# Patient Record
Sex: MALE | Race: WHITE | NOT HISPANIC OR LATINO | Employment: OTHER | URBAN - NONMETROPOLITAN AREA
[De-identification: names, ages, dates, MRNs, and addresses within clinical notes are randomized per-mention and may not be internally consistent; named-entity substitution may affect disease eponyms.]

---

## 2024-05-28 ENCOUNTER — OUTSIDE FACILITY SERVICE (OUTPATIENT)
Dept: CARDIOLOGY | Facility: CLINIC | Age: 69
End: 2024-05-28
Payer: MEDICARE

## 2024-05-29 ENCOUNTER — OUTSIDE FACILITY SERVICE (OUTPATIENT)
Dept: CARDIOLOGY | Facility: CLINIC | Age: 69
End: 2024-05-29
Payer: MEDICARE

## 2024-05-29 PROCEDURE — 93306 TTE W/DOPPLER COMPLETE: CPT | Performed by: INTERNAL MEDICINE

## 2024-05-29 PROCEDURE — 99232 SBSQ HOSP IP/OBS MODERATE 35: CPT | Performed by: INTERNAL MEDICINE

## 2024-05-30 ENCOUNTER — OUTSIDE FACILITY SERVICE (OUTPATIENT)
Dept: CARDIOLOGY | Facility: CLINIC | Age: 69
End: 2024-05-30
Payer: MEDICARE

## 2024-05-30 PROCEDURE — 99232 SBSQ HOSP IP/OBS MODERATE 35: CPT | Performed by: INTERNAL MEDICINE

## 2024-05-31 ENCOUNTER — APPOINTMENT (OUTPATIENT)
Dept: CT IMAGING | Facility: HOSPITAL | Age: 69
End: 2024-05-31
Payer: MEDICARE

## 2024-05-31 ENCOUNTER — OUTSIDE FACILITY SERVICE (OUTPATIENT)
Dept: CARDIOLOGY | Facility: CLINIC | Age: 69
End: 2024-05-31
Payer: MEDICARE

## 2024-05-31 ENCOUNTER — HOSPITAL ENCOUNTER (INPATIENT)
Facility: HOSPITAL | Age: 69
LOS: 20 days | Discharge: NURSING, REHAB OR LONG-TERM CARE | End: 2024-06-20
Attending: INTERNAL MEDICINE | Admitting: FAMILY MEDICINE
Payer: MEDICARE

## 2024-05-31 ENCOUNTER — APPOINTMENT (OUTPATIENT)
Dept: GENERAL RADIOLOGY | Facility: HOSPITAL | Age: 69
End: 2024-05-31
Payer: MEDICARE

## 2024-05-31 PROBLEM — I48.91 ATRIAL FIBRILLATION: Status: ACTIVE | Noted: 2024-05-31

## 2024-05-31 PROBLEM — A04.72 C. DIFFICILE COLITIS: Status: ACTIVE | Noted: 2024-05-31

## 2024-05-31 PROBLEM — K81.9 CHOLECYSTITIS: Status: ACTIVE | Noted: 2024-05-31

## 2024-05-31 PROBLEM — I10 ESSENTIAL HYPERTENSION: Status: ACTIVE | Noted: 2024-05-31

## 2024-05-31 PROBLEM — I21.4 NSTEMI (NON-ST ELEVATED MYOCARDIAL INFARCTION): Status: ACTIVE | Noted: 2024-05-31

## 2024-05-31 PROBLEM — N18.6 ESRD (END STAGE RENAL DISEASE): Status: ACTIVE | Noted: 2024-05-31

## 2024-05-31 LAB
ALBUMIN SERPL-MCNC: 2.5 G/DL (ref 3.5–5.2)
ALBUMIN/GLOB SERPL: 0.9 G/DL
ALP SERPL-CCNC: 201 U/L (ref 39–117)
ALT SERPL W P-5'-P-CCNC: 10 U/L (ref 1–41)
ANION GAP SERPL CALCULATED.3IONS-SCNC: 12 MMOL/L (ref 5–15)
APTT PPP: 53.1 SECONDS (ref 60–90)
AST SERPL-CCNC: 10 U/L (ref 1–40)
BACTERIA UR QL AUTO: ABNORMAL /HPF
BASOPHILS # BLD AUTO: 0.05 10*3/MM3 (ref 0–0.2)
BASOPHILS NFR BLD AUTO: 0.2 % (ref 0–1.5)
BILIRUB SERPL-MCNC: 0.3 MG/DL (ref 0–1.2)
BILIRUB UR QL STRIP: NEGATIVE
BUN SERPL-MCNC: 37 MG/DL (ref 8–23)
BUN/CREAT SERPL: 10.7 (ref 7–25)
CALCIUM SPEC-SCNC: 8.9 MG/DL (ref 8.6–10.5)
CHLORIDE SERPL-SCNC: 99 MMOL/L (ref 98–107)
CLARITY UR: ABNORMAL
CO2 SERPL-SCNC: 22 MMOL/L (ref 22–29)
COLOR UR: YELLOW
CREAT SERPL-MCNC: 3.47 MG/DL (ref 0.76–1.27)
D-LACTATE SERPL-SCNC: 1.3 MMOL/L (ref 0.5–2)
DEPRECATED RDW RBC AUTO: 63 FL (ref 37–54)
EGFRCR SERPLBLD CKD-EPI 2021: 18.1 ML/MIN/1.73
EOSINOPHIL # BLD AUTO: 0.09 10*3/MM3 (ref 0–0.4)
EOSINOPHIL NFR BLD AUTO: 0.4 % (ref 0.3–6.2)
ERYTHROCYTE [DISTWIDTH] IN BLOOD BY AUTOMATED COUNT: 18 % (ref 12.3–15.4)
GEN 5 2HR TROPONIN T REFLEX: 404 NG/L
GLOBULIN UR ELPH-MCNC: 2.8 GM/DL
GLUCOSE SERPL-MCNC: 92 MG/DL (ref 65–99)
GLUCOSE UR STRIP-MCNC: ABNORMAL MG/DL
HCT VFR BLD AUTO: 24.4 % (ref 37.5–51)
HGB BLD-MCNC: 7.5 G/DL (ref 13–17.7)
HGB UR QL STRIP.AUTO: ABNORMAL
HYALINE CASTS UR QL AUTO: ABNORMAL /LPF
IMM GRANULOCYTES # BLD AUTO: 0.16 10*3/MM3 (ref 0–0.05)
IMM GRANULOCYTES NFR BLD AUTO: 0.7 % (ref 0–0.5)
INR PPP: 2.16 (ref 0.89–1.12)
IRON 24H UR-MRATE: 13 MCG/DL (ref 59–158)
IRON SATN MFR SERPL: 7 % (ref 20–50)
KETONES UR QL STRIP: NEGATIVE
LEUKOCYTE ESTERASE UR QL STRIP.AUTO: ABNORMAL
LYMPHOCYTES # BLD AUTO: 1.7 10*3/MM3 (ref 0.7–3.1)
LYMPHOCYTES NFR BLD AUTO: 7.7 % (ref 19.6–45.3)
MCH RBC QN AUTO: 29.8 PG (ref 26.6–33)
MCHC RBC AUTO-ENTMCNC: 30.7 G/DL (ref 31.5–35.7)
MCV RBC AUTO: 96.8 FL (ref 79–97)
MONOCYTES # BLD AUTO: 1.23 10*3/MM3 (ref 0.1–0.9)
MONOCYTES NFR BLD AUTO: 5.6 % (ref 5–12)
NEUTROPHILS NFR BLD AUTO: 18.79 10*3/MM3 (ref 1.7–7)
NEUTROPHILS NFR BLD AUTO: 85.4 % (ref 42.7–76)
NITRITE UR QL STRIP: NEGATIVE
NRBC BLD AUTO-RTO: 0 /100 WBC (ref 0–0.2)
PH UR STRIP.AUTO: 8.5 [PH] (ref 5–8)
PLATELET # BLD AUTO: 249 10*3/MM3 (ref 140–450)
PMV BLD AUTO: 9.2 FL (ref 6–12)
POTASSIUM SERPL-SCNC: 4.2 MMOL/L (ref 3.5–5.2)
PROT SERPL-MCNC: 5.3 G/DL (ref 6–8.5)
PROT UR QL STRIP: ABNORMAL
PROTHROMBIN TIME: 24.3 SECONDS (ref 12.2–14.5)
RBC # BLD AUTO: 2.52 10*6/MM3 (ref 4.14–5.8)
RBC # UR STRIP: ABNORMAL /HPF
REF LAB TEST METHOD: ABNORMAL
SODIUM SERPL-SCNC: 133 MMOL/L (ref 136–145)
SP GR UR STRIP: 1.01 (ref 1–1.03)
SQUAMOUS #/AREA URNS HPF: ABNORMAL /HPF
T4 FREE SERPL-MCNC: 0.53 NG/DL (ref 0.92–1.68)
TIBC SERPL-MCNC: 179 MCG/DL (ref 298–536)
TRANSFERRIN SERPL-MCNC: 120 MG/DL (ref 200–360)
TROPONIN T DELTA: -50 NG/L
TROPONIN T SERPL HS-MCNC: 454 NG/L
TSH SERPL DL<=0.05 MIU/L-ACNC: 65.05 UIU/ML (ref 0.27–4.2)
UFH PPP CHRO-ACNC: >1.1 IU/ML (ref 0.3–0.7)
UROBILINOGEN UR QL STRIP: ABNORMAL
WBC # UR STRIP: ABNORMAL /HPF
WBC NRBC COR # BLD AUTO: 22.02 10*3/MM3 (ref 3.4–10.8)

## 2024-05-31 PROCEDURE — 84443 ASSAY THYROID STIM HORMONE: CPT | Performed by: HOSPITALIST

## 2024-05-31 PROCEDURE — 87077 CULTURE AEROBIC IDENTIFY: CPT | Performed by: HOSPITALIST

## 2024-05-31 PROCEDURE — 99222 1ST HOSP IP/OBS MODERATE 55: CPT | Performed by: INTERNAL MEDICINE

## 2024-05-31 PROCEDURE — 02HV33Z INSERTION OF INFUSION DEVICE INTO SUPERIOR VENA CAVA, PERCUTANEOUS APPROACH: ICD-10-PCS | Performed by: SURGERY

## 2024-05-31 PROCEDURE — 80053 COMPREHEN METABOLIC PANEL: CPT | Performed by: INTERNAL MEDICINE

## 2024-05-31 PROCEDURE — 97602 WOUND(S) CARE NON-SELECTIVE: CPT

## 2024-05-31 PROCEDURE — 85610 PROTHROMBIN TIME: CPT

## 2024-05-31 PROCEDURE — G0316 PR PROLONG INPT EVAL ADD15 M: HCPCS | Performed by: HOSPITALIST

## 2024-05-31 PROCEDURE — 87186 SC STD MICRODIL/AGAR DIL: CPT | Performed by: HOSPITALIST

## 2024-05-31 PROCEDURE — 87086 URINE CULTURE/COLONY COUNT: CPT | Performed by: HOSPITALIST

## 2024-05-31 PROCEDURE — 86901 BLOOD TYPING SEROLOGIC RH(D): CPT

## 2024-05-31 PROCEDURE — 71045 X-RAY EXAM CHEST 1 VIEW: CPT

## 2024-05-31 PROCEDURE — 84439 ASSAY OF FREE THYROXINE: CPT | Performed by: HOSPITALIST

## 2024-05-31 PROCEDURE — 84484 ASSAY OF TROPONIN QUANT: CPT | Performed by: HOSPITALIST

## 2024-05-31 PROCEDURE — 83540 ASSAY OF IRON: CPT | Performed by: HOSPITALIST

## 2024-05-31 PROCEDURE — 85730 THROMBOPLASTIN TIME PARTIAL: CPT

## 2024-05-31 PROCEDURE — 74176 CT ABD & PELVIS W/O CONTRAST: CPT

## 2024-05-31 PROCEDURE — 25010000002 HEPARIN (PORCINE) 25000-0.45 UT/250ML-% SOLUTION

## 2024-05-31 PROCEDURE — 87040 BLOOD CULTURE FOR BACTERIA: CPT | Performed by: HOSPITALIST

## 2024-05-31 PROCEDURE — 81001 URINALYSIS AUTO W/SCOPE: CPT | Performed by: HOSPITALIST

## 2024-05-31 PROCEDURE — 85520 HEPARIN ASSAY: CPT

## 2024-05-31 PROCEDURE — 84466 ASSAY OF TRANSFERRIN: CPT | Performed by: HOSPITALIST

## 2024-05-31 PROCEDURE — 25010000002 MORPHINE PER 10 MG: Performed by: HOSPITALIST

## 2024-05-31 PROCEDURE — 99223 1ST HOSP IP/OBS HIGH 75: CPT | Performed by: HOSPITALIST

## 2024-05-31 PROCEDURE — 83605 ASSAY OF LACTIC ACID: CPT | Performed by: HOSPITALIST

## 2024-05-31 PROCEDURE — 99232 SBSQ HOSP IP/OBS MODERATE 35: CPT | Performed by: INTERNAL MEDICINE

## 2024-05-31 PROCEDURE — 86900 BLOOD TYPING SEROLOGIC ABO: CPT

## 2024-05-31 PROCEDURE — 85025 COMPLETE CBC W/AUTO DIFF WBC: CPT | Performed by: INTERNAL MEDICINE

## 2024-05-31 RX ORDER — SODIUM CHLORIDE 9 MG/ML
40 INJECTION, SOLUTION INTRAVENOUS AS NEEDED
Status: DISCONTINUED | OUTPATIENT
Start: 2024-05-31 | End: 2024-06-20 | Stop reason: HOSPADM

## 2024-05-31 RX ORDER — VANCOMYCIN HYDROCHLORIDE 125 MG/1
125 CAPSULE ORAL EVERY 6 HOURS SCHEDULED
Status: DISCONTINUED | OUTPATIENT
Start: 2024-05-31 | End: 2024-06-14

## 2024-05-31 RX ORDER — SODIUM CHLORIDE 0.9 % (FLUSH) 0.9 %
10 SYRINGE (ML) INJECTION AS NEEDED
Status: DISCONTINUED | OUTPATIENT
Start: 2024-05-31 | End: 2024-06-20 | Stop reason: HOSPADM

## 2024-05-31 RX ORDER — ACETAMINOPHEN 325 MG/1
650 TABLET ORAL EVERY 4 HOURS PRN
Status: DISCONTINUED | OUTPATIENT
Start: 2024-05-31 | End: 2024-06-20 | Stop reason: HOSPADM

## 2024-05-31 RX ORDER — MORPHINE SULFATE 4 MG/ML
4 INJECTION, SOLUTION INTRAMUSCULAR; INTRAVENOUS DAILY PRN
Status: DISPENSED | OUTPATIENT
Start: 2024-05-31 | End: 2024-06-05

## 2024-05-31 RX ORDER — ASPIRIN 81 MG/1
81 TABLET ORAL DAILY
Status: DISCONTINUED | OUTPATIENT
Start: 2024-05-31 | End: 2024-06-20 | Stop reason: HOSPADM

## 2024-05-31 RX ORDER — HYDROCODONE BITARTRATE AND ACETAMINOPHEN 7.5; 325 MG/1; MG/1
1 TABLET ORAL EVERY 6 HOURS PRN
Status: DISPENSED | OUTPATIENT
Start: 2024-05-31 | End: 2024-06-09

## 2024-05-31 RX ORDER — HEPARIN SODIUM 1000 [USP'U]/ML
4000 INJECTION, SOLUTION INTRAVENOUS; SUBCUTANEOUS AS NEEDED
Status: DISCONTINUED | OUTPATIENT
Start: 2024-05-31 | End: 2024-05-31

## 2024-05-31 RX ORDER — LEVOTHYROXINE SODIUM 0.07 MG/1
75 TABLET ORAL
Status: DISCONTINUED | OUTPATIENT
Start: 2024-06-01 | End: 2024-06-20 | Stop reason: HOSPADM

## 2024-05-31 RX ORDER — METOPROLOL SUCCINATE 50 MG/1
50 TABLET, EXTENDED RELEASE ORAL
Status: DISCONTINUED | OUTPATIENT
Start: 2024-05-31 | End: 2024-06-03

## 2024-05-31 RX ORDER — MORPHINE SULFATE 4 MG/ML
4 INJECTION, SOLUTION INTRAMUSCULAR; INTRAVENOUS DAILY PRN
Status: DISCONTINUED | OUTPATIENT
Start: 2024-05-31 | End: 2024-05-31

## 2024-05-31 RX ORDER — LIDOCAINE HYDROCHLORIDE 20 MG/ML
JELLY TOPICAL AS NEEDED
Status: DISCONTINUED | OUTPATIENT
Start: 2024-05-31 | End: 2024-06-20 | Stop reason: HOSPADM

## 2024-05-31 RX ORDER — SODIUM CHLORIDE 0.9 % (FLUSH) 0.9 %
10 SYRINGE (ML) INJECTION EVERY 12 HOURS SCHEDULED
Status: DISCONTINUED | OUTPATIENT
Start: 2024-05-31 | End: 2024-06-20 | Stop reason: HOSPADM

## 2024-05-31 RX ORDER — HEPARIN SODIUM 1000 [USP'U]/ML
2000 INJECTION, SOLUTION INTRAVENOUS; SUBCUTANEOUS AS NEEDED
Status: DISCONTINUED | OUTPATIENT
Start: 2024-05-31 | End: 2024-05-31

## 2024-05-31 RX ORDER — HEPARIN SODIUM 10000 [USP'U]/100ML
20.5 INJECTION, SOLUTION INTRAVENOUS
Status: DISCONTINUED | OUTPATIENT
Start: 2024-05-31 | End: 2024-06-05

## 2024-05-31 RX ADMIN — VANCOMYCIN HYDROCHLORIDE 125 MG: 125 CAPSULE ORAL at 17:45

## 2024-05-31 RX ADMIN — LIDOCAINE HYDROCHLORIDE: 20 JELLY TOPICAL at 18:29

## 2024-05-31 RX ADMIN — MORPHINE SULFATE 4 MG: 4 INJECTION, SOLUTION INTRAMUSCULAR; INTRAVENOUS at 17:52

## 2024-05-31 RX ADMIN — ASPIRIN 81 MG: 81 TABLET, COATED ORAL at 17:45

## 2024-05-31 RX ADMIN — HEPARIN SODIUM 11 UNITS/KG/HR: 10000 INJECTION, SOLUTION INTRAVENOUS at 21:58

## 2024-05-31 RX ADMIN — Medication 10 ML: at 22:09

## 2024-05-31 RX ADMIN — ACETAMINOPHEN 650 MG: 325 TABLET ORAL at 17:49

## 2024-05-31 RX ADMIN — HYDROCODONE BITARTRATE AND ACETAMINOPHEN 1 TABLET: 7.5; 325 TABLET ORAL at 14:58

## 2024-05-31 NOTE — CONSULTS
Patient Name:  Tavo Gudino  YOB: 1955  [id number]       Patient Care Team:  Hayden Weinstein MD as PCP - General (Nephrology)      General Surgery Consult Note     Date of Consultation: 05/31/24    Consulting Physician : Genny Saeed MD    Reason for Consult : Chronic cholecystitis    Subjective     I have been asked to see  Tavo Gudino , a 69 y.o. male in consultation for chronic cholecystitis.  He has a known past medical history of hypertension, hypercholesterolemia, atrial fibrillation, diabetes mellitus, and end-stage renal disease on hemodialysis.  He also has known peripheral vascular disease and underwent a right below-knee amputation in January of this year at [facility].  He is fairly debilitated with a history of sacral decubitus ulcers as well, and had chronic cholecystitis diagnosed in April at the [facility], and underwent cholecystostomy tube placement at that time.  This tube has been in place since then.  He was recently admitted to an outside facility with concern over an acute non-STEMI.  He was transferred to our facility for higher level of care and for cardiac catheterization.  We have been asked to see him to help manage his cholecystostomy tube.  The patient would like the tube removed.  He states he is having no abdominal issues, no nausea or vomiting.  His last bowel movement was today and normal.  He has been tolerating diet without issue.  He has a follow-up appointment at [facility] with interventional radiology for consideration of possible removal.  Otherwise stable.      Allergy: Not on File    Medications:  aspirin, 81 mg, Oral, Daily  [START ON 6/1/2024] levothyroxine, 75 mcg, Oral, Q AM  metoprolol succinate XL, 50 mg, Oral, Q24H  sodium chloride, 10 mL, Intravenous, Q12H  vancomycin, 125 mg, Oral, Q6H      Pharmacy Consult - Pharmacy to dose,       No current facility-administered medications on file prior to encounter.     No  current outpatient medications on file prior to encounter.       PMHx:   Hypertension  Hypercholesterolemia  History of atrial fibrillation  Diabetes mellitus  Peripheral vascular disease status post left below-knee amputation  End-stage renal disease on hemodialysis    Past Surgical History:  Tunneled dialysis catheter placement  Left below-knee amputation    Family History: Noncontributory     Social History: Pt lives in [place].  Retired .    Tobacco use: Denies     EtOH use : Denies    Illicit drug use: Denies      Review of Systems        Constitutional: No fevers, chills or malaise   Eyes: Denies visual changes    Cardiovascular: Denies chest pain, palpitations   Pulmonary: Denies cough or shortness of breath   Abdominal/ GI: See HPI    Genitourinary: Denies dysuria or hematuria   Musculoskeletal: Denies any but chronic joint aches, pains or deformities   Psychiatric: No recent mood changes   Neurologic: No paresthesias or loss of function          Objective     Physical Exam:      Vital Signs  There were no vitals taken for this visit.  No intake or output data in the 24 hours ending 05/31/24 1644      Physical Exam:    Head: Normocephalic, atraumatic.   Eyes: Pupils equal, round, react to light and accommodation.   Mouth: Oral mucosa without lesions,   Neck: No masses, lymphadenopathy or carotid bruits bilaterally   CV: Rhythm  and rate regular , no  murmurs, rubs or gallops  Lungs: Clear  to auscultation bilaterally   Abdomen: Bowel sounds positive  , soft, nontender.  There is a cholecystostomy tube in place draining nguyen brown bile.  Groin : No obvious hernias bilaterally .  Delaney catheter in place  Extremities:  No cyanosis, clubbing or edema bilaterally , left below-knee amputation.  There are bandages over the left stump and around the right lower extremity.  Lymphatics: No abnormal lymphadenopathy appreciated   Neurologic: No gross deficits       Results Review: I have personally  reviewed all of the recent lab and imaging results available at this time.  Laboratory exam demonstrates a white count of 22,000 on arrival with a hemoglobin of 7.5 and a platelet count of 249.  Comprehensive metabolic panel significant for an alkaline phosphatase of 201 with a normal bilirubin of 0.3 and normal AST and ALT of 10 and 10 respectively.  His albumin is 2.5.  Creatinine 3.47.  His high-sensitivity troponin T is 454.    I reviewed his records from the [facility] and his cholecystostomy tube placement was 4/10/2024.           Assessment and Plan:    Chronic cholecystitis- He has evidence of chronic cholecystitis well-managed with a cholecystostomy tube.  I would not recommend removal at this time especially in light of his possible acute myocardial infarction and need for intervention.  From my standpoint I think imaging to confirm its location is reasonable (though the continued drainage of nguyen brown bile would indicate adequate placement).  I would deal with his other medical issues at this time, with planned outpatient follow-up as already scheduled with [facility] to further manage his gallbladder.  I will continue to follow this patient for the next 24 to 48 hours to confirm appropriate placement of the cholecystostomy tube.  No indication for surgical intervention at this time.         Active Hospital Problems    Diagnosis  POA    **NSTEMI (non-ST elevated myocardial infarction) [I21.4]  Yes    C. difficile colitis [A04.72]  Yes    History of cholecystitis s/p cholecystostomy tube [K81.9]  Yes    ESRD (end stage renal disease) on hemodialysis [N18.6]  Yes    Atrial fibrillation [I48.91]  Yes    Essential hypertension [I10]  Yes      Resolved Hospital Problems   No resolved problems to display.            I discussed the patient's findings and my recommendations with the patient and/or family, as well as the primary team     Niranjan Beckman MD  05/31/24  16:44  EDT        Dictated using Dragon Dictation

## 2024-05-31 NOTE — H&P
[facility] Medicine Services  HISTORY AND PHYSICAL    Patient Name: Tavo Gudino  : 1955  MRN: [id number]  Primary Care Physician: Moses Ordaz MD  Date of admission: 2024      Subjective   Subjective     Chief Complaint:  Transfer from OSH due to chest pain, Afib with RVR, possible NSTEMI    HPI:  Tavo Gudino is a 69 y.o. male with hx of HTN, PAD, left BKA, ESRD on hemodialysis, Afib on Eliquis, recent acute cholecystitis s/p cholecystostomy tube placement at , chronic urinary retention with indwelling Delaney catheter, and previous hx of C.diff who presents from [facility] for Cardiology evaluation due to chest pain, elevated troponin concerning for NSTEMI, and Afib with RVR. He typically gets all of his care at [facility]. Admitted to [facility] on 24 with complaints of chest pain and palpitations. Found to have elevated troponins (18 -->97-->472) and was in Afib with RVR. CXR negative. Rate was controlled with IV Metoprolol. He was continued on Eliquis. Due to concern for NSTEMI, case discussed with Cardiology/Dr. Amador who accepted patient in transfer for [facility]. However, while awaiting transfer, he developed diarrhea and worsening leukocytosis up to 24K (WBC was normal on day of admission). C.diff toxin was negative but antigen was positive. He was started on PO Vanc.    Very limited records sent from OSH. I talked to one of the providers there today by phone who said patient did not complain of any abdominal pain, but had loose stools/diarrhea after receiving Kayexalate for hyperkalemia as well as Colace. His blood cultures from admission were negative. He last had dialysis on 24 - provider at OSH reports he gets dialysis daily at his facility? Delaney was not exchanged on admission either.       Personal History     No past medical history on file.        No past surgical history on file.    Family History: family history is not on file.      Social History:    Social History     Social History Narrative    Not on file       Medications:  Available home medication information reviewed.       Not on File    Objective   Objective     Vital Signs:           Physical Exam   Constitutional: Awake, alert  Eyes: PERRLA, sclerae anicteric, no conjunctival injection  HENT: NCAT, mucous membranes moist  Neck: Supple, no thyromegaly, no lymphadenopathy, trachea midline  Respiratory: Clear to auscultation bilaterally, nonlabored respirations   Cardiovascular: irregularly irregular, no murmurs, rubs, or gallops, palpable pedal pulses bilaterally  Gastrointestinal: Positive bowel sounds, soft, nontender, nondistended, cholecystostomy drain in place  Musculoskeletal: No bilateral ankle edema, no clubbing or cyanosis to extremities; left BKA stump in dressing  Psychiatric: Appropriate affect, cooperative  Neurologic: Oriented x 3, strength symmetric in all extremities, Cranial Nerves grossly intact to confrontation, speech clear  Skin: No rashes      Result Review:  I have personally reviewed the results from the time of this admission to 5/31/2024 14:29 EDT and agree with these findings:  [x]  Laboratory list / accordion  []  Microbiology  []  Radiology  []  EKG/Telemetry   []  Cardiology/Vascular   []  Pathology  [x]  Old records  []  Other:      LAB RESULTS:                              UA          1/10/2024    12:59 1/31/2024    22:59 4/5/2024    17:48   Urinalysis   Squamous Epithelial Cells, UA 0-2     0-2     Unable to estimate due to obscuring WBC's (UNEWBC)       Specific [place], UA 1.013     1.011     1.015       Blood, UA Negative     Moderate     Large       Leukocytes, UA Negative     Small     Large       RBC, UA 3     4-10     Unable to estimate due to obscuring WBC's (UNEWBC)       Bacteria, UA Negative     Present     Unable to estimate due to obscuring WBC's (UNEWBC)          Details          This result is from an external source.                Microbiology Results (last 10 days)       ** No results found for the last 240 hours. **            No radiology results from the last 24 hrs        Assessment & Plan   Assessment & Plan       NSTEMI (non-ST elevated myocardial infarction)    C. difficile colitis    History of cholecystitis s/p cholecystostomy tube    ESRD (end stage renal disease) on hemodialysis    Atrial fibrillation    Essential hypertension        70 yo M with hx of HTN, PAD, left BKA, ESRD on hemodialysis, Afib on Eliquis, recent acute cholecystitis s/p cholecystostomy tube placement at , chronic urinary retention with indwelling Delaney catheter, and previous hx of C.diff who presents from [facility] due to chest pain, elevated troponin, and Afib with RVR.     Chest pain  Elevated troponin, possible NSTEMI  --Transferred to [facility] for LHC with Dr. Amador  --Troponins at OSH 18 -->97-->472  --Troponin 454 here  --Monitor on telemetry  --Dr. Amador/Cardiology consulted, tentative plan for [facility] next week  --ASA, heparin drip, beta blocker    Afib with RVR  Hx of Afib/Aflutter  --Rate controlled with Metoprolol apparently per OSH notes  --Hold Eliquis for heart cath, on heparin drip as above  --Continue Metoprolol    HFrEF  Moderate MR  --Most recent Echo on file I April 2024: EF 40%, akinetic septal, anterior, and apical walls, severely dilated LA, moderate MR  --Follows with  Cardiology, had been evaluated previously for Vanessa clip  --Repeat Echo ordered here as no Echo results sent from OSH    Leukocytosis  C.diff colitis?  --Apparently WBC increased to 24K with complaints of diarrhea, had negative C.diff toxin but positive C.diff antigen? Was started on PO Vanc at OSH; of note, I talked to the provider there who stated he had loose stools following administration of Kayexalate and Colace  --Pe chart review, he had C.diff documented on 4/6/24 from   --WBC 22K here  --Will repeat C.diff testing here and continue on PO Vanc for  now  --Check blood cultures, CT A/P, and UA for further workup     Recent cholecystitis s/p cholecystostomy tube  --Request records from that hospitalization, apparently was placed at  but I cannot find records of this  --Consult General Surgery for management of tube, discussed with Dr. Beckman  --Check CT A/P to ensure tube is in place  --Apparently already has follow up at  6/6/24 General Surgery    Chronic urinary retention  --Has Delaney in place, reported hx of urethral injury per OSH note (data deficit, no idea when this was Delaney placed), appears he follows with Urology at  and this is a chronic Delaney  --Replace Delaney here, send UA with culture due to leukocytosis    PAD  Hx of left BKA  --Continue ASA    Anemia  --Likely chronic due to renal disease  --Hb 7.5 here, had been 9 at OSH  --Check iron studies, B12, folate  --Monitor closely while on heparin drip    HTN  --Confirm and continue home meds    ESRD  --Nephrology consulted for dialysis  --Last HD 5/30/24     Hypothyroidism  --TSH significantly elevated at 91 at OSH, apparently free T4 was low but do not have that test result available to review  --Synthroid dose was increased to 75 mcg daily at OSH  --Will repeat TSH, free T4 here  --Continue Synthroid at above dose    Multiple wounds  --Wound care consult    Poor IV access  --Dr. Beckman plans to place central line this evening, appreciate his assistance      DVT prophylaxis:  Mechanical DVT prophylaxis orders are present.      Total time spent: >90 minutes  Time spent includes time reviewing chart, face-to-face time, counseling patient/family/caregiver, ordering medications/tests/procedures, communicating with other health care professionals, documenting clinical information in the electronic health record, and coordination of care.     CODE STATUS:    Code Status and Medical Interventions:   Ordered at: 05/31/24 1429     Code Status (Patient has no pulse and is not breathing):    CPR (Attempt to  Resuscitate)     Medical Interventions (Patient has pulse or is breathing):    Full Support       Expected Discharge   Expected discharge date/ time has not been documented.     Genny Saeed MD  05/31/24

## 2024-05-31 NOTE — NURSING NOTE
Woc consulted for multiple wounds we will start at the worst.    Right lateral lower leg-exposed tendon mild granulation proximal and distal with good dermis some puffy unhealthy looking subcu possibly at the anterior aspect of wound.    Tendon needs to be kept moist.  I cleansed with a 5-minute Vashe soak and then I applied normal saline hydrogel I topped it with a Mepitel silicone contact layer for ease of removal of the dressings without it so we will damage to the tendon when we change the dressings.  I covered with a silver foam Mepilex Ag for antimicrobial properties.  Hydrogel can donate wound moisture to the wound bed for 3 days however consulted PT wound care over the weekend and then when I get back on Monday Wo has a prevalent study we are not here on the weekends so we may have tons of consults on Monday however I will try to work him back ends of the 2 different wound care departments can piggyback him or give them will make sure he gets the care he needs for this tendon.    Right lateral malleolus has some dry slough/scabs that is peeling off and some healing dermis it is very superficial nondraining.  Just keep this covered with the Allevyn heel dressing.    Right posterior heel is unstageable but it still most likely predominantly arterial there is black eschar and yellow slough in the wound bed it is unstable as the edges are open and red.  Mild drainage noted.    Clean this with normal saline, apply nickel thick layer of Santyl and cover with the other side of the Allevyn silicone foam dressing.  This will need to be done daily.    There is a right groin wound looks like a cath site is very small about 0.7 cm in diameter it looks infected there was some draining creamy white-yellow purulence that I cleansed off there is a little bit of puffy gray slough in the wound bed with some not granulating pink subcutaneous tissue.    This was cleansed with normal saline and Thera honey applied this can be  changed every other day.    Patient has a sacral wound stage III roughly 1.5 cm x 2 cm but it is rather deep and is about 1.2 cm deep no bone exposed it is granulating and somewhat bleeding.    Cleanse with 5-minute Vashe soak and packed with collagen there is scar tissue around this area indicating larger pressure injury at 1 time I sprayed the periwound with barrier spray let it dry and covered with a 4 x 4 silicone foam.    Left gluteal has it what looks like a stage III as there is subcutaneous tissue exposed but it is rather superficial it is around about 3.5 cm in diameter it is also bleeding.    Cleanse with 5-minute Vashe soak and applied silicone foam after a barrier sprayed x-ray I put collagen in there because its Ag collagen to stop the bleeding spray periwound with barrier spray and then covered with silicone foam.    It must be noted that the 2 sacral and gluteal wounds these wounds he is having frequent bowel movements per nursing due to the C. difficile runny stools so if the runny stools are prohibiting the dressings from sticking even after barrier spray the instructions are to cleanse all of area with pH no rinse foam and blue wipes and then lightly dusting these wounds with stoma powder spraying with barrier spray letting dry and covering with calazime paste.    For the left knee stump.  The most of the knee is a pale pink smooth not granulating wound bed this is indicative of bioburden.  There is also a large amount of yellow and black slough in the center of the wound bed.  Patient states he was getting Santyl to this and the dressing that I took off was just Santyl and trach foam placed over top of it which really hurt when I removed it the trach foam is in adequate for wet-to-dry type debridement so unclear why they were doing this.              I cleansed with a 5-minute Vashe soak and then I did not have Santyl so I applied Thera honey gel with a thin foam for the slough and then I applied  the hydrogel all around to the not granulating covered with ABD pad and wrapped with Kerlix and secured with the patient's own elastic bandage.    Have ordered PT wound care to check on this and the tendon as I think this might be too advanced for regular bedside nursing to maintain I will try to come back on Monday.    RICHY bed ordered the kind from agility that she can plug-in and has dynamic inflation of air cells because he needs more than just the Isotoner with Isotoner can provide.

## 2024-05-31 NOTE — LETTER
EMS Transport Request  For use at [facility], [place], [place], [place], and [place] only   Patient Name: Tavo Gudino : 1955   Weight:84.8 kg (186 lb 15.2 oz) Pick-up Location: [facility] ([facility] BLS/ALS: BLS/ALS: BLS   Insurance: [employer] MEDICARE REPLACEMENT Auth End Date:    Pre-Cert #: D/C Summary complete:    Destination: [facility] in [facility]   Contact Precautions: Droplet/Spore   Equipment (O2, Fluids, etc.): None   Arrive By Date/Time: tomorrow afternoon 1500 or 1600 still need insurance approval  Stretcher/WC: Stretcher   CM Requesting: Clarissa Jasso RN Ext: [phone]   Notes/Medical Necessity: fall risk and debility     ______________________________________________________________________    *Only 2 patient bags OR 1 carry-on size bag are permitted.  Wheelchairs and walkers CANNOT transported with the patient. Acknowledge: Yes

## 2024-05-31 NOTE — OP NOTE
OPERATIVE NOTE    Patient Name:  Tavo Gudino  YOB: 1955  [id number]    5/31/2024      PREOPERATIVE DIAGNOSIS: NSTEMI      POSTOPERATIVE DIAGNOSIS: Same       PROCEDURE PERFORMED: Left internal jugular triple-lumen central line placement       SURGEON: Niranjan Beckman MD        SPECIMENS: None       ANESTHESIA: Local      GRAFTS/IMPLANTS: Triple lumen central line catheter       FINDINGS:   1.  7 [place] triple-lumen catheter placed in the left internal jugular vein to a depth of 15 cm       INDICATIONS: The patient is a 69 y.o. male who presented with NSTEMI.  He has renal failure and is not a candidate for PICC line.  He is a difficult IV access. They are in need of central venous access. The risks and benefits of central line placement were discussed at length with the patient and their family, and they agree to proceed.       DESCRIPTION OF PROCEDURE:      After obtaining informed consent, the patient remained in their room and was placed in the Trendelenburg position.  The neck and chest were prepped and draped in standard sterile fashion and after infiltrating the skin with local anesthetic, under ultrasound guidance an 18-gauge needle was advanced without difficulty into the left internal jugular vein.  A guidewire was placed through the needle to the level of the cavoatrial junction.  Wire positioning was confirmed by ultrasound.  The needle was removed over the guidewire and the tract dilated.  Using the Seldinger technique a 7 [place] triple lumen catheter was placed to a depth of 15 cm. The guidewire was removed and Dio testing confirmed venous cannulation.  All 3 ports ellen blood easily and were flushed with normal saline.  The catheter was sutured to the skin at a depth of 15 cm, dressed in standard sterile fashion and covered with a dry sterile dressing.  There were no  immediate complications.    All sponge and needle counts were correct times two at the completion of the procedure.   A postprocedure chest x-ray is pending at the time of this dictation.        Niranjan Beckman MD  5/31/2024  19:17 EDT

## 2024-05-31 NOTE — LETTER
EMS Transport Request  For use at [facility], [place], [place], [place], and [place] only   Patient Name: Tavo Gudino : 1955   Weight:84.8 kg (186 lb 15.2 oz) [facility]-up Location: [facility] BLS/ALS: BLS/ALS: BLS   Insurance: HU[employer] MEDICARE REPLACEMENT Auth End Date: 24   Pre-Cert #: D/C Summary complete:    Destination: [facility]   Contact Precautions: MRSA/C. Diff   Equipment (O2, Fluids, etc.): None   Arrive By Date/Time: 24 between [id number] Stretcher/WC: Stretcher   CM Requesting: Serina Sinclair RN Ext: [phone]   Notes/Medical Necessity: amputation, wounds, cannot bear weight on existing leg     ______________________________________________________________________    *Only 2 patient bags OR 1 carry-on size bag are permitted.  Wheelchairs and walkers CANNOT transported with the patient. Acknowledge: Yes

## 2024-05-31 NOTE — CONSULTS
[facility] at [facility]  CARDIOLOGY CONSULTATION NOTE    Tavo Gudino  : 1955  MRN:[id number]  Home Phone:[phone]    Date of Admission:2024  Date of Consultation: 24    PCP: Hayden Weinstein MD    IDENTIFICATION: A 69 y.o. male resident of [place], [place]     CC: Elevated troponin/ Afib with RVR    PROBLEM LIST:     NSTEMI (non-ST elevated myocardial infarction)    C. difficile colitis    History of cholecystitis s/p cholecystostomy tube    ESRD (end stage renal disease) on hemodialysis    Atrial fibrillation    Essential hypertension    ALLERGIES: PCN     HPI: Patient is an extremely poor historian, with no family present, history is obtained from chart review. Mr. Gudino is a 70 y/o male with mitral regurgitation, HFmrEF, PAF on Eliquis, ESRD on dialysis, PAD, s/p left BKA,  who is transferred from [facility] for elevated troponin, Afib with RVR. Patient is followed by  Cardiology, and most of his specialty care has been received at [facility]. MITCHELL 10/2023 showed EF 40-50%, moderate MR and mild MS. He presented to OSH with chest pain/burning sensation, associated with Afib with RVR. Also found to be anemic and WBC count elevated overnight with complaints of abdominal pain and diarrhea, antigen positive for CDiff. He has multiple intercurrent medical problems, namely recent cholecystitis with GB drain in place, sacral decubitus ulcer, nonhealing leg ulcers, and an indwelling urinary catheter. He currently denies any chest pain, reports the chest pain resolved the night he went in to [facility] on . He is in rate controlled Afib currently.       ROS: Unable to obtain.    Surgical History: No past surgical history on file.    Social History:   Social History     Socioeconomic History    Marital status:        Family History: No family history on file.    Objective     There were no vitals taken for this visit.  No intake or output data in the 24  hours ending 05/31/24 1542    PHYSICAL EXAM:  CONSTITUTIONAL: Well nourished, in no acute distress, chronically ill appearing   HEENT: Normocephalic, atraumatic, PERRLA, no JVD  CARDIOVASCULAR:  Irregular rhythm and normal rate, + systolic murmur, no gallop, rub.   RESPIRATORY: Normal respiratory effort, no wheezing, rales or rhonchi  EXTREMITIES: Left BKA with stump dressing in place, RLE dressing in place for nonhealing ulcers per patient  NEUROLOGICAL: No gross focal deficits    Labs/Diagnostic Data  Results from last 7 days   Lab Units 05/31/24  1421   SODIUM mmol/L 133*   POTASSIUM mmol/L 4.2   CHLORIDE mmol/L 99   CO2 mmol/L 22.0   BUN mg/dL 37*   CREATININE mg/dL 3.47*   GLUCOSE mg/dL 92   CALCIUM mg/dL 8.9     Results from last 7 days   Lab Units 05/31/24  1421   HSTROP T ng/L 454*     Results from last 7 days   Lab Units 05/31/24  1421   WBC 10*3/mm3 22.02*   HEMOGLOBIN g/dL 7.5*   HEMATOCRIT % 24.4*   PLATELETS 10*3/mm3 249                               I personally reviewed the patient's EKG/Telemetry data    Radiology Data:   No radiology results for the last day      Current Medications:    sodium chloride, 10 mL, Intravenous, Q12H  vancomycin, 125 mg, Oral, Q6H             Assessment and Plan:     1. NSTEMI  - elevated HS troponin at OSH with Afib with RVR and symptoms of chest pain. EKGs from OSH not available for review  - HS troponin here 454, currently asymptomatic   - eventual plan for cardiac cath early next week with Dr. Amador once evaluated by consultants   - will start ECASA, BB and Heparin gtt   - check echo     2. Afib with RVR  - rate controlled at this time  - continue BB, and Heparin gtt for anticoagulation     3. ESRD on dialysis  - NAL consulted     4. PAD, s/p left BKA and nonhealing ulcers of RLE  5. Diarrhea with C-Diff antigen positive at OSH  - per primary     6. Anemia  - acute/chronic  - per primary service     7. Recent cholecystitis with GB drain in place  - will consult  general surgery to see for further management         Scribed for rFed Amador MD by Keren Escalona PA-C. 5/31/2024  15:49 EDT    I,Fred Amador M.D., personally performed the services described in this documentation as scribed by the above named individual in my presence, and it is both accurate and complete.    Fred Amador MD, [facility], [facility]  05/31/24  17:07 EDT

## 2024-06-01 ENCOUNTER — APPOINTMENT (OUTPATIENT)
Dept: NEPHROLOGY | Facility: HOSPITAL | Age: 69
End: 2024-06-01
Payer: MEDICARE

## 2024-06-01 ENCOUNTER — APPOINTMENT (OUTPATIENT)
Dept: CARDIOLOGY | Facility: HOSPITAL | Age: 69
End: 2024-06-01
Payer: MEDICARE

## 2024-06-01 LAB
ABO GROUP BLD: NORMAL
ABO GROUP BLD: NORMAL
ANION GAP SERPL CALCULATED.3IONS-SCNC: 14 MMOL/L (ref 5–15)
APTT PPP: 43.4 SECONDS (ref 60–90)
APTT PPP: 59.2 SECONDS (ref 60–90)
ASCENDING AORTA: 3.2 CM
BASOPHILS # BLD AUTO: 0.05 10*3/MM3 (ref 0–0.2)
BASOPHILS NFR BLD AUTO: 0.2 % (ref 0–1.5)
BH CV ECHO MEAS - AO MAX PG: 20.1 MMHG
BH CV ECHO MEAS - AO MEAN PG: 8 MMHG
BH CV ECHO MEAS - AO ROOT DIAM: 3.4 CM
BH CV ECHO MEAS - AO V2 MAX: 224 CM/SEC
BH CV ECHO MEAS - AO V2 VTI: 35.1 CM
BH CV ECHO MEAS - AVA(I,D): 1.43 CM2
BH CV ECHO MEAS - EDV(CUBED): 140.6 ML
BH CV ECHO MEAS - EDV(MOD-SP2): 114 ML
BH CV ECHO MEAS - EDV(MOD-SP4): 140 ML
BH CV ECHO MEAS - EF(MOD-BP): 58.1 %
BH CV ECHO MEAS - EF(MOD-SP2): 62.7 %
BH CV ECHO MEAS - EF(MOD-SP4): 52.9 %
BH CV ECHO MEAS - ESV(CUBED): 46.7 ML
BH CV ECHO MEAS - ESV(MOD-SP2): 42.5 ML
BH CV ECHO MEAS - ESV(MOD-SP4): 66 ML
BH CV ECHO MEAS - FS: 30.8 %
BH CV ECHO MEAS - IVS/LVPW: 1 CM
BH CV ECHO MEAS - IVSD: 1.1 CM
BH CV ECHO MEAS - LA DIMENSION: 4.7 CM
BH CV ECHO MEAS - LAT PEAK E' VEL: 8.2 CM/SEC
BH CV ECHO MEAS - LV DIASTOLIC VOL/BSA (35-75): 66.9 CM2
BH CV ECHO MEAS - LV MASS(C)D: 220.8 GRAMS
BH CV ECHO MEAS - LV MAX PG: 2.7 MMHG
BH CV ECHO MEAS - LV MEAN PG: 1 MMHG
BH CV ECHO MEAS - LV SYSTOLIC VOL/BSA (12-30): 31.6 CM2
BH CV ECHO MEAS - LV V1 MAX: 82.7 CM/SEC
BH CV ECHO MEAS - LV V1 VTI: 14.5 CM
BH CV ECHO MEAS - LVIDD: 5.2 CM
BH CV ECHO MEAS - LVIDS: 3.6 CM
BH CV ECHO MEAS - LVOT AREA: 3.5 CM2
BH CV ECHO MEAS - LVOT DIAM: 2.1 CM
BH CV ECHO MEAS - LVPWD: 1.1 CM
BH CV ECHO MEAS - MED PEAK E' VEL: 7.9 CM/SEC
BH CV ECHO MEAS - MR MAX PG: 101.6 MMHG
BH CV ECHO MEAS - MR MAX VEL: 504 CM/SEC
BH CV ECHO MEAS - MR MEAN PG: 65 MMHG
BH CV ECHO MEAS - MR MEAN VEL: 375 CM/SEC
BH CV ECHO MEAS - MR VTI: 142 CM
BH CV ECHO MEAS - MV DEC SLOPE: 611 CM/SEC2
BH CV ECHO MEAS - MV MAX PG: 8.4 MMHG
BH CV ECHO MEAS - MV MEAN PG: 3.5 MMHG
BH CV ECHO MEAS - MV P1/2T: 69.5 MSEC
BH CV ECHO MEAS - MV V2 VTI: 32.9 CM
BH CV ECHO MEAS - MVA(P1/2T): 3.2 CM2
BH CV ECHO MEAS - MVA(VTI): 1.53 CM2
BH CV ECHO MEAS - PA ACC TIME: 0.16 SEC
BH CV ECHO MEAS - SV(LVOT): 50.2 ML
BH CV ECHO MEAS - SV(MOD-SP2): 71.5 ML
BH CV ECHO MEAS - SV(MOD-SP4): 74 ML
BH CV ECHO MEAS - SVI(LVOT): 24 ML/M2
BH CV ECHO MEAS - SVI(MOD-SP2): 34.2 ML/M2
BH CV ECHO MEAS - SVI(MOD-SP4): 35.4 ML/M2
BH CV ECHO MEAS - TAPSE (>1.6): 1.6 CM
BH CV VAS BP LEFT ARM: NORMAL MMHG
BH CV XLRA - RV BASE: 3.2 CM
BH CV XLRA - RV LENGTH: 8.5 CM
BH CV XLRA - RV MID: 2.6 CM
BH CV XLRA - TDI S': 11.5 CM/SEC
BLD GP AB SCN SERPL QL: NEGATIVE
BUN SERPL-MCNC: 40 MG/DL (ref 8–23)
BUN/CREAT SERPL: 10.5 (ref 7–25)
CALCIUM SPEC-SCNC: 8.7 MG/DL (ref 8.6–10.5)
CHLORIDE SERPL-SCNC: 100 MMOL/L (ref 98–107)
CK SERPL-CCNC: 12 U/L (ref 20–200)
CO2 SERPL-SCNC: 19 MMOL/L (ref 22–29)
CREAT SERPL-MCNC: 3.8 MG/DL (ref 0.76–1.27)
DEPRECATED RDW RBC AUTO: 61.6 FL (ref 37–54)
EGFRCR SERPLBLD CKD-EPI 2021: 16.2 ML/MIN/1.73
EOSINOPHIL # BLD AUTO: 0.13 10*3/MM3 (ref 0–0.4)
EOSINOPHIL NFR BLD AUTO: 0.6 % (ref 0.3–6.2)
ERYTHROCYTE [DISTWIDTH] IN BLOOD BY AUTOMATED COUNT: 17.7 % (ref 12.3–15.4)
FERRITIN SERPL-MCNC: 1181 NG/ML (ref 30–400)
FOLATE SERPL-MCNC: 4.36 NG/ML (ref 4.78–24.2)
GLUCOSE SERPL-MCNC: 76 MG/DL (ref 65–99)
HCT VFR BLD AUTO: 22.1 % (ref 37.5–51)
HGB BLD-MCNC: 6.9 G/DL (ref 13–17.7)
IMM GRANULOCYTES # BLD AUTO: 0.19 10*3/MM3 (ref 0–0.05)
IMM GRANULOCYTES NFR BLD AUTO: 0.9 % (ref 0–0.5)
LEFT ATRIUM VOLUME INDEX: 48.3 ML/M2
LYMPHOCYTES # BLD AUTO: 1.96 10*3/MM3 (ref 0.7–3.1)
LYMPHOCYTES NFR BLD AUTO: 9.6 % (ref 19.6–45.3)
MCH RBC QN AUTO: 29.9 PG (ref 26.6–33)
MCHC RBC AUTO-ENTMCNC: 31.2 G/DL (ref 31.5–35.7)
MCV RBC AUTO: 95.7 FL (ref 79–97)
MONOCYTES # BLD AUTO: 1.42 10*3/MM3 (ref 0.1–0.9)
MONOCYTES NFR BLD AUTO: 7 % (ref 5–12)
MR PISA EROA: 0.28 CM2
MV REGURGITANT VOLUME: 39 CC
NEUTROPHILS NFR BLD AUTO: 16.57 10*3/MM3 (ref 1.7–7)
NEUTROPHILS NFR BLD AUTO: 81.7 % (ref 42.7–76)
NRBC BLD AUTO-RTO: 0 /100 WBC (ref 0–0.2)
PLATELET # BLD AUTO: 241 10*3/MM3 (ref 140–450)
PMV BLD AUTO: 9.8 FL (ref 6–12)
POTASSIUM SERPL-SCNC: 4.3 MMOL/L (ref 3.5–5.2)
RBC # BLD AUTO: 2.31 10*6/MM3 (ref 4.14–5.8)
RH BLD: POSITIVE
RH BLD: POSITIVE
SODIUM SERPL-SCNC: 133 MMOL/L (ref 136–145)
T&S EXPIRATION DATE: NORMAL
TROPONIN T SERPL HS-MCNC: 386 NG/L
VIT B12 BLD-MCNC: 755 PG/ML (ref 211–946)
WBC NRBC COR # BLD AUTO: 20.32 10*3/MM3 (ref 3.4–10.8)

## 2024-06-01 PROCEDURE — 82607 VITAMIN B-12: CPT | Performed by: HOSPITALIST

## 2024-06-01 PROCEDURE — 86900 BLOOD TYPING SEROLOGIC ABO: CPT

## 2024-06-01 PROCEDURE — 86900 BLOOD TYPING SEROLOGIC ABO: CPT | Performed by: NURSE PRACTITIONER

## 2024-06-01 PROCEDURE — 82550 ASSAY OF CK (CPK): CPT | Performed by: STUDENT IN AN ORGANIZED HEALTH CARE EDUCATION/TRAINING PROGRAM

## 2024-06-01 PROCEDURE — 80048 BASIC METABOLIC PNL TOTAL CA: CPT | Performed by: HOSPITALIST

## 2024-06-01 PROCEDURE — 25010000002 HEPARIN (PORCINE) 25000-0.45 UT/250ML-% SOLUTION

## 2024-06-01 PROCEDURE — 93306 TTE W/DOPPLER COMPLETE: CPT

## 2024-06-01 PROCEDURE — 86850 RBC ANTIBODY SCREEN: CPT | Performed by: NURSE PRACTITIONER

## 2024-06-01 PROCEDURE — 99232 SBSQ HOSP IP/OBS MODERATE 35: CPT | Performed by: STUDENT IN AN ORGANIZED HEALTH CARE EDUCATION/TRAINING PROGRAM

## 2024-06-01 PROCEDURE — 86901 BLOOD TYPING SEROLOGIC RH(D): CPT | Performed by: NURSE PRACTITIONER

## 2024-06-01 PROCEDURE — 84484 ASSAY OF TROPONIN QUANT: CPT | Performed by: HOSPITALIST

## 2024-06-01 PROCEDURE — 82746 ASSAY OF FOLIC ACID SERUM: CPT | Performed by: HOSPITALIST

## 2024-06-01 PROCEDURE — P9016 RBC LEUKOCYTES REDUCED: HCPCS

## 2024-06-01 PROCEDURE — 82728 ASSAY OF FERRITIN: CPT | Performed by: STUDENT IN AN ORGANIZED HEALTH CARE EDUCATION/TRAINING PROGRAM

## 2024-06-01 PROCEDURE — 93306 TTE W/DOPPLER COMPLETE: CPT | Performed by: INTERNAL MEDICINE

## 2024-06-01 PROCEDURE — 5A1D70Z PERFORMANCE OF URINARY FILTRATION, INTERMITTENT, LESS THAN 6 HOURS PER DAY: ICD-10-PCS | Performed by: FAMILY MEDICINE

## 2024-06-01 PROCEDURE — 25010000002 DAPTOMYCIN PER 1 MG: Performed by: STUDENT IN AN ORGANIZED HEALTH CARE EDUCATION/TRAINING PROGRAM

## 2024-06-01 PROCEDURE — 86923 COMPATIBILITY TEST ELECTRIC: CPT

## 2024-06-01 PROCEDURE — 85025 COMPLETE CBC W/AUTO DIFF WBC: CPT

## 2024-06-01 PROCEDURE — 85730 THROMBOPLASTIN TIME PARTIAL: CPT

## 2024-06-01 PROCEDURE — 25010000002 CEFTRIAXONE PER 250 MG: Performed by: STUDENT IN AN ORGANIZED HEALTH CARE EDUCATION/TRAINING PROGRAM

## 2024-06-01 RX ORDER — FINASTERIDE 5 MG/1
5 TABLET, FILM COATED ORAL DAILY
COMMUNITY
End: 2024-06-20 | Stop reason: HOSPADM

## 2024-06-01 RX ORDER — LEVOTHYROXINE SODIUM 0.03 MG/1
25 TABLET ORAL
COMMUNITY
End: 2024-06-20 | Stop reason: HOSPADM

## 2024-06-01 RX ORDER — LIDOCAINE HYDROCHLORIDE 20 MG/ML
JELLY TOPICAL 2 TIMES DAILY
Status: DISCONTINUED | OUTPATIENT
Start: 2024-06-01 | End: 2024-06-20 | Stop reason: HOSPADM

## 2024-06-01 RX ORDER — LORAZEPAM 0.5 MG/1
0.25 TABLET ORAL ONCE
Status: COMPLETED | OUTPATIENT
Start: 2024-06-01 | End: 2024-06-01

## 2024-06-01 RX ORDER — METRONIDAZOLE 500 MG/1
500 TABLET ORAL EVERY 8 HOURS SCHEDULED
Status: DISCONTINUED | OUTPATIENT
Start: 2024-06-01 | End: 2024-06-02

## 2024-06-01 RX ORDER — AMIODARONE HYDROCHLORIDE 200 MG/1
200 TABLET ORAL DAILY
COMMUNITY
End: 2024-06-20 | Stop reason: HOSPADM

## 2024-06-01 RX ORDER — TORSEMIDE 100 MG/1
100 TABLET ORAL DAILY
COMMUNITY
End: 2024-06-20 | Stop reason: HOSPADM

## 2024-06-01 RX ORDER — TRAZODONE HYDROCHLORIDE 50 MG/1
50 TABLET ORAL NIGHTLY
COMMUNITY
End: 2024-06-20 | Stop reason: HOSPADM

## 2024-06-01 RX ORDER — METOPROLOL TARTRATE 1 MG/ML
5 INJECTION, SOLUTION INTRAVENOUS EVERY 6 HOURS PRN
Status: DISCONTINUED | OUTPATIENT
Start: 2024-06-01 | End: 2024-06-20 | Stop reason: HOSPADM

## 2024-06-01 RX ORDER — SODIUM BICARBONATE 650 MG/1
1300 TABLET ORAL 2 TIMES DAILY
COMMUNITY
End: 2024-06-20 | Stop reason: HOSPADM

## 2024-06-01 RX ORDER — TAMSULOSIN HYDROCHLORIDE 0.4 MG/1
1 CAPSULE ORAL DAILY
COMMUNITY
End: 2024-06-20 | Stop reason: HOSPADM

## 2024-06-01 RX ORDER — HYDRALAZINE HYDROCHLORIDE 25 MG/1
25 TABLET, FILM COATED ORAL 3 TIMES DAILY
COMMUNITY
End: 2024-06-20 | Stop reason: HOSPADM

## 2024-06-01 RX ADMIN — METRONIDAZOLE 500 MG: 500 TABLET ORAL at 21:11

## 2024-06-01 RX ADMIN — LIDOCAINE HYDROCHLORIDE: 20 JELLY TOPICAL at 21:07

## 2024-06-01 RX ADMIN — VANCOMYCIN HYDROCHLORIDE 125 MG: 125 CAPSULE ORAL at 16:45

## 2024-06-01 RX ADMIN — HYDROCODONE BITARTRATE AND ACETAMINOPHEN 1 TABLET: 7.5; 325 TABLET ORAL at 00:56

## 2024-06-01 RX ADMIN — LEVOTHYROXINE SODIUM 75 MCG: 0.07 TABLET ORAL at 05:18

## 2024-06-01 RX ADMIN — HEPARIN SODIUM 12 UNITS/KG/HR: 10000 INJECTION, SOLUTION INTRAVENOUS at 23:06

## 2024-06-01 RX ADMIN — DAPTOMYCIN 500 MG: 500 INJECTION, POWDER, LYOPHILIZED, FOR SOLUTION INTRAVENOUS at 16:44

## 2024-06-01 RX ADMIN — VANCOMYCIN HYDROCHLORIDE 125 MG: 125 CAPSULE ORAL at 23:12

## 2024-06-01 RX ADMIN — LORAZEPAM 0.25 MG: 0.5 TABLET ORAL at 20:53

## 2024-06-01 RX ADMIN — Medication 10 ML: at 21:07

## 2024-06-01 RX ADMIN — COLLAGENASE SANTYL 1 APPLICATION: 250 OINTMENT TOPICAL at 21:07

## 2024-06-01 RX ADMIN — LIDOCAINE HYDROCHLORIDE: 20 JELLY TOPICAL at 06:09

## 2024-06-01 RX ADMIN — VANCOMYCIN HYDROCHLORIDE 125 MG: 125 CAPSULE ORAL at 05:18

## 2024-06-01 RX ADMIN — HYDROCODONE BITARTRATE AND ACETAMINOPHEN 1 TABLET: 7.5; 325 TABLET ORAL at 08:54

## 2024-06-01 RX ADMIN — VANCOMYCIN HYDROCHLORIDE 125 MG: 125 CAPSULE ORAL at 00:56

## 2024-06-01 RX ADMIN — METRONIDAZOLE 500 MG: 500 TABLET ORAL at 15:56

## 2024-06-01 RX ADMIN — HYDROCODONE BITARTRATE AND ACETAMINOPHEN 1 TABLET: 7.5; 325 TABLET ORAL at 16:44

## 2024-06-01 RX ADMIN — SODIUM CHLORIDE 1000 MG: 900 INJECTION INTRAVENOUS at 15:45

## 2024-06-01 RX ADMIN — ASPIRIN 81 MG: 81 TABLET, COATED ORAL at 08:56

## 2024-06-01 RX ADMIN — METOPROLOL SUCCINATE 50 MG: 50 TABLET, EXTENDED RELEASE ORAL at 16:45

## 2024-06-01 NOTE — CONSULTS
Patient Care Team:  Hayden Weinstein MD as PCP - General (Nephrology)    Chief complaint: ESRD  Afib w RVR  C.diff colitis      History of Present Illness: Tavo Gudino is a 69 y.o. male with hx of HTN, PAD, left BKA, ESRD on hemodialysis, Afib on Eliquis, recent acute cholecystitis s/p cholecystostomy tube placement at , chronic urinary retention with indwelling Cole catheter, and previous hx of C.diff who presents from [facility] for Cardiology evaluation due to chest pain, elevated troponin concerning for NSTEMI, and Afib with RVR. Patient was transferred to [facility] for evaluation of ACS. Nephrology has been consulted for dialysis needs on this admission. He is seen in HD unit today. Patient mentioned being on dialysis for few months. He is unable to tell me his dialysis unit or jonathan. Did mention seeing Dr Wan as outpatient. He has functioning Left IJ TDC. He also has hx of chronic urinary retention and cole cath placement. 1 hr into the HD treatment patient developed afib w RVR. Jammie any chest pain or sob. Didn't receive BB today per RN    Review of Systems   Constitutional: Negative.    HENT: Negative.     Respiratory: Negative.     Cardiovascular: Negative.    Gastrointestinal: Negative.    Genitourinary: Negative.    Musculoskeletal: Negative.    Neurological: Negative.    Hematological: Negative.    Psychiatric/Behavioral: Negative.          Past Medical History:   Diagnosis Date    Atrial fibrillation     Chronic kidney disease (CKD), stage V     ESRD on hemodialysis     Hypertension     Metabolic acidosis     Peripheral arterial disease     Pneumothorax     Secondary hyperparathyroidism    ,   Past Surgical History:   Procedure Laterality Date    ARTERIOVENOUS FISTULA Right     BELOW KNEE LEG AMPUTATION Left     CATARACT EXTRACTION Bilateral    ,   Family History   Problem Relation Age of Onset    Cancer Father         Bladder   ,   Social History     Socioeconomic History    Marital status:     Tobacco Use    Smoking status: Never    Smokeless tobacco: Never   Vaping Use    Vaping status: Never Used   Substance and Sexual Activity    Alcohol use: Never    Drug use: Never    Sexual activity: Defer     E-cigarette/Vaping    E-cigarette/Vaping Use Never User      E-cigarette/Vaping Substances     E-cigarette/Vaping Devices         ,   Medications Prior to Admission   Medication Sig Dispense Refill Last Dose    amiodarone (PACERONE) 200 MG tablet Take 1 tablet by mouth Daily.   Unknown    apixaban (ELIQUIS) 5 MG tablet tablet Take 1 tablet by mouth 2 (Two) Times a Day.   Unknown    finasteride (PROSCAR) 5 MG tablet Take 1 tablet by mouth Daily.   Unknown    hydrALAZINE (APRESOLINE) 25 MG tablet Take 1 tablet by mouth 3 (Three) Times a Day.   Unknown    levothyroxine (SYNTHROID, LEVOTHROID) 25 MCG tablet Take 1 tablet by mouth Every Morning.   Unknown    sodium bicarbonate 650 MG tablet Take 2 tablets by mouth 2 (Two) Times a Day.   Unknown    tamsulosin (FLOMAX) 0.4 MG capsule 24 hr capsule Take 1 capsule by mouth Daily.   Unknown    torsemide (DEMADEX) 100 MG tablet Take 1 tablet by mouth Daily.   Unknown    traZODone (DESYREL) 50 MG tablet Take 1 tablet by mouth Every Night.   Unknown   , Scheduled Meds:  aspirin, 81 mg, Oral, Daily  cefTRIAXone, 1,000 mg, Intravenous, Q24H  collagenase, 1 Application, Topical, Q24H  levothyroxine, 75 mcg, Oral, Q AM  Lidocaine HCl gel, , Topical, BID  metoprolol succinate XL, 50 mg, Oral, Q24H  metroNIDAZOLE, 500 mg, Oral, Q8H  pharmacy consult - [place], , Does not apply, Daily  sodium chloride, 10 mL, Intravenous, Q12H  vancomycin, 125 mg, Oral, Q6H   , and Continuous Infusions:  heparin, 12 Units/kg/hr, Last Rate: 12 Units/kg/hr (06/01/24 0633)  Pharmacy to Dose Heparin,        Objective     Vital Signs  Temp:  [97.4 °F (36.3 °C)-98.2 °F (36.8 °C)] 98.2 °F (36.8 °C)  Heart Rate:  [] 121  Resp:  [15-16] 16  BP: (112-155)/() 150/102    No intake/output  "data recorded.  I/O last 3 completed shifts:  In: 240 [P.O.:240]  Out: 750 [Urine:750]    Physical Exam  Constitutional:       General: He is not in acute distress.     Appearance: Normal appearance. He is not ill-appearing.   HENT:      Head: Normocephalic and atraumatic.   Cardiovascular:      Rate and Rhythm: Normal rate and regular rhythm.   Pulmonary:      Effort: Pulmonary effort is normal.      Breath sounds: Normal breath sounds.   Abdominal:      General: Abdomen is flat.   Genitourinary:     Comments: Scrotal edema+  Musculoskeletal:      Right lower leg: No edema.      Left lower leg: No edema.   Skin:     General: Skin is warm.   Neurological:      General: No focal deficit present.      Mental Status: He is alert and oriented to person, place, and time. Mental status is at baseline.         Results Review:    I reviewed the patient's new clinical results.    WBC WBC   Date Value Ref Range Status   06/01/2024 20.32 (H) 3.40 - 10.80 10*3/mm3 Final   05/31/2024 22.02 (H) 3.40 - 10.80 10*3/mm3 Final      HGB Hemoglobin   Date Value Ref Range Status   06/01/2024 6.9 (C) 13.0 - 17.7 g/dL Final   05/31/2024 7.5 (L) 13.0 - 17.7 g/dL Final      HCT Hematocrit   Date Value Ref Range Status   06/01/2024 22.1 (L) 37.5 - 51.0 % Final   05/31/2024 24.4 (L) 37.5 - 51.0 % Final      Platlets No results found for: \"LABPLAT\"   MCV MCV   Date Value Ref Range Status   06/01/2024 95.7 79.0 - 97.0 fL Final   05/31/2024 96.8 79.0 - 97.0 fL Final          Sodium Sodium   Date Value Ref Range Status   06/01/2024 133 (L) 136 - 145 mmol/L Final   05/31/2024 133 (L) 136 - 145 mmol/L Final      Potassium Potassium   Date Value Ref Range Status   06/01/2024 4.3 3.5 - 5.2 mmol/L Final   05/31/2024 4.2 3.5 - 5.2 mmol/L Final      Chloride Chloride   Date Value Ref Range Status   06/01/2024 100 98 - 107 mmol/L Final   05/31/2024 99 98 - 107 mmol/L Final      CO2 CO2   Date Value Ref Range Status   06/01/2024 19.0 (L) 22.0 - 29.0 mmol/L " "Final   05/31/2024 22.0 22.0 - 29.0 mmol/L Final      BUN BUN   Date Value Ref Range Status   06/01/2024 40 (H) 8 - 23 mg/dL Final   05/31/2024 37 (H) 8 - 23 mg/dL Final      Creatinine Creatinine   Date Value Ref Range Status   06/01/2024 3.80 (H) 0.76 - 1.27 mg/dL Final   05/31/2024 3.47 (H) 0.76 - 1.27 mg/dL Final      Calcium Calcium   Date Value Ref Range Status   06/01/2024 8.7 8.6 - 10.5 mg/dL Final   05/31/2024 8.9 8.6 - 10.5 mg/dL Final      PO4 No results found for: \"CAPO4\"   Albumin Albumin   Date Value Ref Range Status   05/31/2024 2.5 (L) 3.5 - 5.2 g/dL Final      Magnesium No results found for: \"MG\"   Uric Acid No results found for: \"URICACID\"         Assessment & Plan       NSTEMI (non-ST elevated myocardial infarction)    C. difficile colitis    History of cholecystitis s/p cholecystostomy tube    ESRD (end stage renal disease) on hemodialysis    Atrial fibrillation    Essential hypertension      Assessment & Plan    ESRD: On TTS jonathan. HD through right IJ TDC. Still makes urine. Does have cole cath+ for chronic urinary retention     Volume status: No significant LE edema noted. Does have scrotal edema. Optimization of volume status with HD     Afib w RVR: RVR during HD treatment. BP controlled. Asymptomatic. No chest pain or sob.      Met acidosis: Mild     Anemia: ACD. Transfuse at hb<7.  ALLISON on HD days             I discussed the patients findings and my recommendations with patient    Hayden Weinstein MD  06/01/24  12:42 EDT            "

## 2024-06-01 NOTE — PROGRESS NOTES
"Patient Name:  Tavo Gudino  YOB: 1955  [id number]    Surgery Progress Note    Date of visit: 6/1/2024    Subjective   Subjective: Stable overnight.         Objective     Objective:     /53 (BP Location: Right arm, Patient Position: Lying)   Pulse 86   Temp 98.2 °F (36.8 °C) (Oral)   Resp 16   Ht 185.4 cm (73\")   Wt 84.8 kg (187 lb)   SpO2 100%   BMI 24.67 kg/m²     Intake/Output Summary (Last 24 hours) at 6/1/2024 0721  Last data filed at 6/1/2024 0418  Gross per 24 hour   Intake 240 ml   Output 750 ml   Net -510 ml       CV:  Rhythm  regular and rate regular   L:  Clear  to auscultation bilaterally   Abd:  Bowel sounds positive , soft, nontender. Cholecystostomy tube with nguyen bile  Ext:  No cyanosis, clubbing, edema    Recent labs that are back at this time have been reviewed. CT scan personally reviewed, this shows the cholecystostomy tube is well-positioned within the gallbladder without inflammatory changes.  There is no evidence of bowel obstruction.  The appendix is visualized and is normal.  There is evidence of colitis.  He has a large left inguinal hernia containing nonobstructed sigmoid colon that is chronic.    Chest x-ray shows excellent placement of his central line without pneumothorax.           Assessment/ Plan:    Chronic cholecystitis- His cholecystostomy tube is well-positioned.  There is no need for removal at this time.  I would recommend continuing to work with his cardiac issues per cardiology.  He may have an outpatient follow-up with the [facility] who placed the tube for further management.  As for his left inguinal hernia this is chronic and does not require emergent surgical intervention.  He is a very poor operative candidate overall.  I will follow with you.    Problem List Items Addressed This Visit    None       Active Hospital Problems    Diagnosis  POA    **NSTEMI (non-ST elevated myocardial infarction) [I21.4]  Yes    C. difficile colitis " [A04.72]  Yes    History of cholecystitis s/p cholecystostomy tube [K81.9]  Yes    ESRD (end stage renal disease) on hemodialysis [N18.6]  Yes    Atrial fibrillation [I48.91]  Yes    Essential hypertension [I10]  Yes      Resolved Hospital Problems   No resolved problems to display.              Niranjan Beckman MD  6/1/2024  07:21 EDT

## 2024-06-01 NOTE — PLAN OF CARE
Goal Outcome Evaluation: HD completed. Tolerated well.  Access functioned well. UF goal reached of 2260 mL. Blood returned. Report given to richar Summers RN.     Patient had an episode of a-fib rvr well on HD machine. PRN metoprolol was ordered but held d/t patient flipping out of a-fib rvr. Patient's hr remained 110/120s during tx. Dr. Weinstein notified with events.1 PRBC administered during tx.    Problem: Device-Related Complication Risk (Hemodialysis)  Goal: Safe, Effective Therapy Delivery  Outcome: Ongoing, Progressing     Problem: Hemodynamic Instability (Hemodialysis)  Goal: Effective Tissue Perfusion  Outcome: Ongoing, Progressing     Problem: Infection (Hemodialysis)  Goal: Absence of Infection Signs and Symptoms  Outcome: Ongoing, Progressing

## 2024-06-01 NOTE — PROGRESS NOTES
[facility] Medicine Services  PROGRESS NOTE    Patient Name: Tavo Gudino  : 1955  MRN: [id number]    Date of Admission: 2024  Primary Care Physician: Hayden Weinstein MD    Subjective   Subjective     CC:  Chest pain, A fib with RVR, NSTEMI, leukocytosis    HPI:  Seen in HD. Intermittently going into A fib with RVR. Getting blood transfusion. Denied headache, visual changes, chest pain, SOA, palpitations, N/V, abdominal pain. Has had diarrhea for 3-4 days. Has minimal to rare cough.    Objective   Objective     Vital Signs:   Temp:  [97.4 °F (36.3 °C)-98.7 °F (37.1 °C)] 97.8 °F (36.6 °C)  Heart Rate:  [] 111  Resp:  [15-16] 16  BP: (112-158)/() 146/76     Physical Exam:  Constitutional: No acute distress, awake, alert  HENT: NCAT, mucous membranes moist  Respiratory: Clear to auscultation bilaterally, respiratory effort normal   Cardiovascular: IRIR, HR 95 to 140  Gastrointestinal: Positive bowel sounds, soft, nontender, nondistended, percutaneous cholecystostomy tube  Musculoskeletal: left BKA  Psychiatric: Appropriate affect, cooperative  Neurologic: PERRL, symmetric facies, speech clear  Skin:                                Results Reviewed:  LAB RESULTS:      Lab 24  0450 24  2148 24  2056 24  1421   WBC 20.32*  --   --  22.02*   HEMOGLOBIN 6.9*  --   --  7.5*   HEMATOCRIT 22.1*  --   --  24.4*   PLATELETS 241  --   --  249   NEUTROS ABS 16.57*  --   --  18.79*   IMMATURE GRANS (ABS) 0.19*  --   --  0.16*   LYMPHS ABS 1.96  --   --  1.70   MONOS ABS 1.42*  --   --  1.23*   EOS ABS 0.13  --   --  0.09   MCV 95.7  --   --  96.8   LACTATE  --   --  1.3  --    PROTIME  --  24.3*  --   --    APTT 59.2* 53.1*  --   --    HEPARIN ANTI-XA  --  >1.10*  --   --          Lab 24  0450 24  1421   SODIUM 133* 133*   POTASSIUM 4.3 4.2   CHLORIDE 100 99   CO2 19.0* 22.0   ANION GAP 14.0 12.0   BUN 40* 37*   CREATININE 3.80* 3.47*   EGFR 16.2*  18.1*   GLUCOSE 76 92   CALCIUM 8.7 8.9   TSH  --  65.050*         Lab 05/31/24  1421   TOTAL PROTEIN 5.3*   ALBUMIN 2.5*   GLOBULIN 2.8   ALT (SGPT) 10   AST (SGOT) 10   BILIRUBIN 0.3   ALK PHOS 201*         Lab 06/01/24  0450 05/31/24  2148 05/31/24  2056 05/31/24  1421   HSTROP T 386*  --  404* 454*   PROTIME  --  24.3*  --   --    INR  --  2.16*  --   --              Lab 06/01/24  0944 06/01/24  0450 05/31/24  1421   IRON  --   --  13*   IRON SATURATION (TSAT)  --   --  7*   TIBC  --   --  179*   TRANSFERRIN  --   --  120*   FERRITIN  --  1,181.00*  --    FOLATE  --  4.36*  --    VITAMIN B 12  --  755  --    ABO TYPING B  --  B   RH TYPING Positive  --  Positive   ANTIBODY SCREEN Negative  --   --          Brief Urine Lab Results  (Last result in the past 365 days)        Color   Clarity   Blood   Leuk Est   Nitrite   Protein   CREAT   Urine HCG        05/31/24 2219 Yellow   Cloudy   Small (1+)   Moderate (2+)   Negative   100 mg/dL (2+)                   Microbiology Results Abnormal       None            CT Abdomen Pelvis Without Contrast    Result Date: 5/31/2024  CT ABDOMEN PELVIS WO CONTRAST Date of Exam: 5/31/2024 9:17 PM EDT Indication: leukocytosis, has a cholecystostomy tube. Comparison: None available. Technique: Axial CT images were obtained of the abdomen and pelvis without the administration of contrast. Reconstructed coronal and sagittal images were also obtained. Automated exposure control and iterative construction methods were used. Findings: Visualized Chest: Moderate cardiomegaly. Small pericardial effusion. Partially imaged central venous catheter with the distal tip at the cavoatrial junction. Trace bilateral pleural effusions. Mild diffuse interstitial thickening. Liver: Liver is normal in size and CT density. No focal lesions. Gallbladder: Cholecystostomy tube is noted. The gallbladder is almost completely decompressed. There is mild fat stranding noted within the gallbladder fossa. Bile  Ducts: No billiary dcutal dilation. Spleen: Spleen is normal in size and CT density. Pancreas: Pancreas is normal. There is no evidence of pancreatic mass or peripancreatic fluid. Adrenals: Adrenal glands are unremarkable. Kidneys: Kidneys are normal in size. There are no stones or hydronephrosis. Gastrointestinal: Wall thickening of the sigmoid colon and rectum. Additionally hyperdensity is noted within the rectum, nonspecific but may represent suppositories. Otherwise the visualized GI tract is unremarkable on this study performed without IV and oral contrast. Bladder: A Delaney catheter is present within the bladder. Significant bladder wall thickening, nonspecific but Pelvis:  No suspecious mass. Peritoneum/Mesentery: Mild diffuse mesenteric edema. No free fluid or pneumoperitoneum.   Lymph Nodes: Multiple prominent to mildly enlarged para-aortic and mesenteric lymph nodes. Vasculature: Extensive vascular calcifications noted throughout the visualized arteries of the abdomen and pelvis. Abdominal Wall: Partially imaged large left inguinal hernia containing a portion of the sigmoid colon. Mild diffuse soft tissue edema. Bony Structures: No acute osseous abnormality     Impression: Impression: Delaney catheter is present within the bladder with associated significant bladder wall thickening is concerning for cystitis. Wall thickening of the sigmoid colon and rectum is also concerning for colitis/proctitis. Ongoing mild inflammatory changes within the gallbladder fossa. Cholecystostomy tube appears to be in adequate position. Large, partially imaged, left inguinal hernia containing a portion of the sigmoid colon without evidence of obstruction. Findings concerning for pulmonary edema. Multiple additional nonemergent findings as characterized above Electronically Signed: Romel Dominguez DO  5/31/2024 9:48 PM EDT  Workstation ID: [id number]    XR Chest 1 View    Result Date: 5/31/2024  XR CHEST 1 VW Date of Exam:  5/31/2024 7:10 PM EDT Indication: central line Comparison: None available. Findings: Right subclavian central venous catheter with the tip in the superior vena cava. Left internal jugular central venous catheter with the tip in the superior vena cava. No consolidation. No pneumothorax or pleural effusion. Cardiac size is normal. The clavicles are intact. No rib fractures. Large region of partially visualized coarse calcification of the right upper extremity likely dystrophic calcification given the location.     Impression: No acute cardiopulmonary disease. Appropriate line placement. Likely dystrophic calcification of the right proximal humerus. Plain film evaluation of the right humerus recommended. Electronically Signed: Delmer Shea MD  5/31/2024 7:36 PM EDT  Workstation ID: [id number]         Current medications:  Scheduled Meds:aspirin, 81 mg, Oral, Daily  cefTRIAXone, 1,000 mg, Intravenous, Q24H  collagenase, 1 Application, Topical, Q24H  DAPTOmycin, 6 mg/kg, Intravenous, Q48H  levothyroxine, 75 mcg, Oral, Q AM  Lidocaine HCl gel, , Topical, BID  metoprolol succinate XL, 50 mg, Oral, Q24H  metroNIDAZOLE, 500 mg, Oral, Q8H  pharmacy consult - MTM, , Does not apply, Daily  sodium chloride, 10 mL, Intravenous, Q12H  vancomycin, 125 mg, Oral, Q6H      Continuous Infusions:heparin, 12 Units/kg/hr, Last Rate: 12 Units/kg/hr (06/01/24 0633)  Pharmacy to Dose Heparin,       PRN Meds:.  acetaminophen    HYDROcodone-acetaminophen    Lidocaine HCl gel    metoprolol tartrate    Morphine    Pharmacy to Dose Heparin    sodium chloride    sodium chloride    Assessment & Plan   Assessment & Plan     Active Hospital Problems    Diagnosis  POA    **NSTEMI (non-ST elevated myocardial infarction) [I21.4]  Yes    C. difficile colitis [A04.72]  Yes    History of cholecystitis s/p cholecystostomy tube [K81.9]  Yes    ESRD (end stage renal disease) on hemodialysis [N18.6]  Yes    Atrial fibrillation [I48.91]  Yes    Essential  hypertension [I10]  Yes      Resolved Hospital Problems   No resolved problems to display.        Brief Hospital Course to date:  Tavo Gudino is a 69 y.o. male with hx of HTN, PAD, left BKA, ESRD on hemodialysis, Afib on Eliquis, recent acute cholecystitis s/p cholecystostomy tube placement at , chronic urinary retention with indwelling Delaney catheter, and previous hx of C.diff who presented from [facility] due to chest pain, elevated troponin, and Afib with RVR.     This patient's problems and plans were partially entered by my partner and updated as appropriate by me 06/01/24.     Chest pain, resolved  Elevated troponin, possible NSTEMI  --Transferred to [facility] for LHC with Dr. Amador  --Troponins at OSH 18 -->97-->472  --Troponin 454 here on admission  --Monitor on telemetry  --Dr. Amador/Cardiology consulted, tentative plan for C next week  --ASA, heparin drip, beta blocker     Afib with RVR  Hx of Afib/Aflutter  --Rate controlled with Metoprolol apparently per OSH notes  --Hold Eliquis for heart cath, on heparin drip as above  --Continue Metoprolol     HFrEF  Moderate MR  --Most recent Echo on file I April 2024: EF 40%, akinetic septal, anterior, and apical walls, severely dilated LA, moderate MR  --Follows with  Cardiology, had been evaluated previously for Vanessa clip  --Repeat Echo ordered here as no Echo results sent from OSH     Leukocytosis  C.diff colitis?  Concern for osteomyelitis   Exposed tendon  --Apparently WBC increased to 24K with complaints of diarrhea, had negative C.diff toxin but positive C.diff antigen. Was started on PO Vanc at OSH; of note, I talked to the provider there who stated he had loose stools following administration of Kayexalate and Colace  --Pe chart review, he had C.diff documented on 4/6/24 from   --WBC 22K on admission here  --Will repeat C.diff testing here and continue on PO Vanc for now  --Follow-up blood and urine cultures  --MRI left tib/fib pending  --MRI  right foot pending  --Ceftriaxone, flagyl, dapto  --MRSA screen pending  -- Arterial duplex  --Discussed with Dr. Yeager, ortho will see tmw     Recent cholecystitis s/p cholecystostomy tube  --Request records from that hospitalization, apparently was placed at  but I cannot find records of this  --Consult General Surgery for management of tube, my practice partner discussed with Dr. Beckman  --Apparently already has follow up at  6/6/24 General Surgery     Chronic urinary retention  --Has Delaney in place, reported hx of urethral injury per OSH note (data deficit, no idea when this was Delaney placed), appears he follows with Urology at  and this is a chronic Delaney  --Replaced Delaney here; follow-up urine culture     PAD  Hx of left BKA  --Continue ASA     Anemia  --Likely chronic due to renal disease  --Hb 7.5 here, had been 9 at OSH  --Check iron studies, B12, folate  --Monitor closely while on heparin drip     HTN  --Confirm and continue home meds     ESRD  --Nephrology consulted for dialysis  --Last HD 5/30/24      Hypothyroidism  --TSH significantly elevated at 91 at OSH, apparently free T4 was low but do not have that test result available to review  --Synthroid dose was increased to 75 mcg daily at OSH  --Will repeat TSH, free T4 here  --Continue Synthroid at above dose     Multiple wounds  --Wound care consulted     Poor IV access  --Dr. Beckman placed central line       Expected Discharge Location and Transportation: pending PT/OT  Expected Discharge   Expected Discharge Date: 6/5/2024; Expected Discharge Time:      DVT prophylaxis:  Medical and mechanical DVT prophylaxis orders are present.         AM-PAC 6 Clicks Score (PT): 6 (06/01/24 0610)    CODE STATUS:   Code Status and Medical Interventions:   Ordered at: 05/31/24 7997     Code Status (Patient has no pulse and is not breathing):    CPR (Attempt to Resuscitate)     Medical Interventions (Patient has pulse or is breathing):    Full Support       Sonya  MD Darren  06/01/24

## 2024-06-01 NOTE — PLAN OF CARE
Problem: Skin Injury Risk Increased  Goal: Skin Health and Integrity  Outcome: Ongoing, Not Progressing  Intervention: Optimize Skin Protection  Description: Perform a full pressure injury risk assessment, as indicated by screening, upon admission to care unit.  Reassess skin (injury risk, full inspection) frequently (e.g., scheduled interval, with change in condition) to provide optimal early detection and prevention.  Maintain adequate tissue perfusion (e.g., encourage fluid balance; avoid crossing legs, constrictive clothing or devices) to promote tissue oxygenation.  Maintain head of bed at lowest degree of elevation tolerated, considering medical condition and other restrictions.  Avoid positioning onto an area that remains reddened.  Minimize incontinence and moisture (e.g., toileting schedule; moisture-wicking pad, diaper or incontinence collection device; skin moisture barrier).  Cleanse skin promptly and gently when soiled utilizing a pH-balanced cleanser.  Relieve and redistribute pressure (e.g., scheduled position changes, weight shifts, use of support surface, medical device repositioning, protective dressing application, use of positioning device, microclimate control, use of pressure-injury-monitor  Encourage increased activity, such as sitting in a chair at the bedside or early mobilization, when able to tolerate.  Recent Flowsheet Documentation  Taken 5/31/2024 1829 by Melina King RN  Pressure Reduction Techniques: weight shift assistance provided  Head of Bed (HOB) Positioning: HOB elevated  Pressure Reduction Devices:   heel offloading device utilized   positioning supports utilized   pressure-redistributing mattress utilized  Skin Protection:   adhesive use limited   skin sealant/moisture barrier applied   skin-to-device areas padded   skin-to-skin areas padded   transparent dressing maintained   tubing/devices free from skin contact  Taken 5/31/2024 1635 by Melina King RN  Pressure  Reduction Techniques: weight shift assistance provided  Head of Bed (HOB) Positioning: HOB elevated  Pressure Reduction Devices:   heel offloading device utilized   positioning supports utilized   pressure-redistributing mattress utilized  Skin Protection:   adhesive use limited   skin sealant/moisture barrier applied   skin-to-device areas padded   skin-to-skin areas padded   transparent dressing maintained   tubing/devices free from skin contact  Taken 5/31/2024 1439 by Melina King RN  Pressure Reduction Techniques: weight shift assistance provided  Head of Bed (HOB) Positioning: HOB elevated  Pressure Reduction Devices:   heel offloading device utilized   positioning supports utilized   pressure-redistributing mattress utilized  Skin Protection:   adhesive use limited   skin sealant/moisture barrier applied   skin-to-device areas padded   skin-to-skin areas padded   transparent dressing maintained   tubing/devices free from skin contact     Problem: Adult Inpatient Plan of Care  Goal: Absence of Hospital-Acquired Illness or Injury  Outcome: Ongoing, Not Progressing  Intervention: Identify and Manage Fall Risk  Description: Perform standard risk assessment on admission using a validated tool or comprehensive approach appropriate to the patient; reassess fall risk frequently, with change in status or transfer to another level of care.  Communicate fall injury risk to interprofessional healthcare team.  Determine need for increased observation, equipment and environmental modification, such as low bed, signage and supportive, nonskid footwear.  Adjust safety measures to individual developmental age, stage and identified risk factors.  Reinforce the importance of safety and physical activity with patient and family.  Perform regular intentional rounding to assess need for position change, pain assessment and personal needs, including assistance with toileting.  Recent Flowsheet Documentation  Taken 5/31/2024 5068  by King, April, RN  Safety Promotion/Fall Prevention:   clutter free environment maintained   activity supervised   assistive device/personal items within reach   fall prevention program maintained   nonskid shoes/slippers when out of bed   room organization consistent   safety round/check completed   toileting scheduled  Taken 5/31/2024 1635 by Melina King RN  Safety Promotion/Fall Prevention:   clutter free environment maintained   activity supervised   assistive device/personal items within reach   fall prevention program maintained   nonskid shoes/slippers when out of bed   room organization consistent   safety round/check completed   toileting scheduled  Taken 5/31/2024 1439 by Melina King RN  Safety Promotion/Fall Prevention:   activity supervised   assistive device/personal items within reach   clutter free environment maintained   nonskid shoes/slippers when out of bed   room organization consistent   safety round/check completed   toileting scheduled  Intervention: Prevent Skin Injury  Description: Perform a screening for skin injury risk, such as pressure or moisture associated skin damage on admission and at regular intervals throughout hospital stay.  Keep all areas of skin (especially folds) clean and dry.  Maintain adequate skin hydration.  Relieve and redistribute pressure and protect bony prominences; implement measures based on patient-specific risk factors.  Match turning and repositioning schedule to clinical condition.  Encourage weight shift frequently; assist with reposition if unable to complete independently.  Float heels off bed; avoid pressure on the Achilles tendon.  Keep skin free from extended contact with medical devices.  Encourage functional activity and mobility, as early as tolerated.  Use aids (e.g., slide boards, mechanical lift) during transfer.  Recent Flowsheet Documentation  Taken 5/31/2024 1829 by Melina King RN  Body Position: position changed  independently  Skin Protection:   adhesive use limited   skin sealant/moisture barrier applied   skin-to-device areas padded   skin-to-skin areas padded   transparent dressing maintained   tubing/devices free from skin contact  Taken 5/31/2024 1635 by Melina King RN  Body Position: position changed independently  Skin Protection:   adhesive use limited   skin sealant/moisture barrier applied   skin-to-device areas padded   skin-to-skin areas padded   transparent dressing maintained   tubing/devices free from skin contact  Taken 5/31/2024 1439 by Melina King RN  Body Position: position changed independently  Skin Protection:   adhesive use limited   skin sealant/moisture barrier applied   skin-to-device areas padded   skin-to-skin areas padded   transparent dressing maintained   tubing/devices free from skin contact  Intervention: Prevent and Manage VTE (Venous Thromboembolism) Risk  Description: Assess for VTE (venous thromboembolism) risk.  Encourage and assist with early ambulation.  Initiate and maintain compression or other therapy, as indicated, based on identified risk in accordance with organizational protocol and provider order.  Encourage both active and passive leg exercises while in bed, if unable to ambulate.  Recent Flowsheet Documentation  Taken 5/31/2024 1829 by Melina King RN  Activity Management: activity minimized  Taken 5/31/2024 1635 by Melina King RN  Activity Management: activity minimized  Taken 5/31/2024 1439 by Melina King RN  Activity Management: activity minimized     Problem: Adult Inpatient Plan of Care  Goal: Optimal Comfort and Wellbeing  Outcome: Ongoing, Not Progressing  Intervention: Monitor Pain and Promote Comfort  Description: Assess pain level, treatment efficacy and patient response at regular intervals using a consistent pain scale.  Consider the presence and impact of preexisting chronic pain.  Encourage patient and caregiver involvement in pain  assessment, interventions and safety measures.  Recent Flowsheet Documentation  Taken 5/31/2024 1829 by Melina King RN  Pain Management Interventions:   see MAR   quiet environment facilitated   care clustered  Taken 5/31/2024 1745 by Melina King RN  Pain Management Interventions:   quiet environment facilitated   care clustered   see MAR  Taken 5/31/2024 1635 by Melina King RN  Pain Management Interventions:   see MAR   quiet environment facilitated   care clustered  Taken 5/31/2024 1439 by Melina King RN  Pain Management Interventions:   quiet environment facilitated   care clustered  Intervention: Provide Person-Centered Care  Description: Use a family-focused approach to care.  Develop trust and rapport by proactively providing information, encouraging questions, addressing concerns and offering reassurance.  Acknowledge emotional response to hospitalization.  Recognize and utilize personal coping strategies.  Honor spiritual and cultural preferences.  Recent Flowsheet Documentation  Taken 5/31/2024 1829 by Melina King RN  Trust Relationship/Rapport:   care explained   choices provided   emotional support provided   empathic listening provided   questions answered   questions encouraged   reassurance provided   thoughts/feelings acknowledged  Taken 5/31/2024 1635 by Melina King RN  Trust Relationship/Rapport:   care explained   choices provided   emotional support provided   empathic listening provided   questions answered   questions encouraged   reassurance provided   thoughts/feelings acknowledged  Taken 5/31/2024 1439 by Melina King RN  Trust Relationship/Rapport:   care explained   choices provided   emotional support provided   empathic listening provided   questions answered   questions encouraged   reassurance provided   thoughts/feelings acknowledged   Goal Outcome Evaluation:  Plan of Care Reviewed With: patient        Progress: no change  Outcome Evaluation: Pt AO x4/100%  on RA/aFib on monitor/VSS with some low BP, provider notified. Pt is a direct admit from [facility]. Pt had liquid stool used bedpan and dressings changed. Pt has several wounds and a left BKA, WOC consulted and provided wound care.  Pt cole was ill-appearing with dark meatus discharge. Pt had issue with prior facility IV leaking and bleeding with pain, patient educated on not gesturing/grabbing toward staff even while in pain. Multiple failed IV attempts even with US, provider notified, see orders. Continue monitoring.

## 2024-06-02 ENCOUNTER — APPOINTMENT (OUTPATIENT)
Dept: MRI IMAGING | Facility: HOSPITAL | Age: 69
End: 2024-06-02
Payer: MEDICARE

## 2024-06-02 ENCOUNTER — APPOINTMENT (OUTPATIENT)
Dept: CARDIOLOGY | Facility: HOSPITAL | Age: 69
End: 2024-06-02
Payer: MEDICARE

## 2024-06-02 LAB
ALBUMIN SERPL-MCNC: 2.4 G/DL (ref 3.5–5.2)
ALBUMIN/GLOB SERPL: 0.8 G/DL
ALP SERPL-CCNC: 240 U/L (ref 39–117)
ALT SERPL W P-5'-P-CCNC: 7 U/L (ref 1–41)
ANION GAP SERPL CALCULATED.3IONS-SCNC: 18 MMOL/L (ref 5–15)
APTT PPP: 43.8 SECONDS (ref 60–90)
APTT PPP: 53.4 SECONDS (ref 60–90)
AST SERPL-CCNC: 7 U/L (ref 1–40)
BACTERIA UR QL AUTO: ABNORMAL /HPF
BASOPHILS # BLD AUTO: 0.07 10*3/MM3 (ref 0–0.2)
BASOPHILS NFR BLD AUTO: 0.6 % (ref 0–1.5)
BH BB BLOOD EXPIRATION DATE: NORMAL
BH BB BLOOD TYPE BARCODE: 7300
BH BB DISPENSE STATUS: NORMAL
BH BB PRODUCT CODE: NORMAL
BH BB UNIT NUMBER: NORMAL
BILIRUB SERPL-MCNC: 0.4 MG/DL (ref 0–1.2)
BILIRUB UR QL STRIP: NEGATIVE
BUN SERPL-MCNC: 22 MG/DL (ref 8–23)
BUN/CREAT SERPL: 7.9 (ref 7–25)
C DIFF GDH + TOXINS A+B STL QL IA.RAPID: POSITIVE
C DIFF TOX GENS STL QL NAA+PROBE: DETECTED
CALCIUM SPEC-SCNC: 8.9 MG/DL (ref 8.6–10.5)
CHLORIDE SERPL-SCNC: 99 MMOL/L (ref 98–107)
CLARITY UR: CLEAR
CO2 SERPL-SCNC: 16 MMOL/L (ref 22–29)
COLOR UR: YELLOW
CREAT SERPL-MCNC: 2.79 MG/DL (ref 0.76–1.27)
CROSSMATCH INTERPRETATION: NORMAL
DEPRECATED RDW RBC AUTO: 57.1 FL (ref 37–54)
EGFRCR SERPLBLD CKD-EPI 2021: 23.5 ML/MIN/1.73
EOSINOPHIL # BLD AUTO: 0.11 10*3/MM3 (ref 0–0.4)
EOSINOPHIL NFR BLD AUTO: 0.9 % (ref 0.3–6.2)
ERYTHROCYTE [DISTWIDTH] IN BLOOD BY AUTOMATED COUNT: 16.8 % (ref 12.3–15.4)
GLOBULIN UR ELPH-MCNC: 3 GM/DL
GLUCOSE BLDC GLUCOMTR-MCNC: 72 MG/DL (ref 70–130)
GLUCOSE SERPL-MCNC: 64 MG/DL (ref 65–99)
GLUCOSE UR STRIP-MCNC: ABNORMAL MG/DL
HCT VFR BLD AUTO: 27.5 % (ref 37.5–51)
HGB BLD-MCNC: 9.1 G/DL (ref 13–17.7)
HGB UR QL STRIP.AUTO: ABNORMAL
HYALINE CASTS UR QL AUTO: ABNORMAL /LPF
IMM GRANULOCYTES # BLD AUTO: 0.08 10*3/MM3 (ref 0–0.05)
IMM GRANULOCYTES NFR BLD AUTO: 0.6 % (ref 0–0.5)
KETONES UR QL STRIP: ABNORMAL
LEUKOCYTE ESTERASE UR QL STRIP.AUTO: ABNORMAL
LYMPHOCYTES # BLD AUTO: 1.63 10*3/MM3 (ref 0.7–3.1)
LYMPHOCYTES NFR BLD AUTO: 13.2 % (ref 19.6–45.3)
MCH RBC QN AUTO: 30.7 PG (ref 26.6–33)
MCHC RBC AUTO-ENTMCNC: 33.1 G/DL (ref 31.5–35.7)
MCV RBC AUTO: 92.9 FL (ref 79–97)
MONOCYTES # BLD AUTO: 0.77 10*3/MM3 (ref 0.1–0.9)
MONOCYTES NFR BLD AUTO: 6.2 % (ref 5–12)
MRSA DNA SPEC QL NAA+PROBE: POSITIVE
NEUTROPHILS NFR BLD AUTO: 78.5 % (ref 42.7–76)
NEUTROPHILS NFR BLD AUTO: 9.7 10*3/MM3 (ref 1.7–7)
NITRITE UR QL STRIP: NEGATIVE
NRBC BLD AUTO-RTO: 0.2 /100 WBC (ref 0–0.2)
PH UR STRIP.AUTO: 8 [PH] (ref 5–8)
PLATELET # BLD AUTO: 262 10*3/MM3 (ref 140–450)
PMV BLD AUTO: 9.2 FL (ref 6–12)
POTASSIUM SERPL-SCNC: 3.1 MMOL/L (ref 3.5–5.2)
PROT SERPL-MCNC: 5.4 G/DL (ref 6–8.5)
PROT UR QL STRIP: ABNORMAL
QT INTERVAL: 364 MS
QT INTERVAL: 406 MS
QTC INTERVAL: 476 MS
QTC INTERVAL: 507 MS
RBC # BLD AUTO: 2.96 10*6/MM3 (ref 4.14–5.8)
RBC # UR STRIP: ABNORMAL /HPF
REF LAB TEST METHOD: ABNORMAL
SODIUM SERPL-SCNC: 133 MMOL/L (ref 136–145)
SP GR UR STRIP: 1.01 (ref 1–1.03)
SQUAMOUS #/AREA URNS HPF: ABNORMAL /HPF
UNIT  ABO: NORMAL
UNIT  RH: NORMAL
UROBILINOGEN UR QL STRIP: ABNORMAL
WBC # UR STRIP: ABNORMAL /HPF
WBC NRBC COR # BLD AUTO: 12.36 10*3/MM3 (ref 3.4–10.8)

## 2024-06-02 PROCEDURE — 85730 THROMBOPLASTIN TIME PARTIAL: CPT

## 2024-06-02 PROCEDURE — 93005 ELECTROCARDIOGRAM TRACING: CPT | Performed by: STUDENT IN AN ORGANIZED HEALTH CARE EDUCATION/TRAINING PROGRAM

## 2024-06-02 PROCEDURE — 25810000003 SODIUM CHLORIDE 0.9 % SOLUTION 250 ML FLEX CONT: Performed by: INTERNAL MEDICINE

## 2024-06-02 PROCEDURE — 93010 ELECTROCARDIOGRAM REPORT: CPT | Performed by: INTERNAL MEDICINE

## 2024-06-02 PROCEDURE — 87449 NOS EACH ORGANISM AG IA: CPT | Performed by: HOSPITALIST

## 2024-06-02 PROCEDURE — 87641 MR-STAPH DNA AMP PROBE: CPT | Performed by: STUDENT IN AN ORGANIZED HEALTH CARE EDUCATION/TRAINING PROGRAM

## 2024-06-02 PROCEDURE — 93005 ELECTROCARDIOGRAM TRACING: CPT | Performed by: INTERNAL MEDICINE

## 2024-06-02 PROCEDURE — 81001 URINALYSIS AUTO W/SCOPE: CPT | Performed by: INTERNAL MEDICINE

## 2024-06-02 PROCEDURE — 93925 LOWER EXTREMITY STUDY: CPT

## 2024-06-02 PROCEDURE — 97602 WOUND(S) CARE NON-SELECTIVE: CPT

## 2024-06-02 PROCEDURE — 97162 PT EVAL MOD COMPLEX 30 MIN: CPT

## 2024-06-02 PROCEDURE — 87493 C DIFF AMPLIFIED PROBE: CPT | Performed by: HOSPITALIST

## 2024-06-02 PROCEDURE — 93925 LOWER EXTREMITY STUDY: CPT | Performed by: INTERNAL MEDICINE

## 2024-06-02 PROCEDURE — 25010000002 HEPARIN (PORCINE) 25000-0.45 UT/250ML-% SOLUTION

## 2024-06-02 PROCEDURE — 87086 URINE CULTURE/COLONY COUNT: CPT | Performed by: INTERNAL MEDICINE

## 2024-06-02 PROCEDURE — 85025 COMPLETE CBC W/AUTO DIFF WBC: CPT | Performed by: STUDENT IN AN ORGANIZED HEALTH CARE EDUCATION/TRAINING PROGRAM

## 2024-06-02 PROCEDURE — 99232 SBSQ HOSP IP/OBS MODERATE 35: CPT | Performed by: STUDENT IN AN ORGANIZED HEALTH CARE EDUCATION/TRAINING PROGRAM

## 2024-06-02 PROCEDURE — 73718 MRI LOWER EXTREMITY W/O DYE: CPT

## 2024-06-02 PROCEDURE — 25010000002 ERAVACYCLINE DIHYDROCHLORIDE 50 MG RECONSTITUTED SOLUTION 1 EACH VIAL: Performed by: INTERNAL MEDICINE

## 2024-06-02 PROCEDURE — 82948 REAGENT STRIP/BLOOD GLUCOSE: CPT

## 2024-06-02 PROCEDURE — 80053 COMPREHEN METABOLIC PANEL: CPT | Performed by: STUDENT IN AN ORGANIZED HEALTH CARE EDUCATION/TRAINING PROGRAM

## 2024-06-02 RX ORDER — LORAZEPAM 0.5 MG/1
0.25 TABLET ORAL EVERY 6 HOURS PRN
Status: DISPENSED | OUTPATIENT
Start: 2024-06-02 | End: 2024-06-07

## 2024-06-02 RX ORDER — PANTOPRAZOLE SODIUM 40 MG/1
40 TABLET, DELAYED RELEASE ORAL
Status: DISCONTINUED | OUTPATIENT
Start: 2024-06-02 | End: 2024-06-04

## 2024-06-02 RX ORDER — POTASSIUM CHLORIDE 750 MG/1
40 CAPSULE, EXTENDED RELEASE ORAL ONCE
Status: COMPLETED | OUTPATIENT
Start: 2024-06-02 | End: 2024-06-02

## 2024-06-02 RX ADMIN — METOPROLOL SUCCINATE 50 MG: 50 TABLET, EXTENDED RELEASE ORAL at 16:46

## 2024-06-02 RX ADMIN — ASPIRIN 81 MG: 81 TABLET, COATED ORAL at 09:27

## 2024-06-02 RX ADMIN — LORAZEPAM 0.25 MG: 0.5 TABLET ORAL at 09:27

## 2024-06-02 RX ADMIN — Medication 10 ML: at 09:28

## 2024-06-02 RX ADMIN — HYDROCODONE BITARTRATE AND ACETAMINOPHEN 1 TABLET: 7.5; 325 TABLET ORAL at 06:56

## 2024-06-02 RX ADMIN — LIDOCAINE HYDROCHLORIDE: 20 JELLY TOPICAL at 20:07

## 2024-06-02 RX ADMIN — COLLAGENASE SANTYL 1 APPLICATION: 250 OINTMENT TOPICAL at 20:08

## 2024-06-02 RX ADMIN — ERAVACYCLINE 85 MG: 50 INJECTION, POWDER, LYOPHILIZED, FOR SOLUTION INTRAVENOUS at 16:45

## 2024-06-02 RX ADMIN — PANTOPRAZOLE SODIUM 40 MG: 40 TABLET, DELAYED RELEASE ORAL at 09:27

## 2024-06-02 RX ADMIN — LEVOTHYROXINE SODIUM 75 MCG: 0.07 TABLET ORAL at 06:56

## 2024-06-02 RX ADMIN — HEPARIN SODIUM 18 UNITS/KG/HR: 10000 INJECTION, SOLUTION INTRAVENOUS at 19:07

## 2024-06-02 RX ADMIN — Medication 10 ML: at 20:07

## 2024-06-02 RX ADMIN — VANCOMYCIN HYDROCHLORIDE 125 MG: 125 CAPSULE ORAL at 23:51

## 2024-06-02 RX ADMIN — HYDROCODONE BITARTRATE AND ACETAMINOPHEN 1 TABLET: 7.5; 325 TABLET ORAL at 20:17

## 2024-06-02 RX ADMIN — VANCOMYCIN HYDROCHLORIDE 125 MG: 125 CAPSULE ORAL at 06:56

## 2024-06-02 RX ADMIN — VANCOMYCIN HYDROCHLORIDE 125 MG: 125 CAPSULE ORAL at 17:21

## 2024-06-02 RX ADMIN — METRONIDAZOLE 500 MG: 500 TABLET ORAL at 06:56

## 2024-06-02 RX ADMIN — VANCOMYCIN HYDROCHLORIDE 125 MG: 125 CAPSULE ORAL at 13:02

## 2024-06-02 RX ADMIN — POTASSIUM CHLORIDE 40 MEQ: 750 CAPSULE, EXTENDED RELEASE ORAL at 13:02

## 2024-06-02 NOTE — CONSULTS
[facility]  [address] [address]  [phone]       Orthopedic Consult    Patient: Tavo Gudino    Date of Admission: 5/31/2024 12:00 PM    YOB: 1955    Medical Record Number: [id number]    Attending Physician: Sonya Banks MD    Consulting Physician: Kale Yeager Jr, MD    Chief Complaint: NSTEMI (non-ST elevated myocardial infarction) [I21.4]    History of Present Illness: 69 y.o. male admitted to [facility] with NSTEMI (non-ST elevated myocardial infarction) [I21.4]. He is status post left below knee amputation at the [facility] and has developed ulceration and dry gangrenous changes to his amputation stump.  He has contralateral distal lateral skin full thickness ulceration with exposed peroneal tendons.       Allergies   Allergen Reactions    Levothyroxine Unknown - Low Severity    Penicillins Hives    Hydromorphone Other (See Comments)     Confusion, encephalopathy per wife        Home Medications:  Medications Prior to Admission   Medication Sig Dispense Refill Last Dose    amiodarone (PACERONE) 200 MG tablet Take 1 tablet by mouth Daily.   Unknown    apixaban (ELIQUIS) 5 MG tablet tablet Take 1 tablet by mouth 2 (Two) Times a Day.   Unknown    finasteride (PROSCAR) 5 MG tablet Take 1 tablet by mouth Daily.   Unknown    hydrALAZINE (APRESOLINE) 25 MG tablet Take 1 tablet by mouth 3 (Three) Times a Day.   Unknown    levothyroxine (SYNTHROID, LEVOTHROID) 25 MCG tablet Take 1 tablet by mouth Every Morning.   Unknown    sodium bicarbonate 650 MG tablet Take 2 tablets by mouth 2 (Two) Times a Day.   Unknown    tamsulosin (FLOMAX) 0.4 MG capsule 24 hr capsule Take 1 capsule by mouth Daily.   Unknown    torsemide (DEMADEX) 100 MG tablet Take 1 tablet by mouth Daily.   Unknown    traZODone (DESYREL) 50 MG tablet Take 1 tablet by mouth Every Night.   Unknown       Current Medications:  Scheduled Meds:aspirin, 81 mg, Oral, Daily  cefTRIAXone, 1,000 mg,  Intravenous, Q24H  collagenase, 1 Application, Topical, Q24H  DAPTOmycin, 6 mg/kg, Intravenous, Q48H  levothyroxine, 75 mcg, Oral, Q AM  Lidocaine HCl gel, , Topical, BID  metoprolol succinate XL, 50 mg, Oral, Q24H  metroNIDAZOLE, 500 mg, Oral, Q8H  pantoprazole, 40 mg, Oral, Q AM  pharmacy consult - MTM, , Does not apply, Daily  sodium chloride, 10 mL, Intravenous, Q12H  vancomycin, 125 mg, Oral, Q6H      Continuous Infusions:heparin, 15 Units/kg/hr, Last Rate: 15 Units/kg/hr (06/01/24 1910)  Pharmacy to Dose Heparin,       PRN Meds:.  acetaminophen    HYDROcodone-acetaminophen    Lidocaine HCl gel    LORazepam    metoprolol tartrate    Morphine    Pharmacy to Dose Heparin    sodium chloride    sodium chloride    Past Medical History:   Diagnosis Date    Atrial fibrillation     Chronic kidney disease (CKD), stage V     ESRD on hemodialysis     Hypertension     Metabolic acidosis     Peripheral arterial disease     Pneumothorax     Secondary hyperparathyroidism         Past Surgical History:   Procedure Laterality Date    ARTERIOVENOUS FISTULA Right     BELOW KNEE LEG AMPUTATION Left     CATARACT EXTRACTION Bilateral         Social History     Occupational History    Not on file   Tobacco Use    Smoking status: Never    Smokeless tobacco: Never   Vaping Use    Vaping status: Never Used   Substance and Sexual Activity    Alcohol use: Never    Drug use: Never    Sexual activity: Defer      Social History     Social History Narrative    Not on file        Family History   Problem Relation Age of Onset    Cancer Father         Bladder         Review of Systems:   HEENT: Patient denies any headaches, vision changes, change in hearing, or tinnitus, Patient denies any rhinorrhea,epistaxis, sinus pain, mouth or dental problems, sore throat or hoarseness, or dysphagia  Pulmonary: Patient denies any cough, congestion, SOA, or wheezing  Cardiovascular: Patient denies any chest pain, dyspnea, palpitations, weakness, intolerance  of exercise, varicosities, swelling of extremities, known murmur  Gastrointestinal:  Patient denies nausea, vomiting, diarrhea, constipation, loss  of appetite, change in appetite, dysphagia, gas, heartburn, melena, change in bowel habits, use of laxatives or other drugs to alter the function of the gastrointestinal tract.  Genital/Urinary: Patient denies dysuria, change in color of urine, change in frequency of urination, pain with urgency, incontinence, retention, or nocturia.  Musculoskeletal: Patient denies increased warmth; redness; or swelling of joints; limitation of function; deformity; crepitation: pain in a joint or an extremity, the neck, or the back, especially with movement.  Neurological: Patient denies dizziness, tremor, ataxia, difficulty in speaking, change in speech, paresthesia, loss of sensation, seizures, syncope, changes in memory.  Endocrine system: Patient denies tremors, palpitations, intolerance of heat or cold, polyuria, polydipsia, polyphagia, diaphoresis, exophthalmos, or goiter.  Psychological: Patient denies thoughts/plans or harming self or other; depression,  insomnia, night terrors, alber, memory loss, disorientation.  Skin: Patient denies any bruising, rashes, discoloration, pruritus, wounds, ulcers, decubiti, changes in the hair or nails  Hematopoietic: Patient denies history of spontaneous or excessive bleeding, epistaxis, hematuria, melena, fatigue, enlarged or tender lymph nodes, pallor, history of anemia.    Physical Exam: 69 y.o. male  General Appearance:    Alert, cooperative, in no acute distress                   Vitals:    06/01/24 1912 06/01/24 2303 06/02/24 0347 06/02/24 0724   BP: 105/57 138/89 132/66 146/61   BP Location: Right arm Right arm Right arm Right arm   Patient Position: Lying Lying Lying Lying   Pulse: 81 87 91 116   Resp: 18 18 18 18   Temp: 97.7 °F (36.5 °C)  97.9 °F (36.6 °C) 97.4 °F (36.3 °C)   TempSrc: Oral  Oral Oral   SpO2:  95% 100%    Weight:        Height:            Head:    Normocephalic, without obvious abnormality, atraumatic   Eyes:            Lids and lashes normal, conjunctivae and sclerae normal, no icterus, no pallor, corneas clear, PERRLA   Ears:    Ears appear intact with no abnormalities noted   Throat:   No oral lesions, no thrush, oral mucosa moist   Back:     No C-T-L spine tenderness   Lungs:     Clear to auscultation,respirations regular, even and                unlabored   Chest Wall:    No abnormalities observed   Abdomen:     Normal bowel sounds, no masses, no organomegaly, soft      non-tender, non-distended, no guarding, no rebound              tenderness   Extremities:   Ulceration distal left below knee amputation stump with necrotic tissue posteriorly.  Right shin with lateral ulcer with what appears to be peroneal tendon exposure.   Pulses:   Pulses palpable and equal bilaterally   Skin:   No bleeding, bruising or rash   Lymph nodes:   No palpable adenopathy   Neurologic:  Cranial nerves 2 - 12 grossly intact, sensation intact, DTR     present and equal bilaterally           Diagnostic Tests:    Admission on 05/31/2024   Component Date Value Ref Range Status    Glucose 05/31/2024 92  65 - 99 mg/dL Final    BUN 05/31/2024 37 (H)  8 - 23 mg/dL Final    Creatinine 05/31/2024 3.47 (H)  0.76 - 1.27 mg/dL Final    Sodium 05/31/2024 133 (L)  136 - 145 mmol/L Final    Potassium 05/31/2024 4.2  3.5 - 5.2 mmol/L Final    Chloride 05/31/2024 99  98 - 107 mmol/L Final    CO2 05/31/2024 22.0  22.0 - 29.0 mmol/L Final    Calcium 05/31/2024 8.9  8.6 - 10.5 mg/dL Final    Total Protein 05/31/2024 5.3 (L)  6.0 - 8.5 g/dL Final    Albumin 05/31/2024 2.5 (L)  3.5 - 5.2 g/dL Final    ALT (SGPT) 05/31/2024 10  1 - 41 U/L Final    AST (SGOT) 05/31/2024 10  1 - 40 U/L Final    Alkaline Phosphatase 05/31/2024 201 (H)  39 - 117 U/L Final    Total Bilirubin 05/31/2024 0.3  0.0 - 1.2 mg/dL Final    Globulin 05/31/2024 2.8  gm/dL Final    Calculated Result     A/G Ratio 05/31/2024 0.9  g/dL Final    BUN/Creatinine Ratio 05/31/2024 10.7  7.0 - 25.0 Final    Anion Gap 05/31/2024 12.0  5.0 - 15.0 mmol/L Final    eGFR 05/31/2024 18.1 (L)  >60.0 mL/min/1.73 Final    WBC 05/31/2024 22.02 (H)  3.40 - 10.80 10*3/mm3 Final    RBC 05/31/2024 2.52 (L)  4.14 - 5.80 10*6/mm3 Final    Hemoglobin 05/31/2024 7.5 (L)  13.0 - 17.7 g/dL Final    Hematocrit 05/31/2024 24.4 (L)  37.5 - 51.0 % Final    MCV 05/31/2024 96.8  79.0 - 97.0 fL Final    MCH 05/31/2024 29.8  26.6 - 33.0 pg Final    MCHC 05/31/2024 30.7 (L)  31.5 - 35.7 g/dL Final    RDW 05/31/2024 18.0 (H)  12.3 - 15.4 % Final    RDW-SD 05/31/2024 63.0 (H)  37.0 - 54.0 fl Final    MPV 05/31/2024 9.2  6.0 - 12.0 fL Final    Platelets 05/31/2024 249  140 - 450 10*3/mm3 Final    Neutrophil % 05/31/2024 85.4 (H)  42.7 - 76.0 % Final    Lymphocyte % 05/31/2024 7.7 (L)  19.6 - 45.3 % Final    Monocyte % 05/31/2024 5.6  5.0 - 12.0 % Final    Eosinophil % 05/31/2024 0.4  0.3 - 6.2 % Final    Basophil % 05/31/2024 0.2  0.0 - 1.5 % Final    Immature Grans % 05/31/2024 0.7 (H)  0.0 - 0.5 % Final    Neutrophils, Absolute 05/31/2024 18.79 (H)  1.70 - 7.00 10*3/mm3 Final    Lymphocytes, Absolute 05/31/2024 1.70  0.70 - 3.10 10*3/mm3 Final    Monocytes, Absolute 05/31/2024 1.23 (H)  0.10 - 0.90 10*3/mm3 Final    Eosinophils, Absolute 05/31/2024 0.09  0.00 - 0.40 10*3/mm3 Final    Basophils, Absolute 05/31/2024 0.05  0.00 - 0.20 10*3/mm3 Final    Immature Grans, Absolute 05/31/2024 0.16 (H)  0.00 - 0.05 10*3/mm3 Final    nRBC 05/31/2024 0.0  0.0 - 0.2 /100 WBC Final    HS Troponin T 05/31/2024 454 (C)  <22 ng/L Final    HS Troponin T 05/31/2024 404 (C)  <22 ng/L Final    Troponin T Delta 05/31/2024 -50 (L)  >=-4 - <+4 ng/L Final    Color, UA 05/31/2024 Yellow  Yellow, Straw Final    Appearance, UA 05/31/2024 Cloudy (A)  Clear Final    pH, UA 05/31/2024 8.5 (H)  5.0 - 8.0 Final    Specific Gravity, UA 05/31/2024 1.011  1.001 - 1.030 Final    Glucose,  UA 05/31/2024 100 mg/dL (Trace) (A)  Negative Final    Ketones, UA 05/31/2024 Negative  Negative Final    Bilirubin, UA 05/31/2024 Negative  Negative Final    Blood, UA 05/31/2024 Small (1+) (A)  Negative Final    Protein, UA 05/31/2024 100 mg/dL (2+) (A)  Negative Final    Leuk Esterase, UA 05/31/2024 Moderate (2+) (A)  Negative Final    Nitrite, UA 05/31/2024 Negative  Negative Final    Urobilinogen, UA 05/31/2024 0.2 E.U./dL  0.2 - 1.0 E.U./dL Final    EF(MOD-bp) 06/01/2024 58.1  % Final    LVIDd 06/01/2024 5.2  cm Final    LVIDs 06/01/2024 3.6  cm Final    IVSd 06/01/2024 1.10  cm Final    LVPWd 06/01/2024 1.10  cm Final    FS 06/01/2024 30.8  % Final    IVS/LVPW 06/01/2024 1.00  cm Final    ESV(cubed) 06/01/2024 46.7  ml Final    LV Sys Vol (BSA corrected) 06/01/2024 31.6  cm2 Final    EDV(cubed) 06/01/2024 140.6  ml Final    LV Hall Vol (BSA corrected) 06/01/2024 66.9  cm2 Final    LV mass(C)d 06/01/2024 220.8  grams Final    LVOT area 06/01/2024 3.5  cm2 Final    LVOT diam 06/01/2024 2.10  cm Final    EDV(MOD-sp2) 06/01/2024 114.0  ml Final    EDV(MOD-sp4) 06/01/2024 140.0  ml Final    ESV(MOD-sp2) 06/01/2024 42.5  ml Final    ESV(MOD-sp4) 06/01/2024 66.0  ml Final    SV(MOD-sp2) 06/01/2024 71.5  ml Final    SV(MOD-sp4) 06/01/2024 74.0  ml Final    SVi(MOD-SP2) 06/01/2024 34.2  ml/m2 Final    SVi(MOD-SP4) 06/01/2024 35.4  ml/m2 Final    SVi (LVOT) 06/01/2024 24.0  ml/m2 Final    EF(MOD-sp2) 06/01/2024 62.7  % Final    EF(MOD-sp4) 06/01/2024 52.9  % Final    LA ESV Index (BP) 06/01/2024 48.3  ml/m2 Final    Med Peak E' Marcel 06/01/2024 7.9  cm/sec Final    Lat Peak E' Marcel 06/01/2024 8.2  cm/sec Final    SV(LVOT) 06/01/2024 50.2  ml Final    RV Base 06/01/2024 3.2  cm Final    RV Mid 06/01/2024 2.6  cm Final    RV Length 06/01/2024 8.5  cm Final    TAPSE (>1.6) 06/01/2024 1.60  cm Final    RV S' 06/01/2024 11.5  cm/sec Final    LA dimension (2D)  06/01/2024 4.7  cm Final    LV V1 max 06/01/2024 82.7  cm/sec Final     LV V1 max PG 06/01/2024 2.7  mmHg Final    LV V1 mean PG 06/01/2024 1.00  mmHg Final    LV V1 VTI 06/01/2024 14.5  cm Final    Ao pk lashae 06/01/2024 224.0  cm/sec Final    Ao max PG 06/01/2024 20.1  mmHg Final    Ao mean PG 06/01/2024 8.0  mmHg Final    Ao V2 VTI 06/01/2024 35.1  cm Final    TAMIKA(I,D) 06/01/2024 1.43  cm2 Final    MV max PG 06/01/2024 8.4  mmHg Final    MV mean PG 06/01/2024 3.5  mmHg Final    MV V2 VTI 06/01/2024 32.9  cm Final    MV P1/2t 06/01/2024 69.5  msec Final    MVA(P1/2t) 06/01/2024 3.2  cm2 Final    MVA(VTI) 06/01/2024 1.53  cm2 Final    MV dec slope 06/01/2024 611.0  cm/sec2 Final    MR max lashae 06/01/2024 504.0  cm/sec Final    MR max PG 06/01/2024 101.6  mmHg Final    MR mean lashae 06/01/2024 375.0  cm/sec Final    MR mean PG 06/01/2024 65.0  mmHg Final    MR VTI 06/01/2024 142.0  cm Final    PA acc time 06/01/2024 0.16  sec Final    Ao root diam 06/01/2024 3.4  cm Final    BH CV VAS BP LEFT ARM 06/01/2024 116/69  mmHg Final    MV regurgitant volume 06/01/2024 39  cc Final    PISA MR EROA 06/01/2024 0.28  cm2 Final    Ascending aorta 06/01/2024 3.2  cm Final    Blood Culture 05/31/2024 No growth at 24 hours   Preliminary    Blood Culture 05/31/2024 No growth at 24 hours   Preliminary    Lactate 05/31/2024 1.3  0.5 - 2.0 mmol/L Final    Falsely depressed results may occur on samples drawn from patients receiving N-Acetylcysteine (NAC) or Metamizole.    TSH 05/31/2024 65.050 (H)  0.270 - 4.200 uIU/mL Final    Free T4 05/31/2024 0.53 (L)  0.92 - 1.68 ng/dL Final    Iron 05/31/2024 13 (L)  59 - 158 mcg/dL Final    Iron Saturation (TSAT) 05/31/2024 7 (L)  20 - 50 % Final    Transferrin 05/31/2024 120 (L)  200 - 360 mg/dL Final    TIBC 05/31/2024 179 (L)  298 - 536 mcg/dL Final    Heparin Anti-Xa (UFH) 05/31/2024 >1.10 (C)  0.30 - 0.70 IU/ml Final    Protime 05/31/2024 24.3 (H)  12.2 - 14.5 Seconds Final    INR 05/31/2024 2.16 (H)  0.89 - 1.12 Final    PTT 05/31/2024 53.1 (L)  60.0 - 90.0  seconds Final    Glucose 06/01/2024 76  65 - 99 mg/dL Final    BUN 06/01/2024 40 (H)  8 - 23 mg/dL Final    Creatinine 06/01/2024 3.80 (H)  0.76 - 1.27 mg/dL Final    Sodium 06/01/2024 133 (L)  136 - 145 mmol/L Final    Potassium 06/01/2024 4.3  3.5 - 5.2 mmol/L Final    Chloride 06/01/2024 100  98 - 107 mmol/L Final    CO2 06/01/2024 19.0 (L)  22.0 - 29.0 mmol/L Final    Calcium 06/01/2024 8.7  8.6 - 10.5 mg/dL Final    BUN/Creatinine Ratio 06/01/2024 10.5  7.0 - 25.0 Final    Anion Gap 06/01/2024 14.0  5.0 - 15.0 mmol/L Final    eGFR 06/01/2024 16.2 (L)  >60.0 mL/min/1.73 Final    Vitamin B-12 06/01/2024 755  211 - 946 pg/mL Final    Folate 06/01/2024 4.36 (L)  4.78 - 24.20 ng/mL Final    HS Troponin T 06/01/2024 386 (C)  <22 ng/L Final    WBC 06/01/2024 20.32 (H)  3.40 - 10.80 10*3/mm3 Final    RBC 06/01/2024 2.31 (L)  4.14 - 5.80 10*6/mm3 Final    Hemoglobin 06/01/2024 6.9 (C)  13.0 - 17.7 g/dL Final    Hematocrit 06/01/2024 22.1 (L)  37.5 - 51.0 % Final    MCV 06/01/2024 95.7  79.0 - 97.0 fL Final    MCH 06/01/2024 29.9  26.6 - 33.0 pg Final    MCHC 06/01/2024 31.2 (L)  31.5 - 35.7 g/dL Final    RDW 06/01/2024 17.7 (H)  12.3 - 15.4 % Final    RDW-SD 06/01/2024 61.6 (H)  37.0 - 54.0 fl Final    MPV 06/01/2024 9.8  6.0 - 12.0 fL Final    Platelets 06/01/2024 241  140 - 450 10*3/mm3 Final    Neutrophil % 06/01/2024 81.7 (H)  42.7 - 76.0 % Final    Lymphocyte % 06/01/2024 9.6 (L)  19.6 - 45.3 % Final    Monocyte % 06/01/2024 7.0  5.0 - 12.0 % Final    Eosinophil % 06/01/2024 0.6  0.3 - 6.2 % Final    Basophil % 06/01/2024 0.2  0.0 - 1.5 % Final    Immature Grans % 06/01/2024 0.9 (H)  0.0 - 0.5 % Final    Neutrophils, Absolute 06/01/2024 16.57 (H)  1.70 - 7.00 10*3/mm3 Final    Lymphocytes, Absolute 06/01/2024 1.96  0.70 - 3.10 10*3/mm3 Final    Monocytes, Absolute 06/01/2024 1.42 (H)  0.10 - 0.90 10*3/mm3 Final    Eosinophils, Absolute 06/01/2024 0.13  0.00 - 0.40 10*3/mm3 Final    Basophils, Absolute 06/01/2024  0.05  0.00 - 0.20 10*3/mm3 Final    Immature Grans, Absolute 06/01/2024 0.19 (H)  0.00 - 0.05 10*3/mm3 Final    nRBC 06/01/2024 0.0  0.0 - 0.2 /100 WBC Final    RBC, UA 05/31/2024 11-20 (A)  None Seen, 0-2 /HPF Final    WBC, UA 05/31/2024 Too Numerous to Count (A)  None Seen, 0-2 /HPF Final    Bacteria, UA 05/31/2024 None Seen  None Seen, Trace /HPF Final    Squamous Epithelial Cells, UA 05/31/2024 None Seen  None Seen, 0-2 /HPF Final    Hyaline Casts, UA 05/31/2024 None Seen  0 - 6 /LPF Final    Methodology 05/31/2024 Automated Microscopy   Final    PTT 06/01/2024 59.2 (L)  60.0 - 90.0 seconds Final    Product Code 06/01/2024 [id number]   Final    Unit Number 06/01/2024 [id number]   Final    UNIT  ABO 06/01/2024 B   Final    UNIT  RH 06/01/2024 POS   Final    Crossmatch Interpretation 06/01/2024 Compatible   Final    Dispense Status 06/01/2024 IS   Final    Blood Expiration Date 06/01/2024 [id number]   Final    Blood Type Barcode 06/01/2024 7300   Final    ABO Type 06/01/2024 B   Final    RH type 06/01/2024 Positive   Final    Antibody Screen 06/01/2024 Negative   Final    T&S Expiration Date 06/01/2024 6/4/2024 11:59:59 PM   Final    Ferritin 06/01/2024 1,181.00 (H)  30.00 - 400.00 ng/mL Final    ABO Type 05/31/2024 B   Final    RH type 05/31/2024 Positive   Final    Creatine Kinase 06/01/2024 12 (L)  20 - 200 U/L Final    PTT 06/01/2024 43.4 (L)  60.0 - 90.0 seconds Final    QT Interval 06/02/2024 406  ms Preliminary    QTC Interval 06/02/2024 507  ms Preliminary    QT Interval 06/02/2024 364  ms Preliminary    QTC Interval 06/02/2024 476  ms Preliminary       Adult Transthoracic Echo Complete W/ Cont if Necessary Per Protocol    Result Date: 6/1/2024  Narrative:   Left ventricular systolic function is normal. Left ventricular ejection fraction appears to be 51 - 55%.   Left ventricular wall thickness is consistent with borderline concentric hypertrophy.   Left atrial volume is moderately increased.   There  is mild calcification of the aortic valve.   Mild mitral valve regurgitation is present.   There is a small (<1cm) circumferential pericardial effusion.     CT Abdomen Pelvis Without Contrast    Result Date: 5/31/2024  Narrative: CT ABDOMEN PELVIS WO CONTRAST Date of Exam: 5/31/2024 9:17 PM EDT Indication: leukocytosis, has a cholecystostomy tube. Comparison: None available. Technique: Axial CT images were obtained of the abdomen and pelvis without the administration of contrast. Reconstructed coronal and sagittal images were also obtained. Automated exposure control and iterative construction methods were used. Findings: Visualized Chest: Moderate cardiomegaly. Small pericardial effusion. Partially imaged central venous catheter with the distal tip at the cavoatrial junction. Trace bilateral pleural effusions. Mild diffuse interstitial thickening. Liver: Liver is normal in size and CT density. No focal lesions. Gallbladder: Cholecystostomy tube is noted. The gallbladder is almost completely decompressed. There is mild fat stranding noted within the gallbladder fossa. Bile Ducts: No billiary dcutal dilation. Spleen: Spleen is normal in size and CT density. Pancreas: Pancreas is normal. There is no evidence of pancreatic mass or peripancreatic fluid. Adrenals: Adrenal glands are unremarkable. Kidneys: Kidneys are normal in size. There are no stones or hydronephrosis. Gastrointestinal: Wall thickening of the sigmoid colon and rectum. Additionally hyperdensity is noted within the rectum, nonspecific but may represent suppositories. Otherwise the visualized GI tract is unremarkable on this study performed without IV and oral contrast. Bladder: A Delaney catheter is present within the bladder. Significant bladder wall thickening, nonspecific but Pelvis:  No suspecious mass. Peritoneum/Mesentery: Mild diffuse mesenteric edema. No free fluid or pneumoperitoneum.   Lymph Nodes: Multiple prominent to mildly enlarged  para-aortic and mesenteric lymph nodes. Vasculature: Extensive vascular calcifications noted throughout the visualized arteries of the abdomen and pelvis. Abdominal Wall: Partially imaged large left inguinal hernia containing a portion of the sigmoid colon. Mild diffuse soft tissue edema. Bony Structures: No acute osseous abnormality     Impression: Impression: Delaney catheter is present within the bladder with associated significant bladder wall thickening is concerning for cystitis. Wall thickening of the sigmoid colon and rectum is also concerning for colitis/proctitis. Ongoing mild inflammatory changes within the gallbladder fossa. Cholecystostomy tube appears to be in adequate position. Large, partially imaged, left inguinal hernia containing a portion of the sigmoid colon without evidence of obstruction. Findings concerning for pulmonary edema. Multiple additional nonemergent findings as characterized above Electronically Signed: Romel Dominguez DO  5/31/2024 9:48 PM EDT  Workstation ID: [id number]    XR Chest 1 View    Result Date: 5/31/2024  Narrative: XR CHEST 1 VW Date of Exam: 5/31/2024 7:10 PM EDT Indication: central line Comparison: None available. Findings: Right subclavian central venous catheter with the tip in the superior vena cava. Left internal jugular central venous catheter with the tip in the superior vena cava. No consolidation. No pneumothorax or pleural effusion. Cardiac size is normal. The clavicles are intact. No rib fractures. Large region of partially visualized coarse calcification of the right upper extremity likely dystrophic calcification given the location.     Impression: No acute cardiopulmonary disease. Appropriate line placement. Likely dystrophic calcification of the right proximal humerus. Plain film evaluation of the right humerus recommended. Electronically Signed: Delmer Shea MD  5/31/2024 7:36 PM EDT  Workstation ID: [id number]       Assessment:    NSTEMI (non-ST elevated  myocardial infarction)    C. difficile colitis    History of cholecystitis s/p cholecystostomy tube    ESRD (end stage renal disease) on hemodialysis    Atrial fibrillation    Essential hypertension     Left below knee amputation stump ulceration / right lateral shin ulcer with exposed peroneal tendons.    Plan:  MRI imaging obtained, read pending.  There is minimal edema in residual tibial on left.    Vascular workup pending.  Would benefit from vascular eval.    Could consider revision below knee amputation on left vs above knee amputation, right leg debridement, irrigation, wound vacuum assisted closure.    He's adamantly opposed to considering above knee amputation on the left.  The prognosis for healing the right sided ulcer is poor.    Will discuss with him following cardiac intervention.    Date: 6/2/2024    Kale Yeager Jr, MD

## 2024-06-02 NOTE — PROGRESS NOTES
[facility] Medicine Services  PROGRESS NOTE    Patient Name: Tavo Gudino  : 1955  MRN: [id number]    Date of Admission: 2024  Primary Care Physician: Hayden Weisntein MD    Subjective   Subjective     CC:  Chest pain, A fib with RVR, NSTEMI, leukocytosis    HPI:  Patient became very upset after talking with ortho this AM and became very anxious and tearful. Seen later this AM with patient's wife and daughter-in-law at bedside. Patient's wife reports being upset that the patient was told information about his own health without her being told first and that they didn't want to come here and would have rather gone to [facility]. They also express frustration that his cardiac cath hasn't happened yet. Explained to patient and his family that patient has numerous issues going on at the same time and that prior to doing certain procedures, the appropriate infectious work-up needed to be done.     Patient denied chest pain. Has had 3-4 bowel movements in the past 24h. Had RVR during HD yesterday. No abdominal pain    Objective   Objective     Vital Signs:   Temp:  [97.7 °F (36.5 °C)-98.7 °F (37.1 °C)] 97.9 °F (36.6 °C)  Heart Rate:  [] 91  Resp:  [15-18] 18  BP: (105-158)/() 132/66     Physical Exam:  Constitutional: Awake, alert, chronically ill-appearing  HENT: NCAT, mucous membranes moist  Respiratory: Clear to auscultation bilaterally, respiratory effort normal   Cardiovascular: IRIR, HR 90s  Gastrointestinal: Positive bowel sounds, soft, nontender, nondistended, percutaneous cholecystostomy tube  Musculoskeletal: left BKA  Psychiatric: Anxious, cooperative  Neurologic: PERRL, symmetric facies, speech clear  Skin:                                Results Reviewed:  LAB RESULTS:      Lab 24  2250 24  0450 24  2148 246 24  1421   WBC  --  20.32*  --   --  22.02*   HEMOGLOBIN  --  6.9*  --   --  7.5*   HEMATOCRIT  --  22.1*  --   --  24.4*    PLATELETS  --  241  --   --  249   NEUTROS ABS  --  16.57*  --   --  18.79*   IMMATURE GRANS (ABS)  --  0.19*  --   --  0.16*   LYMPHS ABS  --  1.96  --   --  1.70   MONOS ABS  --  1.42*  --   --  1.23*   EOS ABS  --  0.13  --   --  0.09   MCV  --  95.7  --   --  96.8   LACTATE  --   --   --  1.3  --    PROTIME  --   --  24.3*  --   --    APTT 43.4* 59.2* 53.1*  --   --    HEPARIN ANTI-XA  --   --  >1.10*  --   --          Lab 06/01/24  0450 05/31/24  1421   SODIUM 133* 133*   POTASSIUM 4.3 4.2   CHLORIDE 100 99   CO2 19.0* 22.0   ANION GAP 14.0 12.0   BUN 40* 37*   CREATININE 3.80* 3.47*   EGFR 16.2* 18.1*   GLUCOSE 76 92   CALCIUM 8.7 8.9   TSH  --  65.050*         Lab 05/31/24  1421   TOTAL PROTEIN 5.3*   ALBUMIN 2.5*   GLOBULIN 2.8   ALT (SGPT) 10   AST (SGOT) 10   BILIRUBIN 0.3   ALK PHOS 201*         Lab 06/01/24  0450 05/31/24  2148 05/31/24 2056 05/31/24  1421   HSTROP T 386*  --  404* 454*   PROTIME  --  24.3*  --   --    INR  --  2.16*  --   --              Lab 06/01/24  0944 06/01/24  0450 05/31/24  1421   IRON  --   --  13*   IRON SATURATION (TSAT)  --   --  7*   TIBC  --   --  179*   TRANSFERRIN  --   --  120*   FERRITIN  --  1,181.00*  --    FOLATE  --  4.36*  --    VITAMIN B 12  --  755  --    ABO TYPING B  --  B   RH TYPING Positive  --  Positive   ANTIBODY SCREEN Negative  --   --          Brief Urine Lab Results  (Last result in the past 365 days)        Color   Clarity   Blood   Leuk Est   Nitrite   Protein   CREAT   Urine HCG        05/31/24 2219 Yellow   Cloudy   Small (1+)   Moderate (2+)   Negative   100 mg/dL (2+)                   Microbiology Results Abnormal       Procedure Component Value - Date/Time    Blood Culture - Blood, Blood, Central Line [[id number]]  (Normal) Collected: 05/31/24 2237    Lab Status: Preliminary result Specimen: Blood, Central Line Updated: 06/02/24 0800     Blood Culture No growth at 24 hours    Blood Culture - Blood, Arm, Right [[id number]]  (Normal) Collected:  05/31/24 2237    Lab Status: Preliminary result Specimen: Blood from Arm, Right Updated: 06/02/24 0745     Blood Culture No growth at 24 hours            Adult Transthoracic Echo Complete W/ Cont if Necessary Per Protocol    Result Date: 6/1/2024    Left ventricular systolic function is normal. Left ventricular ejection fraction appears to be 51 - 55%.   Left ventricular wall thickness is consistent with borderline concentric hypertrophy.   Left atrial volume is moderately increased.   There is mild calcification of the aortic valve.   Mild mitral valve regurgitation is present.   There is a small (<1cm) circumferential pericardial effusion.     CT Abdomen Pelvis Without Contrast    Result Date: 5/31/2024  CT ABDOMEN PELVIS WO CONTRAST Date of Exam: 5/31/2024 9:17 PM EDT Indication: leukocytosis, has a cholecystostomy tube. Comparison: None available. Technique: Axial CT images were obtained of the abdomen and pelvis without the administration of contrast. Reconstructed coronal and sagittal images were also obtained. Automated exposure control and iterative construction methods were used. Findings: Visualized Chest: Moderate cardiomegaly. Small pericardial effusion. Partially imaged central venous catheter with the distal tip at the cavoatrial junction. Trace bilateral pleural effusions. Mild diffuse interstitial thickening. Liver: Liver is normal in size and CT density. No focal lesions. Gallbladder: Cholecystostomy tube is noted. The gallbladder is almost completely decompressed. There is mild fat stranding noted within the gallbladder fossa. Bile Ducts: No billiary dcutal dilation. Spleen: Spleen is normal in size and CT density. Pancreas: Pancreas is normal. There is no evidence of pancreatic mass or peripancreatic fluid. Adrenals: Adrenal glands are unremarkable. Kidneys: Kidneys are normal in size. There are no stones or hydronephrosis. Gastrointestinal: Wall thickening of the sigmoid colon and rectum.  Additionally hyperdensity is noted within the rectum, nonspecific but may represent suppositories. Otherwise the visualized GI tract is unremarkable on this study performed without IV and oral contrast. Bladder: A Delaney catheter is present within the bladder. Significant bladder wall thickening, nonspecific but Pelvis:  No suspecious mass. Peritoneum/Mesentery: Mild diffuse mesenteric edema. No free fluid or pneumoperitoneum.   Lymph Nodes: Multiple prominent to mildly enlarged para-aortic and mesenteric lymph nodes. Vasculature: Extensive vascular calcifications noted throughout the visualized arteries of the abdomen and pelvis. Abdominal Wall: Partially imaged large left inguinal hernia containing a portion of the sigmoid colon. Mild diffuse soft tissue edema. Bony Structures: No acute osseous abnormality     Impression: Impression: Delaney catheter is present within the bladder with associated significant bladder wall thickening is concerning for cystitis. Wall thickening of the sigmoid colon and rectum is also concerning for colitis/proctitis. Ongoing mild inflammatory changes within the gallbladder fossa. Cholecystostomy tube appears to be in adequate position. Large, partially imaged, left inguinal hernia containing a portion of the sigmoid colon without evidence of obstruction. Findings concerning for pulmonary edema. Multiple additional nonemergent findings as characterized above Electronically Signed: Romel Dominguez DO  5/31/2024 9:48 PM EDT  Workstation ID: [id number]    XR Chest 1 View    Result Date: 5/31/2024  XR CHEST 1 VW Date of Exam: 5/31/2024 7:10 PM EDT Indication: central line Comparison: None available. Findings: Right subclavian central venous catheter with the tip in the superior vena cava. Left internal jugular central venous catheter with the tip in the superior vena cava. No consolidation. No pneumothorax or pleural effusion. Cardiac size is normal. The clavicles are intact. No rib  fractures. Large region of partially visualized coarse calcification of the right upper extremity likely dystrophic calcification given the location.     Impression: No acute cardiopulmonary disease. Appropriate line placement. Likely dystrophic calcification of the right proximal humerus. Plain film evaluation of the right humerus recommended. Electronically Signed: Delmer Shea MD  5/31/2024 7:36 PM EDT  Workstation ID: [id number]     Results for orders placed during the hospital encounter of 05/31/24    Adult Transthoracic Echo Complete W/ Cont if Necessary Per Protocol    Interpretation Summary    Left ventricular systolic function is normal. Left ventricular ejection fraction appears to be 51 - 55%.    Left ventricular wall thickness is consistent with borderline concentric hypertrophy.    Left atrial volume is moderately increased.    There is mild calcification of the aortic valve.    Mild mitral valve regurgitation is present.    There is a small (<1cm) circumferential pericardial effusion.      Current medications:  Scheduled Meds:aspirin, 81 mg, Oral, Daily  cefTRIAXone, 1,000 mg, Intravenous, Q24H  collagenase, 1 Application, Topical, Q24H  DAPTOmycin, 6 mg/kg, Intravenous, Q48H  levothyroxine, 75 mcg, Oral, Q AM  Lidocaine HCl gel, , Topical, BID  metoprolol succinate XL, 50 mg, Oral, Q24H  metroNIDAZOLE, 500 mg, Oral, Q8H  pantoprazole, 40 mg, Oral, Q AM  pharmacy consult - MTM, , Does not apply, Daily  sodium chloride, 10 mL, Intravenous, Q12H  vancomycin, 125 mg, Oral, Q6H      Continuous Infusions:heparin, 15 Units/kg/hr, Last Rate: 15 Units/kg/hr (06/01/24 2270)  Pharmacy to Dose Heparin,       PRN Meds:.  acetaminophen    HYDROcodone-acetaminophen    Lidocaine HCl gel    metoprolol tartrate    Morphine    Pharmacy to Dose Heparin    sodium chloride    sodium chloride    Assessment & Plan   Assessment & Plan     Active Hospital Problems    Diagnosis  POA    **NSTEMI (non-ST elevated myocardial  infarction) [I21.4]  Yes    C. difficile colitis [A04.72]  Yes    History of cholecystitis s/p cholecystostomy tube [K81.9]  Yes    ESRD (end stage renal disease) on hemodialysis [N18.6]  Yes    Atrial fibrillation [I48.91]  Yes    Essential hypertension [I10]  Yes      Resolved Hospital Problems   No resolved problems to display.        Brief Hospital Course to date:  Tavo Gudino is a 69 y.o. male with hx of HTN, PAD, left BKA, ESRD on hemodialysis, Afib on Eliquis, recent acute cholecystitis s/p cholecystostomy tube placement at , chronic urinary retention with indwelling Delaney catheter, and previous hx of C.diff who presented from [facility] due to chest pain, elevated troponin, and Afib with RVR.     This patient's problems and plans were partially entered by my partner and updated as appropriate by me 06/02/24.     Chest pain, resolved  Elevated troponin, possible NSTEMI  --Transferred to [facility] for LHC with Dr. Amador  --Troponins at OSH 18 -->97-->472  --Troponin 454 here on admission  --Monitor on telemetry  --Dr. Amador/Cardiology consulted, tentative plan for C this week  --ASA, heparin drip, beta blocker     Afib with RVR, now rate-controlled  Hx of Afib/Aflutter  --Rate controlled with Metoprolol apparently per OSH notes  --Hold Eliquis for heart cath, on heparin drip as above  --Continue Metoprolol     HFrEF  Moderate MR  --Most recent Echo on file I April 2024: EF 40%, akinetic septal, anterior, and apical walls, severely dilated LA, moderate MR  --Follows with  Cardiology, had been evaluated previously for Vanessa clip  --ECHO 6/1: LVEF 51 to 55%, borderline concentric LVH, left atrial volume moderately increased, mild MR, small less than 1 cm circumferential pericardial effusion     Leukocytosis, improving  C.diff colitis?  Concern for osteomyelitis   Exposed tendon on RLE  --Apparently WBC increased to 24K with complaints of diarrhea, had negative C.diff toxin but positive C.diff antigen.  Was started on PO Vanc at OSH; of note, I talked to the provider there who stated he had loose stools following administration of Kayexalate and Colace  --Pe chart review, he had C.diff documented on 4/6/24 from   --WBC 22K on admission here, now improving  --Will repeat C.diff testing here and continue on PO Vanc for now  --Follow-up blood and urine cultures  --MRI left tib/fib showed cellulitis, possible osteomyelitis  --MRI right foot with cellulitis/edema  --Ceftriaxone, flagyl, dapto  --MRSA screen pending  -- Arterial duplex pending  --Discussed with Dr. Yeager; likely needs additional amputation on LLE d/t concern for poor wound healing, likely needs debridement right lower extremity around the tendon with wound VAC placement; patient refusing further amputation of left lower extremity  --ID consulted     Recent cholecystitis s/p cholecystostomy tube  --Request records from that hospitalization, apparently was placed at  but I cannot find records of this  --Consult General Surgery for management of tube, my practice partner discussed with Dr. Beckman  --Apparently already has follow up at  6/6/24 General Surgery     Chronic urinary retention  --Has Delaney in place, reported hx of urethral injury per OSH note (data deficit, no idea when this was Delaney placed), appears he follows with Urology at  and this is a chronic Delaney  --Replaced Delaney here; follow-up urine culture     PAD  Hx of left BKA  --Continue ASA     Anemia  --Likely chronic due to renal disease  --Hb 7.5 here, had been 9 at OSH  --Monitor closely while on heparin drip     HTN  ESRD  --Nephrology consulted for dialysis      Hypothyroidism  --TSH significantly elevated at 91 at OSH, apparently free T4 was low but do not have that test result available to review  --Synthroid dose was increased to 75 mcg daily at OSH  --Will repeat TSH, free T4 here  --Continue Synthroid at above dose     Multiple wounds  --Wound care consulted     Poor IV  access  --Dr. Beckman placed central line    Expected Discharge Location and Transportation: pending PT/OT  Expected Discharge   Expected Discharge Date: 6/5/2024; Expected Discharge Time:      DVT prophylaxis:  Medical and mechanical DVT prophylaxis orders are present.         AM-PAC 6 Clicks Score (PT): 6 (06/02/24 0657)    CODE STATUS:   Code Status and Medical Interventions:   Ordered at: 05/31/24 1429     Code Status (Patient has no pulse and is not breathing):    CPR (Attempt to Resuscitate)     Medical Interventions (Patient has pulse or is breathing):    Full Support       Sonya Banks MD  06/02/24

## 2024-06-02 NOTE — PLAN OF CARE
Goal Outcome Evaluation:  Plan of Care Reviewed With: patient           Outcome Evaluation: Pt presents with multiple ulcerations to BLEs. L BKA site with moderate slough / eschar covering and R lateral ulceration with tendon exposure. PT held any significant debridement due to family reporting poor arterial flow to LLE and history of poor response to debridement. PT noted ortho consult with possible discussion about surgical management and PT will hold further tx at this time to determine further need for skilled PT.

## 2024-06-02 NOTE — THERAPY EVALUATION
Acute Care - Wound/Debridement Initial Evaluation  [facility]     Patient Name: Tavo Gudino  : 1955  MRN: [id number]  Today's Date: 2024                Admit Date: 2024    Visit Dx:  No diagnosis found.    Patient Active Problem List   Diagnosis    NSTEMI (non-ST elevated myocardial infarction)    C. difficile colitis    History of cholecystitis s/p cholecystostomy tube    ESRD (end stage renal disease) on hemodialysis    Atrial fibrillation    Essential hypertension        Past Medical History:   Diagnosis Date    Atrial fibrillation     Chronic kidney disease (CKD), stage V     ESRD on hemodialysis     Hypertension     Metabolic acidosis     Peripheral arterial disease     Pneumothorax     Secondary hyperparathyroidism         Past Surgical History:   Procedure Laterality Date    ARTERIOVENOUS FISTULA Right     BELOW KNEE LEG AMPUTATION Left     CATARACT EXTRACTION Bilateral            Wound 24 1454 Right sacral spine Pressure Injury (Active)   Closure Unable to assess 24   Base dressing in place, unable to visualize 24       Wound 24 1630 Left gluteal Pressure Injury (Active)   Closure Unable to assess 24   Base dressing in place, unable to visualize 24       Wound 24 1630 Right anterior groin Incision (Active)   Closure Unable to assess 24   Base dressing in place, unable to visualize 24       Wound 24 1630 Left circumferential knee Amputation stump (Active)   Dressing Appearance intact;moist drainage 24 1500   Closure Unable to assess 24   Base moist;pink;red;slough;subcutaneous;yellow;black eschar 24 1500   Periwound intact;dry 24 1500   Periwound Temperature warm 24 1500   Periwound Skin Turgor soft 24 1500   Edges open 24 1500   Wound Length (cm) 9 cm 24 1500   Wound Width (cm) 9 cm 24 1500   Wound Depth (cm) 0.2 cm 24 1500   Wound  Surface Area (cm^2) 81 cm^2 06/02/24 1500   Wound Volume (cm^3) 16.2 cm^3 06/02/24 1500   Drainage Characteristics/Odor serosanguineous 06/02/24 1500   Drainage Amount moderate 06/02/24 1500   Care, Wound cleansed with;wound cleanser;debrided 06/02/24 1500   Dressing Care foam;low-adherent;silver impregnated 06/02/24 1500   Periwound Care cleansed with pH balanced cleanser 06/02/24 1500       Wound 05/31/24 1903 Right lateral leg Arterial Ulcer (Active)   Dressing Appearance intact;moist drainage 06/02/24 1500   Closure Unable to assess 06/01/24 2053   Base moist;pink;red;subcutaneous;yellow;exposed structure 06/02/24 1500   Periwound intact 06/02/24 1500   Periwound Temperature warm 06/02/24 1500   Periwound Skin Turgor soft 06/02/24 1500   Edges open 06/02/24 1500   Wound Length (cm) 7 cm 06/02/24 1500   Wound Width (cm) 4 cm 06/02/24 1500   Wound Depth (cm) 0.3 cm 06/02/24 1500   Wound Surface Area (cm^2) 28 cm^2 06/02/24 1500   Wound Volume (cm^3) 8.4 cm^3 06/02/24 1500   Drainage Characteristics/Odor serosanguineous 06/02/24 1500   Drainage Amount small 06/02/24 1500   Care, Wound cleansed with;wound cleanser;debrided 06/02/24 1500   Dressing Care foam;low-adherent;silver impregnated 06/02/24 1500       Wound 05/31/24 1630 Right lateral ankle Arterial Ulcer (Active)   Dressing Appearance intact 06/02/24 1500   Closure Unable to assess 06/01/24 2053   Base dry;red 06/02/24 1500   Periwound intact;dry 06/02/24 1500   Periwound Temperature warm 06/02/24 1500   Periwound Skin Turgor soft 06/02/24 1500   Edges open 06/02/24 1500   Wound Length (cm) 2 cm 06/02/24 1500   Wound Width (cm) 1 cm 06/02/24 1500   Wound Depth (cm) 0.1 cm 06/02/24 1500   Wound Surface Area (cm^2) 2 cm^2 06/02/24 1500   Wound Volume (cm^3) 0.2 cm^3 06/02/24 1500   Drainage Amount none 06/02/24 1500   Care, Wound cleansed with;soap and water 06/02/24 1500   Dressing Care foam;low-adherent;silver impregnated 06/02/24 1500       Wound 05/31/24  1630 Right posterior heel Arterial Ulcer (Active)   Dressing Appearance intact;moist drainage 06/02/24 1500   Closure Unable to assess 06/01/24 2053   Base moist;pink;red;slough;subcutaneous;yellow 06/02/24 1500   Periwound intact;dry 06/02/24 1500   Periwound Temperature warm 06/02/24 1500   Periwound Skin Turgor soft 06/02/24 1500   Edges open 06/02/24 1500   Wound Length (cm) 1.5 cm 06/02/24 1500   Wound Width (cm) 1.5 cm 06/02/24 1500   Wound Depth (cm) 0.2 cm 06/02/24 1500   Wound Surface Area (cm^2) 2.25 cm^2 06/02/24 1500   Wound Volume (cm^3) 0.45 cm^3 06/02/24 1500   Drainage Characteristics/Odor serosanguineous 06/02/24 1500   Drainage Amount small 06/02/24 1500   Care, Wound cleansed with;wound cleanser;debrided 06/02/24 1500   Dressing Care foam;low-adherent;silver impregnated 06/02/24 1500         WOUND DEBRIDEMENT                     PT Assessment (Last 12 Hours)       PT Evaluation and Treatment       Row Name 06/02/24 1500          Physical Therapy Time and Intention    Subjective Information complains of;weakness;fatigue  -     Document Type evaluation;therapy note (daily note);wound care  -     Mode of Treatment individual therapy;physical therapy  -       Row Name 06/02/24 1500          General Information    Patient Profile Reviewed yes  -MF     Patient Observations alert;cooperative;agree to therapy  -     Pertinent History of Current Functional Problem Multiple ulcerations to BLEs  -     Risks Reviewed patient:;increased discomfort  -     Benefits Reviewed patient:;improve skin integrity  -     Barriers to Rehab medically complex;previous functional deficit;physical barrier  -MF       Row Name 06/02/24 1500          Pain    Pretreatment Pain Rating 0/10 - no pain  -     Posttreatment Pain Rating 0/10 - no pain  -MF       Row Name 06/02/24 1500          Cognition    Affect/Mental Status (Cognition) WNL  -       Row Name             Wound 05/31/24 2513 Right sacral spine  Pressure Injury    Wound - Properties Group Placement Date: 05/31/24  -AH Placement Time: 1454  -AH Present on Original Admission: Y  -AH Side: Right  -AH Location: sacral spine  -AH Primary Wound Type: Pressure inj  -AH, TUNNELING     Retired Wound - Properties Group Placement Date: 05/31/24  -AH Placement Time: 1454  -AH Present on Original Admission: Y  -AH Side: Right  -AH Location: sacral spine  -AH Primary Wound Type: Pressure inj  -AH, TUNNELING     Retired Wound - Properties Group Date first assessed: 05/31/24  -AH Time first assessed: 1454  -AH Present on Original Admission: Y  -AH Side: Right  -AH Location: sacral spine  -AH Primary Wound Type: Pressure inj  -AH, TUNNELING       Row Name             Wound 05/31/24 1630 Left gluteal Pressure Injury    Wound - Properties Group Placement Date: 05/31/24  -TG Placement Time: 1630  -TG Present on Original Admission: Y  -TG Side: Left  -TG Location: gluteal  -TG Primary Wound Type: Pressure inj  -TG    Retired Wound - Properties Group Placement Date: 05/31/24  -TG Placement Time: 1630  -TG Present on Original Admission: Y  -TG Side: Left  -TG Location: gluteal  -TG Primary Wound Type: Pressure inj  -TG    Retired Wound - Properties Group Date first assessed: 05/31/24  -TG Time first assessed: 1630  -TG Present on Original Admission: Y  -TG Side: Left  -TG Location: gluteal  -TG Primary Wound Type: Pressure inj  -TG      Row Name             Wound 05/31/24 1630 Right anterior groin Incision    Wound - Properties Group Placement Date: 05/31/24  -TG Placement Time: 1630  -TG Present on Original Admission: Y  -TG Side: Right  -TG Orientation: anterior  -TG Location: groin  -TG Primary Wound Type: Incision  -TG, infected cath site     Retired Wound - Properties Group Placement Date: 05/31/24  -TG Placement Time: 1630  -TG Present on Original Admission: Y  -TG Side: Right  -TG Orientation: anterior  -TG Location: groin  -TG Primary Wound Type: Incision  -TG, infected  cath site     Retired Wound - Properties Group Date first assessed: 05/31/24  -TG Time first assessed: 1630  -TG Present on Original Admission: Y  -TG Side: Right  -TG Location: groin  -TG Primary Wound Type: Incision  -TG, infected cath site       Row Name 06/02/24 1500          Wound 05/31/24 1630 Left circumferential knee Amputation stump    Wound - Properties Group Placement Date: 05/31/24  -TG Placement Time: 1630  -TG Present on Original Admission: Y  -TG Side: Left  -TG Orientation: circumferential  -TG Location: knee  -TG Primary Wound Type: Amputation s  -TG    Dressing Appearance intact;moist drainage  -MF     Base moist;pink;red;slough;subcutaneous;yellow;black eschar  -MF     Periwound intact;dry  -MF     Periwound Temperature warm  -MF     Periwound Skin Turgor soft  -MF     Edges open  -MF     Wound Length (cm) 9 cm  -MF     Wound Width (cm) 9 cm  -MF     Wound Depth (cm) 0.2 cm  -MF     Wound Surface Area (cm^2) 81 cm^2  -MF     Wound Volume (cm^3) 16.2 cm^3  -MF     Drainage Characteristics/Odor serosanguineous  -MF     Drainage Amount moderate  -MF     Care, Wound cleansed with;wound cleanser;debrided  nonsel debr  -MF     Dressing Care foam;low-adherent;silver impregnated  therahoney with optifoam Ag  -MF     Periwound Care cleansed with pH balanced cleanser  -MF     Retired Wound - Properties Group Placement Date: 05/31/24  -TG Placement Time: 1630  -TG Present on Original Admission: Y  -TG Side: Left  -TG Orientation: circumferential  -TG Location: knee  -TG Primary Wound Type: Amputation s  -TG    Retired Wound - Properties Group Date first assessed: 05/31/24  -TG Time first assessed: 1630  -TG Present on Original Admission: Y  -TG Side: Left  -TG Location: knee  -TG Primary Wound Type: Amputation s  -TG      Row Name 06/02/24 1500          Wound 05/31/24 1903 Right lateral leg Arterial Ulcer    Wound - Properties Group Placement Date: 05/31/24  -TG Placement Time: 1903  -TG Present on Original  Admission: Y  -TG Side: Right  -TG Orientation: lateral  -TG Location: leg  -TG Primary Wound Type: Arterial ulc  -TG    Dressing Appearance intact;moist drainage  -MF     Base moist;pink;red;subcutaneous;yellow;exposed structure  tendon exposure  -MF     Periwound intact  -MF     Periwound Temperature warm  -MF     Periwound Skin Turgor soft  -MF     Edges open  -MF     Wound Length (cm) 7 cm  -MF     Wound Width (cm) 4 cm  -MF     Wound Depth (cm) 0.3 cm  -MF     Wound Surface Area (cm^2) 28 cm^2  -MF     Wound Volume (cm^3) 8.4 cm^3  -MF     Drainage Characteristics/Odor serosanguineous  -MF     Drainage Amount small  -MF     Care, Wound cleansed with;wound cleanser;debrided  nonsel emigdio  -MF     Dressing Care foam;low-adherent;silver impregnated  mepilex Ag foam with silver gel with kerlix and spandage to secure  -MF     Retired Wound - Properties Group Placement Date: 05/31/24  -TG Placement Time: 1903 -TG Present on Original Admission: Y  -TG Side: Right  -TG Orientation: lateral  -TG Location: leg  -TG Primary Wound Type: Arterial ulc  -TG    Retired Wound - Properties Group Date first assessed: 05/31/24  -TG Time first assessed: 1903  -TG Present on Original Admission: Y  -TG Side: Right  -TG Location: leg  -TG Primary Wound Type: Arterial ulc  -TG      Row Name 06/02/24 1500          Wound 05/31/24 1630 Right lateral ankle Arterial Ulcer    Wound - Properties Group Placement Date: 05/31/24  -TG Placement Time: 1630  -TG Present on Original Admission: Y  -TG Side: Right  -TG Orientation: lateral  -TG Location: ankle  -TG Primary Wound Type: Arterial ulc  -TG    Dressing Appearance intact  -MF     Base dry;red  -MF     Periwound intact;dry  -MF     Periwound Temperature warm  -MF     Periwound Skin Turgor soft  -MF     Edges open  -MF     Wound Length (cm) 2 cm  -MF     Wound Width (cm) 1 cm  -MF     Wound Depth (cm) 0.1 cm  -MF     Wound Surface Area (cm^2) 2 cm^2  -MF     Wound Volume (cm^3) 0.2 cm^3  -MF      Drainage Amount none  -MF     Care, Wound cleansed with;soap and water  -MF     Dressing Care foam;low-adherent;silver impregnated  -MF     Retired Wound - Properties Group Placement Date: 05/31/24  -TG Placement Time: 1630 -TG Present on Original Admission: Y  -TG Side: Right  -TG Orientation: lateral  -TG Location: ankle  -TG Primary Wound Type: Arterial ulc  -TG    Retired Wound - Properties Group Date first assessed: 05/31/24 -TG Time first assessed: 1630 -TG Present on Original Admission: Y  -TG Side: Right  -TG Location: ankle  -TG Primary Wound Type: Arterial ulc  -TG      Row Name 06/02/24 1500          Wound 05/31/24 1630 Right posterior heel Arterial Ulcer    Wound - Properties Group Placement Date: 05/31/24  -TG Placement Time: 1630 -TG Present on Original Admission: Y  -TG Side: Right  -TG Orientation: posterior  -TG Location: heel  -TG Primary Wound Type: Arterial ulc  -TG, with pressure component     Dressing Appearance intact;moist drainage  -MF     Base moist;pink;red;slough;subcutaneous;yellow  -MF     Periwound intact;dry  -MF     Periwound Temperature warm  -MF     Periwound Skin Turgor soft  -MF     Edges open  -MF     Wound Length (cm) 1.5 cm  -MF     Wound Width (cm) 1.5 cm  -MF     Wound Depth (cm) 0.2 cm  -MF     Wound Surface Area (cm^2) 2.25 cm^2  -MF     Wound Volume (cm^3) 0.45 cm^3  -MF     Drainage Characteristics/Odor serosanguineous  -MF     Drainage Amount small  -MF     Care, Wound cleansed with;wound cleanser;debrided  nonsel emigdio  -MF     Dressing Care foam;low-adherent;silver impregnated  therahoney with mepilex Ag foam  -MF     Retired Wound - Properties Group Placement Date: 05/31/24  -TG Placement Time: 1630 -TG Present on Original Admission: Y  -TG Side: Right  -TG Orientation: posterior  -TG Location: heel  -TG Primary Wound Type: Arterial ulc  -TG, with pressure component     Retired Wound - Properties Group Date first assessed: 05/31/24  -TG Time first assessed:  1630  -TG Present on Original Admission: Y  -TG Side: Right  -TG Location: heel  -TG Primary Wound Type: Arterial ulc  -TG, with pressure component       Row Name             Wound 05/31/24 1630 distal penis Pressure Injury    Wound - Properties Group Placement Date: 05/31/24  -TG Placement Time: 1630  -TG Present on Original Admission: Y  -TG Orientation: distal  -TG Location: penis  -TG Primary Wound Type: Pressure inj  -TG, bifurcation fromchronic cole     Retired Wound - Properties Group Placement Date: 05/31/24  -TG Placement Time: 1630  -TG Present on Original Admission: Y  -TG Orientation: distal  -TG Location: penis  -TG Primary Wound Type: Pressure inj  -TG, bifurcation fromchronic cole     Retired Wound - Properties Group Date first assessed: 05/31/24  -TG Time first assessed: 1630  -TG Present on Original Admission: Y  -TG Location: penis  -TG Primary Wound Type: Pressure inj  -TG, bifurcation fromchronic cole       Row Name 06/02/24 1500          Coping    Observed Emotional State calm;cooperative  -     Verbalized Emotional State acceptance  -     Trust Relationship/Rapport care explained  -       Row Name 06/02/24 1500          Plan of Care Review    Plan of Care Reviewed With patient  -     Outcome Evaluation Pt presents with multiple ulcerations to BLEs. L BKA site with moderate slough / eschar covering and R lateral ulceration with tendon exposure. PT held any significant debridement due to family reporting poor arterial flow to LLE and history of poor response to debridement. PT noted ortho consult with possible discussion about surgical management and PT will hold further tx at this time to determine further need for skilled PT.  -       Row Name 06/02/24 1500          Positioning and Restraints    Pre-Treatment Position in bed  -     Post Treatment Position bed  -     In Bed supine;call light within reach  -       Row Name 06/02/24 1500          Therapy Assessment/Plan (PT)     Patient/Family Therapy Goals Statement (PT) wound healing  -     PT Diagnosis (PT) BLE wounds  -     Rehab Potential (PT) fair, will monitor progress closely  -     Criteria for Skilled Interventions Met (PT) yes;meets criteria;skilled treatment is necessary  -     Predicted Duration of Therapy Intervention (PT) 10 days  -       Row Name 06/02/24 1500          PT Evaluation Complexity    History, PT Evaluation Complexity 3 or more personal factors and/or comorbidities  -     Examination of Body Systems (PT Eval Complexity) total of 4 or more elements  -     Clinical Presentation (PT Evaluation Complexity) unstable  -     Clinical Decision Making (PT Evaluation Complexity) high complexity  -     Overall Complexity (PT Evaluation Complexity) high complexity  -       Row Name 06/02/24 1500          Therapy Plan Review/Discharge Plan (PT)    Therapy Plan Review (PT) evaluation/treatment results reviewed;care plan/treatment goals reviewed;risks/benefits reviewed;current/potential barriers reviewed;participants voiced agreement with care plan;participants included;patient;spouse/significant other  -       Row Name 06/02/24 1500          Physical Therapy Goals    Wound Care Goal Selection (PT) wound care, PT goal 1  -       Row Name 06/02/24 1500          Wound Care Goal 1 (PT)    Wound Care Goal 1 (PT) Pt to have s/s of infection or wound complications  -     Time Frame (Wound Care Goal 1, PT) 10 days  -               User Key  (r) = Recorded By, (t) = Taken By, (c) = Cosigned By      Initials Name Provider Type    Rommel Frey, PT Physical Therapist    Porfirio Valerio RN Registered Nurse    Melina Oropeza RN Registered Nurse                      Recommendation and Plan  Planned Therapy Interventions (PT): wound care, patient/family education  Plan of Care Reviewed With: patient           Outcome Evaluation: Pt presents with multiple ulcerations to BLEs. L BKA site with  moderate slough / eschar covering and R lateral ulceration with tendon exposure. PT held any significant debridement due to family reporting poor arterial flow to LLE and history of poor response to debridement. PT noted ortho consult with possible discussion about surgical management and PT will hold further tx at this time to determine further need for skilled PT.  Plan of Care Reviewed With: patient            Time Calculation   PT Charges       Row Name 06/02/24 1500             Time Calculation    Start Time 1500  -MF      PT Goal Re-Cert Due Date 06/12/24  -MF         Untimed Charges    PT Eval/Re-eval Minutes 35  -MF      Wound Care 59352 Non-selective debridement  -      97602-Non-selective debridement 25  -MF         Total Minutes    Untimed Charges Total Minutes 60  -MF       Total Minutes 60  -MF                User Key  (r) = Recorded By, (t) = Taken By, (c) = Cosigned By      Initials Name Provider Type    Rommel Frey, PT Physical Therapist                            PT G-Codes  AM-PAC 6 Clicks Score (PT): 6       Rommel Espinoza, PT  6/2/2024

## 2024-06-02 NOTE — PLAN OF CARE
Goal Outcome Evaluation:  Plan of Care Reviewed With: patient           Outcome Evaluation: Pt a/o, VSS, RA, Sat. 95%, no c/o  pain at this time. Continue  monitoring.

## 2024-06-02 NOTE — CONSULTS
INFECTIOUS DISEASE CONSULT/INITIAL HOSPITAL VISIT    Tavo Gudino  1955  [id number]    Date of Consult: 6/2/2024    Admission Date: 5/31/2024    Requesting Provider: Darren  Evaluating Physician: José Fuentes MD    Reason for Consultation: recurrent C diff. Osteomyelitis     History of present illness:    Patient is a 69 y.o. male, with PMH chronic cholecystitis( cholecystotomy tube) DM, ESRD on HD, HTN, PVD s/p Left  BKA.  All recent care done at [facility] in [facility].    Presented to OSH with chest pain, found to be in afib with RVR possible NSTEMi, transferred to [facility] for LHC.  Developed diarrhea prior to transfer, C diff toxin negative, positive antigen, started on oral Vancomycin. He had been treated with Kayexalate for hyperkalemia, as well as Colace.  Last positive C diff PCR 4/6/24 from [facility]. Noted exposed tendon RLE wound , and ulcer of right heel, sacral area, and left residual stump. He has been afebrile. Admitting labs with WBC 22, plt 249, ALT 10 AST 10, Scr 3.47, UA with TNTC WBCs, urine culture with gram negative bacilli, blood cultures no growth. MRI Left with edema throughout soft tissue of stump, no abscess,subtle early changes of superimposed osteomyelitis distal osseous stump not excluded.  Right with diffuse soft tissue cellulitis, possible early osteomyelitis lateral malleolus, no definite evidence of osteomyelitis.  Started on Ceftriaxone Daptomycin, Flagyl, and oral Vancomycin and we were consulted for evaluation and treatment. He has had an indwelling cole catheter since April, just recently changed.  He continues to have numerous diarrhea stools and abdominal cramping.  No fever.      Past Medical History:   Diagnosis Date    Atrial fibrillation     Chronic kidney disease (CKD), stage V     ESRD on hemodialysis     Hypertension     Metabolic acidosis     Peripheral arterial disease     Pneumothorax     Secondary hyperparathyroidism        Past  Surgical History:   Procedure Laterality Date    ARTERIOVENOUS FISTULA Right     BELOW KNEE LEG AMPUTATION Left     CATARACT EXTRACTION Bilateral        Family History   Problem Relation Age of Onset    Cancer Father         Bladder       Social History     Socioeconomic History    Marital status:    Tobacco Use    Smoking status: Never    Smokeless tobacco: Never   Vaping Use    Vaping status: Never Used   Substance and Sexual Activity    Alcohol use: Never    Drug use: Never    Sexual activity: Defer       Allergies   Allergen Reactions    Levothyroxine Unknown - Low Severity    Penicillins Hives    Hydromorphone Other (See Comments)     Confusion, encephalopathy per wife         Medication:    Current Facility-Administered Medications:     acetaminophen (TYLENOL) tablet 650 mg, 650 mg, Oral, Q4H PRN, Genny Saeed MD, 650 mg at 05/31/24 1749    aspirin EC tablet 81 mg, 81 mg, Oral, Daily, Keren Escalona PA-C, 81 mg at 06/02/24 0927    cefTRIAXone (ROCEPHIN) 1,000 mg in sodium chloride 0.9 % 100 mL MBP, 1,000 mg, Intravenous, Q24H, Sonya Banks MD, Last Rate: 200 mL/hr at 06/01/24 1545, 1,000 mg at 06/01/24 1545    collagenase ointment 1 Application, 1 Application, Topical, Q24H, Genny Saeed MD, 1 Application at 06/01/24 2107    DAPTOmycin (CUBICIN) 500 mg in sodium chloride 0.9 % 50 mL IVPB, 6 mg/kg, Intravenous, Q48H, Sonya Banks MD, Last Rate: 100 mL/hr at 06/01/24 1644, 500 mg at 06/01/24 1644    heparin 47788 units/250 mL (100 units/mL) in 0.45 % NaCl infusion, 18 Units/kg/hr, Intravenous, Titrated, Freddie Sutton, [facility], Last Rate: 15.26 mL/hr at 06/02/24 1111, 18 Units/kg/hr at 06/02/24 1111    HYDROcodone-acetaminophen (NORCO) 7.5-325 MG per tablet 1 tablet, 1 tablet, Oral, Q6H PRN, Genny Saeed MD, 1 tablet at 06/02/24 0656    levothyroxine (SYNTHROID, LEVOTHROID) tablet 75 mcg, 75 mcg, Oral, Q AM, Genny Saeed MD, 75 mcg at 06/02/24 2486    Lidocaine HCl gel  (XYLOCAINE) urethral/mucosal syringe, , Topical, PRN, Genny Saeed MD, Given at 06/01/24 0609    lidocaine HCL topical jelly USP 2% syringe 11 mL, , Topical, BID, Genny Saeed MD, Given at 06/01/24 2107    LORazepam (ATIVAN) tablet 0.25 mg, 0.25 mg, Oral, Q6H PRN, Sonya Banks MD, 0.25 mg at 06/02/24 0927    metoprolol succinate XL (TOPROL-XL) 24 hr tablet 50 mg, 50 mg, Oral, Q24H, Keren Escalona PA-C, 50 mg at 06/01/24 1645    metoprolol tartrate (LOPRESSOR) injection 5 mg, 5 mg, Intravenous, Q6H PRN, Hayden Weinstein MD    metroNIDAZOLE (FLAGYL) tablet 500 mg, 500 mg, Oral, Q8H, Sonya Banks MD, 500 mg at 06/02/24 0656    Morphine sulfate (PF) injection 4 mg, 4 mg, Intravenous, Daily PRN, Genny Saeed MD    pantoprazole (PROTONIX) EC tablet 40 mg, 40 mg, Oral, Q AM, Niranjan Beckman MD, 40 mg at 06/02/24 0927    Pharmacy Consult - [facility], , Does not apply, Daily, Freddie Sutton, [facility]    Pharmacy to Dose Heparin, , Does not apply, Continuous PRN, Dinorah Russell, [facility]    potassium chloride (MICRO-K/KLOR-CON) CR capsule, 40 mEq, Oral, Once, Sonya Banks MD    sodium chloride 0.9 % flush 10 mL, 10 mL, Intravenous, Q12H, Genny Saeed MD, 10 mL at 06/02/24 0928    sodium chloride 0.9 % flush 10 mL, 10 mL, Intravenous, PRN, Genny Saeed MD    sodium chloride 0.9 % infusion 40 mL, 40 mL, Intravenous, PRN, Genny Saeed MD    vancomycin (VANCOCIN) capsule 125 mg, 125 mg, Oral, Q6H, Genny Saeed MD, 125 mg at 06/02/24 0656    Antibiotics:  Anti-Infectives (From admission, onward)      Ordered     Dose/Rate Route Frequency Start Stop    06/01/24 1425  DAPTOmycin (CUBICIN) 500 mg in sodium chloride 0.9 % 50 mL IVPB        Ordering Provider: Sonya Banks MD    6 mg/kg × 84.8 kg  100 mL/hr over 30 Minutes Intravenous Every 48 Hours 06/01/24 1600 06/07/24 1559    06/01/24 1229  metroNIDAZOLE (FLAGYL) tablet 500 mg        Ordering Provider: Sonya Banks MD    500 mg Oral Every 8 Hours  Scheduled 24 1400 24 1359    24 1229  cefTRIAXone (ROCEPHIN) 1,000 mg in sodium chloride 0.9 % 100 mL MBP        Ordering Provider: Sonya Banks MD    1,000 mg  200 mL/hr over 30 Minutes Intravenous Every 24 Hours 24 1300 24 1359    24 1503  vancomycin (VANCOCIN) capsule 125 mg        Ordering Provider: Genny Saeed MD    125 mg Oral Every 6 Hours Scheduled 24 1800 06/10/24 2599              Review of Systems:  Constitutional-- No Fever, chills or sweats.  Appetite good, and no malaise. +fatigue   HEENT-- No new vision, hearing or throat complaints.  No epistaxis or oral sores.  Denies odynophagia or dysphagia. No headache, photophobia or neck stiffness.  CV-- No chest pain, palpitation or syncope  Resp-- No SOB/cough/Hemoptysis  GI- No nausea, vomiting, + diarrhea abdominal cramping.  No hematochezia, melena, or hematemesis. Denies jaundice or chronic liver disease.  -- chronic cole   Heme- No active bruising or bleeding; no Hx of DVT or PE.  MS-- Left BKA site, right lateral wound, with exposed tendon   Neuro-- No acute focal weakness or numbness in the arms or legs.  No seizures.  Skin--No rashes   right HD catheter site ok, Left IJ      Physical Exam:   Vital Signs  Temp (24hrs), Av.8 °F (36.6 °C), Min:97.4 °F (36.3 °C), Max:98.7 °F (37.1 °C)    Temp  Min: 97.4 °F (36.3 °C)  Max: 98.7 °F (37.1 °C)  BP  Min: 105/57  Max: 158/71  Pulse  Min: 81  Max: 130  Resp  Min: 16  Max: 18  SpO2  Min: 69 %  Max: 100 %    GENERAL: Awake and alert, in no acute distress.   HEENT: Normocephalic, atraumatic.  PERRL. EOMI. No conjunctival injection. No icterus. Oropharynx clear without evidence of thrush or exudate.  Edentulous   NECK: Supple   HEART: RRR; No murmur, rubs, gallops.   LUNGS: Clear to auscultation bilaterally without wheezing, rales, rhonchi. Normal respiratory effort. Nonlabored.   ABDOMEN: Soft,  tender  nondistended. Positive bowel sounds. No rebound or  guarding.   EXT: Photo of left BKA stump with area of necrotic tissue, slough, erythema around incision site. Right heel with ulcer approx 0.5cm in diameter with necrotic tissue, slough.  Left buttock DTI with slough, bleeding, right sacral area with cicular area of eschar. Right lateral wound approx 4cm x 4xcm with exposed tendon, slough, granulation  :  With Delaney catheter.  MSK: No joint effusions or erythema  SKIN: Warm and dry    NEURO: Oriented to PPT.  Motor 5/5 strength  PSYCHIATRIC: Normal insight and judgment. Cooperative with PE    Laboratory Data    Results from last 7 days   Lab Units 06/02/24  0906 06/01/24  0450 05/31/24  1421   WBC 10*3/mm3 12.36* 20.32* 22.02*   HEMOGLOBIN g/dL 9.1* 6.9* 7.5*   HEMATOCRIT % 27.5* 22.1* 24.4*   PLATELETS 10*3/mm3 262 241 249     Results from last 7 days   Lab Units 06/02/24  0906   SODIUM mmol/L 133*   POTASSIUM mmol/L 3.1*   CHLORIDE mmol/L 99   CO2 mmol/L 16.0*   BUN mg/dL 22   CREATININE mg/dL 2.79*   GLUCOSE mg/dL 64*   CALCIUM mg/dL 8.9     Results from last 7 days   Lab Units 06/02/24  0906   ALK PHOS U/L 240*   BILIRUBIN mg/dL 0.4   ALT (SGPT) U/L 7   AST (SGOT) U/L 7             Results from last 7 days   Lab Units 05/31/24  2056   LACTATE mmol/L 1.3     Results from last 7 days   Lab Units 06/01/24  0450   CK TOTAL U/L 12*         Estimated Creatinine Clearance: 29.1 mL/min (A) (by C-G formula based on SCr of 2.79 mg/dL (H)).      Microbiology:  Microbiology Results (last 10 days)       Procedure Component Value - Date/Time    Clostridioides difficile Toxin - Stool, Per Rectum [[id number]]  (Abnormal) Collected: 06/02/24 1305    Lab Status: Final result Specimen: Stool from Per Rectum Updated: 06/02/24 1416    Narrative:      The following orders were created for panel order Clostridioides difficile Toxin - Stool, Per Rectum.  Procedure                               Abnormality         Status                     ---------                                -----------         ------                     Clostridioides difficile...[[id number]]  Abnormal            Final result                 Please view results for these tests on the individual orders.    Clostridioides difficile Toxin, PCR - Stool, Per Rectum [[id number]]  (Abnormal) Collected: 06/02/24 1305    Lab Status: Final result Specimen: Stool from Per Rectum Updated: 06/02/24 1416     Toxigenic C. difficile by PCR Detected    Narrative:      DNA from a toxigenic strain of C.difficile has been detected.    Blood Culture - Blood, Arm, Right [[id number]]  (Normal) Collected: 05/31/24 2237    Lab Status: Preliminary result Specimen: Blood from Arm, Right Updated: 06/02/24 0745     Blood Culture No growth at 24 hours    Blood Culture - Blood, Blood, Central Line [[id number]]  (Normal) Collected: 05/31/24 2237    Lab Status: Preliminary result Specimen: Blood, Central Line Updated: 06/02/24 0800     Blood Culture No growth at 24 hours    Urine Culture - Urine, Indwelling Urethral Catheter [[id number]]  (Abnormal) Collected: 05/31/24 2219    Lab Status: Preliminary result Specimen: Urine from Indwelling Urethral Catheter Updated: 06/02/24 1245     Urine Culture >100,000 CFU/mL Gram Negative Bacilli    Narrative:      Colonization of the urinary tract without infection is common. Treatment is discouraged unless the patient is symptomatic, pregnant, or undergoing an invasive urologic procedure.              Radiology:  Imaging Results (Last 72 Hours)       Procedure Component Value Units Date/Time    MRI Tibia Fibula Left Without Contrast [[id number]] Collected: 06/02/24 1138     Updated: 06/02/24 1151    Narrative:      MRI TIBIA FIBULA LEFT WO CONTRAST    Date of Exam: 6/2/2024 4:59 AM EDT    Indication: concern for osteomyelitis.     Comparison: None available.    Technique:  Routine multiplanar/multisequence images of the left tibia and fibula were obtained without contrast administration.         Findings:  Postoperative changes are noted status post prior below the knee amputation occurring at the level of the proximal diaphyses of the tibia and fibula. Nonspecific scattered fluid signal is noted within the soft tissues at the surgical stump. The findings   may be reactive. Changes of soft tissue cellulitis could have this appearance.    Scarring is observed in the soft tissues directly adjacent to the osseous stub of the tibia. There are signal changes at the site of osteotomy of the tibia which are related to postoperative changes. Subtle increased signal is noted in this region.   Findings secondary to early developing superimposed osteomyelitis cannot be entirely excluded. Otherwise there is no separate definitive cortical erosion or destruction. No additional abnormal bone marrow edema is seen.    No well-defined fluid collections are seen in the soft tissues to suggest abscess.      Impression:      Impression:  1.Nonspecific edema throughout the soft tissues of the surgical stump of the lower leg status post below-the-knee amputation. The findings may indicate changes of soft tissue cellulitis. No well-defined abscess is observed.  2.Signal changes are noted at the osteotomy site of the tibial resection which may relate to postoperative changes. Subtle early signs of superimposed osteomyelitis at the distal osseous stump cannot be excluded. Otherwise no additional abnormal bone   marrow signal is seen throughout the tibia-fibula. There are no definitive additional potential sites of osteomyelitis.        Electronically Signed: Aj Hollingsworth MD    6/2/2024 11:48 AM EDT    Workstation ID: [id number]    MRI Foot Right Without Contrast [[id number]] Collected: 06/02/24 1113     Updated: 06/02/24 1128    Narrative:        MRI FOOT RIGHT WO CONTRAST    Date of Exam: 6/2/2024 5:02 AM EDT    Indication: R foot wounds, evaluate for osteomyelitis.     Comparison: None available.    Technique:  Routine  multiplanar/multisequence sequence images of the right foot were obtained without contrast administration.      Findings:  There is edema in the subcutaneous soft tissues along the medial, posterior, and lateral aspect of the ankle/hindfoot. There is also edema involving the subcutaneous soft tissues of the forefoot extending into the digits. Mild edema is also seen   throughout the midfoot. The findings suggest changes of soft tissue cellulitis throughout the foot.    There is a low signal focus within the subcutaneous soft tissues along the plantar aspect of the hindfoot. This finding measures 2.0 x 1.0 x 1.3 cm. This finding demonstrates decreased signal on both T1 and T2 weighted imaging. The etiology for this   abnormality is uncertain.    Moderate changes of arthropathy are seen throughout the midfoot indicating developing neuropathic arthropathy or Charcot foot. There is mild edema involving the peripheral margin of the lateral malleolus. The findings may indicate early signs of   developing osteomyelitis. No well-defined cortical destruction or erosion is seen. No definitive additional potential sites of osteomyelitis are identified.      Impression:      Impression:  1.Scattered edema throughout the soft tissues of the foot which may indicate diffuse soft tissue cellulitis.  2.There is an ovoid low signal focus within the subcutaneous soft tissues along the plantar aspect of the hindfoot adjacent to the plantar fascia. This finding may indicate developing fibromatosis related to planter fasciitis. Focal scarring in the soft   tissues in this region with fibrous change could have this appearance. Residual changes of prior hemorrhage could have this appearance. Findings related to prior postoperative change could potentially have this appearance as well.  3.Mild edema associated with the peripheral margin of the lateral malleolus. The finds are nonspecific but may indicate mild early signs of osteomyelitis.  There is no definitive evidence for additional potential sites of osteomyelitis.  4.Changes of neuropathic arthropathy or Charcot foot involving the midfoot.        Electronically Signed: Aj Hollingsworth MD    6/2/2024 11:25 AM EDT    Workstation ID: [id number]    CT Abdomen Pelvis Without Contrast [[id number]] Collected: 05/31/24 2141     Updated: 05/31/24 2151    Narrative:      CT ABDOMEN PELVIS WO CONTRAST    Date of Exam: 5/31/2024 9:17 PM EDT    Indication: leukocytosis, has a cholecystostomy tube.    Comparison: None available.    Technique: Axial CT images were obtained of the abdomen and pelvis without the administration of contrast. Reconstructed coronal and sagittal images were also obtained. Automated exposure control and iterative construction methods were used.      Findings:  Visualized Chest: Moderate cardiomegaly. Small pericardial effusion. Partially imaged central venous catheter with the distal tip at the cavoatrial junction.  Trace bilateral pleural effusions. Mild diffuse interstitial thickening.    Liver: Liver is normal in size and CT density. No focal lesions.    Gallbladder: Cholecystostomy tube is noted. The gallbladder is almost completely decompressed. There is mild fat stranding noted within the gallbladder fossa.    Bile Ducts: No billiary dcutal dilation.    Spleen: Spleen is normal in size and CT density.    Pancreas: Pancreas is normal. There is no evidence of pancreatic mass or peripancreatic fluid.    Adrenals: Adrenal glands are unremarkable.    Kidneys: Kidneys are normal in size. There are no stones or hydronephrosis.    Gastrointestinal: Wall thickening of the sigmoid colon and rectum. Additionally hyperdensity is noted within the rectum, nonspecific but may represent suppositories.  Otherwise the visualized GI tract is unremarkable on this study performed without IV and oral contrast.    Bladder: A Delaney catheter is present within the bladder. Significant bladder wall  thickening, nonspecific but    Pelvis:  No suspecious mass.    Peritoneum/Mesentery: Mild diffuse mesenteric edema. No free fluid or pneumoperitoneum.      Lymph Nodes: Multiple prominent to mildly enlarged para-aortic and mesenteric lymph nodes.    Vasculature: Extensive vascular calcifications noted throughout the visualized arteries of the abdomen and pelvis.    Abdominal Wall: Partially imaged large left inguinal hernia containing a portion of the sigmoid colon. Mild diffuse soft tissue edema.    Bony Structures: No acute osseous abnormality      Impression:      Impression:  Delaney catheter is present within the bladder with associated significant bladder wall thickening is concerning for cystitis.    Wall thickening of the sigmoid colon and rectum is also concerning for colitis/proctitis.    Ongoing mild inflammatory changes within the gallbladder fossa. Cholecystostomy tube appears to be in adequate position.    Large, partially imaged, left inguinal hernia containing a portion of the sigmoid colon without evidence of obstruction.    Findings concerning for pulmonary edema.    Multiple additional nonemergent findings as characterized above              Electronically Signed: Romel Dominguez DO    5/31/2024 9:48 PM EDT    Workstation ID: [id number]    XR Chest 1 View [[id number]] Collected: 05/31/24 1934     Updated: 05/31/24 1940    Narrative:      XR CHEST 1 VW    Date of Exam: 5/31/2024 7:10 PM EDT    Indication: central line    Comparison: None available.    Findings: Right subclavian central venous catheter with the tip in the superior vena cava. Left internal jugular central venous catheter with the tip in the superior vena cava. No consolidation. No pneumothorax or pleural effusion. Cardiac size is   normal. The clavicles are intact. No rib fractures. Large region of partially visualized coarse calcification of the right upper extremity likely dystrophic calcification given the location.      Impression:       No acute cardiopulmonary disease. Appropriate line placement. Likely dystrophic calcification of the right proximal humerus. Plain film evaluation of the right humerus recommended.      Electronically Signed: Delmer Shea MD    5/31/2024 7:36 PM EDT    Workstation ID: [id number]          5/28, 5/31 blood cultures from [place], no growth so far-- Virgie Mcpherson called micro  No other cultures obtained      Impression:   C diff colitis, initially diagnosed in April 2024. Was given Lactulose, Stool softeners in [place] and developed worsening diarrhea, with positive antigen, negative EIA toxin so possible Colonization vs true infection.  With recent diagnosis, and worsening stool frequency will continue to treat for now.  NSTEMI, transferred for [facility] here   ESRD on HD  Severe peripheral vascular disease  S/p left BKA 4/2024, with dehiscence of the amputation site  Possible left tibial early osteomyelitis:  by MRI.  Dr. Yeager evaluating  Left lower extremity wound, with exposed tendon-  early osteomyelitis of the lateral malleolus not excluded by MRI per radiology  Chronic cholecystitis, s/p percutaneous cholecystotomy tube- [facility]  Pyuria, possible UTI: Had chronic indwelling Delaney catheter that was in place for almost 2 months prior to being changed on admission 5/31  Sacral pressure ulcers    PLAN/RECOMMENDATIONS:   - Follow-up pending cultures done at [place]  - Follow-up pending blood cultures from [facility]  - C diff toxin antigen pending  - Repeat urine culture  - Follow CBC, CMP, CRP     - DC Daptomycin, Rocephin   - Start IV Eravacycline per pharm dosing  - continue oral Vancomycin    Highly complex medical decision making.  Discussed with family at bedside. I will follow    Dr. Fuentes has obtained the history, performed the PE, formulated the above treament plan    ROSELINE Cheng  6/2/2024  12:35 EDT    I have edited this note to reflect my history, exam, assessment and plan. I alone have  made the medical decision making for this complex patient.   José Fuentes MD

## 2024-06-02 NOTE — PROGRESS NOTES
" LOS: 2 days   Patient Care Team:  Hayden Weinstein MD as PCP - General (Nephrology)    Chief Complaint: ESRD  NSTEMI    Subjective         Subjective:  Symptoms:  Stable.  No shortness of breath or chest pain.        History taken from: patient    Objective     Vital Sign Min/Max for last 24 hours  Temp  Min: 97.4 °F (36.3 °C)  Max: 98.7 °F (37.1 °C)   BP  Min: 105/57  Max: 158/71   Pulse  Min: 81  Max: 130   Resp  Min: 16  Max: 18   SpO2  Min: 69 %  Max: 100 %   No data recorded   Weight  Min: 84.8 kg (187 lb)  Max: 84.8 kg (187 lb)     Flowsheet Rows      Flowsheet Row First Filed Value   Admission Height 185.4 cm (73\") Documented at 05/31/2024 1826   Admission Weight 84.8 kg (187 lb) Documented at 05/31/2024 1826            I/O this shift:  In: 0   Out: 150 [Drains:150]  I/O last 3 completed shifts:  In: 568 [P.O.:240; Blood:328]  Out: 3570 [Urine:650; Drains:350]    Objective:  General Appearance:  Comfortable.    Vital signs: (most recent): Blood pressure 126/83, pulse 113, temperature 97.4 °F (36.3 °C), temperature source Oral, resp. rate 18, height 185.4 cm (73\"), weight 84.8 kg (187 lb), SpO2 100%.  Vital signs are normal.    Output: Producing urine (Delaney cath+).    HEENT: Normal HEENT exam.    Lungs:  Normal effort.    Heart: Normal rate.  Regular rhythm.  S1 normal.    Abdomen: Abdomen is soft and non-distended.  There are no signs of ascites.    Extremities: Normal range of motion.                Results Review:     I reviewed the patient's new clinical results.    WBC WBC   Date Value Ref Range Status   06/02/2024 12.36 (H) 3.40 - 10.80 10*3/mm3 Final   06/01/2024 20.32 (H) 3.40 - 10.80 10*3/mm3 Final   05/31/2024 22.02 (H) 3.40 - 10.80 10*3/mm3 Final      HGB Hemoglobin   Date Value Ref Range Status   06/02/2024 9.1 (L) 13.0 - 17.7 g/dL Final   06/01/2024 6.9 (C) 13.0 - 17.7 g/dL Final   05/31/2024 7.5 (L) 13.0 - 17.7 g/dL Final      HCT Hematocrit   Date Value Ref Range Status   06/02/2024 27.5 (L) " "37.5 - 51.0 % Final   06/01/2024 22.1 (L) 37.5 - 51.0 % Final   05/31/2024 24.4 (L) 37.5 - 51.0 % Final      Platlets No results found for: \"LABPLAT\"   MCV MCV   Date Value Ref Range Status   06/02/2024 92.9 79.0 - 97.0 fL Final   06/01/2024 95.7 79.0 - 97.0 fL Final   05/31/2024 96.8 79.0 - 97.0 fL Final          Sodium Sodium   Date Value Ref Range Status   06/02/2024 133 (L) 136 - 145 mmol/L Final   06/01/2024 133 (L) 136 - 145 mmol/L Final   05/31/2024 133 (L) 136 - 145 mmol/L Final      Potassium Potassium   Date Value Ref Range Status   06/02/2024 3.1 (L) 3.5 - 5.2 mmol/L Final   06/01/2024 4.3 3.5 - 5.2 mmol/L Final   05/31/2024 4.2 3.5 - 5.2 mmol/L Final      Chloride Chloride   Date Value Ref Range Status   06/02/2024 99 98 - 107 mmol/L Final   06/01/2024 100 98 - 107 mmol/L Final   05/31/2024 99 98 - 107 mmol/L Final      CO2 CO2   Date Value Ref Range Status   06/02/2024 16.0 (L) 22.0 - 29.0 mmol/L Final   06/01/2024 19.0 (L) 22.0 - 29.0 mmol/L Final   05/31/2024 22.0 22.0 - 29.0 mmol/L Final      BUN BUN   Date Value Ref Range Status   06/02/2024 22 8 - 23 mg/dL Final   06/01/2024 40 (H) 8 - 23 mg/dL Final   05/31/2024 37 (H) 8 - 23 mg/dL Final      Creatinine Creatinine   Date Value Ref Range Status   06/02/2024 2.79 (H) 0.76 - 1.27 mg/dL Final   06/01/2024 3.80 (H) 0.76 - 1.27 mg/dL Final   05/31/2024 3.47 (H) 0.76 - 1.27 mg/dL Final      Calcium Calcium   Date Value Ref Range Status   06/02/2024 8.9 8.6 - 10.5 mg/dL Final   06/01/2024 8.7 8.6 - 10.5 mg/dL Final   05/31/2024 8.9 8.6 - 10.5 mg/dL Final      PO4 No results found for: \"CAPO4\"   Albumin Albumin   Date Value Ref Range Status   06/02/2024 2.4 (L) 3.5 - 5.2 g/dL Final   05/31/2024 2.5 (L) 3.5 - 5.2 g/dL Final      Magnesium No results found for: \"MG\"   Uric Acid No results found for: \"URICACID\"     Medication Review: Yes    Assessment & Plan       NSTEMI (non-ST elevated myocardial infarction)    C. difficile colitis    History of " cholecystitis s/p cholecystostomy tube    ESRD (end stage renal disease) on hemodialysis    Atrial fibrillation    Essential hypertension      Assessment & Plan    ESRD: On TTS jonathan. HD through right IJ TDC. Still makes urine. Does have cole cath+ for chronic urinary retention. Patient the family Primary nephrologist was monitoring for renal recovery and residual renal function.      Volume status: No significant LE edema noted. Does have scrotal edema.     Afib w RVR: RVR during HD treatment. BP controlled. Asymptomatic. No chest pain or sob.       Met acidosis: Mild. Management with HD.     Anemia: ACD. Transfuse at hb<7.  ALLISON on HD days     CAD: Transferred from OSH for evaluation of ACD. Getting duplex for LE as well to evaluate for PVD.    Hayden Weinstein MD  06/02/24  12:43 EDT

## 2024-06-02 NOTE — PROGRESS NOTES
"Patient Name:  Tavo Gudino  YOB: 1955  [id number]    Surgery Progress Note    Date of visit: 6/2/2024    Subjective   Subjective: Distressed about possibility of further lower extremity amputations.         Objective     Objective:     /61 (BP Location: Right arm, Patient Position: Lying)   Pulse 116   Temp 97.4 °F (36.3 °C) (Oral)   Resp 18   Ht 185.4 cm (73\")   Wt 84.8 kg (187 lb)   SpO2 100%   BMI 24.67 kg/m²     Intake/Output Summary (Last 24 hours) at 6/2/2024 0815  Last data filed at 6/2/2024 0724  Gross per 24 hour   Intake 568 ml   Output 3220 ml   Net -2652 ml       CV:  Rhythm  regular and rate regular   L:  Clear  to auscultation bilaterally   Abd:  Bowel sounds positive , soft, nontender. Cholecystostomy with nguyen brown bile  Ext:  No cyanosis, clubbing, edema    Recent labs that are back at this time have been reviewed.            Assessment/ Plan:    Chronic cholecystitis- Stable. Will defer management to other specialties re: cardiac and extremity issues. From a GB standpoint, should follow up with [facility] as scheduled for cholecystostomy management. Will sign off for now, please call with questions.        Active Hospital Problems    Diagnosis  POA    **NSTEMI (non-ST elevated myocardial infarction) [I21.4]  Yes    C. difficile colitis [A04.72]  Yes    History of cholecystitis s/p cholecystostomy tube [K81.9]  Yes    ESRD (end stage renal disease) on hemodialysis [N18.6]  Yes    Atrial fibrillation [I48.91]  Yes    Essential hypertension [I10]  Yes      Resolved Hospital Problems   No resolved problems to display.              Niranjan Beckman MD  6/2/2024  08:15 EDT      "

## 2024-06-03 LAB
ANION GAP SERPL CALCULATED.3IONS-SCNC: 12 MMOL/L (ref 5–15)
APTT PPP: 57.7 SECONDS (ref 60–90)
APTT PPP: 72.8 SECONDS (ref 60–90)
APTT PPP: 85.4 SECONDS (ref 60–90)
BACTERIA SPEC AEROBE CULT: ABNORMAL
BH CV GRAFT BRACHIAL PRESSURE LEFT: NORMAL MMHG
BH CV LEA LEFT CFA DISTAL PSV: 50 CM/S
BH CV LEA LEFT CFA PROX PSV: 62.8 CM/S
BH CV LEA LEFT DFA PROX PSV: 48.7 CM/S
BH CV LEA LEFT DPA PRESSURE: NORMAL MMHG
BH CV LEA LEFT PERONEAL  MID EDV: 22.6 CM/S
BH CV LEA LEFT PERONEAL  MID PSV: 72.7 CM/S
BH CV LEA LEFT POPITEAL A  DISTAL EDV: 9.05 CM/S
BH CV LEA LEFT POPITEAL A  DISTAL PSV: 35.9 CM/S
BH CV LEA LEFT POPITEAL A  PROX PSV: 38.2 CM/S
BH CV LEA LEFT PTA MID EDV: 6.9 CM/S
BH CV LEA LEFT PTA MID PSV: 19.3 CM/S
BH CV LEA LEFT PTA PRESSURE: NORMAL MMHG
BH CV LEA LEFT PTA PROX EDV: 10.9 CM/S
BH CV LEA LEFT PTA PROX PSV: 24.1 CM/S
BH CV LEA LEFT SFA DISTAL PSV: 42 CM/S
BH CV LEA LEFT SFA MID PSV: 52.9 CM/S
BH CV LEA LEFT SFA PROX PSV: 80.1 CM/S
BH CV LEA LEFT TIBEOPERONEAL EDV: 5.53 CM/S
BH CV LEA LEFT TIBEOPERONEAL PSV: 29.2 CM/S
BH CV LEA RIGHT ANT TIBIAL A DISTAL PSV: 84.5 CM/S
BH CV LEA RIGHT CFA DISTAL PSV: 64.9 CM/S
BH CV LEA RIGHT CFA PROX PSV: 68.8 CM/S
BH CV LEA RIGHT DFA PROX PSV: 53.3 CM/S
BH CV LEA RIGHT DPA PRESSURE: NORMAL MMHG
BH CV LEA RIGHT PERONEAL  MID EDV: 8.2 CM/S
BH CV LEA RIGHT PERONEAL  MID PSV: 32.4 CM/S
BH CV LEA RIGHT POPITEAL A  DISTAL PSV: 54.4 CM/S
BH CV LEA RIGHT POPITEAL A  PROX PSV: 45.2 CM/S
BH CV LEA RIGHT PTA DISTAL PSV: 16 CM/S
BH CV LEA RIGHT PTA PRESSURE: NORMAL MMHG
BH CV LEA RIGHT PTA PROX PSV: 34.7 CM/S
BH CV LEA RIGHT SFA DISTAL PSV: 34.2 CM/S
BH CV LEA RIGHT SFA MID PSV: 56.1 CM/S
BH CV LEA RIGHT SFA PROX PSV: 69 CM/S
BUN SERPL-MCNC: 25 MG/DL (ref 8–23)
BUN/CREAT SERPL: 9.3 (ref 7–25)
CALCIUM SPEC-SCNC: 8 MG/DL (ref 8.6–10.5)
CHLORIDE SERPL-SCNC: 108 MMOL/L (ref 98–107)
CO2 SERPL-SCNC: 16 MMOL/L (ref 22–29)
CREAT SERPL-MCNC: 2.69 MG/DL (ref 0.76–1.27)
DEPRECATED RDW RBC AUTO: 60.3 FL (ref 37–54)
EGFRCR SERPLBLD CKD-EPI 2021: 24.5 ML/MIN/1.73
ERYTHROCYTE [DISTWIDTH] IN BLOOD BY AUTOMATED COUNT: 17 % (ref 12.3–15.4)
GLUCOSE SERPL-MCNC: 67 MG/DL (ref 65–99)
HCT VFR BLD AUTO: 27.3 % (ref 37.5–51)
HGB BLD-MCNC: 8.5 G/DL (ref 13–17.7)
LEFT GROIN CFA SYS: 62.8 CM/SEC
MCH RBC QN AUTO: 29.8 PG (ref 26.6–33)
MCHC RBC AUTO-ENTMCNC: 31.1 G/DL (ref 31.5–35.7)
MCV RBC AUTO: 95.8 FL (ref 79–97)
PLATELET # BLD AUTO: 257 10*3/MM3 (ref 140–450)
PMV BLD AUTO: 8.7 FL (ref 6–12)
POTASSIUM SERPL-SCNC: 3.4 MMOL/L (ref 3.5–5.2)
RBC # BLD AUTO: 2.85 10*6/MM3 (ref 4.14–5.8)
RIGHT GROIN CFA SYS: 68.8 CM/SEC
SODIUM SERPL-SCNC: 136 MMOL/L (ref 136–145)
WBC NRBC COR # BLD AUTO: 9.32 10*3/MM3 (ref 3.4–10.8)

## 2024-06-03 PROCEDURE — 85730 THROMBOPLASTIN TIME PARTIAL: CPT

## 2024-06-03 PROCEDURE — 25010000002 HEPARIN (PORCINE) 25000-0.45 UT/250ML-% SOLUTION

## 2024-06-03 PROCEDURE — 99232 SBSQ HOSP IP/OBS MODERATE 35: CPT | Performed by: STUDENT IN AN ORGANIZED HEALTH CARE EDUCATION/TRAINING PROGRAM

## 2024-06-03 PROCEDURE — 97166 OT EVAL MOD COMPLEX 45 MIN: CPT

## 2024-06-03 PROCEDURE — 85027 COMPLETE CBC AUTOMATED: CPT | Performed by: STUDENT IN AN ORGANIZED HEALTH CARE EDUCATION/TRAINING PROGRAM

## 2024-06-03 PROCEDURE — 80048 BASIC METABOLIC PNL TOTAL CA: CPT | Performed by: STUDENT IN AN ORGANIZED HEALTH CARE EDUCATION/TRAINING PROGRAM

## 2024-06-03 PROCEDURE — 97164 PT RE-EVAL EST PLAN CARE: CPT

## 2024-06-03 PROCEDURE — 25810000003 SODIUM CHLORIDE 0.9 % SOLUTION 250 ML FLEX CONT: Performed by: INTERNAL MEDICINE

## 2024-06-03 PROCEDURE — 25010000002 MORPHINE PER 10 MG: Performed by: HOSPITALIST

## 2024-06-03 PROCEDURE — 25010000002 ERAVACYCLINE DIHYDROCHLORIDE 50 MG RECONSTITUTED SOLUTION 1 EACH VIAL: Performed by: INTERNAL MEDICINE

## 2024-06-03 PROCEDURE — 99233 SBSQ HOSP IP/OBS HIGH 50: CPT | Performed by: PHYSICIAN ASSISTANT

## 2024-06-03 RX ORDER — GRANULES FOR ORAL 3 G/1
3 POWDER ORAL ONCE
Qty: 3 G | Refills: 0 | Status: COMPLETED | OUTPATIENT
Start: 2024-06-03 | End: 2024-06-03

## 2024-06-03 RX ORDER — POTASSIUM CHLORIDE 750 MG/1
40 CAPSULE, EXTENDED RELEASE ORAL ONCE
Status: COMPLETED | OUTPATIENT
Start: 2024-06-03 | End: 2024-06-03

## 2024-06-03 RX ORDER — METOPROLOL SUCCINATE 100 MG/1
100 TABLET, EXTENDED RELEASE ORAL
Status: DISCONTINUED | OUTPATIENT
Start: 2024-06-03 | End: 2024-06-20 | Stop reason: HOSPADM

## 2024-06-03 RX ORDER — FOLIC ACID 1 MG/1
1 TABLET ORAL DAILY
Status: DISCONTINUED | OUTPATIENT
Start: 2024-06-03 | End: 2024-06-20 | Stop reason: HOSPADM

## 2024-06-03 RX ADMIN — HEPARIN SODIUM 19 UNITS/KG/HR: 10000 INJECTION, SOLUTION INTRAVENOUS at 10:43

## 2024-06-03 RX ADMIN — LEVOTHYROXINE SODIUM 75 MCG: 0.07 TABLET ORAL at 05:24

## 2024-06-03 RX ADMIN — Medication 10 ML: at 20:05

## 2024-06-03 RX ADMIN — VANCOMYCIN HYDROCHLORIDE 125 MG: 125 CAPSULE ORAL at 18:17

## 2024-06-03 RX ADMIN — VANCOMYCIN HYDROCHLORIDE 125 MG: 125 CAPSULE ORAL at 05:24

## 2024-06-03 RX ADMIN — ERAVACYCLINE 85 MG: 50 INJECTION, POWDER, LYOPHILIZED, FOR SOLUTION INTRAVENOUS at 15:00

## 2024-06-03 RX ADMIN — Medication 10 ML: at 10:37

## 2024-06-03 RX ADMIN — ASPIRIN 81 MG: 81 TABLET, COATED ORAL at 10:36

## 2024-06-03 RX ADMIN — LIDOCAINE HYDROCHLORIDE: 20 JELLY TOPICAL at 20:04

## 2024-06-03 RX ADMIN — MORPHINE SULFATE 4 MG: 4 INJECTION, SOLUTION INTRAMUSCULAR; INTRAVENOUS at 14:49

## 2024-06-03 RX ADMIN — FOLIC ACID 1 MG: 1 TABLET ORAL at 14:49

## 2024-06-03 RX ADMIN — GRANULES FOR ORAL SOLUTION 3 G: 3 POWDER ORAL at 14:50

## 2024-06-03 RX ADMIN — HYDROCODONE BITARTRATE AND ACETAMINOPHEN 1 TABLET: 7.5; 325 TABLET ORAL at 20:19

## 2024-06-03 RX ADMIN — POTASSIUM CHLORIDE 40 MEQ: 750 CAPSULE, EXTENDED RELEASE ORAL at 10:36

## 2024-06-03 RX ADMIN — PANTOPRAZOLE SODIUM 40 MG: 40 TABLET, DELAYED RELEASE ORAL at 05:24

## 2024-06-03 RX ADMIN — LIDOCAINE HYDROCHLORIDE: 20 JELLY TOPICAL at 10:37

## 2024-06-03 RX ADMIN — VANCOMYCIN HYDROCHLORIDE 125 MG: 125 CAPSULE ORAL at 12:25

## 2024-06-03 RX ADMIN — METOPROLOL SUCCINATE 100 MG: 100 TABLET, EXTENDED RELEASE ORAL at 16:23

## 2024-06-03 RX ADMIN — ERAVACYCLINE 85 MG: 50 INJECTION, POWDER, LYOPHILIZED, FOR SOLUTION INTRAVENOUS at 03:35

## 2024-06-03 NOTE — PROGRESS NOTES
" LOS: 3 days   Patient Care Team:  Hayden Weinstein MD as PCP - General (Nephrology)    Chief Complaint: ESRD  NSTEMI    Subjective     No acute events overnight. No new complaints       Subjective:  Symptoms:  Stable.  No shortness of breath or chest pain.        History taken from: patient    Objective     Vital Sign Min/Max for last 24 hours  Temp  Min: 97.4 °F (36.3 °C)  Max: 97.9 °F (36.6 °C)   BP  Min: 126/83  Max: 153/91   Pulse  Min: 86  Max: 124   Resp  Min: 18  Max: 18   SpO2  Min: 100 %  Max: 100 %   No data recorded   Weight  Min: 84.8 kg (187 lb)  Max: 84.8 kg (187 lb)     Flowsheet Rows      Flowsheet Row First Filed Value   Admission Height 185.4 cm (73\") Documented at 05/31/2024 1826   Admission Weight 84.8 kg (187 lb) Documented at 05/31/2024 1826            No intake/output data recorded.  I/O last 3 completed shifts:  In: 0   Out: 2250 [Urine:1600; Drains:600; Stool:50]    Objective       Gen: Alert, NAD   HENT: NC, AT, EOMI   NECK: Supple, no JVD, Trachea midline   LUNGS: CTA bilaterally, non labored respirtation   CVS: S1/S2 audible, RRR, no murmur   Abd: Soft, NT, ND, BS+   Ext: No pedal edema, no cyanosis   CNS: Alert, No focal deficit noted grossly  Psy: Cooperative  Skin: Warm, dry and intact        Results Review:     I reviewed the patient's new clinical results.    WBC WBC   Date Value Ref Range Status   06/03/2024 9.32 3.40 - 10.80 10*3/mm3 Final   06/02/2024 12.36 (H) 3.40 - 10.80 10*3/mm3 Final   06/01/2024 20.32 (H) 3.40 - 10.80 10*3/mm3 Final   05/31/2024 22.02 (H) 3.40 - 10.80 10*3/mm3 Final      HGB Hemoglobin   Date Value Ref Range Status   06/03/2024 8.5 (L) 13.0 - 17.7 g/dL Final   06/02/2024 9.1 (L) 13.0 - 17.7 g/dL Final   06/01/2024 6.9 (C) 13.0 - 17.7 g/dL Final   05/31/2024 7.5 (L) 13.0 - 17.7 g/dL Final      HCT Hematocrit   Date Value Ref Range Status   06/03/2024 27.3 (L) 37.5 - 51.0 % Final   06/02/2024 27.5 (L) 37.5 - 51.0 % Final   06/01/2024 22.1 (L) 37.5 - 51.0 % " "Final   05/31/2024 24.4 (L) 37.5 - 51.0 % Final      Platlets No results found for: \"LABPLAT\"   MCV MCV   Date Value Ref Range Status   06/03/2024 95.8 79.0 - 97.0 fL Final   06/02/2024 92.9 79.0 - 97.0 fL Final   06/01/2024 95.7 79.0 - 97.0 fL Final   05/31/2024 96.8 79.0 - 97.0 fL Final          Sodium Sodium   Date Value Ref Range Status   06/03/2024 136 136 - 145 mmol/L Final   06/02/2024 133 (L) 136 - 145 mmol/L Final   06/01/2024 133 (L) 136 - 145 mmol/L Final   05/31/2024 133 (L) 136 - 145 mmol/L Final      Potassium Potassium   Date Value Ref Range Status   06/03/2024 3.4 (L) 3.5 - 5.2 mmol/L Final     Comment:     Slight hemolysis detected by analyzer. Result may be falsely elevated.   06/02/2024 3.1 (L) 3.5 - 5.2 mmol/L Final   06/01/2024 4.3 3.5 - 5.2 mmol/L Final   05/31/2024 4.2 3.5 - 5.2 mmol/L Final      Chloride Chloride   Date Value Ref Range Status   06/03/2024 108 (H) 98 - 107 mmol/L Final   06/02/2024 99 98 - 107 mmol/L Final   06/01/2024 100 98 - 107 mmol/L Final   05/31/2024 99 98 - 107 mmol/L Final      CO2 CO2   Date Value Ref Range Status   06/03/2024 16.0 (L) 22.0 - 29.0 mmol/L Final   06/02/2024 16.0 (L) 22.0 - 29.0 mmol/L Final   06/01/2024 19.0 (L) 22.0 - 29.0 mmol/L Final   05/31/2024 22.0 22.0 - 29.0 mmol/L Final      BUN BUN   Date Value Ref Range Status   06/03/2024 25 (H) 8 - 23 mg/dL Final   06/02/2024 22 8 - 23 mg/dL Final   06/01/2024 40 (H) 8 - 23 mg/dL Final   05/31/2024 37 (H) 8 - 23 mg/dL Final      Creatinine Creatinine   Date Value Ref Range Status   06/03/2024 2.69 (H) 0.76 - 1.27 mg/dL Final   06/02/2024 2.79 (H) 0.76 - 1.27 mg/dL Final   06/01/2024 3.80 (H) 0.76 - 1.27 mg/dL Final   05/31/2024 3.47 (H) 0.76 - 1.27 mg/dL Final      Calcium Calcium   Date Value Ref Range Status   06/03/2024 8.0 (L) 8.6 - 10.5 mg/dL Final   06/02/2024 8.9 8.6 - 10.5 mg/dL Final   06/01/2024 8.7 8.6 - 10.5 mg/dL Final   05/31/2024 8.9 8.6 - 10.5 mg/dL Final      PO4 No results found for: " "\"CAPO4\"   Albumin Albumin   Date Value Ref Range Status   06/02/2024 2.4 (L) 3.5 - 5.2 g/dL Final   05/31/2024 2.5 (L) 3.5 - 5.2 g/dL Final      Magnesium No results found for: \"MG\"   Uric Acid No results found for: \"URICACID\"     Medication Review: Yes    Assessment & Plan       NSTEMI (non-ST elevated myocardial infarction)    C. difficile colitis    History of cholecystitis s/p cholecystostomy tube    ESRD (end stage renal disease) on hemodialysis    Atrial fibrillation    Essential hypertension      Assessment & Plan    ESRD: On TTS jonathan. HD through right IJ TDC. Still makes urine. Does have cole cath+ for chronic urinary retention. Patient the family Primary nephrologist was monitoring for renal recovery and residual renal function.      Volume status: No significant LE edema noted. Does have scrotal edema.     Afib w RVR: RVR during HD treatment. BP controlled. Asymptomatic. No chest pain or sob.       Met acidosis: Mild. Management with HD.     Anemia: ACD. Transfuse at hb<7.  ALLISON on HD days     CAD: Transferred from OSH for evaluation of ACD. Getting duplex for LE as well to evaluate for PVD.    Plan:  - HD tomorrow per schedule. UF as tolerated.   - Renal diet   - ALLISON with HD     Jose Wilks MD  06/03/24  10:24 EDT            "

## 2024-06-03 NOTE — THERAPY EVALUATION
Patient Name: Tavo Gudino  : 1955    MRN: [id number]                              Today's Date: 6/3/2024       Admit Date: 2024    Visit Dx: No diagnosis found.  Patient Active Problem List   Diagnosis    NSTEMI (non-ST elevated myocardial infarction)    C. difficile colitis    History of cholecystitis s/p cholecystostomy tube    ESRD (end stage renal disease) on hemodialysis    Atrial fibrillation    Essential hypertension     Past Medical History:   Diagnosis Date    Atrial fibrillation     Chronic kidney disease (CKD), stage V     ESRD on hemodialysis     Hypertension     Metabolic acidosis     Peripheral arterial disease     Pneumothorax     Secondary hyperparathyroidism      Past Surgical History:   Procedure Laterality Date    ARTERIOVENOUS FISTULA Right     BELOW KNEE LEG AMPUTATION Left     CATARACT EXTRACTION Bilateral       General Information       Row Name 24 1525          OT Time and Intention    Document Type evaluation  -GARRY     Mode of Treatment occupational therapy  -GARRY       Row Name 24 1525          General Information    Patient Profile Reviewed yes  -GARRY     Prior Level of Function min assist:;bed mobility;feeding;grooming;dependent:;dressing;bathing  Pt. limited historian. Stated only rolling in bed and doin UE and LE TE with therapy at SNF due to wounds, min A rolling  -GARRY     Existing Precautions/Restrictions fall;other (see comments)  sacral and LE wounds, drain, cole, dialysis cath  -GARRY     Barriers to Rehab medically complex;previous functional deficit;physical barrier  -GARRY       Row Name 24 1522          Occupational Profile    Environmental Supports and Barriers (Occupational Profile) per report pt. not home for 5-6 months, pt. between SNF and hospital  -GARRY       Row Name 24 1525          Living Environment    People in Home facility resident;other (see comments)  pt. in SNF or hospital past 5-6 month, but priorly home with spouse  -GARRY       Row Name  06/03/24 1525          Cognition    Orientation Status (Cognition) oriented x 3  -GARRY       Row Name 06/03/24 1525          Safety Issues, Functional Mobility    Safety Issues Affecting Function (Mobility) --  CHRISTINA fully due to pt. only rolled in bed and declined to sit up to EOB  -GARRY     Impairments Affecting Function (Mobility) range of motion (ROM);strength;pain;coordination;endurance/activity tolerance;sensation/sensory awareness  -               User Key  (r) = Recorded By, (t) = Taken By, (c) = Cosigned By      Initials Name Provider Type    Na Pena, OT Occupational Therapist                     Mobility/ADL's       Row Name 06/03/24 1529          Bed Mobility    Bed Mobility rolling right;rolling left  -GARRY     Rolling Left [place] (Bed Mobility) minimum assist (75% patient effort);1 person assist  -GARRY     Rolling Right [place] (Bed Mobility) minimum assist (75% patient effort);1 person assist  -GARRY     Bed Mobility, Safety Issues decreased use of arms for pushing/pulling;decreased use of legs for bridging/pushing  -GARRY     Assistive Device (Bed Mobility) bed rails;draw sheet  -GARRY     Comment, (Bed Mobility) pt. able to start roll, but assist to fully roll hips  -GARRY       Row Name 06/03/24 1529          Transfers    Comment, (Transfers) pt. declined due to worry over making wound worse  -GARRY       Row Name 06/03/24 1529          Activities of Daily Living    BADL Assessment/Intervention other (see comments)  pt. declined most activity today, with observation and testing of strength and coordination expect pt. can groom and self feed post full setup of tray with min A if fully set up in bed, expect max A to dep with all other ADL tasks  -               User Key  (r) = Recorded By, (t) = Taken By, (c) = Cosigned By      Initials Name Provider Type    Na Pena, OT Occupational Therapist                   Obj/Interventions       Row Name 06/03/24 1531          Sensory Assessment  (Somatosensory)    Sensory Assessment (Somatosensory) UE sensation intact  -       Row Name 06/03/24 1531          Vision Assessment/Intervention    Visual Impairment/Limitations WFL  -GARRY     Vision Assessment Comment WFL for evaluation completion  -       Row Name 06/03/24 1531          Range of Motion Comprehensive    General Range of Motion upper extremity range of motion deficits identified  -GARRY     Comment, General Range of Motion R shoulder with only grossly 10 degrees AROM with prior damage (per pt. and family unsure if rotator cuff injuty or just severe OA), all other WFL minus fingers with some PROM deficits grossly 15-30%  -       Row Name 06/03/24 1531          Strength Comprehensive (MMT)    General Manual Muscle Testing (MMT) Assessment upper extremity strength deficits identified  -GARRY     Comment, General Manual Muscle Testing (MMT) Assessment R shoulder 2+/5, all other grossly 4/5  -[facility] Name 06/03/24 1531          Motor Skills    Motor Skills coordination  -     Coordination fine motor deficit;bilateral;upper extremity  impacted partially due to limited finger ROM  -       Row Name 06/03/24 1531          Balance    Comment, Balance pt. declined  -GARRY               User Key  (r) = Recorded By, (t) = Taken By, (c) = Cosigned By      Initials Name Provider Type    Na Pena, OT Occupational Therapist                   Goals/Plan       Row Name 06/03/24 1540          Bed Mobility Goal 1 (OT)    Activity/Assistive Device (Bed Mobility Goal 1, OT) rolling to left;rolling to right  -GARRY     [place] Level/Cues Needed (Bed Mobility Goal 1, OT) standby assist  -GARRY     Time Frame (Bed Mobility Goal 1, OT) long term goal (LTG);10 days  -GARRY     Strategies/Barriers (Bed Mobility Goal 1, OT) to support caregiver care  -GARRY     Progress/Outcomes (Bed Mobility Goal 1, OT) new goal  -       Row Name 06/03/24 1540          Grooming Goal 1 (OT)    Activity/Device (Grooming Goal 1, OT) oral  care;wash face, hands  -GARRY     [place] (Grooming Goal 1, OT) set-up required;standby assist  -GARRY     Time Frame (Grooming Goal 1, OT) short term goal (STG);5 days  -GARRY     Strategies/Barriers (Grooming Goal 1, OT) sitting up in bed  -GARRY     Progress/Outcome (Grooming Goal 1, OT) new goal  -GARRY       Row Name 06/03/24 1540          Self-Feeding Goal 1 (OT)    Activity/Device (Self-Feeding Goal 1, OT) scoop food and bring to mouth;liquids to mouth  -GARRY     [place] Level/Cues Needed (Self-Feeding Goal 1, OT) set-up required  -GARRY     Time Frame (Self-Feeding Goal 1, OT) short term goal (STG);5 days  -GARRY     Strategies/Barriers (Self-Feeding Goal 1, OT) AE prn  -GARRY     Progress/Outcomes (Self-Feeding Goal 1, OT) new goal  -GARRY       Row Name 06/03/24 1540          Strength Goal 1 (OT)    Strength Goal 1 (OT) Pt will complete 10-15 reps each UE TE (R shoulder as tolerated) and core TE to support BADL independence and bed mobility for caregiver care.  -GARRY     Time Frame (Strength Goal 1, OT) long term goal (LTG);10 days  -GARRY     Progress/Outcome (Strength Goal 1, OT) new goal  -GARRY       Row Name 06/03/24 7490          Problem Specific Goal 1 (OT)    Problem Specific Goal 1 (OT) Pt. will sit at EOB for 10 minutes with CGA to support ADL independence.  -GARRY     Time Frame (Problem Specific Goal 1, OT) long term goal (LTG);10 days  -GARRY     Progress/Outcome (Problem Specific Goal 1, OT) new goal  -GARRY       Row Name 06/03/24 1540          Therapy Assessment/Plan (OT)    Planned Therapy Interventions (OT) activity tolerance training;adaptive equipment training;BADL retraining;occupation/activity based interventions;patient/caregiver education/training;strengthening exercise;transfer/mobility retraining;ROM/therapeutic exercise;functional balance retraining  -GARRY               User Key  (r) = Recorded By, (t) = Taken By, (c) = Cosigned By      Initials Name Provider Type    Na Pena, OT Occupational Therapist                    Clinical Impression       [facility] Name 06/03/24 1531          Pain Assessment    Pretreatment Pain Rating 5/10  -GARRY     Posttreatment Pain Rating 5/10  -GARRY     Pain Location - Side/Orientation Right  -GARRY     Pain Location lower  -GARRY     Pain Location - extremity  -GARRY     Pain Intervention(s) Ambulation/increased activity;Repositioned  -GARRY       Row Name 06/03/24 8120          Plan of Care Review    Plan of Care Reviewed With patient;daughter;spouse  -GARRY     Progress no change  -GARRY     Outcome Evaluation OT evaluation completed as able with limitations from pt. and his medical issues.  Pt. rolled with min of 1 left and right and allowed ROM and strength assessment in bed, but declined any movement to sit up in bed or EOB to assess other areas. Per pt. his goal is to get stable enough with wounds to return home. Will see pt. only 3 x a week until he is able to increase his participation.  Skilled OT services to address strength, endurance, ROM and coordination and impact on BADL performance and supporting caregiver care.  Recommend SNF at this time, but will update as pt. able to participate and determines level of medical care to receive.  -[facility] Name 06/03/24 4231          Therapy Assessment/Plan (OT)    Patient/Family Therapy Goal Statement (OT) be able to return home  -GARRY     Rehab Potential (OT) fair, will monitor progress closely  -GARRY     Criteria for Skilled Therapeutic Interventions Met (OT) yes;meets criteria;skilled treatment is necessary  -     Therapy Frequency (OT) 3 times/wk  -GARRY     Predicted Duration of Therapy Intervention (OT) 10 days  -GARRY       Row Name 06/03/24 4812          Therapy Plan Review/Discharge Plan (OT)    Equipment Needs Upon Discharge (OT) --  TBD further pending discharage position determination  -GARRY     Anticipated Discharge Disposition (OT) skilled nursing facility  -       Row Name 06/03/24 5796          Vital Signs    Pre Systolic BP Rehab 142  -GARRY     Pre  Treatment Diastolic BP 95  -GARRY     Pretreatment Heart Rate (beats/min) 109  -GARRY     Posttreatment Heart Rate (beats/min) 139  appears with afib  -GARRY     O2 Delivery Pre Treatment room air  -GARRY     O2 Delivery Intra Treatment room air  -GARRY     O2 Delivery Post Treatment room air  -AGRRY     Pre Patient Position Supine  -GARRY     Intra Patient Position Side Lying  -GARRY     Post Patient Position Supine  -GARRY       Row Name 06/03/24 1535          Positioning and Restraints    Pre-Treatment Position in bed  -GARRY     Post Treatment Position bed  -GARRY     In Bed side rails up x2;notified nsg;exit alarm on;call light within reach;with family/caregiver;side lying right;R heel elevated;legs elevated  -GARRY               User Key  (r) = Recorded By, (t) = Taken By, (c) = Cosigned By      Initials Name Provider Type    Na Pena OT Occupational Therapist                   Outcome Measures       Row Name 06/03/24 1543          How much help from another is currently needed...    Putting on and taking off regular lower body clothing? 1  -GARRY     Bathing (including washing, rinsing, and drying) 1  -GARRY     Toileting (which includes using toilet bed pan or urinal) 1  -GARRY     Putting on and taking off regular upper body clothing 2  -GARRY     Taking care of personal grooming (such as brushing teeth) 2  -GARRY     Eating meals 3  -GARRY     AM-PAC 6 Clicks Score (OT) 10  -GARRY       Row Name 06/03/24 1543          Functional Assessment    Outcome Measure Options AM-PAC 6 Clicks Daily Activity (OT)  -GARRY               User Key  (r) = Recorded By, (t) = Taken By, (c) = Cosigned By      Initials Name Provider Type    Na Pena OT Occupational Therapist                    Occupational Therapy Education       Title: PT OT SLP Therapies (In Progress)       Topic: Occupational Therapy (In Progress)       Point: ADL training (Done)       Description:   Instruct learner(s) on proper safety adaptation and remediation techniques during self care or  transfers.   Instruct in proper use of assistive devices.                  Learning Progress Summary             Patient Acceptance, E, VU by GARRY at 6/3/2024 1544    Comment: reason for consult, evaluation results   Family Acceptance, E, VU by GARRY at 6/3/2024 1544    Comment: reason for consult, evaluation results                         Point: Home exercise program (Not Started)       Description:   Instruct learner(s) on appropriate technique for monitoring, assisting and/or progressing therapeutic exercises/activities.                  Learner Progress:  Not documented in this visit.              Point: Precautions (Not Started)       Description:   Instruct learner(s) on prescribed precautions during self-care and functional transfers.                  Learner Progress:  Not documented in this visit.              Point: Body mechanics (Not Started)       Description:   Instruct learner(s) on proper positioning and spine alignment during self-care, functional mobility activities and/or exercises.                  Learner Progress:  Not documented in this visit.                              User Key       Initials Effective Dates Name Provider Type Discipline    GARRY 07/11/23 -  Na Pitts, OT Occupational Therapist OT                  OT Recommendation and Plan  Planned Therapy Interventions (OT): activity tolerance training, adaptive equipment training, BADL retraining, occupation/activity based interventions, patient/caregiver education/training, strengthening exercise, transfer/mobility retraining, ROM/therapeutic exercise, functional balance retraining  Therapy Frequency (OT): 3 times/wk  Plan of Care Review  Plan of Care Reviewed With: patient, daughter, spouse  Progress: no change  Outcome Evaluation: OT evaluation completed as able with limitations from pt. and his medical issues.  Pt. rolled with min of 1 left and right and allowed ROM and strength assessment in bed, but declined any movement to sit up  in bed or EOB to assess other areas. Per pt. his goal is to get stable enough with wounds to return home. Will see pt. only 3 x a week until he is able to increase his participation.  Skilled OT services to address strength, endurance, ROM and coordination and impact on BADL performance and supporting caregiver care.  Recommend SNF at this time, but will update as pt. able to participate and determines level of medical care to receive.     Time Calculation:   Evaluation Complexity (OT)  Review Occupational Profile/Medical/Therapy History Complexity: expanded/moderate complexity  Assessment, Occupational Performance/Identification of Deficit Complexity: 3-5 performance deficits  Clinical Decision Making Complexity (OT): detailed assessment/moderate complexity  Overall Complexity of Evaluation (OT): moderate complexity     Time Calculation- OT       Row Name 06/03/24 1545             Time Calculation- OT    OT Start Time 1406  -GARRY      OT Received On 06/03/24  -GARRY      OT Goal Re-Cert Due Date 06/13/24  -GARRY         Untimed Charges    OT Eval/Re-eval Minutes 45  -GARRY         Total Minutes    Untimed Charges Total Minutes 45  -GARRY       Total Minutes 45  -GARRY                User Key  (r) = Recorded By, (t) = Taken By, (c) = Cosigned By      Initials Name Provider Type    Na Pena OT Occupational Therapist                  Therapy Charges for Today       Code Description Service Date Service Provider Modifiers Qty    66372010486 HC OT EVAL MOD COMPLEXITY 3 6/3/2024 Na Pitts OT GO 1                 Na Pitts OT  6/3/2024

## 2024-06-03 NOTE — PROGRESS NOTES
Malnutrition Severity Assessment    Patient Name:  Tavo Gudnio  YOB: 1955  MRN: [id number]  Admit Date:  5/31/2024    Patient meets criteria for : Moderate (non-severe) Malnutrition (Pt meets criteria for moderate chronic malnturition based on intake hx w wasting.)    Comments:      Malnutrition Severity Assessment  Malnutrition Type: Chronic Disease - Related Malnutrition  Malnutrition Type (Last 8 Hours)       Malnutrition Severity Assessment       Row Name 06/03/24 1944       Malnutrition Severity Assessment    Malnutrition Type Chronic Disease - Related Malnutrition      Row Name 06/03/24 1944       Insufficient Energy Intake     Insufficient Energy Intake Findings Severe    Insufficient Energy Intake  <75% of est. energy requirement for > or equal to 1 month      Row Name 06/03/24 1944       Unintentional Weight Loss     Unintentional Weight Loss Findings --  Apparent loss of 61 lbs 26% body wt over 4 mo. Unable to confirm at this time.      Row Name 06/03/24 1944       Muscle Loss    Loss of Muscle Mass Findings Mild    [place] Region --  mild    Clavicle Bone Region --  mild    Acromion Bone Region --  mild    Scapular Bone Region --  mild    Dorsal Hand Region None    Patellar Region --  unable to determine at this time    Anterior Thigh Region --  unable to determine at this time    Posterior Calf Region --  unable to determine at this time      Row Name 06/03/24 1944       Fat Loss    Subcutaneous Fat Loss Findings Mild    Orbital Region  Moderate -  somewhat hollowness, slightly dark circles    Upper Arm Region --  mild    Thoracic & Lumbar Region --  mild      Row Name 06/03/24 1944       Criteria Met (Must meet criteria for severity in at least 2 of these categories: M Wasting, Fat Loss, Fluid, Secondary Signs, Wt. Status, Intake)    Patient meets criteria for  Moderate (non-severe) Malnutrition  Pt meets criteria for moderate chronic malnturition based on intake hx w wasting.                     Electronically signed by:  Ave Amaya RD  06/03/24 19:59 EDT

## 2024-06-03 NOTE — PROGRESS NOTES
Tavo Gudino       LOS: 3 days   Patient Care Team:  Hayden Weinstein MD as PCP - General (Nephrology)    Chief Complaint: Left below knee wound dehiscence, right ankle wound    Subjective     Interval History:     Resting comfortably in bed this morning.  Pain tolerable, no acute events overnight.    Review of Systems:     Gen- No fevers, chills  CV- No chest pain, palpitations  Resp- No cough, dyspnea  GI- No N/V/D, abd pain    Objective     Vital Signs  Vital Signs (last 24 hours)         06/02 0700  06/03 0659 06/03 0700  06/03 0805   Most Recent      Temp (°F) 97.4 -  97.8      97.9     97.9 (36.6) 06/03 0754    Heart Rate 104 -  124      86     86 06/03 0754    Resp   18      18     18 06/03 0754    /83 -  153/91      134/87     134/87 06/03 0754    SpO2 (%)     100     100 06/03 0754              Physical Exam:     No acute distress.  Nonlabored respirations.  Regular rate and rhythm.  Abdomen nondistended.  Right lower extremity: Dressing present is clean, dry, intact.  Left lower extremity: Dressing present is clean, dry, intact.     Results Review:     I reviewed the patient's new clinical results.    Medication Review:   Hospital Medications (active)         Dose Frequency Start End    acetaminophen (TYLENOL) tablet 650 mg 650 mg Every 4 Hours PRN 5/31/2024 --    Admin Instructions: If given for fever, use fever parameter: fever greater than 100.4 °F  Based on patient request - if ordered for moderate or severe pain, provider allows for administration of a medication prescribed for a lower pain scale.    Do not exceed 4 grams of acetaminophen in a 24 hr period. Max dose of 2gm for AST/ALT greater than 120 units/L.    If given for pain, use the following pain scale:   Mild Pain = Pain Score of 1-3, CPOT 1-2  Moderate Pain = Pain Score of 4-6, CPOT 3-4  Severe Pain = Pain Score of 7-10, CPOT 5-8    Route: Oral    aspirin EC tablet 81 mg 81 mg Daily 5/31/2024 --    Admin Instructions: Do not crush or chew  the capsules or tablets. The drug may not work as designed if the capsule or tablet is crushed or chewed. Swallow whole.  Do not exceed 4 grams of aspirin in a 24 hr period.    If given for pain, use the following pain scale:   Mild Pain = Pain Score of 1-3, CPOT 1-2  Moderate Pain = Pain Score of 4-6, CPOT 3-4  Severe Pain = Pain Score of 7-10, CPOT 5-8    Route: Oral    collagenase ointment 1 Application 1 Application Every 24 Hours Scheduled 5/31/2024 --    Admin Instructions: Apply to slough on left amputation site and slough on posterior right heel. .    Route: Topical    Cosign for Ordering: Accepted by Genny Saeed MD on 5/31/2024  7:38 PM    eravacycline dihydrochloride (XERAVA) 85 mg in sodium chloride 0.9 % 250 mL (0.34 mg/mL) IVPB 85 mg Every 12 Hours 6/2/2024 6/7/2024    Admin Instructions: Infuse through dedicated line or via Y-site. If the same IV line is used for sequential infusion of several drugs, flush line with NS before and after eravacycline administration. Do not mix with other drugs or add to solutions containing other drugs.  Dilute in 0.9% sodium chloride to a target concentration of 0.3 mg/mL (range 0.2-0.6 mg/mL)    Notes to Pharmacy: !! Ensure final concentration of 0.2 - 0.6 mg/mL !!    Route: Intravenous    heparin 04374 units/250 mL (100 units/mL) in 0.45 % NaCl infusion 19 Units/kg/hr × 84.8 kg Titrated 5/31/2024 --    Admin Instructions: Pharmacy dosing - Cardiac or Other NOT VTE - Boluses (No initial bolus)    Route: Intravenous    HYDROcodone-acetaminophen (NORCO) 7.5-325 MG per tablet 1 tablet 1 tablet Every 6 Hours PRN 5/31/2024 6/5/2024    Admin Instructions: Based on patient request - if ordered for moderate or severe pain, provider allows for administration of a medication prescribed for a lower pain scale.  [VICTOR HUGO]    Do not exceed 4 grams of acetaminophen in a 24 hr period. Max dose of 2gm for AST/ALT greater than 120 units/L        If given for pain, use the following  pain scale:   Mild Pain = Pain Score of 1-3, CPOT 1-2  Moderate Pain = Pain Score of 4-6, CPOT 3-4  Severe Pain = Pain Score of 7-10, CPOT 5-8    Route: Oral    levothyroxine (SYNTHROID, LEVOTHROID) tablet 75 mcg 75 mcg Every Early Morning 6/1/2024 --    Admin Instructions: Take on empty stomach.    Route: Oral    Lidocaine HCl gel (XYLOCAINE) urethral/mucosal syringe  As Needed 5/31/2024 --    Admin Instructions: Apply to urethra as needed. Based on patient request - if ordered for moderate or severe pain, provider allows for administration of a medication prescribed for a lower pain scale.    Route: Topical    lidocaine HCL topical jelly USP 2% syringe 11 mL  2 Times Daily 6/1/2024 --    Admin Instructions: Apply to areas of lower gluteals, perineum, groin and scrotum. Can mix with calazime and then apply - Glydo syringe used for Lidocaine 2% gel (unavailable)    Route: Topical    LORazepam (ATIVAN) tablet 0.25 mg 0.25 mg Every 6 Hours PRN 6/2/2024 6/7/2024    Admin Instructions:  Caution: Look alike/sound alike drug alert    Route: Oral    metoprolol succinate XL (TOPROL-XL) 24 hr tablet 50 mg 50 mg Every 24 Hours Scheduled 5/31/2024 --    Admin Instructions: Hold for SBP less than 100, DBP less than 60, or heart rate less than 50    Do not crush or chew the capsules or tablets. The drug may not work as designed if the capsule or tablet is crushed or chewed. Swallow whole.  Do not crush or chew.    Route: Oral    metoprolol tartrate (LOPRESSOR) injection 5 mg 5 mg Every 6 Hours PRN 6/1/2024 --    Admin Instructions: Hold for SBP less than 100, DBP less than 60, or heart rate less than 50. If a dose is held, please contact the provider.    Route: Intravenous    Morphine sulfate (PF) injection 4 mg 4 mg Daily PRN 5/31/2024 6/5/2024    Admin Instructions: Based on patient request - if ordered for moderate or severe pain, provider allows for administration of a medication prescribed for a lower pain scale.  Subcutaneous until IV access can be established      Caution: Look alike/sound alike drug alert    If given for pain, use the following pain scale:  Mild Pain = Pain Score of 1-3, CPOT 1-2  Moderate Pain = Pain Score of 4-6, CPOT 3-4  Severe Pain = Pain Score of 7-10, CPOT 5-8    Route: Intravenous    pantoprazole (PROTONIX) EC tablet 40 mg 40 mg Every Early Morning 6/2/2024 --    Admin Instructions: Swallow whole; do not crush, split, or chew.    Route: Oral    Pharmacy Consult - MTM  Daily 6/1/2024 --    Admin Instructions: Contact Pharmacy prior to discharge.    Route: Does not apply    Pharmacy Consult - Pharmacy to dose  Continuous PRN 6/2/2024 --    Route: Does not apply    Pharmacy to Dose Heparin  Continuous PRN 5/31/2024 --    Admin Instructions: Boluses (No initial bolus)    Route: Does not apply    sodium chloride 0.9 % flush 10 mL 10 mL Every 12 Hours Scheduled 5/31/2024 --    Route: Intravenous    sodium chloride 0.9 % flush 10 mL 10 mL As Needed 5/31/2024 --    Route: Intravenous    sodium chloride 0.9 % infusion 40 mL 40 mL As Needed 5/31/2024 --    Admin Instructions: Following administration of an IV intermittent medication, flush line with 40mL NS at 100mL/hr.    Route: Intravenous    vancomycin (VANCOCIN) capsule 125 mg 125 mg Every 6 Hours Scheduled 5/31/2024 6/10/2024    Admin Instructions: Do not crush or chew the capsules or tablets. Contact Pharmacy if needed.    Route: Oral              Assessment & Plan     69-year-old male with left below-knee amputation site ulceration, right lateral shin ulcer with exposed peroneal tendons.      NSTEMI (non-ST elevated myocardial infarction)    C. difficile colitis    History of cholecystitis s/p cholecystostomy tube    ESRD (end stage renal disease) on hemodialysis    Atrial fibrillation    Essential hypertension    Vascular workup pending.  Would benefit from vascular evaluation.    Awaiting cardiac intervention earlier this week.    I again discussed  Detail Level: Detailed with the patient further management options including revision left below-knee amputation versus above-knee amputation, right leg debridement, irrigation, wound vacuum-assisted closure.  At this time the patient is adamantly opposed to considering above-knee amputation on the left, he states that he does not wish for any further loss of his limb but is tentatively agreeable to left below-knee revision amputation, right lower extremity irrigation, debridement, wound vacuum-assisted closure.  We have discussed that these are limb threatening wounds.     No imminent plan for orthopedic intervention today, okay for diet.  Will discuss further orthopedic management options following cardiac intervention/vascular workup.    KHANG Grijalva  06/03/24  08:05 EDT

## 2024-06-03 NOTE — PLAN OF CARE
Goal Outcome Evaluation:                                            VSS on room air. Precautions maintained. Minimal bowel output tonight. Drains patent and draining to gravity. Heparin infusing.

## 2024-06-03 NOTE — PROGRESS NOTES
"  [facility]  PROGRESS NOTE    Date of Admission: 5/31/2024  Date of Service: 06/03/24    Primary Care Physician: Hayden Weinstein MD    Chief Complaint: f/u elevated troponin, Afib with RVR   Problem List:   NSTEMI (non-ST elevated myocardial infarction)    C. difficile colitis    History of cholecystitis s/p cholecystostomy tube    ESRD (end stage renal disease) on hemodialysis    Atrial fibrillation    Essential hypertension      Subjective      Denies chest pain or dyspnea, does have tachypalpitations and is currently in Afib with rates 120s on  monitor. He is still frustrated/upset with current medical issues and the possibility of further LE amputation/ debridement.        Objective   Vitals: /87 (BP Location: Right arm, Patient Position: Lying)   Pulse 86   Temp 97.9 °F (36.6 °C) (Oral)   Resp 18   Ht 185.4 cm (73\")   Wt 84.8 kg (187 lb)   SpO2 100%   BMI 24.67 kg/m²     Physical Exam:  GENERAL: Alert, cooperative, in no acute distress.   HEART: Irregular rhythm, fast rate, and no murmurs, gallops, or rubs.   LUNGS: No wheezing, rales or rhonchi.   NEUROLOGIC: No focal abnormalities involving strength or sensation are noted.   EXTREMITIES: LLE BKA, RLE with discoloration present and nonhealing ulcers with dressing in place.     Results:  Results from last 7 days   Lab Units 06/03/24  0555 06/02/24  0906 06/01/24  0450   WBC 10*3/mm3 9.32 12.36* 20.32*   HEMOGLOBIN g/dL 8.5* 9.1* 6.9*   HEMATOCRIT % 27.3* 27.5* 22.1*   PLATELETS 10*3/mm3 257 262 241     Results from last 7 days   Lab Units 06/03/24  0555 06/02/24  0906 06/01/24  0450   SODIUM mmol/L 136 133* 133*   POTASSIUM mmol/L 3.4* 3.1* 4.3   CHLORIDE mmol/L 108* 99 100   CO2 mmol/L 16.0* 16.0* 19.0*   BUN mg/dL 25* 22 40*   CREATININE mg/dL 2.69* 2.79* 3.80*   GLUCOSE mg/dL 67 64* 76      Lab Results   Component Value Date    AST 7 06/02/2024    ALT 7 06/02/2024             Results from last 7 days   Lab " Units 05/31/24  1421   TSH uIU/mL 65.050*   FREE T4 ng/dL 0.53*         Results from last 7 days   Lab Units 06/03/24  0323 06/02/24 2032 06/02/24  0906 06/01/24 0450 05/31/24  2148   PROTIME Seconds  --   --   --   --  24.3*   INR   --   --   --   --  2.16*   APTT seconds 72.8 53.4* 43.8*   < > 53.1*    < > = values in this interval not displayed.     Results from last 7 days   Lab Units 06/01/24  0450 05/31/24 2056 05/31/24  1421   CK TOTAL U/L 12*  --   --    HSTROP T ng/L 386* 404* 454*       Intake/Output Summary (Last 24 hours) at 6/3/2024 1121  Last data filed at 6/3/2024 0500  Gross per 24 hour   Intake --   Output 850 ml   Net -850 ml       I personally reviewed the patient's EKG/Telemetry data    Radiology Data:   MRI Tibia Fibula Left Without Contrast    Result Date: 6/2/2024  Impression: 1.Nonspecific edema throughout the soft tissues of the surgical stump of the lower leg status post below-the-knee amputation. The findings may indicate changes of soft tissue cellulitis. No well-defined abscess is observed. 2.Signal changes are noted at the osteotomy site of the tibial resection which may relate to postoperative changes. Subtle early signs of superimposed osteomyelitis at the distal osseous stump cannot be excluded. Otherwise no additional abnormal bone marrow signal is seen throughout the tibia-fibula. There are no definitive additional potential sites of osteomyelitis. Electronically Signed: Aj Hollingsworth MD  6/2/2024 11:48 AM EDT  Workstation ID: [id number]    MRI Foot Right Without Contrast    Result Date: 6/2/2024  Impression: 1.Scattered edema throughout the soft tissues of the foot which may indicate diffuse soft tissue cellulitis. 2.There is an ovoid low signal focus within the subcutaneous soft tissues along the plantar aspect of the hindfoot adjacent to the plantar fascia. This finding may indicate developing fibromatosis related to planter fasciitis. Focal scarring in the soft tissues in  this region with fibrous change could have this appearance. Residual changes of prior hemorrhage could have this appearance. Findings related to prior postoperative change could potentially have this appearance as well. 3.Mild edema associated with the peripheral margin of the lateral malleolus. The finds are nonspecific but may indicate mild early signs of osteomyelitis. There is no definitive evidence for additional potential sites of osteomyelitis. 4.Changes of neuropathic arthropathy or Charcot foot involving the midfoot. Electronically Signed: Aj Hollingsworth MD  6/2/2024 11:25 AM EDT  Workstation ID: [id number]     Results for orders placed during the hospital encounter of 05/31/24    Adult Transthoracic Echo Complete W/ Cont if Necessary Per Protocol    Interpretation Summary    Left ventricular systolic function is normal. Left ventricular ejection fraction appears to be 51 - 55%.    Left ventricular wall thickness is consistent with borderline concentric hypertrophy.    Left atrial volume is moderately increased.    There is mild calcification of the aortic valve.    Mild mitral valve regurgitation is present.    There is a small (<1cm) circumferential pericardial effusion.        Current Medications:  aspirin, 81 mg, Oral, Daily  collagenase, 1 Application, Topical, Q24H  eravacycline dihydrochloride (XERAVA) 85 mg in sodium chloride 0.9 % 250 mL (0.34 mg/mL) IVPB, 85 mg, Intravenous, Q12H  levothyroxine, 75 mcg, Oral, Q AM  Lidocaine HCl gel, , Topical, BID  metoprolol succinate XL, 50 mg, Oral, Q24H  pantoprazole, 40 mg, Oral, Q AM  pharmacy consult - MTM, , Does not apply, Daily  sodium chloride, 10 mL, Intravenous, Q12H  vancomycin, 125 mg, Oral, Q6H      heparin, 19 Units/kg/hr, Last Rate: 19 Units/kg/hr (06/03/24 1043)  Pharmacy Consult - Pharmacy to dose,   Pharmacy to Dose Heparin,         Assessment and Plan:     1. NSTEMI  - elevated HS troponin at OSH with Afib with RVR and symptoms of chest pain.  EKGs from OSH not available for review  - HS troponin here 454 -- 386, EKGs with Afib/RVR no acute ischemic changes   - continue ASA, BB and Heparin gtt   - echo with normal LVEF, mild MR  - discussed with Dr. Amador, chest pain in setting of Afib with RVR with no recurrent chest pain since admission. Will defer cath due to multiple intercurrent issues and proceed with stress PET tomorrow. NPO after midnight      2. Afib with RVR  - rate control and anticoagulation   - continue BB, and Heparin gtt for anticoagulation   - increase Metoprolol for better rate control      3. ESRD on dialysis  - NAL following, still making some urine      4. PAD, s/p left BKA and nonhealing ulcers of RLE  - Ortho and ID following  - Vascular consulted     5. C-diff  - per ID      6. Anemia  - acute/chronic  - per primary service      7. Recent cholecystitis with GB drain in place  - Dr. Beckman has seen, recommend OP follow up with [facility]       Electronically signed by Keren Escalona PA-C, 06/03/24, 11:26 AM EDT.

## 2024-06-03 NOTE — NURSING NOTE
Wocn follow up for: We have a an exposed tendon on the right lateral lower leg, we have a healing scab with scar tissue underneath on the right lateral foot, we have a unstageable pressure injury on the right medial heel, we have a pressure injury on the left gluteal and the sacrum (2 wounds), we also have a giant slow to heal amputation stump stump on the left leg.  Below the knee.    Assessment: The below the knee amputation which is more actually of a middle shin amputation still with slough on the posterior distal aspect roughly a little less than half of the wound.  There is epithelialization around the edges.  There are visible sutures on the lateral aspect.  And there is a pale pink kind of slimy not granulating wound bed.  Slightly better than when they came in.    The there are no changes in the right medial heel, there is improvement in the right lateral foot.  There might be worsening of the right lateral wound with exposed tendon as the tendon today seemed more necrotic and less tendon and shiny.  The Mepitel contact layer that I had over the tendon was not in place and removing the silver dressing pulled on the tendon.    I did not turn the patient over and addressed the sacrum or the left gluteal however order stand and remain the same.  Discussing with the patient's wife she was told that they could find bone in that sacral wound however there is no palpable bone or visible bone at this time.    Improvement: Somewhat but most likely no change.    Recommendations/ changes: For the right lateral tendon I am still going to stick with cleansing with a 5-minute Vashe soaked 4 x 4, then we will cover the wound with the hydrogel and then will I have left the ergo tool silver mesh to put over this so that we can remove it and any topical dressing without hurting the tendon and I covered with Mepilex Ag and it was wrapped with Kerlix with the rest of the foot.    The right medial heel cleanse with saline apply  Santyl nickel thick and cover with the part of the Allevyn heel dressing.    The right lateral foot could just be covered by dressing.  And all of the foot is wrapped in Kerlix.    For the right stump we will also cleanse with a 5 minutes Vashe soaked 4 x 4.  Upon removal place nickel thick layer of Santyl on the slough place Hope Ag over top of the pink tissue in the wound cover all of this with Xeroform multilayer (2 rectangle packages) top with ABD pad and wrap with Kerlix.     Secure both wounds with Spandage.  Defer to Dr. Yeager if he changes the wound care.    Areas of concern/ new issues: Patient asked what my opinion was about healing.  I reviewed the vascular PA note.  It was found that patient has adequate blood supply in both limbs.  I also reviewed the impression the MRIs.  I told patient that we should err on the side of caution because osteo was inconclusive.  I did say that he can continue with Santyl and Hope and dressing changes but this would most likely be very slow and he would need to lots of protein and then we engaged in a rather lengthy discussion on nutrition and protein needs.    The other options discussed with him more a revision of the left stump as well as a left AKA.  Patient extremely adamant against the left AKA.  [facility] does not see why it is necessary if there is plenty of vascular supply however this option was given to patient before the vascular assessment.  I did state that a revision would most likely be very beneficial and probably close the wound and eliminate any need for further dressing changes.  Patient wants to save as much length of the leg as possible.  I believe this would be the best way to do this.    As far as the exposed tendon that is the wound that I am most concerned about.  It needs to be kept moist at all times.  I urged him to think that if Dr. Yeager could clean it out wash it out and possibly put a graft over it and put a wound VAC on it that this  is exactly what I would do and if I would not have anybody else do it.    Skin interventions in place.    Specialty bed: I had ordered a Dolphin mattress when I last assessed the patient and he is not on it I will retry.    Pressure Injury Protocol (initiate for Chance Score of 18 or less):   *Maintain good skin care, keep dry, turn q 2 hr, keep heels elevated and offloaded with heel boots.    *Apply z-guard to sacrococcygeal area/ perineal area BID or daily and PRN per incontinent episodes.  *Follow C.A.R.E protocol if medical devices (Bipap, cole, Ng tube, etc) are being used.     Woc will follow.    Please contact with questions or concerns.

## 2024-06-03 NOTE — PROGRESS NOTES
Tavo Gudino  1955  [id number]    Reason for Consultation: recurrent C diff. Osteomyelitis     History of present illness:    Patient is a 69 y.o. male, with PMH chronic cholecystitis( cholecystotomy tube) DM, ESRD on HD, HTN, PVD s/p Left  BKA.  All recent care done at [facility] in [facility].    Presented to OSH with chest pain, found to be in afib with RVR possible NSTEMi, transferred to [facility] for LHC.  Developed diarrhea prior to transfer, C diff toxin negative, positive antigen, started on oral Vancomycin. He had been treated with Kayexalate for hyperkalemia, as well as Colace.  Last positive C diff PCR 4/6/24 from [facility]. Noted exposed tendon RLE wound , and ulcer of right heel, sacral area, and left residual stump. He has been afebrile. Admitting labs with WBC 22, plt 249, ALT 10 AST 10, Scr 3.47, UA with TNTC WBCs, urine culture with gram negative bacilli, blood cultures no growth. MRI Left with edema throughout soft tissue of stump, no abscess,subtle early changes of superimposed osteomyelitis distal osseous stump not excluded.  Right with diffuse soft tissue cellulitis, possible early osteomyelitis lateral malleolus, no definite evidence of osteomyelitis.  Started on Ceftriaxone Daptomycin, Flagyl, and oral Vancomycin and we were consulted for evaluation and treatment. He has had an indwelling cole catheter since April, just recently changed.  He continues to have numerous diarrhea stools and abdominal cramping.  No fever.      6/3/24: Frustrated with prolonged hospitalization but slow overnight.  Awaiting possible cardiac workup.  Legs stable.  Cole catheter in place.  Denies abdominal pain, suprapubic pain at this time.  He said he only had 1 bowel movement yesterday evening but has had 2 loose bowel movement so far today.  Overall he thinks his stool frequency has improved    ROS:  See HPI      Allergies   Allergen Reactions    Levothyroxine Unknown - Low Severity     Penicillins Hives     Received ceftriaxone 6/1/24    Hydromorphone Other (See Comments)     Confusion, encephalopathy per wife         Medication:    Current Facility-Administered Medications:     acetaminophen (TYLENOL) tablet 650 mg, 650 mg, Oral, Q4H PRN, Genny Saeed MD, 650 mg at 05/31/24 1749    aspirin EC tablet 81 mg, 81 mg, Oral, Daily, Keren Escalona PA-C, 81 mg at 06/03/24 1036    collagenase ointment 1 Application, 1 Application, Topical, Q24H, Genny Saeed MD, 1 Application at 06/02/24 2008    eravacycline dihydrochloride (XERAVA) 85 mg in sodium chloride 0.9 % 250 mL (0.34 mg/mL) IVPB, 85 mg, Intravenous, Q12H, José Fuentes MD, Last Rate: 250 mL/hr at 06/03/24 0335, 85 mg at 06/03/24 0335    heparin 73115 units/250 mL (100 units/mL) in 0.45 % NaCl infusion, 19 Units/kg/hr, Intravenous, Titrated, Shahnaz Steele, [facility], Last Rate: 16.11 mL/hr at 06/03/24 1043, 19 Units/kg/hr at 06/03/24 1043    HYDROcodone-acetaminophen (NORCO) 7.5-325 MG per tablet 1 tablet, 1 tablet, Oral, Q6H PRN, Genny Saeed MD, 1 tablet at 06/02/24 2017    levothyroxine (SYNTHROID, LEVOTHROID) tablet 75 mcg, 75 mcg, Oral, Q AM, Genny Saeed MD, 75 mcg at 06/03/24 0524    Lidocaine HCl gel (XYLOCAINE) urethral/mucosal syringe, , Topical, PRN, Genny Saeed MD, Given at 06/01/24 0609    lidocaine HCL topical jelly USP 2% syringe 11 mL, , Topical, BID, Genny Saeed MD, Given at 06/03/24 1037    LORazepam (ATIVAN) tablet 0.25 mg, 0.25 mg, Oral, Q6H PRN, Sonya Banks MD, 0.25 mg at 06/02/24 0927    metoprolol succinate XL (TOPROL-XL) 24 hr tablet 100 mg, 100 mg, Oral, Q24H, Keren Escalona PA-C    metoprolol tartrate (LOPRESSOR) injection 5 mg, 5 mg, Intravenous, Q6H PRN, Hayden Weinstein MD    Morphine sulfate (PF) injection 4 mg, 4 mg, Intravenous, Daily PRN, Genny Saeed MD    pantoprazole (PROTONIX) EC tablet 40 mg, 40 mg, Oral, Q AM, Niranjan Beckman MD, 40 mg at 06/03/24  0524    Pharmacy Consult - MTM, , Does not apply, Daily, Freddie Sutton, [facility]    Pharmacy Consult - Pharmacy to dose, , Does not apply, Continuous PRN, José Fuentes MD    Pharmacy to Dose Heparin, , Does not apply, Continuous PRN, Dinorah Russell, [facility]    sodium chloride 0.9 % flush 10 mL, 10 mL, Intravenous, Q12H, Genny Saeed MD, 10 mL at 24 1037    sodium chloride 0.9 % flush 10 mL, 10 mL, Intravenous, PRN, Genny Saeed MD    sodium chloride 0.9 % infusion 40 mL, 40 mL, Intravenous, PRN, Genny Saeed MD    vancomycin (VANCOCIN) capsule 125 mg, 125 mg, Oral, Q6H, Genny Saeed MD, 125 mg at 24 1225    Antibiotics:  Anti-Infectives (From admission, onward)      Ordered     Dose/Rate Route Frequency Start Stop    24 1421  eravacycline dihydrochloride (XERAVA) 85 mg in sodium chloride 0.9 % 250 mL (0.34 mg/mL) IVPB        Note to Pharmacy: !! Ensure final concentration of 0.2 - 0.6 mg/mL !!   Ordering Provider: José Fuentes MD    85 mg  250 mL/hr over 60 Minutes Intravenous Every 12 Hours 24 1600 24 1559    24 1503  vancomycin (VANCOCIN) capsule 125 mg        Ordering Provider: Genny Saeed MD    125 mg Oral Every 6 Hours Scheduled 24 1800 06/10/24 1759            Physical Exam:   Vital Signs  Temp (24hrs), Av.8 °F (36.6 °C), Min:97.6 °F (36.4 °C), Max:97.9 °F (36.6 °C)    Temp  Min: 97.6 °F (36.4 °C)  Max: 97.9 °F (36.6 °C)  BP  Min: 129/86  Max: 153/91  Pulse  Min: 86  Max: 124  Resp  Min: 18  Max: 18  SpO2  Min: 100 %  Max: 100 %    GENERAL: Awake and alert, chronically ill-appearing but not acutely toxic  HEENT: Normocephalic, atraumatic.  PERRL. EOMI. No conjunctival injection. No icterus. Edentulous   NECK: Supple   HEART: RRR; No murmur,  .   LUNGS: Clear to auscultation bilaterally without wheezing, rales, rhonchi. Normal respiratory effort. Nonlabored.   ABDOMEN: Soft,  tender  nondistended   EXT: Left BKA site with wound  dehiscence and some slough  Multiple dry ulcerations on right foot dressed.  :  Delaney catheter with clear urine  MSK: No joint effusions or erythema  SKIN: Warm and dry    NEURO: Oriented to PPT.  Motor 5/5 strength  PSYCHIATRIC: Normal insight and judgment. Cooperative with PE    Laboratory Data    Results from last 7 days   Lab Units 06/03/24  0555 06/02/24  0906 06/01/24  0450   WBC 10*3/mm3 9.32 12.36* 20.32*   HEMOGLOBIN g/dL 8.5* 9.1* 6.9*   HEMATOCRIT % 27.3* 27.5* 22.1*   PLATELETS 10*3/mm3 257 262 241     Results from last 7 days   Lab Units 06/03/24  0555   SODIUM mmol/L 136   POTASSIUM mmol/L 3.4*   CHLORIDE mmol/L 108*   CO2 mmol/L 16.0*   BUN mg/dL 25*   CREATININE mg/dL 2.69*   GLUCOSE mg/dL 67   CALCIUM mg/dL 8.0*     Results from last 7 days   Lab Units 06/02/24  0906   ALK PHOS U/L 240*   BILIRUBIN mg/dL 0.4   ALT (SGPT) U/L 7   AST (SGOT) U/L 7             Results from last 7 days   Lab Units 05/31/24  2056   LACTATE mmol/L 1.3     Results from last 7 days   Lab Units 06/01/24  0450   CK TOTAL U/L 12*         Estimated Creatinine Clearance: 30.2 mL/min (A) (by C-G formula based on SCr of 2.69 mg/dL (H)).      Microbiology:  Microbiology Results (last 10 days)       Procedure Component Value - Date/Time    Urine Culture - Urine, Indwelling Urethral Catheter [[id number]]  (Normal) Collected: 06/02/24 1721    Lab Status: Preliminary result Specimen: Urine from Indwelling Urethral Catheter Updated: 06/03/24 1050     Urine Culture No growth    MRSA Screen, PCR (Inpatient) - Swab, Nares [[id number]]  (Abnormal) Collected: 06/02/24 1306    Lab Status: Final result Specimen: Swab from Nares Updated: 06/02/24 1433     MRSA PCR Positive    Narrative:      The negative predictive value of this diagnostic test is high and should only be used to consider de-escalating anti-MRSA therapy. A positive result may indicate colonization with MRSA and must be correlated clinically.    Clostridioides difficile Toxin -  Stool, Per Rectum [[id number]]  (Abnormal) Collected: 06/02/24 1305    Lab Status: Final result Specimen: Stool from Per Rectum Updated: 06/02/24 1416    Narrative:      The following orders were created for panel order Clostridioides difficile Toxin - Stool, Per Rectum.  Procedure                               Abnormality         Status                     ---------                               -----------         ------                     Clostridioides difficile...[[id number]]  Abnormal            Final result                 Please view results for these tests on the individual orders.    Clostridioides difficile Toxin, PCR - Stool, Per Rectum [[id number]]  (Abnormal) Collected: 06/02/24 1305    Lab Status: Final result Specimen: Stool from Per Rectum Updated: 06/02/24 1416     Toxigenic C. difficile by PCR Detected    Narrative:      DNA from a toxigenic strain of C.difficile has been detected.    Clostridioides difficile toxin Ag, Reflex - Stool, Per Rectum [[id number]]  (Abnormal) Collected: 06/02/24 1305    Lab Status: Final result Specimen: Stool from Per Rectum Updated: 06/02/24 1515     C.diff Toxin Ag Positive    Narrative:      DNA from a toxigenic strain of C.difficile was detected, along with the presence of free toxin. These results are suggestive of C.difficile infection.    Blood Culture - Blood, Arm, Right [[id number]]  (Normal) Collected: 05/31/24 2237    Lab Status: Preliminary result Specimen: Blood from Arm, Right Updated: 06/03/24 0745     Blood Culture No growth at 2 days    Blood Culture - Blood, Blood, Central Line [[id number]]  (Normal) Collected: 05/31/24 2237    Lab Status: Preliminary result Specimen: Blood, Central Line Updated: 06/03/24 0801     Blood Culture No growth at 2 days    Urine Culture - Urine, Indwelling Urethral Catheter [[id number]]  (Abnormal)  (Susceptibility) Collected: 05/31/24 2219    Lab Status: Final result Specimen: Urine from Indwelling Urethral Catheter Updated:  06/03/24 0936     Urine Culture >100,000 CFU/mL Enterobacter cloacae complex     Comment:   This organism may develop resistance during prolonged therapy with 3rd generation cephalosporins (e.g. ceftriaxone) as a result of de-repression of AmpC B-lactamase.  Ceftriaxone may be a reasonable treatment option for uncomplicated cystitis or other lower severity infections when susceptibility is demonstrated.       Narrative:      Colonization of the urinary tract without infection is common. Treatment is discouraged unless the patient is symptomatic, pregnant, or undergoing an invasive urologic procedure.    Susceptibility        Enterobacter cloacae complex      PHYLLIS      Cefepime Susceptible      Ceftazidime Susceptible      Ceftriaxone Susceptible      Gentamicin Susceptible      Levofloxacin Susceptible      Nitrofurantoin Intermediate      Piperacillin + Tazobactam Susceptible      Trimethoprim + Sulfamethoxazole Susceptible                                     Radiology:  Imaging Results (Last 72 Hours)       Procedure Component Value Units Date/Time    MRI Tibia Fibula Left Without Contrast [[id number]] Collected: 06/02/24 1138     Updated: 06/02/24 1151    Narrative:      MRI TIBIA FIBULA LEFT WO CONTRAST    Date of Exam: 6/2/2024 4:59 AM EDT    Indication: concern for osteomyelitis.     Comparison: None available.    Technique:  Routine multiplanar/multisequence images of the left tibia and fibula were obtained without contrast administration.        Findings:  Postoperative changes are noted status post prior below the knee amputation occurring at the level of the proximal diaphyses of the tibia and fibula. Nonspecific scattered fluid signal is noted within the soft tissues at the surgical stump. The findings   may be reactive. Changes of soft tissue cellulitis could have this appearance.    Scarring is observed in the soft tissues directly adjacent to the osseous stub of the tibia. There are signal changes at the  site of osteotomy of the tibia which are related to postoperative changes. Subtle increased signal is noted in this region.   Findings secondary to early developing superimposed osteomyelitis cannot be entirely excluded. Otherwise there is no separate definitive cortical erosion or destruction. No additional abnormal bone marrow edema is seen.    No well-defined fluid collections are seen in the soft tissues to suggest abscess.      Impression:      Impression:  1.Nonspecific edema throughout the soft tissues of the surgical stump of the lower leg status post below-the-knee amputation. The findings may indicate changes of soft tissue cellulitis. No well-defined abscess is observed.  2.Signal changes are noted at the osteotomy site of the tibial resection which may relate to postoperative changes. Subtle early signs of superimposed osteomyelitis at the distal osseous stump cannot be excluded. Otherwise no additional abnormal bone   marrow signal is seen throughout the tibia-fibula. There are no definitive additional potential sites of osteomyelitis.        Electronically Signed: Aj Hollingsworth MD    6/2/2024 11:48 AM EDT    Workstation ID: [id number]    MRI Foot Right Without Contrast [[id number]] Collected: 06/02/24 1113     Updated: 06/02/24 1128    Narrative:        MRI FOOT RIGHT WO CONTRAST    Date of Exam: 6/2/2024 5:02 AM EDT    Indication: R foot wounds, evaluate for osteomyelitis.     Comparison: None available.    Technique:  Routine multiplanar/multisequence sequence images of the right foot were obtained without contrast administration.      Findings:  There is edema in the subcutaneous soft tissues along the medial, posterior, and lateral aspect of the ankle/hindfoot. There is also edema involving the subcutaneous soft tissues of the forefoot extending into the digits. Mild edema is also seen   throughout the midfoot. The findings suggest changes of soft tissue cellulitis throughout the foot.    There is a  low signal focus within the subcutaneous soft tissues along the plantar aspect of the hindfoot. This finding measures 2.0 x 1.0 x 1.3 cm. This finding demonstrates decreased signal on both T1 and T2 weighted imaging. The etiology for this   abnormality is uncertain.    Moderate changes of arthropathy are seen throughout the midfoot indicating developing neuropathic arthropathy or Charcot foot. There is mild edema involving the peripheral margin of the lateral malleolus. The findings may indicate early signs of   developing osteomyelitis. No well-defined cortical destruction or erosion is seen. No definitive additional potential sites of osteomyelitis are identified.      Impression:      Impression:  1.Scattered edema throughout the soft tissues of the foot which may indicate diffuse soft tissue cellulitis.  2.There is an ovoid low signal focus within the subcutaneous soft tissues along the plantar aspect of the hindfoot adjacent to the plantar fascia. This finding may indicate developing fibromatosis related to planter fasciitis. Focal scarring in the soft   tissues in this region with fibrous change could have this appearance. Residual changes of prior hemorrhage could have this appearance. Findings related to prior postoperative change could potentially have this appearance as well.  3.Mild edema associated with the peripheral margin of the lateral malleolus. The finds are nonspecific but may indicate mild early signs of osteomyelitis. There is no definitive evidence for additional potential sites of osteomyelitis.  4.Changes of neuropathic arthropathy or Charcot foot involving the midfoot.        Electronically Signed: Aj Hollingsworth MD    6/2/2024 11:25 AM EDT    Workstation ID: [id number]    CT Abdomen Pelvis Without Contrast [[id number]] Collected: 05/31/24 2141     Updated: 05/31/24 2151    Narrative:      CT ABDOMEN PELVIS WO CONTRAST    Date of Exam: 5/31/2024 9:17 PM EDT    Indication: leukocytosis, has a  cholecystostomy tube.    Comparison: None available.    Technique: Axial CT images were obtained of the abdomen and pelvis without the administration of contrast. Reconstructed coronal and sagittal images were also obtained. Automated exposure control and iterative construction methods were used.      Findings:  Visualized Chest: Moderate cardiomegaly. Small pericardial effusion. Partially imaged central venous catheter with the distal tip at the cavoatrial junction.  Trace bilateral pleural effusions. Mild diffuse interstitial thickening.    Liver: Liver is normal in size and CT density. No focal lesions.    Gallbladder: Cholecystostomy tube is noted. The gallbladder is almost completely decompressed. There is mild fat stranding noted within the gallbladder fossa.    Bile Ducts: No billiary dcutal dilation.    Spleen: Spleen is normal in size and CT density.    Pancreas: Pancreas is normal. There is no evidence of pancreatic mass or peripancreatic fluid.    Adrenals: Adrenal glands are unremarkable.    Kidneys: Kidneys are normal in size. There are no stones or hydronephrosis.    Gastrointestinal: Wall thickening of the sigmoid colon and rectum. Additionally hyperdensity is noted within the rectum, nonspecific but may represent suppositories.  Otherwise the visualized GI tract is unremarkable on this study performed without IV and oral contrast.    Bladder: A Delaney catheter is present within the bladder. Significant bladder wall thickening, nonspecific but    Pelvis:  No suspecious mass.    Peritoneum/Mesentery: Mild diffuse mesenteric edema. No free fluid or pneumoperitoneum.      Lymph Nodes: Multiple prominent to mildly enlarged para-aortic and mesenteric lymph nodes.    Vasculature: Extensive vascular calcifications noted throughout the visualized arteries of the abdomen and pelvis.    Abdominal Wall: Partially imaged large left inguinal hernia containing a portion of the sigmoid colon. Mild diffuse soft tissue  edema.    Bony Structures: No acute osseous abnormality      Impression:      Impression:  Delaney catheter is present within the bladder with associated significant bladder wall thickening is concerning for cystitis.    Wall thickening of the sigmoid colon and rectum is also concerning for colitis/proctitis.    Ongoing mild inflammatory changes within the gallbladder fossa. Cholecystostomy tube appears to be in adequate position.    Large, partially imaged, left inguinal hernia containing a portion of the sigmoid colon without evidence of obstruction.    Findings concerning for pulmonary edema.    Multiple additional nonemergent findings as characterized above              Electronically Signed: Romel Dominguez DO    5/31/2024 9:48 PM EDT    Workstation ID: [id number]    XR Chest 1 View [[id number]] Collected: 05/31/24 1934     Updated: 05/31/24 1940    Narrative:      XR CHEST 1 VW    Date of Exam: 5/31/2024 7:10 PM EDT    Indication: central line    Comparison: None available.    Findings: Right subclavian central venous catheter with the tip in the superior vena cava. Left internal jugular central venous catheter with the tip in the superior vena cava. No consolidation. No pneumothorax or pleural effusion. Cardiac size is   normal. The clavicles are intact. No rib fractures. Large region of partially visualized coarse calcification of the right upper extremity likely dystrophic calcification given the location.      Impression:      No acute cardiopulmonary disease. Appropriate line placement. Likely dystrophic calcification of the right proximal humerus. Plain film evaluation of the right humerus recommended.      Electronically Signed: Delmer Shea MD    5/31/2024 7:36 PM EDT    Workstation ID: [id number]          5/28, 5/31 blood cultures from [place], no growth so far-- Virgie Mcpherson called micro  No other cultures obtained      Impression:   C diff colitis, ongoing initially diagnosed in April 2024. Was  given Lactulose, Stool softeners in [place] and developed worsening diarrhea, with positive antigen, negative EIA toxin so possible Colonization vs true infection.  Repeat testing at [facility] with positive PCR and positive EIA antigen.  Patient says stool frequency is overall improving.  NSTEMI, transferred for [facility] here.  Cardiac workup in process  ESRD on HD  Severe peripheral vascular disease: No intervention needed per vascular surgery  S/p left BKA 4/2024, with dehiscence of the amputation site  Possible left tibial early osteomyelitis:  by MRI.  Dr. Yeager evaluating  Left lower extremity wound, with exposed tendon-  early osteomyelitis of the lateral malleolus not excluded by MRI per radiology  Chronic cholecystitis, s/p percutaneous cholecystotomy tube-   Pyuria, Enterobacter bacteriuria: Had chronic indwelling Delaney catheter that was in place for almost 2 months prior to being changed on admission 5/31.  Culture from admission with Enterobacter.  Repeat culture pending.  Currently a symptom  Sacral pressure ulcers    PLAN/RECOMMENDATIONS:   - Follow-up pending cultures done at [place]  - Follow-up pending blood cultures from [facility], so far negative  - Repeat urine culture from 6/2 so far negative  - Follow CBC, CMP, CRP      - Continue IV Eravacycline  - continue oral Vancomycin.  Eventually will need prolonged taper  - single dose fosfomycin    Complex medical decision making.  I will follow     José Fuentes MD  6/3/2024  12:42 EDT

## 2024-06-03 NOTE — PROGRESS NOTES
HEPARIN INFUSION  Tavo Gudino is a  69 y.o. male receiving heparin infusion.     Therapy for (VTE/Cardiac):   ACS  Patient Weight: 84.8kg  Initial Bolus (Y/N):   N  Any Bolus (Y/N):    No bolus x 72 hours (may bolus 6/3 at 22:00)    Signs or Symptoms of Bleeding: None per RN    Cardiac or Other (Not VTE)   Initial rate: 12 units/kg/hr (Max 1,000 units/hr)   aPTT  Rebolus Infusion Hold time Change infusion Dose (Units/kg/hr) Next Anti Xa or aPTT Level Due   <45 50 Units/kg  (4000 Units Max) None Increase by  3 Units/kg/hr 6 hours   45 - 52 25 Units/kg  (2000 Units Max) None Increase by  2 Units/kg/hr 6 hours   53 - 59 0 None Increase by  1 Units/kg/hr 6 hours   60 - 75 0 None No Change 6 hours (after 2 consecutive levels in range check qAM)   76 - 83 0 None Decrease by  1 Units/kg/hr 6 hours   84 - 98 0 30 Minutes Decrease by  2 Units/kg/hr 6 hours   99 - 113 0 60 Minutes Decrease by  3 Units/kg/hr 6 hours   >113 0 Hold  After aPTT less than 75 decrease previous rate by  4 Units/kg/hr  Every 2 hours until aPTT less than 75 then when infusion restarts in 6 hours     Results from last 7 days   Lab Units 06/03/24  0555 06/02/24  0906 06/01/24  0450 05/31/24  2148   INR   --   --   --  2.16*   HEMOGLOBIN g/dL 8.5* 9.1* 6.9*  --    HEMATOCRIT % 27.3* 27.5* 22.1*  --    PLATELETS 10*3/mm3 257 262 241  --         Date   Time   aPTT Current Rate (Unit/kg/hr) Bolus   (Units) Rate Change   (Unit/kg/hr) New Rate (Unit/kg/hr) Next   aPTT Comments  Pump Check Daily   5/31  53.1 NEW START -- +11 11 0200 DW RN     59.2 11 -- +1 12 1300 Christy 3250 -acb   6/1 2250 43.4 12 -- +3 15 0600 Christy 3239 -acb   6/2 0906 43.8 15 --  +3 18 1600 D/W JESUS Leon.    6/2 2032 53.4 18 -- +1 19 0300 D/w RN   6/3 0323 72.8 19 -- -- 19 1000 Elva 3250 -[facility]   6/3 1054 57.7 19 -- +1 20 2000 D/w camryn Richard verified by RN due to precautions.                                                                                                                                                              Fer Maurer, PharmD  6/3/2024  12:49 EDT

## 2024-06-03 NOTE — PROGRESS NOTES
"          Clinical Nutrition Assessment     Patient Name: Tavo Gudino  YOB: 1955  MRN: [id number]  Date of Encounter: 06/03/24 19:48 EDT  Admission date: 5/31/2024  Reason for Visit: Identified at risk by screening criteria, MST score 2+, Malnutrition Severity Assessment    Assessment   Nutrition Assessment   Admission Diagnosis:  NSTEMI (non-ST elevated myocardial infarction) [I21.4]    Problem List:    NSTEMI (non-ST elevated myocardial infarction)    C. difficile colitis    History of cholecystitis s/p cholecystostomy tube    ESRD (end stage renal disease) on hemodialysis    Atrial fibrillation    Essential hypertension      PMH:   He  has a past medical history of Atrial fibrillation, Chronic kidney disease (CKD), stage V, ESRD on hemodialysis, Hypertension, Metabolic acidosis, Peripheral arterial disease, Pneumothorax, and Secondary hyperparathyroidism.    PSH:  He  has a past surgical history that includes Below knee leg amputation (Left); AV fistula placement (Right); and Cataract extraction (Bilateral).    Applicable Nutrition History:   4/24 BKA  Note stump wound and heel and sacral PI's      Anthropometrics     Height: Height: 185.4 cm (73\")  Last Filed Weight: Weight: 84.8 kg (187 lb) (06/02/24 1207)  Method: Weight Method: Bed scale  BMI: BMI (Calculated): 24.7    UBW:  Per EMR wt of 237 lbs on 1/8/24 176 lbs on 4/16/24 prior to BKA   Weight change: Apparent loss of 61 bs 26% body wt over 4 months - uncertain of contribution of fluid status Likely enough true loss for significance.     Nutrition Focused Physical Exam    Date: 6/3    Patient meets criteria for malnutrition diagnosis, see MSA note.     Subjective   Reported/Observed/Food/Nutrition Related History:     6/3  Family rpt pt is \"picky\" w food give some preferences: no carrots or broccoli. Pt/family allow will not want shake type suppl. Will try magic cup. Describe intake of < 3/4 usual for the last 6 months. Note rpt pt will take " cornbread w milk this evening for dinner.     Current Nutrition Prescription   PO: NPO Diet NPO Type: Sips with Meds  Diet: Cardiac; Healthy Heart (2-3 Na+); Fluid Consistency: Thin (IDDSI 0)  Oral Nutrition Supplement:  Magic Cup daily added  Intake: Insufficient data One meal refused recorded    Assessment & Plan   Nutrition Diagnosis   Date:  6/3            Updated:    Problem Malnutrition  moderate chronic   Etiology Limited food prefs and depressed appetite   Signs/Symptoms Intake hx w Wasting    Status:     Date:   6/3  Updated:     Problem Increased nutrient needs   Etiology Skin breakdown   Signs/Symptoms Stump wound and heel and sacral PI's   Status:     Goal / Objectives:   Nutrition to support treatment and Establish PO      Nutrition Intervention      Follow treatment progress, Care plan reviewed, Advise alternate selection, Menu provided, Encourage intake, Supplement provided Diet order adjusted to encourage intake - given low serum K+ adj to Heart Healthy from renal.         Monitoring/Evaluation:   Per protocol, I&O, PO intake, Supplement intake, Pertinent labs, Weight, Skin status, GI status, Symptoms    Ave Amaya RD  Time Spent: 30 min

## 2024-06-03 NOTE — CASE MANAGEMENT/SOCIAL WORK
Discharge Planning Assessment  [facility]     Patient Name: Tavo Gudino  MRN: [id number]  Today's Date: 6/3/2024    Admit Date: 5/31/2024    Plan: discharge plan   Discharge Needs Assessment       Row Name 06/03/24 1658       Living Environment    People in Home spouse    Name(s) of People in Home Nanda Gudino(spouse)    Primary Care Provided by other (see comments)  Staff at [facility]    Provides Primary Care For no one, unable/limited ability to care for self    Family Caregiver if Needed spouse    Family Caregiver Names Nanda Gudino(spouse)    Quality of Family Relationships helpful;involved;supportive    Able to Return to Prior Arrangements other (see comments)  TBD    Living Arrangement Comments I spoke with pt in room and then later with spouse, outside room. Pt resides in [place] with spouse. Per spouse, pt has been in/out of hospital and rehab since Jan 2024 and not been home.       Transition Planning    Patient/Family Anticipates Transition to other (see comments)  TBD    Patient/Family Anticipated Services at Transition     Transportation Anticipated health plan transportation       Discharge Needs Assessment    Readmission Within the Last 30 Days no previous admission in last 30 days    Equipment Currently Used at Home wheelchair  DME needed at facility    Concerns to be Addressed discharge planning    Equipment Needed After Discharge wheelchair, manual;other (see comments)  TBD    Outpatient/Agency/Support Group Needs outpatient hemodialysis  Pt has been going to HD at [facility]    Discharge Facility/Level of Care Needs nursing facility, skilled    Discharge Coordination/Progress Per pt/spouse, pt has [employer] Medicare with prescription coverage and has been using the [facility] pharmacy at [facility]. Pt has been in/out hospital and rehab since Jan. Pt has a history of PAD, LBKA at , ESRD on HD, chronic urinary retention  with a f/c and here as a transfer from [facility] with [facility]. Pt states he wants to go home at discharge and not go back to rehab. Vascular CTS, ID orthopedic surgery, general surgery, nephrology and cardiology consulted. Spouse reports I know he wants to go home but unsure of discharge plan at this time. Discharge plan pending hospital course. CM with follow up with [facility] tomorrow  and will cont to follow for d/c needs.                    Discharge Plan       Row Name 06/03/24 5038       Plan    Plan discharge plan    Plan Comments Pt states he wants to go home at discharge and not go back to rehab. Vascular CTS, ID orthopedic surgery, general surgery, nephrology and cardiology consulted. Spouse reports I know he wants to go home but unsure of discharge plan at this time. Discharge plan pending hospital course. CM will cont to follow    Final Discharge Disposition Code 03 - skilled nursing facility (SNF)                  Continued Care and Services - Admitted Since 5/31/2024    No active coordination exists for this encounter.       Expected Discharge Date and Time       Expected Discharge Date Expected Discharge Time    Jun 5, 2024            Demographic Summary    No documentation.                  Functional Status    No documentation.                  Psychosocial    No documentation.                  Abuse/Neglect    No documentation.                  Legal    No documentation.                  Substance Abuse    No documentation.                  Patient Forms    No documentation.                     Catrachita Robertson, RN

## 2024-06-03 NOTE — PROGRESS NOTES
[facility] Medicine Services  PROGRESS NOTE    Patient Name: Tavo Gudino  : 1955  MRN: [id number]    Date of Admission: 2024  Primary Care Physician: Hayden Weinstein MD    Subjective   Subjective     CC:  Chest pain, A fib with RVR, NSTEMI, leukocytosis    HPI:  Denies any chest pain.  No shortness of breath.  Had 3 bowel movements.  No abdominal pain.  No family bedside.    Objective   Objective     Vital Signs:   Temp:  [97.4 °F (36.3 °C)-97.9 °F (36.6 °C)] 97.9 °F (36.6 °C)  Heart Rate:  [] 86  Resp:  [18] 18  BP: (126-153)/() 134/87     Physical Exam:  Constitutional: Awake, alert, chronically ill-appearing  HENT: NCAT, mucous membranes moist  Respiratory: Clear to auscultation bilaterally, respiratory effort normal   Cardiovascular: IRIR, HR 90s  Gastrointestinal: Positive bowel sounds, soft, nontender, nondistended, percutaneous cholecystostomy tube  Musculoskeletal: left BKA  Psychiatric: Anxious, cooperative  Neurologic: PERRL, symmetric facies, speech clear  Skin:                                Results Reviewed:  LAB RESULTS:      Lab 24  0555 24  0323 24  2032 24  0906 24  2250 24  0450 24  2148 24  2148 24  2056 24  1421   WBC 9.32  --   --  12.36*  --  20.32*  --   --   --  22.02*   HEMOGLOBIN 8.5*  --   --  9.1*  --  6.9*  --   --   --  7.5*   HEMATOCRIT 27.3*  --   --  27.5*  --  22.1*  --   --   --  24.4*   PLATELETS 257  --   --  262  --  241  --   --   --  249   NEUTROS ABS  --   --   --  9.70*  --  16.57*  --   --   --  18.79*   IMMATURE GRANS (ABS)  --   --   --  0.08*  --  0.19*  --   --   --  0.16*   LYMPHS ABS  --   --   --  1.63  --  1.96  --   --   --  1.70   MONOS ABS  --   --   --  0.77  --  1.42*  --   --   --  1.23*   EOS ABS  --   --   --  0.11  --  0.13  --   --   --  0.09   MCV 95.8  --   --  92.9  --  95.7  --   --   --  96.8   LACTATE  --   --   --   --   --   --   --   --   1.3  --    PROTIME  --   --   --   --   --   --   --  24.3*  --   --    APTT  --  72.8 53.4* 43.8* 43.4* 59.2*   < > 53.1*  --   --    HEPARIN ANTI-XA  --   --   --   --   --   --   --  >1.10*  --   --     < > = values in this interval not displayed.         Lab 06/03/24  0555 06/02/24  0906 06/01/24  0450 05/31/24  1421   SODIUM 136 133* 133* 133*   POTASSIUM 3.4* 3.1* 4.3 4.2   CHLORIDE 108* 99 100 99   CO2 16.0* 16.0* 19.0* 22.0   ANION GAP 12.0 18.0* 14.0 12.0   BUN 25* 22 40* 37*   CREATININE 2.69* 2.79* 3.80* 3.47*   EGFR 24.5* 23.5* 16.2* 18.1*   GLUCOSE 67 64* 76 92   CALCIUM 8.0* 8.9 8.7 8.9   TSH  --   --   --  65.050*         Lab 06/02/24  0906 05/31/24  1421   TOTAL PROTEIN 5.4* 5.3*   ALBUMIN 2.4* 2.5*   GLOBULIN 3.0 2.8   ALT (SGPT) 7 10   AST (SGOT) 7 10   BILIRUBIN 0.4 0.3   ALK PHOS 240* 201*         Lab 06/01/24  0450 05/31/24  2148 05/31/24 2056 05/31/24  1421   HSTROP T 386*  --  404* 454*   PROTIME  --  24.3*  --   --    INR  --  2.16*  --   --              Lab 06/01/24  0944 06/01/24  0450 05/31/24  1421   IRON  --   --  13*   IRON SATURATION (TSAT)  --   --  7*   TIBC  --   --  179*   TRANSFERRIN  --   --  120*   FERRITIN  --  1,181.00*  --    FOLATE  --  4.36*  --    VITAMIN B 12  --  755  --    ABO TYPING B  --  B   RH TYPING Positive  --  Positive   ANTIBODY SCREEN Negative  --   --          Brief Urine Lab Results  (Last result in the past 365 days)        Color   Clarity   Blood   Leuk Est   Nitrite   Protein   CREAT   Urine HCG        06/02/24 1721 Yellow   Clear   Trace   Trace   Negative   100 mg/dL (2+)                   Microbiology Results Abnormal       Procedure Component Value - Date/Time    Blood Culture - Blood, Blood, Central Line [[id number]]  (Normal) Collected: 05/31/24 2237    Lab Status: Preliminary result Specimen: Blood, Central Line Updated: 06/03/24 0801     Blood Culture No growth at 2 days    Blood Culture - Blood, Arm, Right [[id number]]  (Normal) Collected:  05/31/24 2237    Lab Status: Preliminary result Specimen: Blood from Arm, Right Updated: 06/03/24 0745     Blood Culture No growth at 2 days            Duplex Lower Extremity Art / Grafts - Bilateral CAR    Result Date: 6/3/2024    Right lower extremity: Indeterminate GOLDIE due to noncompressible vessels.  Nonobstructive atherosclerosis to the level of the popliteal artery.  Multiphasic waveforms to the level of the distal anterior tibial artery. Suspected severe stenosis of the TP trunk.  Monophasic low velocity flow to the distal posterior tibial artery.   Left lower extremity: Indeterminate GOLDIE due to prior BKA.  Nonobstructive atherosclerosis in the femoral segments.  Monophasic waveforms starting at the level of the popliteal artery.  Anterior tibial artery not visualized.  Color Doppler flow noted to the level of the proximal posterior tibial and mid peroneal segments.  Prior BKA.     MRI Tibia Fibula Left Without Contrast    Result Date: 6/2/2024  MRI TIBIA FIBULA LEFT WO CONTRAST Date of Exam: 6/2/2024 4:59 AM EDT Indication: concern for osteomyelitis.  Comparison: None available. Technique:  Routine multiplanar/multisequence images of the left tibia and fibula were obtained without contrast administration.  Findings: Postoperative changes are noted status post prior below the knee amputation occurring at the level of the proximal diaphyses of the tibia and fibula. Nonspecific scattered fluid signal is noted within the soft tissues at the surgical stump. The findings may be reactive. Changes of soft tissue cellulitis could have this appearance. Scarring is observed in the soft tissues directly adjacent to the osseous stub of the tibia. There are signal changes at the site of osteotomy of the tibia which are related to postoperative changes. Subtle increased signal is noted in this region. Findings secondary to early developing superimposed osteomyelitis cannot be entirely excluded. Otherwise there is no  separate definitive cortical erosion or destruction. No additional abnormal bone marrow edema is seen. No well-defined fluid collections are seen in the soft tissues to suggest abscess.     Impression: Impression: 1.Nonspecific edema throughout the soft tissues of the surgical stump of the lower leg status post below-the-knee amputation. The findings may indicate changes of soft tissue cellulitis. No well-defined abscess is observed. 2.Signal changes are noted at the osteotomy site of the tibial resection which may relate to postoperative changes. Subtle early signs of superimposed osteomyelitis at the distal osseous stump cannot be excluded. Otherwise no additional abnormal bone marrow signal is seen throughout the tibia-fibula. There are no definitive additional potential sites of osteomyelitis. Electronically Signed: Aj Hollingsworth MD  6/2/2024 11:48 AM EDT  Workstation ID: [id number]    MRI Foot Right Without Contrast    Result Date: 6/2/2024  MRI FOOT RIGHT WO CONTRAST Date of Exam: 6/2/2024 5:02 AM EDT Indication: R foot wounds, evaluate for osteomyelitis.  Comparison: None available. Technique:  Routine multiplanar/multisequence sequence images of the right foot were obtained without contrast administration. Findings: There is edema in the subcutaneous soft tissues along the medial, posterior, and lateral aspect of the ankle/hindfoot. There is also edema involving the subcutaneous soft tissues of the forefoot extending into the digits. Mild edema is also seen throughout the midfoot. The findings suggest changes of soft tissue cellulitis throughout the foot. There is a low signal focus within the subcutaneous soft tissues along the plantar aspect of the hindfoot. This finding measures 2.0 x 1.0 x 1.3 cm. This finding demonstrates decreased signal on both T1 and T2 weighted imaging. The etiology for this abnormality is uncertain. Moderate changes of arthropathy are seen throughout the midfoot indicating  developing neuropathic arthropathy or Charcot foot. There is mild edema involving the peripheral margin of the lateral malleolus. The findings may indicate early signs of developing osteomyelitis. No well-defined cortical destruction or erosion is seen. No definitive additional potential sites of osteomyelitis are identified.     Impression: Impression: 1.Scattered edema throughout the soft tissues of the foot which may indicate diffuse soft tissue cellulitis. 2.There is an ovoid low signal focus within the subcutaneous soft tissues along the plantar aspect of the hindfoot adjacent to the plantar fascia. This finding may indicate developing fibromatosis related to planter fasciitis. Focal scarring in the soft tissues in this region with fibrous change could have this appearance. Residual changes of prior hemorrhage could have this appearance. Findings related to prior postoperative change could potentially have this appearance as well. 3.Mild edema associated with the peripheral margin of the lateral malleolus. The finds are nonspecific but may indicate mild early signs of osteomyelitis. There is no definitive evidence for additional potential sites of osteomyelitis. 4.Changes of neuropathic arthropathy or Charcot foot involving the midfoot. Electronically Signed: Aj Hollingsworth MD  6/2/2024 11:25 AM EDT  Workstation ID: [id number]    Adult Transthoracic Echo Complete W/ Cont if Necessary Per Protocol    Result Date: 6/1/2024    Left ventricular systolic function is normal. Left ventricular ejection fraction appears to be 51 - 55%.   Left ventricular wall thickness is consistent with borderline concentric hypertrophy.   Left atrial volume is moderately increased.   There is mild calcification of the aortic valve.   Mild mitral valve regurgitation is present.   There is a small (<1cm) circumferential pericardial effusion.      Results for orders placed during the hospital encounter of 05/31/24    Adult Transthoracic  Echo Complete W/ Cont if Necessary Per Protocol    Interpretation Summary    Left ventricular systolic function is normal. Left ventricular ejection fraction appears to be 51 - 55%.    Left ventricular wall thickness is consistent with borderline concentric hypertrophy.    Left atrial volume is moderately increased.    There is mild calcification of the aortic valve.    Mild mitral valve regurgitation is present.    There is a small (<1cm) circumferential pericardial effusion.      Current medications:  Scheduled Meds:aspirin, 81 mg, Oral, Daily  collagenase, 1 Application, Topical, Q24H  eravacycline dihydrochloride (XERAVA) 85 mg in sodium chloride 0.9 % 250 mL (0.34 mg/mL) IVPB, 85 mg, Intravenous, Q12H  levothyroxine, 75 mcg, Oral, Q AM  Lidocaine HCl gel, , Topical, BID  metoprolol succinate XL, 50 mg, Oral, Q24H  pantoprazole, 40 mg, Oral, Q AM  pharmacy consult - MTM, , Does not apply, Daily  potassium chloride, 40 mEq, Oral, Once  sodium chloride, 10 mL, Intravenous, Q12H  vancomycin, 125 mg, Oral, Q6H      Continuous Infusions:heparin, 19 Units/kg/hr, Last Rate: 19 Units/kg/hr (06/02/24 2127)  Pharmacy Consult - Pharmacy to dose,   Pharmacy to Dose Heparin,       PRN Meds:.  acetaminophen    HYDROcodone-acetaminophen    Lidocaine HCl gel    LORazepam    metoprolol tartrate    Morphine    Pharmacy Consult - Pharmacy to dose    Pharmacy to Dose Heparin    sodium chloride    sodium chloride    Assessment & Plan   Assessment & Plan     Active Hospital Problems    Diagnosis  POA    **NSTEMI (non-ST elevated myocardial infarction) [I21.4]  Yes    C. difficile colitis [A04.72]  Yes    History of cholecystitis s/p cholecystostomy tube [K81.9]  Yes    ESRD (end stage renal disease) on hemodialysis [N18.6]  Yes    Atrial fibrillation [I48.91]  Yes    Essential hypertension [I10]  Yes      Resolved Hospital Problems   No resolved problems to display.        Brief Hospital Course to date:  Tavo Gudino is a 69 y.o. male  with hx of HTN, PAD, left BKA, ESRD on hemodialysis, Afib on Eliquis, recent acute cholecystitis s/p cholecystostomy tube placement at , chronic urinary retention with indwelling Delaney catheter, and previous hx of C.diff who presented from [facility] due to chest pain, elevated troponin, and Afib with RVR.  Found to have multiple other concerning issues including active C. difficile infection, concern for possible osteomyelitis, exposed tendon on right lower extremity.    This patient's problems and plans were partially entered by my partner and updated as appropriate by me 06/03/24.     Chest pain, resolved  Elevated troponin, possible NSTEMI  --Transferred to [facility] for LHC with Dr. Amador  --Troponins at OSH 18 -->97-->472  --Troponin 454 here on admission  --Monitor on telemetry  --Dr. Amador/Cardiology consulted, tentative plan for C this week  --ASA, heparin drip, beta blocker     Afib with RVR  Hx of Afib/Aflutter  -- Toprol dosing adjusted per cardiology given episodes of intermittent RVR  --Hold Eliquis for heart cath, on heparin drip as above  --Continue Metoprolol     HFrEF  Moderate MR  --Most recent Echo on file I April 2024: EF 40%, akinetic septal, anterior, and apical walls, severely dilated LA, moderate MR  --Follows with  Cardiology, had been evaluated previously for Vanessa clip  --ECHO 6/1: LVEF 51 to 55%, borderline concentric LVH, left atrial volume moderately increased, mild MR, small less than 1 cm circumferential pericardial effusion     Leukocytosis, improving  C.diff colitis  Concern for osteomyelitis   Exposed tendon on RLE  --Apparently WBC increased to 24K with complaints of diarrhea, had negative C.diff toxin but positive C.diff antigen. Was started on PO Vanc at OSH; of note, my practice partner talked to the provider there who stated he had loose stools following administration of Kayexalate and Colace  --Pe chart review, he had C.diff documented on 4/6/24 from   --WBC 22K  on admission here, now improving  --Continue PO vanc  --Follow-up blood and urine cultures  --MRI left tib/fib showed cellulitis, possible osteomyelitis  --MRI right foot with cellulitis/edema  --Eravacycline per ID  --MRSA screen +  -- Arterial duplex abnormal; Vascular surgery consulted and recommended medical management with no vascular surgery intervention  --Discussed with Dr. Yeager; likely needs additional amputation on LLE d/t concern for poor wound healing, likely needs debridement right lower extremity around the tendon with wound VAC placement; patient refusing further amputation of left lower extremity, but tentatively amenable to revision amputation, right lower extremity irrigation and debridement with wound VAC  --ID consulted     Recent cholecystitis s/p cholecystostomy tube  --Request records from that hospitalization, apparently was placed at   --Consulted General Surgery for management of tube, my practice partner discussed with Dr. Beckman  --Apparently already has follow up at  6/6/24 General Surgery     Chronic urinary retention  --Has Delaney in place, reported hx of urethral injury per OSH note (data deficit, no idea when this was Delaney placed), appears he follows with Urology at  and this is a chronic Delaney  --Replaced Delaney here; follow-up urine culture     PAD  Hx of left BKA  --Continue ASA     Anemia  --Likely chronic due to renal disease  --Folate 4.36 --> supplementation started; vitamin B 12 level 755  --Hb 7.5 here, had been 9 at OSH  --Monitor closely while on heparin drip     HTN  ESRD  --Nephrology consulted for dialysis      Hypothyroidism  --TSH significantly elevated at 91 at OSH, still elevated here to 65, but improved  --Synthroid dose was increased to 75 mcg daily at OSH  --Continue Synthroid at above dose and recheck in 4-6 weeks      Multiple wounds  Sacral Decubitus ulcer, POA  --Wound care consulted  --Ortho as above     Poor IV access  --Dr. Beckman placed central  line    Expected Discharge Location and Transportation: TBD  Expected Discharge   Expected Discharge Date: 6/5/2024; Expected Discharge Time:      DVT prophylaxis:  Medical and mechanical DVT prophylaxis orders are present.         AM-PAC 6 Clicks Score (PT): 6 (06/02/24 2000)    CODE STATUS:   Code Status and Medical Interventions:   Ordered at: 05/31/24 1429     Code Status (Patient has no pulse and is not breathing):    CPR (Attempt to Resuscitate)     Medical Interventions (Patient has pulse or is breathing):    Full Support       Sonya Banks MD  06/03/24

## 2024-06-03 NOTE — THERAPY RE-EVALUATION
Patient Name: Tavo Gudino  : 1955    MRN: [id number]                              Today's Date: 6/3/2024       Admit Date: 2024    Visit Dx: No diagnosis found.  Patient Active Problem List   Diagnosis    NSTEMI (non-ST elevated myocardial infarction)    C. difficile colitis    History of cholecystitis s/p cholecystostomy tube    ESRD (end stage renal disease) on hemodialysis    Atrial fibrillation    Essential hypertension     Past Medical History:   Diagnosis Date    Atrial fibrillation     Chronic kidney disease (CKD), stage V     ESRD on hemodialysis     Hypertension     Metabolic acidosis     Peripheral arterial disease     Pneumothorax     Secondary hyperparathyroidism      Past Surgical History:   Procedure Laterality Date    ARTERIOVENOUS FISTULA Right     BELOW KNEE LEG AMPUTATION Left     CATARACT EXTRACTION Bilateral       General Information       Row Name 24 1554          Physical Therapy Time and Intention    Document Type re-evaluation  -ML     Mode of Treatment physical therapy  -ML       Row Name 24 1559          General Information    Patient Profile Reviewed yes  -ML     Prior Level of Function min assist:;bed mobility;feeding;grooming;dependent:;dressing;bathing  Pt. limited historian. Stated only rolling in bed and doing UE and LE TE with therapy at SNF due to wounds, min A rolling  -ML     Existing Precautions/Restrictions fall;other (see comments)  sacral and LE wounds, drain, cole, dialysis cath  -ML     Barriers to Rehab medically complex;previous functional deficit;physical barrier  -ML       Row Name 24 1556          Living Environment    People in Home facility resident  pt. in SNF or hospital past 5-6 month, but prior home with spouse  -ML       Row Name 24 1552          Cognition    Orientation Status (Cognition) oriented x 3  -ML       Row Name 24 6983          Safety Issues, Functional Mobility    Safety Issues Affecting Function (Mobility)  --  CHRISTINA fully due to pt. only rolled in bed and declined to sit up to EOB  -ML     Impairments Affecting Function (Mobility) range of motion (ROM);strength;pain;coordination;endurance/activity tolerance;sensation/sensory awareness  -ML               User Key  (r) = Recorded By, (t) = Taken By, (c) = Cosigned By      Initials Name Provider Type    Camille Dunn Physical Therapist                   Mobility       Row Name 06/03/24 1600          Bed Mobility    Bed Mobility rolling right;rolling left  -ML     Rolling Left [place] (Bed Mobility) minimum assist (75% patient effort);1 person assist  -ML     Rolling Right [place] (Bed Mobility) minimum assist (75% patient effort);1 person assist  -ML     Assistive Device (Bed Mobility) bed rails;draw sheet  -ML               User Key  (r) = Recorded By, (t) = Taken By, (c) = Cosigned By      Initials Name Provider Type    Camille Dunn Physical Therapist                   Obj/Interventions       Row Name 06/03/24 1601          Range of Motion Comprehensive    General Range of Motion bilateral lower extremity ROM WFL  -ML       Row Name 06/03/24 1601          Strength Comprehensive (MMT)    General Manual Muscle Testing (MMT) Assessment lower extremity strength deficits identified  -ML     Comment, General Manual Muscle Testing (MMT) Assessment BLE grossly 3-/5, except R ankle DF/PF 2-/5  -ML       Row Name 06/03/24 1601          Sensory Assessment (Somatosensory)    Sensory Assessment (Somatosensory) left LE;right LE  -ML     Left LE Sensory Assessment general sensation;intact  -ML     Right LE Sensory Assessment general sensation;light touch awareness;impaired;other (see comments)  at foot  -ML               User Key  (r) = Recorded By, (t) = Taken By, (c) = Cosigned By      Initials Name Provider Type    Camille Dunn Physical Therapist                   Goals/Plan       Row Name 06/03/24 1608          Bed Mobility Goal 1 (PT)    Activity/Assistive Device  (Bed Mobility Goal 1, PT) rolling to left;rolling to right;sit to supine;supine to sit  -ML     [place] Level/Cues Needed (Bed Mobility Goal 1, PT) contact guard required  -ML     Time Frame (Bed Mobility Goal 1, PT) long term goal (LTG);10 days  -ML       Row Name 06/03/24 1608          Problem Specific Goal 1 (PT)    Problem Specific Goal 1 (PT) Patient will be independent with HEP  -ML     Time Frame (Problem Specific Goal 1, PT) long-term goal (LTG);2 weeks  -ML       Row Name 06/03/24 1608          Therapy Assessment/Plan (PT)    Planned Therapy Interventions (PT) balance training;bed mobility training;home exercise program;patient/family education;postural re-education;strengthening;stretching;transfer training  -               User Key  (r) = Recorded By, (t) = Taken By, (c) = Cosigned By      Initials Name Provider Type     Camille Becerra Physical Therapist                   Clinical Impression       Row Name 06/03/24 1602          Pain    Pretreatment Pain Rating 5/10  -ML     Posttreatment Pain Rating 5/10  -ML     Pain Location - Side/Orientation Right  -ML     Pain Location lower  -ML     Pain Location - extremity  -ML     Pain Intervention(s) Repositioned  -ML       Row Name 06/03/24 1602          Plan of Care Review    Plan of Care Reviewed With patient;spouse;daughter  -     Outcome Evaluation Physical therapy evaluation complete. The patient limited in mobility by mutiple wounds, sacral wounds. Patient declined attempt at sitting at EOB. Patient presents below baseline for mobility and would continue to benefit from skilled PT to address strength, balance and activity tolerance deficits. Overall rehab potential is fair due to multiple co-morbidities.  -       Row Name 06/03/24 1602          Therapy Assessment/Plan (PT)    Patient/Family Therapy Goals Statement (PT) go home  -ML     Rehab Potential (PT) fair, will monitor progress closely  -ML     Criteria for Skilled Interventions Met (PT)  yes;meets criteria;skilled treatment is necessary  -ML     Therapy Frequency (PT) 3 times/wk  -ML     Predicted Duration of Therapy Intervention (PT) 10  -ML       Row Name 06/03/24 1602          Vital Signs    Pre Patient Position Supine  -ML     Intra Patient Position Side Lying  -ML     Post Patient Position Supine  -ML       Row Name 06/03/24 1602          Positioning and Restraints    Pre-Treatment Position in bed  -ML     Post Treatment Position bed  -ML     In Bed notified nsg;fowlers;call light within reach;encouraged to call for assist;exit alarm on;side rails up x3;R heel elevated;with family/caregiver  -ML               User Key  (r) = Recorded By, (t) = Taken By, (c) = Cosigned By      Initials Name Provider Type    Camille Dunn Physical Therapist                   Outcome Measures       Row Name 06/03/24 1609          How much help from another person do you currently need...    Turning from your back to your side while in flat bed without using bedrails? 2  -ML     Moving from lying on back to sitting on the side of a flat bed without bedrails? 2  -ML     Moving to and from a bed to a chair (including a wheelchair)? 1  -ML     Standing up from a chair using your arms (e.g., wheelchair, bedside chair)? 1  -ML     Climbing 3-5 steps with a railing? 1  -ML     To walk in hospital room? 1  -ML     AM-PAC 6 Clicks Score (PT) 8  -ML     Highest Level of Mobility Goal 3 --> Sit at edge of bed  -ML       Row Name 06/03/24 1609 06/03/24 1543       Functional Assessment    Outcome Measure Options AM-PAC 6 Clicks Basic Mobility (PT)  -ML AM-PAC 6 Clicks Daily Activity (OT)  -GARRY              User Key  (r) = Recorded By, (t) = Taken By, (c) = Cosigned By      Initials Name Provider Type    [place], OT Occupational Therapist    Camille Dunn Physical Therapist                                 Physical Therapy Education       Title: PT OT SLP Therapies (In Progress)       Topic: Physical Therapy (In  Progress)       Point: Mobility training (Done)       Learning Progress Summary             Patient Acceptance, E, VU,NR by ML at 6/3/2024 1610   Family Acceptance, E, VU,NR by ML at 6/3/2024 1610                         Point: Home exercise program (Not Started)       Learner Progress:  Not documented in this visit.              Point: Body mechanics (Not Started)       Learner Progress:  Not documented in this visit.              Point: Precautions (Done)       Learning Progress Summary             Patient Acceptance, E, VU,NR by ML at 6/3/2024 1610   Family Acceptance, E, VU,NR by ML at 6/3/2024 1610                                         User Key       Initials Effective Dates Name Provider Type Discipline     04/22/21 -  Camille Becerra Physical Therapist PT                  PT Recommendation and Plan  Planned Therapy Interventions (PT): balance training, bed mobility training, home exercise program, patient/family education, postural re-education, strengthening, stretching, transfer training  Plan of Care Reviewed With: patient, spouse, daughter  Outcome Evaluation: Physical therapy evaluation complete. The patient limited in mobility by mutiple wounds, sacral wounds. Patient declined attempt at sitting at EOB. Patient presents below baseline for mobility and would continue to benefit from skilled PT to address strength, balance and activity tolerance deficits. Overall rehab potential is fair due to multiple co-morbidities.     Time Calculation:         PT Charges       Row Name 06/03/24 1611             Time Calculation    Start Time 1415  -ML      PT Received On 06/03/24  -ML      PT Goal Re-Cert Due Date 06/13/24  -ML         Untimed Charges    PT Eval/Re-eval Minutes 30  -ML         Total Minutes    Untimed Charges Total Minutes 30  -ML       Total Minutes 30  -ML                User Key  (r) = Recorded By, (t) = Taken By, (c) = Cosigned By      Initials Name Provider Type    Camille Dunn Physical  Therapist                  Therapy Charges for Today       Code Description Service Date Service Provider Modifiers Qty    33188214707 HC PT RE-EVAL ESTABLISHED PLAN 2 6/3/2024 Camille Becerra GP 1            PT G-Codes  Outcome Measure Options: AM-PAC 6 Clicks Basic Mobility (PT)  AM-PAC 6 Clicks Score (PT): 8  AM-PAC 6 Clicks Score (OT): 10  PT Discharge Summary  Anticipated Discharge Disposition (PT): skilled nursing facility    Camille Becerra  6/3/2024

## 2024-06-03 NOTE — PLAN OF CARE
Goal Outcome Evaluation:  Plan of Care Reviewed With: patient, daughter, spouse        Progress: no change  Outcome Evaluation: OT evaluation completed as able with limitations from pt. and his medical issues.  Pt. rolled with min of 1 left and right and allowed ROM and strength assessment in bed, but declined any movement to sit up in bed or EOB to assess other areas. Per pt. his goal is to get stable enough with wounds to return home. Will see pt. only 3 x a week until he is able to increase his participation.  Skilled OT services to address strength, endurance, ROM and coordination and impact on BADL performance and supporting caregiver care.  Recommend SNF at this time, but will update as pt. able to participate and determines level of medical care to receive.      Anticipated Discharge Disposition (OT): skilled nursing facility

## 2024-06-03 NOTE — PLAN OF CARE
Goal Outcome Evaluation:  Plan of Care Reviewed With: patient           Outcome Evaluation: Pt a/o, VSS, RA, Sat. 95%, c/o  pain addressed with PRN meds. Continue  monitoring.

## 2024-06-03 NOTE — CONSULTS
Vascular Surgery Consult Note      Reason for consultation: PAD with bilateral leg wounds  Consult requested by: Marsha Hobson PA-C    HPI:  Mr. Gudino is a 69-year-old male with medical history ESRD on HD, A-fib on Eliquis, recent acute cholecystitis status post cholecystostomy tube placement at , C. difficile, chronic urinary retention with indwelling Delaney catheter, PAD and left BKA with bilateral nonhealing wounds who presented to [facility] from outside facility with complaints of chest pain found to have NSTEMI.  Vascular has been consulted in regards to nonhealing lower extremity wounds and evaluation of peripheral arterial disease.  Patient states that left BKA was performed at [facility] march 2024. Prior to this hospitalization he was at a rehab facility where he was receiving therapy and wound care services for nonhealing wound on his left BKA stump.  He is currently not open to having further amputation if wound care can be attempted for his left BKA. He also has left heel ulceration that has failed to heal despite outpatient conservative measures.  Vascular workup thus far has revealed noncompressible GOLDIE on the right with tibial level disease bilaterally on arterial duplex.    Review of Systems:  General: No recent fevers and chills/sweats  HEENT: No visual changes, hearing deficits, nasal congestion, neck or throat pain  Respiratory: No shortness of breath, cough, wheezing, hemoptysis  Cardiovascular: No chest pain, palpitations, syncope  Gastrointestinal: no constipation, nausea, vomiting, diarrhea, melena, hematochezia  Genitourinary: No hematuria, dysuria, frequency  Hema/Lymph: No bleeding disorders or DVT, bruising tendency, swollen lymph glands  Endocrine: No excessive thirst, excessive hunger  Extremities: as noted above.   Musculoskeletal: as noted above.   Skin: as noted above.   Neurologic: No dizziness, history of stroke  Psychiatric: No anxiety, depression    History  Past  Medical History:   Diagnosis Date    Atrial fibrillation     Chronic kidney disease (CKD), stage V     ESRD on hemodialysis     Hypertension     Metabolic acidosis     Peripheral arterial disease     Pneumothorax     Secondary hyperparathyroidism      Past Surgical History:   Procedure Laterality Date    ARTERIOVENOUS FISTULA Right     BELOW KNEE LEG AMPUTATION Left     CATARACT EXTRACTION Bilateral      Family History   Problem Relation Age of Onset    Cancer Father         Bladder     Social History     Tobacco Use    Smoking status: Never    Smokeless tobacco: Never   Vaping Use    Vaping status: Never Used   Substance Use Topics    Alcohol use: Never    Drug use: Never     Medications Prior to Admission   Medication Sig Dispense Refill Last Dose    amiodarone (PACERONE) 200 MG tablet Take 1 tablet by mouth Daily.   Unknown    apixaban (ELIQUIS) 5 MG tablet tablet Take 1 tablet by mouth 2 (Two) Times a Day.   Unknown    finasteride (PROSCAR) 5 MG tablet Take 1 tablet by mouth Daily.   Unknown    hydrALAZINE (APRESOLINE) 25 MG tablet Take 1 tablet by mouth 3 (Three) Times a Day.   Unknown    levothyroxine (SYNTHROID, LEVOTHROID) 25 MCG tablet Take 1 tablet by mouth Every Morning.   Unknown    sodium bicarbonate 650 MG tablet Take 2 tablets by mouth 2 (Two) Times a Day.   Unknown    tamsulosin (FLOMAX) 0.4 MG capsule 24 hr capsule Take 1 capsule by mouth Daily.   Unknown    torsemide (DEMADEX) 100 MG tablet Take 1 tablet by mouth Daily.   Unknown    traZODone (DESYREL) 50 MG tablet Take 1 tablet by mouth Every Night.   Unknown     Allergies:  Levothyroxine, Penicillins, and Hydromorphone    Vital Signs  Temp:  [97.4 °F (36.3 °C)-97.9 °F (36.6 °C)] 97.9 °F (36.6 °C)  Heart Rate:  [] 86  Resp:  [18] 18  BP: (126-153)/() 142/95    Physical Exam:  General: no acute distress, alert and oriented x3, elderly  male lying in bed, chronically ill-appearing  HEENT: normocephalic, sclerae anicteric,  extraocular movements intact, ears and nose externally normal, oral mucosa moist  Chest:  normal respiratory effort; clear to auscultation BILATERALLY  Abdomen:  soft, non-distended, + cholecystostomy tube with bilious output  Right lower extremity: Warm, distal leg hyperpigmentation, full-thickness plantar heel ulceration, motor and sensory intact  Left lower extremity: BKA with distal stump superficial ulceration with posterior eschar, warm, motor and sensory intact, no edema  Vascular: Palpable femoral pulses bilaterally, palpable right DPA pulse and left popliteal pulse  Neuro:  cranial nerves II-XII grossly intact, following commands appropriately, speech normal  Psychiatric:  cooperative, appropriate mood and affect for situation    Results Review:    I reviewed the patient's new clinical results.  I reviewed the patient's new imaging results and agree with the interpretation.    Assessment and Plan:    Mr. Gudino is a 69-year-old chronically ill male with peripheral arterial disease with left BKA wound ([facility], March 2024) and right heel ulceration.  Arterial studies reviewed and demonstrate tibial level disease bilaterally but no overt occlusion.  Exam significant for palpable right DPA and left popliteal pulse.  He should have adequate arterial flow for wounds to heal. However, he is at high risk of needing further amputation and has a poor healing prognosis given his renal disease, malnutrition and nonhealing chronic wounds. No vascular intervention recommended.  Continue aspirin and start statin.  Anticoagulation per cardiology for A-fib.      Patient was seen in conjunction with Dr. Linares who assisted with directing the above plan of care. Vascular surgery signing off. Please contact the Vascular Surgery with any questions/concerns.     I discussed the patients findings and my recommendations with patient and nursing staff.       Camille Rangel PA-C  06/03/24  11:54 EDT

## 2024-06-03 NOTE — PLAN OF CARE
Goal Outcome Evaluation:  Plan of Care Reviewed With: patient, spouse, daughter           Outcome Evaluation: Physical therapy evaluation complete. The patient limited in mobility by multiple wounds, sacral wounds. Patient declined attempt at sitting at EOB. Patient presents below baseline for mobility and would continue to benefit from skilled PT to address strength, balance and activity tolerance deficits. Overall rehab potential is fair due to multiple co-morbidities.      Anticipated Discharge Disposition (PT): skilled nursing facility

## 2024-06-04 ENCOUNTER — APPOINTMENT (OUTPATIENT)
Dept: CARDIOLOGY | Facility: HOSPITAL | Age: 69
End: 2024-06-04
Payer: MEDICARE

## 2024-06-04 ENCOUNTER — APPOINTMENT (OUTPATIENT)
Dept: NEPHROLOGY | Facility: HOSPITAL | Age: 69
End: 2024-06-04
Payer: MEDICARE

## 2024-06-04 PROBLEM — E44.0 MODERATE MALNUTRITION: Status: ACTIVE | Noted: 2024-06-04

## 2024-06-04 LAB
ANION GAP SERPL CALCULATED.3IONS-SCNC: 13 MMOL/L (ref 5–15)
APTT PPP: 51.9 SECONDS (ref 60–90)
APTT PPP: 58.9 SECONDS (ref 60–90)
BACTERIA SPEC AEROBE CULT: NO GROWTH
BH CV REST NUCLEAR ISOTOPE DOSE: 29.8 MCI
BH CV STRESS BP STAGE 1: NORMAL
BH CV STRESS BP STAGE 3: NORMAL
BH CV STRESS COMMENTS STAGE 1: NORMAL
BH CV STRESS DOSE REGADENOSON STAGE 1: 0.4
BH CV STRESS DURATION MIN STAGE 1: 1
BH CV STRESS DURATION MIN STAGE 2: 1
BH CV STRESS DURATION MIN STAGE 3: 1
BH CV STRESS DURATION MIN STAGE 4: 1
BH CV STRESS DURATION SEC STAGE 1: 0
BH CV STRESS DURATION SEC STAGE 2: 0
BH CV STRESS DURATION SEC STAGE 3: 0
BH CV STRESS DURATION SEC STAGE 4: 0
BH CV STRESS HR STAGE 1: 100
BH CV STRESS HR STAGE 2: 105
BH CV STRESS HR STAGE 3: 90
BH CV STRESS HR STAGE 4: 86
BH CV STRESS NUCLEAR ISOTOPE DOSE: 30 MCI
BH CV STRESS O2 STAGE 1: 100
BH CV STRESS O2 STAGE 2: 100
BH CV STRESS O2 STAGE 3: 100
BH CV STRESS O2 STAGE 4: 100
BH CV STRESS PROTOCOL 1: NORMAL
BH CV STRESS RECOVERY BP: NORMAL MMHG
BH CV STRESS RECOVERY HR: 81 BPM
BH CV STRESS RECOVERY O2: 100 %
BH CV STRESS STAGE 1: 1
BH CV STRESS STAGE 2: 2
BH CV STRESS STAGE 3: 3
BH CV STRESS STAGE 4: 4
BUN SERPL-MCNC: 38 MG/DL (ref 8–23)
BUN/CREAT SERPL: 10.2 (ref 7–25)
CALCIUM SPEC-SCNC: 8.8 MG/DL (ref 8.6–10.5)
CHLORIDE SERPL-SCNC: 103 MMOL/L (ref 98–107)
CO2 SERPL-SCNC: 19 MMOL/L (ref 22–29)
CREAT SERPL-MCNC: 3.72 MG/DL (ref 0.76–1.27)
DEPRECATED RDW RBC AUTO: 59.7 FL (ref 37–54)
EGFRCR SERPLBLD CKD-EPI 2021: 16.6 ML/MIN/1.73
ERYTHROCYTE [DISTWIDTH] IN BLOOD BY AUTOMATED COUNT: 16.9 % (ref 12.3–15.4)
GLUCOSE SERPL-MCNC: 76 MG/DL (ref 65–99)
HCT VFR BLD AUTO: 27 % (ref 37.5–51)
HGB BLD-MCNC: 8.4 G/DL (ref 13–17.7)
LV EF NUC BP: 41 %
MAGNESIUM SERPL-MCNC: 1.6 MG/DL (ref 1.6–2.4)
MAXIMAL PREDICTED HEART RATE: 149 BPM
MCH RBC QN AUTO: 30.5 PG (ref 26.6–33)
MCHC RBC AUTO-ENTMCNC: 31.1 G/DL (ref 31.5–35.7)
MCV RBC AUTO: 98.2 FL (ref 79–97)
PERCENT MAX PREDICTED HR: 75.17 %
PLATELET # BLD AUTO: 265 10*3/MM3 (ref 140–450)
PMV BLD AUTO: 9.4 FL (ref 6–12)
POTASSIUM SERPL-SCNC: 3.9 MMOL/L (ref 3.5–5.2)
RBC # BLD AUTO: 2.75 10*6/MM3 (ref 4.14–5.8)
SODIUM SERPL-SCNC: 135 MMOL/L (ref 136–145)
STRESS BASELINE BP: NORMAL MMHG
STRESS BASELINE HR: 77 BPM
STRESS O2 SAT REST: 100 %
STRESS PERCENT HR: 88 %
STRESS POST ESTIMATED WORKLOAD: 1 METS
STRESS POST EXERCISE DUR MIN: 4 MIN
STRESS POST EXERCISE DUR SEC: 0 SEC
STRESS POST O2 SAT PEAK: 100 %
STRESS POST PEAK BP: NORMAL MMHG
STRESS POST PEAK HR: 112 BPM
STRESS TARGET HR: 127 BPM
WBC NRBC COR # BLD AUTO: 8.82 10*3/MM3 (ref 3.4–10.8)

## 2024-06-04 PROCEDURE — 85730 THROMBOPLASTIN TIME PARTIAL: CPT

## 2024-06-04 PROCEDURE — 78431 MYOCRD IMG PET RST&STRS CT: CPT

## 2024-06-04 PROCEDURE — 25010000002 MORPHINE PER 10 MG: Performed by: HOSPITALIST

## 2024-06-04 PROCEDURE — 25810000003 SODIUM CHLORIDE 0.9 % SOLUTION 250 ML FLEX CONT: Performed by: INTERNAL MEDICINE

## 2024-06-04 PROCEDURE — 25010000002 HEPARIN (PORCINE) PER 1000 UNITS: Performed by: INTERNAL MEDICINE

## 2024-06-04 PROCEDURE — 25010000002 ALBUMIN HUMAN 25% PER 50 ML: Performed by: INTERNAL MEDICINE

## 2024-06-04 PROCEDURE — 78431 MYOCRD IMG PET RST&STRS CT: CPT | Performed by: INTERNAL MEDICINE

## 2024-06-04 PROCEDURE — 25010000002 HEPARIN (PORCINE) 25000-0.45 UT/250ML-% SOLUTION

## 2024-06-04 PROCEDURE — 80048 BASIC METABOLIC PNL TOTAL CA: CPT | Performed by: STUDENT IN AN ORGANIZED HEALTH CARE EDUCATION/TRAINING PROGRAM

## 2024-06-04 PROCEDURE — 25010000002 ERAVACYCLINE DIHYDROCHLORIDE 50 MG RECONSTITUTED SOLUTION 1 EACH VIAL: Performed by: INTERNAL MEDICINE

## 2024-06-04 PROCEDURE — 83735 ASSAY OF MAGNESIUM: CPT | Performed by: STUDENT IN AN ORGANIZED HEALTH CARE EDUCATION/TRAINING PROGRAM

## 2024-06-04 PROCEDURE — P9047 ALBUMIN (HUMAN), 25%, 50ML: HCPCS | Performed by: INTERNAL MEDICINE

## 2024-06-04 PROCEDURE — 99232 SBSQ HOSP IP/OBS MODERATE 35: CPT | Performed by: PEDIATRICS

## 2024-06-04 PROCEDURE — 93018 CV STRESS TEST I&R ONLY: CPT | Performed by: INTERNAL MEDICINE

## 2024-06-04 PROCEDURE — 25010000002 REGADENOSON 0.4 MG/5ML SOLUTION: Performed by: PHYSICIAN ASSISTANT

## 2024-06-04 PROCEDURE — 85027 COMPLETE CBC AUTOMATED: CPT | Performed by: STUDENT IN AN ORGANIZED HEALTH CARE EDUCATION/TRAINING PROGRAM

## 2024-06-04 PROCEDURE — 93017 CV STRESS TEST TRACING ONLY: CPT

## 2024-06-04 PROCEDURE — 0 RUBIDIUM CHLORIDE: Performed by: PHYSICIAN ASSISTANT

## 2024-06-04 PROCEDURE — A9555 RB82 RUBIDIUM: HCPCS | Performed by: PHYSICIAN ASSISTANT

## 2024-06-04 PROCEDURE — 99232 SBSQ HOSP IP/OBS MODERATE 35: CPT | Performed by: INTERNAL MEDICINE

## 2024-06-04 RX ORDER — FAMOTIDINE 20 MG/1
20 TABLET, FILM COATED ORAL
Status: DISCONTINUED | OUTPATIENT
Start: 2024-06-04 | End: 2024-06-05

## 2024-06-04 RX ORDER — REGADENOSON 0.08 MG/ML
0.4 INJECTION, SOLUTION INTRAVENOUS ONCE
Status: COMPLETED | OUTPATIENT
Start: 2024-06-04 | End: 2024-06-04

## 2024-06-04 RX ORDER — HEPARIN SODIUM 1000 [USP'U]/ML
2000 INJECTION, SOLUTION INTRAVENOUS; SUBCUTANEOUS AS NEEDED
Status: DISCONTINUED | OUTPATIENT
Start: 2024-06-04 | End: 2024-06-20 | Stop reason: HOSPADM

## 2024-06-04 RX ORDER — ALBUMIN (HUMAN) 12.5 G/50ML
12.5 SOLUTION INTRAVENOUS AS NEEDED
Status: ACTIVE | OUTPATIENT
Start: 2024-06-04 | End: 2024-06-05

## 2024-06-04 RX ADMIN — VANCOMYCIN HYDROCHLORIDE 125 MG: 125 CAPSULE ORAL at 00:40

## 2024-06-04 RX ADMIN — RUBIDIUM CHLORIDE RB-82 1 DOSE: 150 INJECTION, SOLUTION INTRAVENOUS at 14:22

## 2024-06-04 RX ADMIN — LEVOTHYROXINE SODIUM 75 MCG: 0.07 TABLET ORAL at 05:11

## 2024-06-04 RX ADMIN — ERAVACYCLINE 85 MG: 50 INJECTION, POWDER, LYOPHILIZED, FOR SOLUTION INTRAVENOUS at 05:01

## 2024-06-04 RX ADMIN — HEPARIN SODIUM 18.5 UNITS/KG/HR: 10000 INJECTION, SOLUTION INTRAVENOUS at 23:43

## 2024-06-04 RX ADMIN — HEPARIN SODIUM 2000 UNITS: 1000 INJECTION INTRAVENOUS; SUBCUTANEOUS at 11:01

## 2024-06-04 RX ADMIN — DILTIAZEM HYDROCHLORIDE 30 MG: 30 TABLET, FILM COATED ORAL at 11:38

## 2024-06-04 RX ADMIN — DILTIAZEM HYDROCHLORIDE 30 MG: 30 TABLET, FILM COATED ORAL at 17:04

## 2024-06-04 RX ADMIN — RUBIDIUM CHLORIDE RB-82 1 DOSE: 150 INJECTION, SOLUTION INTRAVENOUS at 14:10

## 2024-06-04 RX ADMIN — LIDOCAINE HYDROCHLORIDE: 20 JELLY TOPICAL at 23:24

## 2024-06-04 RX ADMIN — HYDROCODONE BITARTRATE AND ACETAMINOPHEN 1 TABLET: 7.5; 325 TABLET ORAL at 02:52

## 2024-06-04 RX ADMIN — HEPARIN SODIUM 16.5 UNITS/KG/HR: 10000 INJECTION, SOLUTION INTRAVENOUS at 03:47

## 2024-06-04 RX ADMIN — VANCOMYCIN HYDROCHLORIDE 125 MG: 125 CAPSULE ORAL at 23:21

## 2024-06-04 RX ADMIN — VANCOMYCIN HYDROCHLORIDE 125 MG: 125 CAPSULE ORAL at 11:38

## 2024-06-04 RX ADMIN — HYDROCODONE BITARTRATE AND ACETAMINOPHEN 1 TABLET: 7.5; 325 TABLET ORAL at 13:20

## 2024-06-04 RX ADMIN — VANCOMYCIN HYDROCHLORIDE 125 MG: 125 CAPSULE ORAL at 17:04

## 2024-06-04 RX ADMIN — FOLIC ACID 1 MG: 1 TABLET ORAL at 11:38

## 2024-06-04 RX ADMIN — LIDOCAINE HYDROCHLORIDE: 20 JELLY TOPICAL at 11:39

## 2024-06-04 RX ADMIN — ACETAMINOPHEN 650 MG: 325 TABLET ORAL at 11:44

## 2024-06-04 RX ADMIN — VANCOMYCIN HYDROCHLORIDE 125 MG: 125 CAPSULE ORAL at 05:11

## 2024-06-04 RX ADMIN — ALBUMIN (HUMAN) 12.5 G: 0.25 INJECTION, SOLUTION INTRAVENOUS at 10:13

## 2024-06-04 RX ADMIN — Medication 10 ML: at 23:22

## 2024-06-04 RX ADMIN — MORPHINE SULFATE 4 MG: 4 INJECTION, SOLUTION INTRAMUSCULAR; INTRAVENOUS at 16:58

## 2024-06-04 RX ADMIN — FAMOTIDINE 20 MG: 20 TABLET, FILM COATED ORAL at 17:04

## 2024-06-04 RX ADMIN — METOPROLOL SUCCINATE 100 MG: 100 TABLET, EXTENDED RELEASE ORAL at 17:04

## 2024-06-04 RX ADMIN — DILTIAZEM HYDROCHLORIDE 30 MG: 30 TABLET, FILM COATED ORAL at 23:21

## 2024-06-04 RX ADMIN — ASPIRIN 81 MG: 81 TABLET, COATED ORAL at 11:38

## 2024-06-04 RX ADMIN — REGADENOSON 0.4 MG: 0.08 INJECTION, SOLUTION INTRAVENOUS at 14:21

## 2024-06-04 RX ADMIN — Medication 10 ML: at 11:41

## 2024-06-04 RX ADMIN — PANTOPRAZOLE SODIUM 40 MG: 40 TABLET, DELAYED RELEASE ORAL at 05:11

## 2024-06-04 RX ADMIN — METOPROLOL TARTRATE 5 MG: 5 INJECTION INTRAVENOUS at 10:20

## 2024-06-04 RX ADMIN — ERAVACYCLINE 85 MG: 50 INJECTION, POWDER, LYOPHILIZED, FOR SOLUTION INTRAVENOUS at 15:59

## 2024-06-04 NOTE — PROGRESS NOTES
" LOS: 4 days   Patient Care Team:  Hayden Weinstein MD as PCP - General (Nephrology)    Chief Complaint: ESRD  NSTEMI    Subjective     No acute events overnight. No new complaints       Subjective:  Symptoms:  Stable.  No shortness of breath or chest pain.        History taken from: patient    Objective     Vital Sign Min/Max for last 24 hours  Temp  Min: 97.4 °F (36.3 °C)  Max: 98.1 °F (36.7 °C)   BP  Min: 117/36  Max: 160/92   Pulse  Min: 82  Max: 135   Resp  Min: 16  Max: 20   SpO2  Min: 98 %  Max: 100 %   No data recorded   No data recorded     Flowsheet Rows      Flowsheet Row First Filed Value   Admission Height 185.4 cm (73\") Documented at 05/31/2024 1826   Admission Weight 84.8 kg (187 lb) Documented at 05/31/2024 1826            No intake/output data recorded.  I/O last 3 completed shifts:  In: -   Out: 1725 [Urine:1175; Drains:550]    Objective       Gen: Alert, NAD   HENT: NC, AT, EOMI   NECK: Supple, no JVD, Trachea midline   LUNGS: CTA bilaterally, non labored respirtation   CVS: S1/S2 audible, RRR, no murmur   Abd: Soft, NT, ND, BS+   Ext: No pedal edema, no cyanosis   CNS: Alert, No focal deficit noted grossly  Psy: Cooperative  Skin: Warm, dry and intact        Results Review:     I reviewed the patient's new clinical results.    WBC WBC   Date Value Ref Range Status   06/04/2024 8.82 3.40 - 10.80 10*3/mm3 Final   06/03/2024 9.32 3.40 - 10.80 10*3/mm3 Final   06/02/2024 12.36 (H) 3.40 - 10.80 10*3/mm3 Final      HGB Hemoglobin   Date Value Ref Range Status   06/04/2024 8.4 (L) 13.0 - 17.7 g/dL Final   06/03/2024 8.5 (L) 13.0 - 17.7 g/dL Final   06/02/2024 9.1 (L) 13.0 - 17.7 g/dL Final      HCT Hematocrit   Date Value Ref Range Status   06/04/2024 27.0 (L) 37.5 - 51.0 % Final   06/03/2024 27.3 (L) 37.5 - 51.0 % Final   06/02/2024 27.5 (L) 37.5 - 51.0 % Final      Platlets No results found for: \"LABPLAT\"   MCV MCV   Date Value Ref Range Status   06/04/2024 98.2 (H) 79.0 - 97.0 fL Final   06/03/2024 " "95.8 79.0 - 97.0 fL Final   06/02/2024 92.9 79.0 - 97.0 fL Final          Sodium Sodium   Date Value Ref Range Status   06/04/2024 135 (L) 136 - 145 mmol/L Final   06/03/2024 136 136 - 145 mmol/L Final   06/02/2024 133 (L) 136 - 145 mmol/L Final      Potassium Potassium   Date Value Ref Range Status   06/04/2024 3.9 3.5 - 5.2 mmol/L Final   06/03/2024 3.4 (L) 3.5 - 5.2 mmol/L Final     Comment:     Slight hemolysis detected by analyzer. Result may be falsely elevated.   06/02/2024 3.1 (L) 3.5 - 5.2 mmol/L Final      Chloride Chloride   Date Value Ref Range Status   06/04/2024 103 98 - 107 mmol/L Final   06/03/2024 108 (H) 98 - 107 mmol/L Final   06/02/2024 99 98 - 107 mmol/L Final      CO2 CO2   Date Value Ref Range Status   06/04/2024 19.0 (L) 22.0 - 29.0 mmol/L Final   06/03/2024 16.0 (L) 22.0 - 29.0 mmol/L Final   06/02/2024 16.0 (L) 22.0 - 29.0 mmol/L Final      BUN BUN   Date Value Ref Range Status   06/04/2024 38 (H) 8 - 23 mg/dL Final   06/03/2024 25 (H) 8 - 23 mg/dL Final   06/02/2024 22 8 - 23 mg/dL Final      Creatinine Creatinine   Date Value Ref Range Status   06/04/2024 3.72 (H) 0.76 - 1.27 mg/dL Final   06/03/2024 2.69 (H) 0.76 - 1.27 mg/dL Final   06/02/2024 2.79 (H) 0.76 - 1.27 mg/dL Final      Calcium Calcium   Date Value Ref Range Status   06/04/2024 8.8 8.6 - 10.5 mg/dL Final   06/03/2024 8.0 (L) 8.6 - 10.5 mg/dL Final   06/02/2024 8.9 8.6 - 10.5 mg/dL Final      PO4 No results found for: \"CAPO4\"   Albumin Albumin   Date Value Ref Range Status   06/02/2024 2.4 (L) 3.5 - 5.2 g/dL Final      Magnesium Magnesium   Date Value Ref Range Status   06/04/2024 1.6 1.6 - 2.4 mg/dL Final      Uric Acid No results found for: \"URICACID\"     Medication Review: Yes    Assessment & Plan       NSTEMI (non-ST elevated myocardial infarction)    C. difficile colitis    History of cholecystitis s/p cholecystostomy tube    ESRD (end stage renal disease) on hemodialysis    Atrial fibrillation    Essential hypertension   "  Moderate malnutrition      Assessment & Plan    ESRD: On TTS jonathan. HD through right IJ TDC. Still makes urine. Does have cole cath+ for chronic urinary retention. Patient the family Primary nephrologist was monitoring for renal recovery and residual renal function.      Volume status: No significant LE edema noted. Does have scrotal edema.     Afib w RVR: RVR during HD treatment. BP controlled. Asymptomatic. No chest pain or sob.       Met acidosis: Mild. Management with HD.     Anemia: ACD. Transfuse at hb<7.  ALLISON on HD days     CAD: Transferred from OSH for evaluation of ACD. Getting duplex for LE as well to evaluate for PVD.    Plan:  - Seen on dialysis. UF as tolerated.   - Renal diet   - ALLISON with HD   - Metoprolol for RVR during HD. Started on Diltiazem by cardiology today.     Jose Wilks MD  06/04/24  09:58 EDT

## 2024-06-04 NOTE — PROGRESS NOTES
[facility] Medicine Services  PROGRESS NOTE    Patient Name: Tavo Gudino  : 1955  MRN: [id number]    Date of Admission: 2024  Primary Care Physician: Hayden Weinstein MD    Subjective   Subjective     CC:  Chest pain, A fib with RVR, NSTEMI, leukocytosis    HPI:  Denies any chest pain.  No shortness of breath.  Continues to have diarrhea.  Went to HD today, no issues      Objective   Objective     Vital Signs:   Temp:  [96.7 °F (35.9 °C)-98.1 °F (36.7 °C)] 97.4 °F (36.3 °C)  Heart Rate:  [] 81  Resp:  [16-20] 19  BP: (106-169)/() 148/51     Physical Exam:  Constitutional: Awake, alert, chronically ill-appearing  HENT: NCAT, mucous membranes moist  Respiratory: Clear to auscultation bilaterally, respiratory effort normal   Cardiovascular: IRIR, HR 90s  Gastrointestinal: Positive bowel sounds, soft, mildly tender, nondistended, percutaneous cholecystostomy tube  Musculoskeletal: left BKA  Psychiatric: Anxious, cooperative  Neurologic: PERRL, symmetric facies, speech clear  Skin:  Results Reviewed:  LAB RESULTS:      Lab 24  1319 24  0449 24  1054 24  0555 24  0323 242 24  0906 24  2250 24  0450 24  2148 24  2148 24  2056 24  1421   WBC  --  8.82  --   --  9.32  --   --  12.36*  --  20.32*  --   --   --  22.02*   HEMOGLOBIN  --  8.4*  --   --  8.5*  --   --  9.1*  --  6.9*  --   --   --  7.5*   HEMATOCRIT  --  27.0*  --   --  27.3*  --   --  27.5*  --  22.1*  --   --   --  24.4*   PLATELETS  --  265  --   --  257  --   --  262  --  241  --   --   --  249   NEUTROS ABS  --   --   --   --   --   --   --  9.70*  --  16.57*  --   --   --  18.79*   IMMATURE GRANS (ABS)  --   --   --   --   --   --   --  0.08*  --  0.19*  --   --   --  0.16*   LYMPHS ABS  --   --   --   --   --   --   --  1.63  --  1.96  --   --   --  1.70   MONOS ABS  --   --   --   --   --   --   --  0.77  --   1.42*  --   --   --  1.23*   EOS ABS  --   --   --   --   --   --   --  0.11  --  0.13  --   --   --  0.09   MCV  --  98.2*  --   --  95.8  --   --  92.9  --  95.7  --   --   --  96.8   LACTATE  --   --   --   --   --   --   --   --   --   --   --   --  1.3  --    PROTIME  --   --   --   --   --   --   --   --   --   --   --  24.3*  --   --    APTT 58.9* 51.9* 85.4 57.7*  --  72.8   < > 43.8*   < > 59.2*   < > 53.1*  --   --    HEPARIN ANTI-XA  --   --   --   --   --   --   --   --   --   --   --  >1.10*  --   --     < > = values in this interval not displayed.         Lab 06/04/24  0449 06/03/24  0555 06/02/24  0906 06/01/24  0450 05/31/24  1421   SODIUM 135* 136 133* 133* 133*   POTASSIUM 3.9 3.4* 3.1* 4.3 4.2   CHLORIDE 103 108* 99 100 99   CO2 19.0* 16.0* 16.0* 19.0* 22.0   ANION GAP 13.0 12.0 18.0* 14.0 12.0   BUN 38* 25* 22 40* 37*   CREATININE 3.72* 2.69* 2.79* 3.80* 3.47*   EGFR 16.6* 24.5* 23.5* 16.2* 18.1*   GLUCOSE 76 67 64* 76 92   CALCIUM 8.8 8.0* 8.9 8.7 8.9   MAGNESIUM 1.6  --   --   --   --    TSH  --   --   --   --  65.050*         Lab 06/02/24  0906 05/31/24  1421   TOTAL PROTEIN 5.4* 5.3*   ALBUMIN 2.4* 2.5*   GLOBULIN 3.0 2.8   ALT (SGPT) 7 10   AST (SGOT) 7 10   BILIRUBIN 0.4 0.3   ALK PHOS 240* 201*         Lab 06/01/24  0450 05/31/24  2148 05/31/24 2056 05/31/24  1421   HSTROP T 386*  --  404* 454*   PROTIME  --  24.3*  --   --    INR  --  2.16*  --   --              Lab 06/01/24  0944 06/01/24 0450 05/31/24  1421   IRON  --   --  13*   IRON SATURATION (TSAT)  --   --  7*   TIBC  --   --  179*   TRANSFERRIN  --   --  120*   FERRITIN  --  1,181.00*  --    FOLATE  --  4.36*  --    VITAMIN B 12  --  755  --    ABO TYPING B  --  B   RH TYPING Positive  --  Positive   ANTIBODY SCREEN Negative  --   --          Brief Urine Lab Results  (Last result in the past 365 days)        Color   Clarity   Blood   Leuk Est   Nitrite   Protein   CREAT   Urine HCG        06/02/24 1721 Yellow   Clear   Trace    Trace   Negative   100 mg/dL (2+)                   Microbiology Results Abnormal       Procedure Component Value - Date/Time    Urine Culture - Urine, Indwelling Urethral Catheter [[id number]]  (Normal) Collected: 06/02/24 1721    Lab Status: Final result Specimen: Urine from Indwelling Urethral Catheter Updated: 06/04/24 0955     Urine Culture No growth    Blood Culture - Blood, Blood, Central Line [[id number]]  (Normal) Collected: 05/31/24 2237    Lab Status: Preliminary result Specimen: Blood, Central Line Updated: 06/04/24 0801     Blood Culture No growth at 3 days    Blood Culture - Blood, Arm, Right [[id number]]  (Normal) Collected: 05/31/24 2237    Lab Status: Preliminary result Specimen: Blood from Arm, Right Updated: 06/04/24 0745     Blood Culture No growth at 3 days            No radiology results from the last 24 hrs    Results for orders placed during the hospital encounter of 05/31/24    Adult Transthoracic Echo Complete W/ Cont if Necessary Per Protocol    Interpretation Summary    Left ventricular systolic function is normal. Left ventricular ejection fraction appears to be 51 - 55%.    Left ventricular wall thickness is consistent with borderline concentric hypertrophy.    Left atrial volume is moderately increased.    There is mild calcification of the aortic valve.    Mild mitral valve regurgitation is present.    There is a small (<1cm) circumferential pericardial effusion.      Current medications:  Scheduled Meds:aspirin, 81 mg, Oral, Daily  collagenase, 1 Application, Topical, Q24H  dilTIAZem, 30 mg, Oral, Q6H  eravacycline dihydrochloride (XERAVA) 85 mg in sodium chloride 0.9 % 250 mL (0.34 mg/mL) IVPB, 85 mg, Intravenous, Q12H  famotidine, 20 mg, Oral, BID AC  folic acid, 1 mg, Oral, Daily  levothyroxine, 75 mcg, Oral, Q AM  Lidocaine HCl gel, , Topical, BID  metoprolol succinate XL, 100 mg, Oral, Q24H  pharmacy consult - MTM, , Does not apply, Daily  sodium chloride, 10 mL, Intravenous,  Q12H  vancomycin, 125 mg, Oral, Q6H      Continuous Infusions:heparin, 17.5 Units/kg/hr, Last Rate: 17.5 Units/kg/hr (06/04/24 0614)  Pharmacy Consult - Pharmacy to dose,   Pharmacy to Dose Heparin,       PRN Meds:.  acetaminophen    albumin human    heparin (porcine)    HYDROcodone-acetaminophen    Lidocaine HCl gel    LORazepam    metoprolol tartrate    Morphine    Pharmacy Consult - Pharmacy to dose    Pharmacy to Dose Heparin    sodium chloride    sodium chloride    Assessment & Plan   Assessment & Plan     Active Hospital Problems    Diagnosis  POA    **NSTEMI (non-ST elevated myocardial infarction) [I21.4]  Yes    Moderate malnutrition [E44.0]  Yes    C. difficile colitis [A04.72]  Yes    History of cholecystitis s/p cholecystostomy tube [K81.9]  Yes    ESRD (end stage renal disease) on hemodialysis [N18.6]  Yes    Atrial fibrillation [I48.91]  Yes    Essential hypertension [I10]  Yes      Resolved Hospital Problems   No resolved problems to display.        Brief Hospital Course to date:  Tavo Gudino is a 69 y.o. male with hx of HTN, PAD, left BKA, ESRD on hemodialysis, Afib on Eliquis, recent acute cholecystitis s/p cholecystostomy tube placement at , chronic urinary retention with indwelling Delaney catheter, and previous hx of C.diff who presented from [facility] due to chest pain, elevated troponin, and Afib with RVR.  Found to have multiple other concerning issues including active C. difficile infection, concern for possible osteomyelitis, exposed tendon on right lower extremity.    This patient's problems and plans were partially entered by my partner and updated as appropriate by me 06/04/24.     Chest pain, resolved  Elevated troponin, possible NSTEMI  --Transferred to [facility] for LHC with Dr. Amador  --Dr. Amador/Cardiology consulted, stress test today  --ASA, heparin drip, beta blocker     Afib with RVR  Hx of Afib/Aflutter  -- Toprol dosing adjusted per cardiology given episodes of intermittent  RVR  --Hold Eliquis for heart cath, on heparin drip as above  --Continue Metoprolol     HFrEF  Moderate MR  --Most recent Echo on file I April 2024: EF 40%, akinetic septal, anterior, and apical walls, severely dilated LA, moderate MR  --Follows with  Cardiology, had been evaluated previously for Vanessa clip  --ECHO 6/1: LVEF 51 to 55%, borderline concentric LVH, left atrial volume moderately increased, mild MR, small less than 1 cm circumferential pericardial effusion     Leukocytosis, improving  C.diff colitis  Concern for osteomyelitis   Exposed tendon on RLE  --Pe chart review, he had C.diff documented on 4/6/24 from   --Continue PO vanc and dose of fosfomycin  --Follow-up blood and urine cultures  --MRI left tib/fib showed cellulitis, possible osteomyelitis  --MRI right foot with cellulitis/edema  --Eravacycline per ID  --MRSA screen +  -- Arterial duplex abnormal; Vascular surgery consulted and recommended medical management with no vascular surgery intervention  --Discussed with Dr. Yeager; likely needs additional amputation on LLE d/t concern for poor wound healing, likely needs debridement right lower extremity around the tendon with wound VAC placement; patient refusing further amputation of left lower extremity, but tentatively amenable to revision amputation, right lower extremity irrigation and debridement with wound VAC  --ID input appreciated     Recent cholecystitis s/p cholecystostomy tube  --Request records from that hospitalization, apparently was placed at   --Consulted General Surgery for management of tube, my practice partner discussed with Dr. Beckman  --Apparently already has follow up at  6/6/24 General Surgery--will need to reschedule this     Chronic urinary retention  --Has Delaney in place, reported hx of urethral injury per OSH note (data deficit, no idea when this was Delaney placed), appears he follows with Urology at  and this is a chronic Delaney  --Replaced Delaney here;  follow-up urine culture     PAD  Hx of left BKA  --Continue ASA     Anemia  --Likely chronic due to renal disease  --Folate 4.36 --> supplementation started; vitamin B 12 level 755  --Hb 7.5 here, had been 9 at OSH  --Monitor closely while on heparin drip     HTN  ESRD  --Nephrology consulted for dialysis      Hypothyroidism  --TSH significantly elevated at 91 at OSH, still elevated here to 65, but improved  --Synthroid dose was increased to 75 mcg daily at OSH  --Continue Synthroid at above dose and recheck in 4-6 weeks      Multiple wounds  Sacral Decubitus ulcer, POA  --Wound care consulted  --Ortho as above     Poor IV access  --Dr. Beckman placed central line    Expected Discharge Location and Transportation: TBD  Expected Discharge   Expected Discharge Date: 6/6/2024; Expected Discharge Time:      DVT prophylaxis:  Medical and mechanical DVT prophylaxis orders are present.         AM-PAC 6 Clicks Score (PT): 6 (06/04/24 1200)    CODE STATUS:   Code Status and Medical Interventions:   Ordered at: 05/31/24 1429     Code Status (Patient has no pulse and is not breathing):    CPR (Attempt to Resuscitate)     Medical Interventions (Patient has pulse or is breathing):    Full Support       Otilia Tay MD  06/04/24

## 2024-06-04 NOTE — PLAN OF CARE
Problem: Device-Related Complication Risk (Hemodialysis)  Goal: Safe, Effective Therapy Delivery  Outcome: Ongoing, Progressing  Intervention: Optimize Device Care and Function  Recent Flowsheet Documentation  Taken 6/4/2024 1100 by Awa Eli RN  Medication Review/Management:   medications reviewed   high-risk medications identified  Taken 6/4/2024 0730 by Awa Eli RN  Medication Review/Management:   medications reviewed   high-risk medications identified     Problem: Hemodynamic Instability (Hemodialysis)  Goal: Effective Tissue Perfusion  Outcome: Ongoing, Progressing     Problem: Infection (Hemodialysis)  Goal: Absence of Infection Signs and Symptoms  Outcome: Ongoing, Progressing   Goal Outcome Evaluation:         HD complete, PRN metoprolol 5mg given per MAR for HR >110 and sustaining. Dr. Wilks ordered to give metoprolol during treatment and to give albumin, stop fluid removal. Blood reinfused. 1 liquid bm during treatment. Report given to primary RN April.

## 2024-06-04 NOTE — CASE MANAGEMENT/SOCIAL WORK
Continued Stay Note  [facility]     Patient Name: Tavo Gudino  MRN: [id number]  Today's Date: 6/4/2024    Admit Date: 5/31/2024    Plan: discharge plan   Discharge Plan       Row Name 06/04/24 1531       Plan    Plan discharge plan    Plan Comments I attempted to contact Radha(admission coordinator at [facility]/) x 2 and had to leave a message. CM waiting for a return call.and will cont to follow    Final Discharge Disposition Code 03 - skilled nursing facility (SNF)                   Discharge Codes    No documentation.                 Expected Discharge Date and Time       Expected Discharge Date Expected Discharge Time    Jun 6, 2024               Catrachita Robertson RN

## 2024-06-04 NOTE — PROGRESS NOTES
"  [facility]  PROGRESS NOTE    Date of Admission: 5/31/2024  Date of Service: 06/04/24    Primary Care Physician: Hayden Weinstein MD    Chief Complaint: f/u elevated troponin, Afib with RVR   Problem List:   NSTEMI (non-ST elevated myocardial infarction)    C. difficile colitis    History of cholecystitis s/p cholecystostomy tube    ESRD (end stage renal disease) on hemodialysis    Atrial fibrillation    Essential hypertension    Moderate malnutrition      Subjective      No recurrent angina while A-fib has been rate controlled    Objective   Vitals: /62   Pulse 74   Temp 97.9 °F (36.6 °C) (Axillary)   Resp 18   Ht 185.4 cm (72.99\")   Wt 84.8 kg (186 lb 15.2 oz)   SpO2 100%   BMI 24.67 kg/m²     Physical Exam:  General Appearance:   · well developed  · well nourished  Neck:  · thyroid not enlarged  · supple  Respiratory:  · no respiratory distress  · normal breath sounds  · no rales  Cardiovascular:  · no jugular venous distention  · regular rhythm  · apical impulse normal  · S1 normal, S2 normal  · no S3, no S4   · no murmur  · no rub, no thrill  · carotid pulses normal; no bruit  · pedal pulses normal  · lower extremity edema: none    Skin:   warm, dry      Results:  Results from last 7 days   Lab Units 06/04/24 0449 06/03/24  0555 06/02/24  0906   WBC 10*3/mm3 8.82 9.32 12.36*   HEMOGLOBIN g/dL 8.4* 8.5* 9.1*   HEMATOCRIT % 27.0* 27.3* 27.5*   PLATELETS 10*3/mm3 265 257 262     Results from last 7 days   Lab Units 06/04/24 0449 06/03/24  0555 06/02/24  0906   SODIUM mmol/L 135* 136 133*   POTASSIUM mmol/L 3.9 3.4* 3.1*   CHLORIDE mmol/L 103 108* 99   CO2 mmol/L 19.0* 16.0* 16.0*   BUN mg/dL 38* 25* 22   CREATININE mg/dL 3.72* 2.69* 2.79*   GLUCOSE mg/dL 76 67 64*      Lab Results   Component Value Date    AST 7 06/02/2024    ALT 7 06/02/2024             Results from last 7 days   Lab Units 05/31/24  1421   TSH uIU/mL 65.050*   FREE T4 ng/dL 0.53*         Results " from last 7 days   Lab Units 06/04/24  1319 06/04/24  0449 06/03/24 2005 06/01/24  0450 05/31/24  2148   PROTIME Seconds  --   --   --   --  24.3*   INR   --   --   --   --  2.16*   APTT seconds 58.9* 51.9* 85.4   < > 53.1*    < > = values in this interval not displayed.     Results from last 7 days   Lab Units 06/01/24 0450 05/31/24 2056 05/31/24  1421   CK TOTAL U/L 12*  --   --    HSTROP T ng/L 386* 404* 454*       Intake/Output Summary (Last 24 hours) at 6/4/2024 1811  Last data filed at 6/4/2024 1400  Gross per 24 hour   Intake 310 ml   Output 2265 ml   Net -1955 ml       I personally reviewed the patient's EKG/Telemetry data    Radiology Data:   No radiology results for the last day    Results for orders placed during the hospital encounter of 05/31/24    Adult Transthoracic Echo Complete W/ Cont if Necessary Per Protocol    Interpretation Summary    Left ventricular systolic function is normal. Left ventricular ejection fraction appears to be 51 - 55%.    Left ventricular wall thickness is consistent with borderline concentric hypertrophy.    Left atrial volume is moderately increased.    There is mild calcification of the aortic valve.    Mild mitral valve regurgitation is present.    There is a small (<1cm) circumferential pericardial effusion.        Current Medications:  aspirin, 81 mg, Oral, Daily  collagenase, 1 Application, Topical, Q24H  dilTIAZem, 30 mg, Oral, Q6H  eravacycline dihydrochloride (XERAVA) 85 mg in sodium chloride 0.9 % 250 mL (0.34 mg/mL) IVPB, 85 mg, Intravenous, Q12H  famotidine, 20 mg, Oral, BID AC  folic acid, 1 mg, Oral, Daily  levothyroxine, 75 mcg, Oral, Q AM  Lidocaine HCl gel, , Topical, BID  metoprolol succinate XL, 100 mg, Oral, Q24H  pharmacy consult - MTM, , Does not apply, Daily  sodium chloride, 10 mL, Intravenous, Q12H  vancomycin, 125 mg, Oral, Q6H      heparin, 18.5 Units/kg/hr, Last Rate: 18.5 Units/kg/hr (06/04/24 1657)  Pharmacy Consult - Pharmacy to dose,    Pharmacy to Dose Heparin,         Assessment and Plan:     1. NSTEMI  - elevated HS troponin at OSH with Afib with RVR and symptoms of chest pain. EKGs from OSH not available for review  - HS troponin here 454 -- 386, EKGs with Afib/RVR no acute ischemic changes   - continue ASA, BB and Heparin gtt   - echo with normal LVEF, mild MR  -Stress test shows mild small area of ischemia in the mid anterior lateral zone, will discuss medical therapy versus invasive angiography with the patient although he is high risk for complications due to multiple comorbidities     2. Afib with RVR  - rate control and anticoagulation   - continue BB, and Heparin gtt for anticoagulation   - increase Metoprolol for better rate control      3. ESRD on dialysis  - NAL following, still making some urine      4. PAD, s/p left BKA and nonhealing ulcers of RLE  - Ortho and ID following  - Vascular consulted     5. C-diff  - per ID      6. Anemia  - acute/chronic  - per primary service      7. Recent cholecystitis with GB drain in place  - Dr. Beckman has seen, recommend OP follow up with      Fred Amador MD, FACC, [facility]  Interventional Cardiology

## 2024-06-04 NOTE — PLAN OF CARE
Problem: Skin Injury Risk Increased  Goal: Skin Health and Integrity  Outcome: Ongoing, Progressing  Intervention: Optimize Skin Protection  Description: Perform a full pressure injury risk assessment, as indicated by screening, upon admission to care unit.  Reassess skin (injury risk, full inspection) frequently (e.g., scheduled interval, with change in condition) to provide optimal early detection and prevention.  Maintain adequate tissue perfusion (e.g., encourage fluid balance; avoid crossing legs, constrictive clothing or devices) to promote tissue oxygenation.  Maintain head of bed at lowest degree of elevation tolerated, considering medical condition and other restrictions.  Avoid positioning onto an area that remains reddened.  Minimize incontinence and moisture (e.g., toileting schedule; moisture-wicking pad, diaper or incontinence collection device; skin moisture barrier).  Cleanse skin promptly and gently when soiled utilizing a pH-balanced cleanser.  Relieve and redistribute pressure (e.g., scheduled position changes, weight shifts, use of support surface, medical device repositioning, protective dressing application, use of positioning device, microclimate control, use of pressure-injury-monitor  Encourage increased activity, such as sitting in a chair at the bedside or early mobilization, when able to tolerate.  Recent Flowsheet Documentation  Taken 6/4/2024 1658 by Melina King RN  Pressure Reduction Techniques: weight shift assistance provided  Head of Bed (HOB) Positioning: HOB elevated  Pressure Reduction Devices:   heel offloading device utilized   positioning supports utilized   pressure-redistributing mattress utilized  Skin Protection:   adhesive use limited   skin sealant/moisture barrier applied   skin-to-device areas padded   skin-to-skin areas padded   transparent dressing maintained   tubing/devices free from skin contact  Taken 6/4/2024 1400 by Melina King RN  Pressure Reduction  Techniques: weight shift assistance provided  Head of Bed (HOB) Positioning: HOB elevated  Pressure Reduction Devices:   heel offloading device utilized   positioning supports utilized   pressure-redistributing mattress utilized  Skin Protection:   adhesive use limited   skin sealant/moisture barrier applied   skin-to-device areas padded   skin-to-skin areas padded   transparent dressing maintained   tubing/devices free from skin contact  Taken 6/4/2024 1200 by Melina King RN  Pressure Reduction Techniques: weight shift assistance provided  Head of Bed (HOB) Positioning: HOB elevated  Pressure Reduction Devices:   heel offloading device utilized   positioning supports utilized   pressure-redistributing mattress utilized  Skin Protection:   adhesive use limited   skin sealant/moisture barrier applied   skin-to-device areas padded   skin-to-skin areas padded   transparent dressing maintained   tubing/devices free from skin contact     Problem: Adult Inpatient Plan of Care  Goal: Absence of Hospital-Acquired Illness or Injury  Outcome: Ongoing, Progressing  Intervention: Identify and Manage Fall Risk  Description: Perform standard risk assessment on admission using a validated tool or comprehensive approach appropriate to the patient; reassess fall risk frequently, with change in status or transfer to another level of care.  Communicate fall injury risk to interprofessional healthcare team.  Determine need for increased observation, equipment and environmental modification, such as low bed, signage and supportive, nonskid footwear.  Adjust safety measures to individual developmental age, stage and identified risk factors.  Reinforce the importance of safety and physical activity with patient and family.  Perform regular intentional rounding to assess need for position change, pain assessment and personal needs, including assistance with toileting.  Recent Flowsheet Documentation  Taken 6/4/2024 1658 by Fernando  JESUS Summers  Safety Promotion/Fall Prevention:   clutter free environment maintained   activity supervised   assistive device/personal items within [facility]   fall prevention program maintained   nonskid shoes/slippers when out of bed   room organization consistent   safety round/check completed   toileting scheduled  Taken 6/4/2024 1400 by Melina King RN  Safety Promotion/Fall Prevention:   clutter free environment maintained   activity supervised   assistive device/personal items within [facility]   fall prevention program maintained   nonskid shoes/slippers when out of bed   room organization consistent   safety round/check completed   toileting scheduled  Taken 6/4/2024 1200 by Melina King RN  Safety Promotion/Fall Prevention:   activity supervised   assistive device/personal items within [facility]   clutter free environment maintained   nonskid shoes/slippers when out of bed   room organization consistent   safety round/check completed   toileting scheduled  Intervention: Prevent Skin Injury  Description: Perform a screening for skin injury risk, such as pressure or moisture associated skin damage on admission and at regular intervals throughout hospital stay.  Keep all areas of skin (especially folds) clean and dry.  Maintain adequate skin hydration.  Relieve and redistribute pressure and protect bony prominences; implement measures based on patient-specific risk factors.  Match turning and repositioning schedule to clinical condition.  Encourage weight shift frequently; assist with reposition if unable to complete independently.  Float heels off bed; avoid pressure on the Achilles tendon.  Keep skin free from extended contact with medical devices.  Encourage functional activity and mobility, as early as tolerated.  Use aids (e.g., slide boards, mechanical lift) during transfer.  Recent Flowsheet Documentation  Taken 6/4/2024 1658 by Melina King RN  Body Position: position changed independently  Skin  Protection:   adhesive use limited   skin sealant/moisture barrier applied   skin-to-device areas padded   skin-to-skin areas padded   transparent dressing maintained   tubing/devices free from skin contact  Taken 6/4/2024 1400 by Melina King RN  Body Position: position changed independently  Skin Protection:   adhesive use limited   skin sealant/moisture barrier applied   skin-to-device areas padded   skin-to-skin areas padded   transparent dressing maintained   tubing/devices free from skin contact  Taken 6/4/2024 1200 by Melina King RN  Body Position:   turned   left  Skin Protection:   adhesive use limited   skin sealant/moisture barrier applied   skin-to-device areas padded   skin-to-skin areas padded   transparent dressing maintained   tubing/devices free from skin contact  Intervention: Prevent and Manage VTE (Venous Thromboembolism) Risk  Description: Assess for VTE (venous thromboembolism) risk.  Encourage and assist with early ambulation.  Initiate and maintain compression or other therapy, as indicated, based on identified risk in accordance with organizational protocol and provider order.  Encourage both active and passive leg exercises while in bed, if unable to ambulate.  Recent Flowsheet Documentation  Taken 6/4/2024 1658 by Melina King RN  Activity Management: activity minimized  Taken 6/4/2024 1400 by Melina King RN  Activity Management: activity minimized  Taken 6/4/2024 1320 by Melina King RN  Activity Management: (Pt off the floor for stress test) other (see comments)  Taken 6/4/2024 1200 by Melina King RN  Activity Management: activity minimized  VTE Prevention/Management: (see MAR) other (see comments)  Range of Motion: active ROM (range of motion) encouraged  Intervention: Prevent Infection  Description: Maintain skin and mucous membrane integrity; promote hand, oral and pulmonary hygiene.  Optimize fluid balance, nutrition, sleep and glycemic control to maximize  infection resistance.  Identify potential sources of infection early to prevent or mitigate progression of infection (e.g., wound, lines, devices).  Evaluate ongoing need for invasive devices; remove promptly when no longer indicated.  Recent Flowsheet Documentation  Taken 6/4/2024 1400 by Melina King RN  Infection Prevention:   environmental surveillance performed   rest/sleep promoted   single patient room provided  Taken 6/4/2024 1200 by Melina King RN  Infection Prevention:   environmental surveillance performed   rest/sleep promoted   single patient room provided     Problem: Adult Inpatient Plan of Care  Goal: Optimal Comfort and Wellbeing  Outcome: Ongoing, Progressing  Intervention: Monitor Pain and Promote Comfort  Description: Assess pain level, treatment efficacy and patient response at regular intervals using a consistent pain scale.  Consider the presence and impact of preexisting chronic pain.  Encourage patient and caregiver involvement in pain assessment, interventions and safety measures.  Recent Flowsheet Documentation  Taken 6/4/2024 1658 by Melina King RN  Pain Management Interventions:   see MAR   quiet environment facilitated   care clustered  Taken 6/4/2024 1400 by Melina King RN  Pain Management Interventions:   see MAR   quiet environment facilitated   care clustered  Taken 6/4/2024 1320 by Melina King RN  Pain Management Interventions:   see MAR   quiet environment facilitated   care clustered  Taken 6/4/2024 1200 by Melina King RN  Pain Management Interventions:   quiet environment facilitated   care clustered   see MAR  Intervention: Provide Person-Centered Care  Description: Use a family-focused approach to care.  Develop trust and rapport by proactively providing information, encouraging questions, addressing concerns and offering reassurance.  Acknowledge emotional response to hospitalization.  Recognize and utilize personal coping strategies.  Honor spiritual  and cultural preferences.  Recent Flowsheet Documentation  Taken 6/4/2024 1658 by Melina King RN  Trust Relationship/Rapport:   care explained   choices provided   emotional support provided   empathic listening provided   questions answered   questions encouraged   reassurance provided   thoughts/feelings acknowledged  Taken 6/4/2024 1400 by Melina King RN  Trust Relationship/Rapport:   care explained   choices provided   emotional support provided   empathic listening provided   questions answered   questions encouraged   reassurance provided   thoughts/feelings acknowledged  Taken 6/4/2024 1200 by Melina King RN  Trust Relationship/Rapport:   care explained   choices provided   emotional support provided   empathic listening provided   questions answered   questions encouraged   reassurance provided   thoughts/feelings acknowledged     Problem: Adjustment to Illness (Sepsis/Septic Shock)  Goal: Optimal Coping  Outcome: Ongoing, Progressing  Intervention: Optimize Psychosocial Adjustment to Illness  Description: Acknowledge, normalize, validate intensity and complexity of patient and support system response to situation.  Provide opportunity for expression of thoughts, feelings and concerns; respond with compassion and reassurance.  Decrease stress and anxiety by providing information about patient’s status and treatment.  Facilitate support system presence and participation in care; consider providing a diary in intensive care situation.  Support coping by recognizing current coping strategies; provide aid in developing new strategies.  Acknowledge and normalize difficulty in managing life-long lifestyle changes and expectations.  Assess and monitor for signs and symptoms of psychologic distress, anxiety and depression.  Consider palliative care consult for goals of care conversation, if the condition is worsening despite treatment.  Recent Flowsheet Documentation  Taken 6/4/2024 1658 by Fernando  JESUS Summers  Family/Support System Care:   support provided   self-care encouraged  Taken 6/4/2024 1400 by Melina King RN  Family/Support System Care:   support provided   self-care encouraged  Taken 6/4/2024 1200 by Melina King RN  Family/Support System Care:   self-care encouraged   support provided     Problem: Infection Progression (Sepsis/Septic Shock)  Goal: Absence of Infection Signs and Symptoms  Outcome: Ongoing, Progressing  Intervention: Initiate Sepsis Management  Description: Provide fluid therapy, such as crystalloid or albumin, to increase intravascular volume, organ perfusion and oxygen delivery.  Provide respiratory support, such as oxygen therapy, noninvasive or invasive positive pressure ventilation, to achieve oxygenation and ventilation goal; avoid hyperoxemia.  Obtain cultures prior to initiating antimicrobial therapy when possible. Do not delay for laboratory results in the presence of high suspicion or clinical indicators.  Administer intravenous broad-spectrum antimicrobial therapy promptly.  Implement hemodynamic monitoring to guide intravascular support based on individual targeted parameters.  Determine and address underlying source of infection aggressively; implement transmission-based precautions and isolation, as indicated.  Recent Flowsheet Documentation  Taken 6/4/2024 1400 by Melina King RN  Infection Prevention:   environmental surveillance performed   rest/sleep promoted   single patient room provided  Isolation Precautions:   precautions maintained   spore   contact  Taken 6/4/2024 1200 by Melina King RN  Infection Prevention:   environmental surveillance performed   rest/sleep promoted   single patient room provided  Isolation Precautions:   precautions discontinued   contact   spore  Intervention: Promote Recovery  Description: Encourage pulmonary hygiene, such as cough-enhancement and airway-clearance techniques, that may include use of incentive spirometry,  deep breathing and cough.  Encourage early rehabilitation and physical activity to optimize functional ability and activity tolerance, as well as minimize delirium.  Promote energy conservation; minimize oxygen demand and consumption by adjusting environment, decreasing stimulation, maintaining normothermia and treating pain.  Optimize fluid balance, nutrition intake, sleep and glycemic control to maintain tissue perfusion and enhance immune response.  Recent Flowsheet Documentation  Taken 6/4/2024 1658 by Melina King RN  Activity Management: activity minimized  Taken 6/4/2024 1400 by Melina Kign RN  Activity Management: activity minimized  Taken 6/4/2024 1320 by Melina King RN  Activity Management: (Pt off the floor for stress test) other (see comments)  Taken 6/4/2024 1200 by Melina King RN  Activity Management: activity minimized     Problem: Pain Chronic (Persistent) (Comorbidity Management)  Goal: Acceptable Pain Control and Functional Ability  Outcome: Ongoing, Progressing  Intervention: Manage Persistent Pain  Description: Evaluate pain level, effect of treatment and patient response at regular intervals.  Minimize pain stimuli; coordinate care and adjust environment (e.g., light, noise, unnecessary movement); promote sleep/rest.  Match pharmacologic analgesia to severity and type of pain mechanism (e.g., neuropathic, muscle, inflammatory); consider multimodal approach (e.g., nonopioid, opioid, adjuvant).  Provide medication at regular intervals; titrate to patient response.  Manage breakthrough pain with additional doses; consider rotation or switching medication.  Monitor for signs of substance tolerance (increased dose to reach desired effect, decreased effect with same dose).  Avoid abrupt withdrawal of medication, especially agents capable of causing physical dependence.  Manage medication-induced effects, such as constipation, nausea, pruritus, urinary retention, somnolence and  dizziness.  Provide multimodal treatment interventions, such as physical activity, therapeutic exercise, yoga, TENS (transcutaneous electrical nerve stimulation) and manual therapy.  Train in functional activity modifications, such as body mechanics, posture, ergonomics, energy conservation and activity pacing.  Consider addition of complementary or alternative therapy, such as acupuncture, hypnosis or therapeutic touch.  Recent Flowsheet Documentation  Taken 6/4/2024 1658 by Melina King RN  Medication Review/Management: medications reviewed  Taken 6/4/2024 1400 by Melina King RN  Medication Review/Management: medications reviewed  Taken 6/4/2024 1200 by Melina King RN  Bowel Elimination Promotion: adequate fluid intake promoted  Medication Review/Management: medications reviewed  Intervention: Develop Pain Management Plan  Description: Acknowledge patient as the expert in pain self-management.  Use a consistent, validated tool for pain assessment; include function and quality of life.  Evaluate risk for opioid use and dependence.  Set pain management goals; determine acceptable level of discomfort to allow for maximal functioning and quality of life.  Determine [id number] pain management plan, including both pharmacologic and nonpharmacologic measures.  Identify and integrate past successful treatment measures, if able.  Encourage patient and caregiver involvement in pain assessment, interventions and safety measures.  Re-evaluate plan regularly.  Recent Flowsheet Documentation  Taken 6/4/2024 1658 by Melina King RN  Pain Management Interventions:   see MAR   quiet environment facilitated   care clustered  Taken 6/4/2024 1400 by Melina King RN  Pain Management Interventions:   see MAR   quiet environment facilitated   care clustered  Taken 6/4/2024 1320 by Melina King RN  Pain Management Interventions:   see MAR   quiet environment facilitated   care clustered  Taken 6/4/2024 1200  by eMlina King RN  Pain Management Interventions:   quiet environment facilitated   care clustered   see MAR  Intervention: Optimize Psychosocial Wellbeing  Description: Facilitate patient’s self-control over pain by providing pain information and allowing choices in treatment.  Consider and address emotional response to pain.  Explore and promote use of coping strategies; address barriers to successful coping.  Evaluate and assist with psychosocial, cultural and spiritual factors impacting pain.  Modify pain perception by using techniques, such as distraction, mindfulness, guided imagery, meditation or music.  Assess and monitor for signs and symptoms of behavioral health concerns, such as unhealthy substance use, depression and suicidal ideation.  Consider referral for ongoing coping support, such as cognitive behavioral therapy and mindfulness-based stress reduction.  Recent Flowsheet Documentation  Taken 6/4/2024 1658 by Melina King RN  Diversional Activities:   smartphone   television  Family/Support System Care:   support provided   self-care encouraged  Taken 6/4/2024 1400 by Melina King RN  Diversional Activities:   smartphone   television  Family/Support System Care:   support provided   self-care encouraged  Taken 6/4/2024 1200 by Melina King RN  Diversional Activities:   smartphone   television  Family/Support System Care:   self-care encouraged   support provided     Problem: Fall Injury Risk  Goal: Absence of Fall and Fall-Related Injury  Outcome: Ongoing, Progressing  Intervention: Identify and Manage Contributors  Description: Develop a fall prevention plan with the patient and caregiver/family.  Provide reorientation, appropriate sensory stimulation and routines with changes in mental status to decrease risk of fall.  Promote use of personal vision and auditory aids.  Assess assistance level required for safe and effective self-care; provide support as needed, such as toileting,  mobilization. For age 65 and older, implement timed toileting with assistance.  Encourage physical activity, such as performance of mobility and self-care at highest level of patient ability, multicomponent exercise program and provision of appropriate assistive devices.  If fall occurs, assess the severity of injury; implement fall injury protocol. Determine the cause and revise fall injury prevention plan.  Regularly review medication contribution to fall risk; adjust medication administration times to minimize risk of falling.  Consider risk related to polypharmacy and age.  Balance adequate pain management with potential for oversedation.  Recent Flowsheet Documentation  Taken 6/4/2024 1658 by Melina King RN  Medication Review/Management: medications reviewed  Taken 6/4/2024 1400 by Melina King RN  Medication Review/Management: medications reviewed  Taken 6/4/2024 1200 by Melina King RN  Medication Review/Management: medications reviewed  Self-Care Promotion: independence encouraged  Intervention: Promote Injury-Free Environment  Description: Provide a safe, barrier-free environment that encourages independent activity.  Keep care area uncluttered and well-lighted.  Determine need for increased observation or monitoring.  Avoid use of devices that minimize mobility, such as restraints or indwelling urinary catheter.  Recent Flowsheet Documentation  Taken 6/4/2024 1658 by Melina King RN  Safety Promotion/Fall Prevention:   clutter free environment maintained   activity supervised   assistive device/personal items within reach   fall prevention program maintained   nonskid shoes/slippers when out of bed   room organization consistent   safety round/check completed   toileting scheduled  Taken 6/4/2024 1400 by Melina King RN  Safety Promotion/Fall Prevention:   clutter free environment maintained   activity supervised   assistive device/personal items within reach   fall prevention program  maintained   nonskid shoes/slippers when out of bed   room organization consistent   safety round/check completed   toileting scheduled  Taken 6/4/2024 1200 by Melina King RN  Safety Promotion/Fall Prevention:   activity supervised   assistive device/personal items within reach   clutter free environment maintained   nonskid shoes/slippers when out of bed   room organization consistent   safety round/check completed   toileting scheduled   Goal Outcome Evaluation:  Plan of Care Reviewed With: patient        Progress: no change  Outcome Evaluation: Pt AO x4/VSS/100% on RA/aFib on monitor. Pt had dialysis this am. NPO since MN for stress test about 1400. Pt dressing changed after multiple BM. 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 Followed dressing instructions, cleansed with NS, Santyl on ABD pad, xeroform also applied for healing following Porfirio's dressing. Continue monitoring.

## 2024-06-05 ENCOUNTER — SPECIALTY PHARMACY (OUTPATIENT)
Dept: PHARMACY | Facility: TELEHEALTH | Age: 69
End: 2024-06-05
Payer: MEDICARE

## 2024-06-05 LAB
APTT PPP: 51.6 SECONDS (ref 60–90)
APTT PPP: 70.3 SECONDS (ref 60–90)
UFH PPP CHRO-ACNC: 0.36 IU/ML (ref 0.3–0.7)
UFH PPP CHRO-ACNC: 0.56 IU/ML (ref 0.3–0.7)

## 2024-06-05 PROCEDURE — 85520 HEPARIN ASSAY: CPT

## 2024-06-05 PROCEDURE — 99232 SBSQ HOSP IP/OBS MODERATE 35: CPT | Performed by: INTERNAL MEDICINE

## 2024-06-05 PROCEDURE — 85730 THROMBOPLASTIN TIME PARTIAL: CPT | Performed by: PEDIATRICS

## 2024-06-05 PROCEDURE — 25010000002 ERAVACYCLINE DIHYDROCHLORIDE 50 MG RECONSTITUTED SOLUTION 1 EACH VIAL: Performed by: INTERNAL MEDICINE

## 2024-06-05 PROCEDURE — 99232 SBSQ HOSP IP/OBS MODERATE 35: CPT | Performed by: PEDIATRICS

## 2024-06-05 PROCEDURE — 85730 THROMBOPLASTIN TIME PARTIAL: CPT

## 2024-06-05 PROCEDURE — 25810000003 SODIUM CHLORIDE 0.9 % SOLUTION 250 ML FLEX CONT: Performed by: INTERNAL MEDICINE

## 2024-06-05 RX ORDER — FAMOTIDINE 20 MG/1
10 TABLET, FILM COATED ORAL DAILY
Status: DISCONTINUED | OUTPATIENT
Start: 2024-06-05 | End: 2024-06-20 | Stop reason: HOSPADM

## 2024-06-05 RX ORDER — L.ACID,PARA/B.BIFIDUM/S.THERM 8B CELL
1 CAPSULE ORAL 2 TIMES DAILY
Status: DISCONTINUED | OUTPATIENT
Start: 2024-06-05 | End: 2024-06-20 | Stop reason: HOSPADM

## 2024-06-05 RX ADMIN — APIXABAN 5 MG: 5 TABLET, FILM COATED ORAL at 22:01

## 2024-06-05 RX ADMIN — Medication 10 ML: at 22:01

## 2024-06-05 RX ADMIN — METOPROLOL SUCCINATE 100 MG: 100 TABLET, EXTENDED RELEASE ORAL at 17:07

## 2024-06-05 RX ADMIN — FAMOTIDINE 10 MG: 20 TABLET, FILM COATED ORAL at 09:45

## 2024-06-05 RX ADMIN — Medication 10 ML: at 09:45

## 2024-06-05 RX ADMIN — COLLAGENASE SANTYL 1 APPLICATION: 250 OINTMENT TOPICAL at 22:03

## 2024-06-05 RX ADMIN — LEVOTHYROXINE SODIUM 75 MCG: 0.07 TABLET ORAL at 06:42

## 2024-06-05 RX ADMIN — ERAVACYCLINE 85 MG: 50 INJECTION, POWDER, LYOPHILIZED, FOR SOLUTION INTRAVENOUS at 17:00

## 2024-06-05 RX ADMIN — VANCOMYCIN HYDROCHLORIDE 125 MG: 125 CAPSULE ORAL at 06:42

## 2024-06-05 RX ADMIN — ERAVACYCLINE 85 MG: 50 INJECTION, POWDER, LYOPHILIZED, FOR SOLUTION INTRAVENOUS at 06:42

## 2024-06-05 RX ADMIN — HYDROCODONE BITARTRATE AND ACETAMINOPHEN 1 TABLET: 7.5; 325 TABLET ORAL at 09:45

## 2024-06-05 RX ADMIN — DILTIAZEM HYDROCHLORIDE 30 MG: 30 TABLET, FILM COATED ORAL at 17:07

## 2024-06-05 RX ADMIN — Medication 1 CAPSULE: at 22:01

## 2024-06-05 RX ADMIN — DILTIAZEM HYDROCHLORIDE 30 MG: 30 TABLET, FILM COATED ORAL at 11:40

## 2024-06-05 RX ADMIN — FOLIC ACID 1 MG: 1 TABLET ORAL at 09:45

## 2024-06-05 RX ADMIN — ASPIRIN 81 MG: 81 TABLET, COATED ORAL at 09:45

## 2024-06-05 RX ADMIN — DILTIAZEM HYDROCHLORIDE 30 MG: 30 TABLET, FILM COATED ORAL at 06:42

## 2024-06-05 RX ADMIN — VANCOMYCIN HYDROCHLORIDE 125 MG: 125 CAPSULE ORAL at 11:40

## 2024-06-05 RX ADMIN — LIDOCAINE HYDROCHLORIDE: 20 JELLY TOPICAL at 09:46

## 2024-06-05 RX ADMIN — VANCOMYCIN HYDROCHLORIDE 125 MG: 125 CAPSULE ORAL at 17:07

## 2024-06-05 RX ADMIN — LIDOCAINE HYDROCHLORIDE: 20 JELLY TOPICAL at 22:01

## 2024-06-05 RX ADMIN — HYDROCODONE BITARTRATE AND ACETAMINOPHEN 1 TABLET: 7.5; 325 TABLET ORAL at 18:51

## 2024-06-05 NOTE — PROGRESS NOTES
Tavo Gudino  1955  [id number]    Reason for Consultation: recurrent C diff. Osteomyelitis     History of present illness:    Patient is a 69 y.o. male, with PMH chronic cholecystitis( cholecystotomy tube) DM, ESRD on HD, HTN, PVD s/p Left  BKA.  All recent care done at [facility] in [facility].    Presented to OSH with chest pain, found to be in afib with RVR possible NSTEMi, transferred to [facility] for LHC.  Developed diarrhea prior to transfer, C diff toxin negative, positive antigen, started on oral Vancomycin. He had been treated with Kayexalate for hyperkalemia, as well as Colace.  Last positive C diff PCR 4/6/24 from [facility]. Noted exposed tendon RLE wound , and ulcer of right heel, sacral area, and left residual stump. He has been afebrile. Admitting labs with WBC 22, plt 249, ALT 10 AST 10, Scr 3.47, UA with TNTC WBCs, urine culture with gram negative bacilli, blood cultures no growth. MRI Left with edema throughout soft tissue of stump, no abscess,subtle early changes of superimposed osteomyelitis distal osseous stump not excluded.  Right with diffuse soft tissue cellulitis, possible early osteomyelitis lateral malleolus, no definite evidence of osteomyelitis.  Started on Ceftriaxone Daptomycin, Flagyl, and oral Vancomycin and we were consulted for evaluation and treatment. He has had an indwelling cole catheter since April, just recently changed.  He continues to have numerous diarrhea stools and abdominal cramping.  No fever.      6/3/24: Frustrated with prolonged hospitalization but slow overnight.  Awaiting possible cardiac workup.  Legs stable.  Cole catheter in place.  Denies abdominal pain, suprapubic pain at this time.  He said he only had 1 bowel movement yesterday evening but has had 2 loose bowel movement so far today.  Overall he thinks his stool frequency has improved    6/5/2024: Resting comfortably.  Cardiac workup completed.   orthopedic surgery recommended  additional debridement of the amputation site but patient refusing stating he would like to follow-up with his previous wound care providers.  Loose stools slowing down and he has less nausea    ROS:  See HPI      Allergies   Allergen Reactions    Penicillins Hives     Received ceftriaxone 6/1/24    Levothyroxine Unknown - Low Severity    Hydromorphone Other (See Comments)     Confusion, encephalopathy per wife         Medication:    Current Facility-Administered Medications:     acetaminophen (TYLENOL) tablet 650 mg, 650 mg, Oral, Q4H PRN, Genny Saeed MD, 650 mg at 06/04/24 1144    albumin human 25 % IV SOLN 12.5 g, 12.5 g, Intravenous, PRN, EefJose velázquez MD, 12.5 g at 06/04/24 1013    aspirin EC tablet 81 mg, 81 mg, Oral, Daily, Keren Escalona PA-C, 81 mg at 06/05/24 0945    collagenase ointment 1 Application, 1 Application, Topical, Q24H, Genny Saeed MD, 1 Application at 06/02/24 2008    dilTIAZem (CARDIZEM) tablet 30 mg, 30 mg, Oral, Q6H, Fred Amador MD, 30 mg at 06/05/24 1140    eravacycline dihydrochloride (XERAVA) 85 mg in sodium chloride 0.9 % 250 mL (0.34 mg/mL) IVPB, 85 mg, Intravenous, Q12H, José Fuentes MD, Last Rate: 250 mL/hr at 06/05/24 0642, 85 mg at 06/05/24 0642    famotidine (PEPCID) tablet 10 mg, 10 mg, Oral, Daily, Otilia Tay MD, 10 mg at 06/05/24 0945    folic acid (FOLVITE) tablet 1 mg, 1 mg, Oral, Daily, Sonya Banks MD, 1 mg at 06/05/24 0945    heparin (porcine) injection 2,000 Units, 2,000 Units, Intracatheter, PRN, EfeJose velázquez MD, 2,000 Units at 06/04/24 1101    heparin 96085 units/250 mL (100 units/mL) in 0.45 % NaCl infusion, 20.5 Units/kg/hr, Intravenous, Titrated, José Contreras, PharmD, Last Rate: 17.38 mL/hr at 06/05/24 0947, 20.5 Units/kg/hr at 06/05/24 0947    HYDROcodone-acetaminophen (NORCO) 7.5-325 MG per tablet 1 tablet, 1 tablet, Oral, Q6H PRN, Otilia Tay MD, 1 tablet at 06/05/24 0945     levothyroxine (SYNTHROID, LEVOTHROID) tablet 75 mcg, 75 mcg, Oral, Q AM, Genny Saeed MD, 75 mcg at 06/05/24 0642    Lidocaine HCl gel (XYLOCAINE) urethral/mucosal syringe, , Topical, PRN, Genny Saeed MD, Given at 06/01/24 0609    lidocaine HCL topical jelly USP 2% syringe 11 mL, , Topical, BID, Genny Saeed MD, Given at 06/05/24 0946    LORazepam (ATIVAN) tablet 0.25 mg, 0.25 mg, Oral, Q6H PRN, Sonya Banks MD, 0.25 mg at 06/02/24 0927    metoprolol succinate XL (TOPROL-XL) 24 hr tablet 100 mg, 100 mg, Oral, Q24H, Keren Escalona PA-C, 100 mg at 06/04/24 1704    metoprolol tartrate (LOPRESSOR) injection 5 mg, 5 mg, Intravenous, Q6H PRN, Hayden Weinstein MD, 5 mg at 06/04/24 1020    Morphine sulfate (PF) injection 4 mg, 4 mg, Intravenous, Daily PRN, Genny Saeed MD, 4 mg at 06/04/24 1658    Pharmacy Consult - [facility], , Does not apply, Daily, Freddie Sutton, [facility]    Pharmacy Consult - Pharmacy to dose, , Does not apply, Continuous PRN, José Fuentes MD    Pharmacy to Dose Heparin, , Does not apply, Continuous PRN, Dinorah Russell, [facility]    sodium chloride 0.9 % flush 10 mL, 10 mL, Intravenous, Q12H, Genny Saeed MD, 10 mL at 06/05/24 0945    sodium chloride 0.9 % flush 10 mL, 10 mL, Intravenous, PRN, Genny Saeed MD    sodium chloride 0.9 % infusion 40 mL, 40 mL, Intravenous, PRN, Genny Saeed MD    vancomycin (VANCOCIN) capsule 125 mg, 125 mg, Oral, Q6H, Genny Saeed MD, 125 mg at 06/05/24 1140    Antibiotics:  Anti-Infectives (From admission, onward)      Ordered     Dose/Rate Route Frequency Start Stop    06/03/24 1247  fosfomycin (MONUROL) packet 3 g        Ordering Provider: José Fuentes MD    3 g Oral Once 06/03/24 1400 06/03/24 1450    06/02/24 1421  eravacycline dihydrochloride (XERAVA) 85 mg in sodium chloride 0.9 % 250 mL (0.34 mg/mL) IVPB        Note to Pharmacy: !! Ensure final concentration of 0.2 - 0.6 mg/mL !!   Ordering Provider: Alfredo  José BURNS MD    85 mg  250 mL/hr over 60 Minutes Intravenous Every 12 Hours 24 1600 24 1559    24 1503  vancomycin (VANCOCIN) capsule 125 mg        Ordering Provider: Genny Saeed MD    125 mg Oral Every 6 Hours Scheduled 24 1800 06/10/24 8374            Physical Exam:   Vital Signs  Temp (24hrs), Av.8 °F (36.6 °C), Min:97.4 °F (36.3 °C), Max:98.1 °F (36.7 °C)    Temp  Min: 97.4 °F (36.3 °C)  Max: 98.1 °F (36.7 °C)  BP  Min: 114/82  Max: 153/71  Pulse  Min: 62  Max: 86  Resp  Min: 18  Max: 18  SpO2  Min: 100 %  Max: 100 %    GENERAL: Awake and alert, chronically ill-appearing but not acutely toxic  HEENT: Normocephalic, atraumatic.  PERRL. EOMI. No conjunctival injection. No icterus. Edentulous   NECK: Supple   HEART: RRR; No murmur,  .   LUNGS: Clear to auscultation bilaterally without wheezing, rales, rhonchi. Normal respiratory effort. Nonlabored.   ABDOMEN: Soft,  tender  nondistended   EXT: Left BKA site with wound dehiscence and some slough  Multiple dry ulcerations on right foot dressed.  No new images.  Patient deferred direct examination of either site  :  Delaney catheter with clear urine  MSK: No joint effusions or erythema  SKIN: Warm and dry    NEURO: Oriented to PPT.  Motor 5/5 strength  PSYCHIATRIC: Normal insight and judgment. Cooperative with PE  Dialysis line in the right chest.  Left IJ CVC    Laboratory Data    Results from last 7 days   Lab Units 24  0449 24  0555 24  0906   WBC 10*3/mm3 8.82 9.32 12.36*   HEMOGLOBIN g/dL 8.4* 8.5* 9.1*   HEMATOCRIT % 27.0* 27.3* 27.5*   PLATELETS 10*3/mm3 265 257 262     Results from last 7 days   Lab Units 24  0449   SODIUM mmol/L 135*   POTASSIUM mmol/L 3.9   CHLORIDE mmol/L 103   CO2 mmol/L 19.0*   BUN mg/dL 38*   CREATININE mg/dL 3.72*   GLUCOSE mg/dL 76   CALCIUM mg/dL 8.8     Results from last 7 days   Lab Units 24  0906   ALK PHOS U/L 240*   BILIRUBIN mg/dL 0.4   ALT (SGPT) U/L 7   AST (SGOT)  U/L 7             Results from last 7 days   Lab Units 05/31/24  2056   LACTATE mmol/L 1.3     Results from last 7 days   Lab Units 06/01/24  0450   CK TOTAL U/L 12*         Estimated Creatinine Clearance: 21.8 mL/min (A) (by C-G formula based on SCr of 3.72 mg/dL (H)).      Microbiology:  Microbiology Results (last 10 days)       Procedure Component Value - Date/Time    Urine Culture - Urine, Indwelling Urethral Catheter [[id number]]  (Normal) Collected: 06/02/24 1721    Lab Status: Final result Specimen: Urine from Indwelling Urethral Catheter Updated: 06/04/24 0955     Urine Culture No growth    MRSA Screen, PCR (Inpatient) - Swab, Nares [[id number]]  (Abnormal) Collected: 06/02/24 1306    Lab Status: Final result Specimen: Swab from Nares Updated: 06/02/24 1433     MRSA PCR Positive    Narrative:      The negative predictive value of this diagnostic test is high and should only be used to consider de-escalating anti-MRSA therapy. A positive result may indicate colonization with MRSA and must be correlated clinically.    Clostridioides difficile Toxin - Stool, Per Rectum [[id number]]  (Abnormal) Collected: 06/02/24 1305    Lab Status: Final result Specimen: Stool from Per Rectum Updated: 06/02/24 1416    Narrative:      The following orders were created for panel order Clostridioides difficile Toxin - Stool, Per Rectum.  Procedure                               Abnormality         Status                     ---------                               -----------         ------                     Clostridioides difficile...[[id number]]  Abnormal            Final result                 Please view results for these tests on the individual orders.    Clostridioides difficile Toxin, PCR - Stool, Per Rectum [[id number]]  (Abnormal) Collected: 06/02/24 1305    Lab Status: Final result Specimen: Stool from Per Rectum Updated: 06/02/24 1416     Toxigenic C. difficile by PCR Detected    Narrative:      DNA from a toxigenic strain  of C.difficile has been detected.    Clostridioides difficile toxin Ag, Reflex - Stool, Per Rectum [[id number]]  (Abnormal) Collected: 06/02/24 1305    Lab Status: Final result Specimen: Stool from Per Rectum Updated: 06/02/24 1515     C.diff Toxin Ag Positive    Narrative:      DNA from a toxigenic strain of C.difficile was detected, along with the presence of free toxin. These results are suggestive of C.difficile infection.    Blood Culture - Blood, Arm, Right [[id number]]  (Normal) Collected: 05/31/24 2237    Lab Status: Preliminary result Specimen: Blood from Arm, Right Updated: 06/05/24 0745     Blood Culture No growth at 4 days    Blood Culture - Blood, Blood, Central Line [[id number]]  (Normal) Collected: 05/31/24 2237    Lab Status: Preliminary result Specimen: Blood, Central Line Updated: 06/05/24 0801     Blood Culture No growth at 4 days    Urine Culture - Urine, Indwelling Urethral Catheter [[id number]]  (Abnormal)  (Susceptibility) Collected: 05/31/24 2219    Lab Status: Final result Specimen: Urine from Indwelling Urethral Catheter Updated: 06/03/24 0936     Urine Culture >100,000 CFU/mL Enterobacter cloacae complex     Comment:   This organism may develop resistance during prolonged therapy with 3rd generation cephalosporins (e.g. ceftriaxone) as a result of de-repression of AmpC B-lactamase.  Ceftriaxone may be a reasonable treatment option for uncomplicated cystitis or other lower severity infections when susceptibility is demonstrated.       Narrative:      Colonization of the urinary tract without infection is common. Treatment is discouraged unless the patient is symptomatic, pregnant, or undergoing an invasive urologic procedure.    Susceptibility        Enterobacter cloacae complex      PHYLLIS      Cefepime Susceptible      Ceftazidime Susceptible      Ceftriaxone Susceptible      Gentamicin Susceptible      Levofloxacin Susceptible      Nitrofurantoin Intermediate      Piperacillin + Tazobactam  Susceptible      Trimethoprim + Sulfamethoxazole Susceptible                                   Radiology:  Imaging Results (Last 72 Hours)       ** No results found for the last 72 hours. **          5/28, 5/31 blood cultures from [place], negative      Impression:   C diff colitis initially diagnosed in April 2024. Was given Lactulose, Stool softeners in [place] and developed worsening diarrhea, with positive antigen, negative EIA toxin so possible Colonization vs true infection.  Repeat testing at [facility] with positive PCR and positive EIA antigen.  Patient says stool frequency is overall improving, especially after switched to eravacycline  NSTEMI, transferred for [facility] here.  Cardiology evaluating   ESRD on HD: via tunneled HD catheter. still makes urine  Severe peripheral vascular disease: No intervention needed per vascular surgery  S/p left BKA 4/2024, with dehiscence of the amputation site and possible soft tissue infectoin  Possible left tibial early osteomyelitis:  by MRI.  Dr. Yeager recommended additional I&D surgery which the patient refused  Left lower extremity wound, with exposed tendon-  early osteomyelitis of the lateral malleolus not excluded by MRI per radiology.  Lesions dry on exam  Chronic cholecystitis, s/p percutaneous cholecystotomy tube- [facility]  Pyuria, Enterobacter bacteriuria: Had chronic indwelling Delaney catheter that was in place for almost 2 months prior to being changed on admission 5/31.  Culture from admission with Enterobacter.  Treated with fosfomycin x 1.  Repeat culture 6/2 negative  Sacral pressure ulcers    PLAN/RECOMMENDATIONS:   - I called the lab in [place] to verify his blood cultures there were negative  - Follow-up pending blood cultures from [facility], so far negative  - Follow CBC, CMP, CRP      Continue IV eravacycline while inpatient    Long discussion today with patient regarding plan.  Patient is adamant about not discharging back to skilled nursing facility.  He  is also adamant about refusing any additional surgical interventions.  With all of his comorbidities and possible early osteomyelitis on imaging I recommended a short at least a short course of IV antibiotics, which patient refused.  Discussed possibly doing IV antibiotics after each dialysis but these could make C. difficile worse.  Patient prefers to stay on tetracycline class if at all possible.  Discussed tunneled line placement to facilitate IV eravacycline at home which patient refused.  Will see if we can get him PO Nuzyra, but if not the best option would likely be doxycycline.     Continue vancomycin 125 mg 4 times daily for 14 days from admission through June 14, and then taper to 125 mg twice daily for 1 week, and then 125 mg once daily for 1 week, then follow-up in the office to reassess    Continue probiotic BID      Patient adamant about going home rather than SNF despite recommendations. Discussed with CM.   Okay to discharge from my perspective once arrangements have been made. Remove IJ CVC prior to discharge.     Highly complex medical decision making.  Schedule outpatient follow-up with me in 2 weeks     I spent greater than 50 minutes caring for Tavo Gudino today with >50% of the time spent counseling and coordinating care.     José Fuentes MD  6/5/2024  13:32 EDT

## 2024-06-05 NOTE — CASE MANAGEMENT/SOCIAL WORK
Continued Stay Note  [facility]     Patient Name: Tavo Gudino  MRN: [id number]  Today's Date: 6/5/2024    Admit Date: 5/31/2024    Plan: TBD   Discharge Plan       Row Name 06/05/24 1407       Plan    Plan TBD    Patient/Family in Agreement with Plan yes    Plan Comments CM faxed dialysis checklist with orders and needed ducoments to [facility] in [place] today. CM will continue to follow.    Final Discharge Disposition Code 03 - skilled nursing facility (SNF)                   Discharge Codes    No documentation.                 Expected Discharge Date and Time       Expected Discharge Date Expected Discharge Time    Jun 6, 2024               Sanket Lima RN

## 2024-06-05 NOTE — PROGRESS NOTES
[facility] Medicine Services  PROGRESS NOTE    Patient Name: Tavo Gudino  : 1955  MRN: [id number]    Date of Admission: 2024  Primary Care Physician: Hayden Weinstein MD    Subjective   Subjective     CC:  Chest pain, A fib with RVR, NSTEMI, leukocytosis    HPI:  Pt doing ok.  It is that the diarrhea may be getting slightly better.  He is very adamant about wanting to go home, although the wife is unsure she is going to be able to care for him at home.  Otherwise he is tolerating his diet.  Tolerating dialysis without difficulty.      Objective   Objective     Vital Signs:   Temp:  [97.4 °F (36.3 °C)-98.1 °F (36.7 °C)] 98 °F (36.7 °C)  Heart Rate:  [62-86] 66  Resp:  [18] 18  BP: (114-153)/(47-82) 114/82     Physical Exam:  Constitutional: Awake, alert, chronically ill-appearing  HENT: NCAT, mucous membranes moist  Respiratory: Clear to auscultation bilaterally, respiratory effort normal   Cardiovascular: IRIR, HR 90s  Gastrointestinal: Positive bowel sounds, soft, nontender, nondistended, percutaneous cholecystostomy tube  Musculoskeletal: left BKA  Psychiatric: Anxious, cooperative  Neurologic: PERRL, symmetric facies, speech clear  Skin:  Results Reviewed:  LAB RESULTS:      Lab 24  0753 24  0001 24  1319 24  0449 24  1054 24  0555 242 24  0906 24  2250 24  0450 248 246 24  1421   WBC  --   --   --  8.82  --   --  9.32  --  12.36*  --  20.32*  --   --   --  22.02*   HEMOGLOBIN  --   --   --  8.4*  --   --  8.5*  --  9.1*  --  6.9*  --   --   --  7.5*   HEMATOCRIT  --   --   --  27.0*  --   --  27.3*  --  27.5*  --  22.1*  --   --   --  24.4*   PLATELETS  --   --   --  265  --   --  257  --  262  --  241  --   --   --  249   NEUTROS ABS  --   --   --   --   --   --   --   --  9.70*  --  16.57*  --   --   --  18.79*   IMMATURE GRANS (ABS)  --   --   --   --    --   --   --   --  0.08*  --  0.19*  --   --   --  0.16*   LYMPHS ABS  --   --   --   --   --   --   --   --  1.63  --  1.96  --   --   --  1.70   MONOS ABS  --   --   --   --   --   --   --   --  0.77  --  1.42*  --   --   --  1.23*   EOS ABS  --   --   --   --   --   --   --   --  0.11  --  0.13  --   --   --  0.09   MCV  --   --   --  98.2*  --   --  95.8  --  92.9  --  95.7  --   --   --  96.8   LACTATE  --   --   --   --   --   --   --   --   --   --   --   --   --  1.3  --    PROTIME  --   --   --   --   --   --   --   --   --   --   --   --  24.3*  --   --    APTT 70.3 51.6* 58.9* 51.9* 85.4   < >  --    < > 43.8*   < > 59.2*   < > 53.1*  --   --    HEPARIN ANTI-XA 0.56  --   --   --   --   --   --   --   --   --   --   --  >1.10*  --   --     < > = values in this interval not displayed.         Lab 06/04/24  0449 06/03/24  0555 06/02/24  0906 06/01/24  0450 05/31/24  1421   SODIUM 135* 136 133* 133* 133*   POTASSIUM 3.9 3.4* 3.1* 4.3 4.2   CHLORIDE 103 108* 99 100 99   CO2 19.0* 16.0* 16.0* 19.0* 22.0   ANION GAP 13.0 12.0 18.0* 14.0 12.0   BUN 38* 25* 22 40* 37*   CREATININE 3.72* 2.69* 2.79* 3.80* 3.47*   EGFR 16.6* 24.5* 23.5* 16.2* 18.1*   GLUCOSE 76 67 64* 76 92   CALCIUM 8.8 8.0* 8.9 8.7 8.9   MAGNESIUM 1.6  --   --   --   --    TSH  --   --   --   --  65.050*         Lab 06/02/24  0906 05/31/24  1421   TOTAL PROTEIN 5.4* 5.3*   ALBUMIN 2.4* 2.5*   GLOBULIN 3.0 2.8   ALT (SGPT) 7 10   AST (SGOT) 7 10   BILIRUBIN 0.4 0.3   ALK PHOS 240* 201*         Lab 06/01/24 0450 05/31/24 2148 05/31/24 2056 05/31/24  1421   HSTROP T 386*  --  404* 454*   PROTIME  --  24.3*  --   --    INR  --  2.16*  --   --              Lab 06/01/24  0944 06/01/24 0450 05/31/24  1421   IRON  --   --  13*   IRON SATURATION (TSAT)  --   --  7*   TIBC  --   --  179*   TRANSFERRIN  --   --  120*   FERRITIN  --  1,181.00*  --    FOLATE  --  4.36*  --    VITAMIN B 12  --  755  --    ABO TYPING B  --  B   RH TYPING Positive  --   Positive   ANTIBODY SCREEN Negative  --   --          Brief Urine Lab Results  (Last result in the past 365 days)        Color   Clarity   Blood   Leuk Est   Nitrite   Protein   CREAT   Urine HCG        06/02/24 1721 Yellow   Clear   Trace   Trace   Negative   100 mg/dL (2+)                   Microbiology Results Abnormal       Procedure Component Value - Date/Time    Blood Culture - Blood, Blood, Central Line [[id number]]  (Normal) Collected: 05/31/24 2237    Lab Status: Preliminary result Specimen: Blood, Central Line Updated: 06/05/24 0801     Blood Culture No growth at 4 days    Blood Culture - Blood, Arm, Right [[id number]]  (Normal) Collected: 05/31/24 2237    Lab Status: Preliminary result Specimen: Blood from Arm, Right Updated: 06/05/24 0745     Blood Culture No growth at 4 days    Urine Culture - Urine, Indwelling Urethral Catheter [[id number]]  (Normal) Collected: 06/02/24 1721    Lab Status: Final result Specimen: Urine from Indwelling Urethral Catheter Updated: 06/04/24 0955     Urine Culture No growth            Stress Test With Pet Myocardial Perfusion    Result Date: 6/4/2024    Myocardial perfusion imaging indicates a small-sized, moderately severe area of ischemia located in the mid anterior wall and lateral wall.  Sum stress score of 2   Impressions are consistent with a low risk study.   Left ventricular ejection fraction is mildly reduced (Calculated EF = 41%).   Rest EF = 37% Stress EF = 41%. Will discuss medical therapy versus invasive angiography with the patient although cardiac catheterization would be high risk for this patient due to multiple comorbidities.      Results for orders placed during the hospital encounter of 05/31/24    Adult Transthoracic Echo Complete W/ Cont if Necessary Per Protocol    Interpretation Summary    Left ventricular systolic function is normal. Left ventricular ejection fraction appears to be 51 - 55%.    Left ventricular wall thickness is consistent with  borderline concentric hypertrophy.    Left atrial volume is moderately increased.    There is mild calcification of the aortic valve.    Mild mitral valve regurgitation is present.    There is a small (<1cm) circumferential pericardial effusion.      Current medications:  Scheduled Meds:aspirin, 81 mg, Oral, Daily  collagenase, 1 Application, Topical, Q24H  dilTIAZem, 30 mg, Oral, Q6H  eravacycline dihydrochloride (XERAVA) 85 mg in sodium chloride 0.9 % 250 mL (0.34 mg/mL) IVPB, 85 mg, Intravenous, Q12H  famotidine, 10 mg, Oral, Daily  folic acid, 1 mg, Oral, Daily  lactobacillus acidophilus, 1 capsule, Oral, BID  levothyroxine, 75 mcg, Oral, Q AM  Lidocaine HCl gel, , Topical, BID  metoprolol succinate XL, 100 mg, Oral, Q24H  pharmacy consult - MTM, , Does not apply, Daily  sodium chloride, 10 mL, Intravenous, Q12H  vancomycin, 125 mg, Oral, Q6H      Continuous Infusions:heparin, 20.5 Units/kg/hr, Last Rate: 20.5 Units/kg/hr (06/05/24 8518)  Pharmacy Consult - Pharmacy to dose,   Pharmacy to Dose Heparin,       PRN Meds:.  acetaminophen    albumin human    heparin (porcine)    HYDROcodone-acetaminophen    Lidocaine HCl gel    LORazepam    metoprolol tartrate    Morphine    Pharmacy Consult - Pharmacy to dose    Pharmacy to Dose Heparin    sodium chloride    sodium chloride    Assessment & Plan   Assessment & Plan     Active Hospital Problems    Diagnosis  POA    **NSTEMI (non-ST elevated myocardial infarction) [I21.4]  Yes    Moderate malnutrition [E44.0]  Yes    C. difficile colitis [A04.72]  Yes    History of cholecystitis s/p cholecystostomy tube [K81.9]  Yes    ESRD (end stage renal disease) on hemodialysis [N18.6]  Yes    Atrial fibrillation [I48.91]  Yes    Essential hypertension [I10]  Yes      Resolved Hospital Problems   No resolved problems to display.        Brief Hospital Course to date:  Tavo Gudino is a 69 y.o. male with hx of HTN, PAD, left BKA, ESRD on hemodialysis, Afib on Eliquis, recent acute  cholecystitis s/p cholecystostomy tube placement at , chronic urinary retention with indwelling Delaney catheter, and previous hx of C.diff who presented from [facility] due to chest pain, elevated troponin, and Afib with RVR.  Found to have multiple other concerning issues including active C. difficile infection, concern for possible osteomyelitis, exposed tendon on right lower extremity.    This patient's problems and plans were partially entered by my partner and updated as appropriate by me 06/05/24.     Chest pain, resolved  Elevated troponin, possible NSTEMI  --Transferred to [facility] for [facility] with Dr. Amador  --Dr. Amador/Cardiology consulted, stress test with small area of ischemia.  With discussion with patient the decision was made to medically manage at this time.  --ASA, beta blocker--stop heparin drip and restart Eliquis     Afib with RVR  Hx of Afib/Aflutter  -- Toprol dosing adjusted per cardiology given episodes of intermittent RVR  --restart Eliquis   --Continue Metoprolol     HFrEF  Moderate MR  --Most recent Echo on file I April 2024: EF 40%, akinetic septal, anterior, and apical walls, severely dilated LA, moderate MR  --Follows with  Cardiology, had been evaluated previously for Vanessa clip  --ECHO 6/1: LVEF 51 to 55%, borderline concentric LVH, left atrial volume moderately increased, mild MR, small less than 1 cm circumferential pericardial effusion     Leukocytosis, improving  C.diff colitis  Concern for osteomyelitis   Exposed tendon on RLE  --Pe chart review, he had C.diff documented on 4/6/24 from   --Continue PO vanc and s/p dose of fosfomycin.  Will DC on p.o. Vanco taper  --Follow-up blood and urine cultures  --MRI left tib/fib showed cellulitis, possible osteomyelitis  --MRI right foot with cellulitis/edema  --Eravacycline per ID  --MRSA screen +  -- Arterial duplex abnormal; Vascular surgery consulted and recommended medical management with no vascular surgery  intervention  --Discussed with Dr. Yeager; likely needs additional amputation on LLE d/t concern for poor wound healing, likely needs debridement right lower extremity around the tendon with wound VAC placement; patient refusing further amputation of left lower extremity, but tentatively amenable to revision amputation, right lower extremity irrigation and debridement with wound VAC  --ID input appreciated--plan is just to continue the eravacycline for now until we have some idea of his d/c plan.     Recent cholecystitis s/p cholecystostomy tube  --Request records from that hospitalization, apparently was placed at   --Consulted General Surgery for management of tube, my practice partner discussed with Dr. Beckman  --Apparently already has follow up at  6/6/24 General Surgery--will need to reschedule this     Chronic urinary retention  --Has Delaney in place, reported hx of urethral injury per OSH note (data deficit, no idea when this was Delaney placed), appears he follows with Urology at  and this is a chronic Delaney  --Replaced Delaney here     PAD  Hx of left BKA  --Continue ASA     Anemia  --Likely chronic due to renal disease  --Folate 4.36 --> supplementation started; vitamin B 12 level 755  --Hb 7.5 here, had been 9 at OSH  --Monitor closely while on heparin drip     HTN  ESRD  --Nephrology consulted for dialysis   --Case management to work with patient and family in order to work on disposition location and finding an outpatient dialysis chair.     Hypothyroidism  --TSH significantly elevated at 91 at OSH, still elevated here to 65, but improved  --Synthroid dose was increased to 75 mcg daily at OSH  --Continue Synthroid at above dose and recheck in 4-6 weeks      Multiple wounds  Sacral Decubitus ulcer, POA, Stage 3  --Wound care consulted  --Ortho as above     Poor IV access  --Dr. Beckman placed central line    Expected Discharge Location and Transportation: TBD  Expected Discharge   Expected Discharge  Date: 6/6/2024; Expected Discharge Time:      DVT prophylaxis:  Medical and mechanical DVT prophylaxis orders are present.         AM-PAC 6 Clicks Score (PT): 6 (06/04/24 1200)    CODE STATUS:   Code Status and Medical Interventions:   Ordered at: 05/31/24 1429     Code Status (Patient has no pulse and is not breathing):    CPR (Attempt to Resuscitate)     Medical Interventions (Patient has pulse or is breathing):    Full Support       Otilia Tay MD  06/05/24

## 2024-06-05 NOTE — SIGNIFICANT NOTE
06/04/24 0730   Assessments (Pre/Post)   Consent Obtained yes   Safety vein preservation armband present   Hepatitis Status negative   Date Hepatitis Profile Obtained 04/13/24   Transport Modality bed   Cognitive/Neuro/Behavioral WDL   Cognitive/Neuro/Behavioral WDL WDL   Safety WDL   Safety WDL WDL   Cardiovascular WDL   Cardiac WDL X;rhythm   Cardiac Rhythm apical pulse irregular   ECG   Lead Monitored Lead II   Rhythm atrial rhythm   Atrial Rhythm atrial fibrillation   Respiratory WDL   Rhythm/Pattern, Respiratory unlabored;pattern regular;depth regular   Expansion/Accessory Muscles/Retractions no use of accessory muscles;no retractions;expansion symmetric   Pain   Preferred Pain Scale number (Numeric Rating Pain Scale)   (0-10) Pain Rating: Rest 0   Interventions   Safety Promotion/Fall Prevention activity supervised;assistive device/personal items within reach;clutter free environment maintained;fall prevention program maintained;nonskid shoes/slippers when out of bed;room organization consistent;safety round/check completed   Enhanced Safety Measures bed alarm set;room near unit station   Medication Review/Management medications reviewed;high-risk medications identified   Machine Checks   Machine Number 4   Station Number 2   RO Number [id number]   Machine Temperature 96.6 °F (35.9 °C)   Conductivity 137   pH Level 7.3   Chloramines 0   Alarms Activated yes   Hemodialysis Prescription   Mode of Treatment HD (hemodialysis)   Treatment Duration (hours) 3.5   Blood Flow (BFR) 400   Dialysate (K) 2   Dialysate (Ca) 2.5   Na+ Program 137   Bicarb (mEq/L) 35   UF Goal 2L   Heparin Concentration not applicable   Heparin Dosage none   Heparin Comment received in heparin drip   Dialysate Flow 700   Dialyzer Revaclear   Special Orders keep systolic BP above (specify);keep MAP above (specify)  (90;65)   Vitals   Initiation air foam detector engaged;all connections secured;parameters set, arterial;parameters set, venous  "  Temp 96.7 °F (35.9 °C)   Temp src Axillary   Heart Rate 102   Heart Rate Source Monitor   Resp 16   Resp Rate Source Visual   BP (!) 158/104   Noninvasive MAP (mmHg) 122   BP Location Right arm   BP Method Automatic   Patient Position Lying   Oxygen Therapy   Pulse Oximetry Type Continuous   Device (Oxygen Therapy) room air   Treatment Assessment   Blood Flow Rate (BFR) 300   Venous Pressure () 130   Arterial Pressure (AP) -100   Hemodialysis, Fluids 300   Total Mean Pressure (TMP) 10   Ultrafiltration Rate (UFR) 220   Safety Check WDL WDL   Hemodialysis, Comments HD initiated   Hemodialysis Cath Double   No Placement date: If unknown, DO NOT use \"Add Comment\" note or Placement time: If unknown, DO NOT use \"Add Comment\" note found.   Present on Admission? Select all that apply: Unknown placement date/time  Orientation: Right  Access Location: Subclavian   Site Assessment Clean;Dry;Intact   #1 Lumen Status Blood return noted;Flushed;Infusing   #2 Lumen Status Blood return noted;Flushed;Infusing   Line Care Connections checked and tightened;Cap changed   Dressing Type Antimicrobial dressing/disc;Transparent   Dressing Status Clean;Dry;Intact   Dressing Change Due 06/08/24     "

## 2024-06-05 NOTE — PROGRESS NOTES
"Enter Query Response Below      Query Response: Left gluteal pressure injury, Stage 3, present on admission     Electronically signed by Otilia Tay MD, 24, 2:45 PM EDT.               If applicable, please update the problem list.       Patient: Tavo Gudino        : 1955  Account: [id number]           Admit Date: 24        How to Respond to this query:       a. Click New Note     b. Answer query within the yellow box.                c. Update the Problem List, if applicable.      If you have any questions about this query contact me at: [email or web address]     :       69-year-old male with history of PAD with Left BKA admitted (24) with NSTEMI and Atrial Fibrillation. Patient also has diagnosis of \"C diff colitis initially diagnosed in 2024\" per ID consult note. Per H&P, \"Multiple wounds.\" Infectious disease consult note () includes, \" Left buttock DTI with slough, bleeding.\" Patient seen by wound care on (). Wound care nursing note () states, \"Left gluteal has it what looks like a stage III as there is subcutaneous tissue exposed but it is rather superficial it is around about 3.5 cm in diameter it is also bleeding.\"   Per wound care note, suggested treatment, \"Cleanse with 5-minute Vashe soak and applied silicone foam after a barrier sprayed x-ray I put collagen in there because its Ag collagen to stop the bleeding spray periwound with barrier spray and then covered with silicone foam.\"     Please clarify if the patient was treated/monitored for:    Left gluteal pressure injury, Stage 3, present on admission  Left gluteal Deep tissue pressure injury, present on admission  Left gluteal Deep tissue pressure injury, developed during stay  Other- specify______  Unable to determine    By submitting this query, we are merely seeking further clarification of documentation to accurately reflect all conditions that you are monitoring, evaluating, " treating or that extend the hospitalization or utilize additional resources of care. Please utilize your independent clinical judgment when addressing the question(s) above.     This query and your response, once completed, will be entered into the legal medical record.    Sincerely,  Kayla Tapia MSN, RN   Clinical Documentation Integrity Program

## 2024-06-05 NOTE — PROGRESS NOTES
"  [facility]  PROGRESS NOTE    Date of Admission: 5/31/2024  Date of Service: 06/05/24    Primary Care Physician: Hayden Weinstein MD    Chief Complaint: f/u elevated troponin, Afib with RVR   Problem List:   NSTEMI (non-ST elevated myocardial infarction)    C. difficile colitis    History of cholecystitis s/p cholecystostomy tube    ESRD (end stage renal disease) on hemodialysis    Atrial fibrillation    Essential hypertension    Moderate malnutrition      Subjective      HPI: Doing well today, denies chest pain or significant shortness of breath, A-fib with rates controlled.      Objective   Vitals: /82 (BP Location: Right arm, Patient Position: Lying)   Pulse 74   Temp 98 °F (36.7 °C) (Oral)   Resp 18   Ht 185.4 cm (72.99\")   Wt 84.8 kg (186 lb 15.2 oz)   SpO2 100%   BMI 24.67 kg/m²     Physical Exam:  General Appearance:   · well developed  · well nourished  Neck:  · thyroid not enlarged  · supple  Respiratory:  · no respiratory distress  · normal breath sounds  · no rales  Cardiovascular:  · no jugular venous distention  · irregular irregular rhythm  · apical impulse normal  · S1 normal, S2 normal  · no S3, no S4   · no murmur  · no rub, no thrill  · carotid pulses normal; no bruit  · pedal pulses normal  · lower extremity edema: none    Skin:   warm, dry    EXTREMITIES: LLE BKA, RLE with discoloration present and nonhealing ulcers with dressing in place.     Results:  Results from last 7 days   Lab Units 06/04/24 0449 06/03/24  0555 06/02/24  0906   WBC 10*3/mm3 8.82 9.32 12.36*   HEMOGLOBIN g/dL 8.4* 8.5* 9.1*   HEMATOCRIT % 27.0* 27.3* 27.5*   PLATELETS 10*3/mm3 265 257 262     Results from last 7 days   Lab Units 06/04/24 0449 06/03/24  0555 06/02/24  0906   SODIUM mmol/L 135* 136 133*   POTASSIUM mmol/L 3.9 3.4* 3.1*   CHLORIDE mmol/L 103 108* 99   CO2 mmol/L 19.0* 16.0* 16.0*   BUN mg/dL 38* 25* 22   CREATININE mg/dL 3.72* 2.69* 2.79*   GLUCOSE mg/dL 76 " 67 64*      Lab Results   Component Value Date    AST 7 06/02/2024    ALT 7 06/02/2024       Results from last 7 days   Lab Units 05/31/24  1421   TSH uIU/mL 65.050*   FREE T4 ng/dL 0.53*         Results from last 7 days   Lab Units 06/05/24  0001 06/04/24  1319 06/04/24  0449 06/01/24  0450 05/31/24  2148   PROTIME Seconds  --   --   --   --  24.3*   INR   --   --   --   --  2.16*   APTT seconds 51.6* 58.9* 51.9*   < > 53.1*    < > = values in this interval not displayed.     Results from last 7 days   Lab Units 06/01/24  0450 05/31/24 2056 05/31/24  1421   CK TOTAL U/L 12*  --   --    HSTROP T ng/L 386* 404* 454*       Intake/Output Summary (Last 24 hours) at 6/5/2024 0817  Last data filed at 6/5/2024 0500  Gross per 24 hour   Intake 480 ml   Output 2715 ml   Net -2235 ml     I personally reviewed the patient's EKG/Telemetry data    Radiology Data:   No radiology results for the last day    Stress PET 6/4/24:  Interpretation Summary         Myocardial perfusion imaging indicates a small-sized, moderately severe area of ischemia located in the mid anterior wall and lateral wall.  Sum stress score of 2    Impressions are consistent with a low risk study.    Left ventricular ejection fraction is mildly reduced (Calculated EF = 41%).    Rest EF = 37% Stress EF = 41%.     Will discuss medical therapy versus invasive angiography with the patient although cardiac catheterization would be high risk for this patient due to multiple comorbidities.       Current Medications:  aspirin, 81 mg, Oral, Daily  collagenase, 1 Application, Topical, Q24H  dilTIAZem, 30 mg, Oral, Q6H  eravacycline dihydrochloride (XERAVA) 85 mg in sodium chloride 0.9 % 250 mL (0.34 mg/mL) IVPB, 85 mg, Intravenous, Q12H  famotidine, 10 mg, Oral, Daily  folic acid, 1 mg, Oral, Daily  levothyroxine, 75 mcg, Oral, Q AM  Lidocaine HCl gel, , Topical, BID  metoprolol succinate XL, 100 mg, Oral, Q24H  pharmacy consult - MTM, , Does not apply, Daily  sodium  chloride, 10 mL, Intravenous, Q12H  vancomycin, 125 mg, Oral, Q6H      heparin, 20.5 Units/kg/hr, Last Rate: 20.5 Units/kg/hr (06/05/24 4684)  Pharmacy Consult - Pharmacy to dose,   Pharmacy to Dose Heparin,         Assessment and Plan:   1. NSTEMI  - elevated HS troponin at OSH with Afib with RVR and symptoms of chest pain. EKGs from OSH not available for review  - HS troponin here 454 -- 386, EKGs with Afib/RVR no acute ischemic changes   - continue ASA, BB   - echo with normal LVEF, mild MR  -Stress test shows mild small area of ischemia in the mid anterior lateral zone, long discussion today regarding stress test results, at this time I feel it is more prudent to continue medical therapy, patient agrees, we will defer invasive angiography.  Rate control and antianginals for chest pain.    Discontinue heparin, okay to resume Eliquis.     2. Afib with RVR  - rate control and anticoagulation   - continue BB, and Heparin gtt for anticoagulation   - Metoprolol increased to 100mg and Diltiazem added yesterday for better rate control      3. ESRD on dialysis  - NAL following, still making some urine      4. PAD, s/p left BKA and nonhealing ulcers of RLE  - Ortho and ID following  - Ortho has signed off as patient declined further surgical intervention of his wounds/ LE    5. C-diff  - per ID      6. Anemia  - acute/chronic  - per primary service      7. Recent cholecystitis with GB drain in place  - Dr. Beckman has seen, recommend OP follow up with        Fred Amador MD, FACC, [facility]  Interventional Cardiology

## 2024-06-05 NOTE — SIGNIFICANT NOTE
06/01/24 1130   Assessments (Pre/Post)   Consent Obtained yes   Safety vein preservation armband present   Hepatitis Status negative   Date Hepatitis Profile Obtained 04/13/24   Transport Modality bed   Cognitive/Neuro/Behavioral WDL   Cognitive/Neuro/Behavioral WDL WDL   Safety WDL   Safety WDL WDL   Cardiovascular WDL   Cardiac WDL X;rhythm   Cardiac Rhythm radial pulse irregular   ECG   Lead Monitored Lead II   Rhythm atrial rhythm   Atrial Rhythm atrial fibrillation   Frequency/Ectopy Bigeminy   Pain   Preferred Pain Scale number (Numeric Rating Pain Scale)   (0-10) Pain Rating: Rest 0   Interventions   Safety Promotion/Fall Prevention activity supervised;clutter free environment maintained;assistive device/personal items within reach;fall prevention program maintained;nonskid shoes/slippers when out of bed;safety round/check completed;room organization consistent   Enhanced Safety Measures room near unit station   Medication Review/Management medications reviewed;high-risk medications identified   Machine Checks   Machine Number 5   Station Number 1   RO Number [id number]   Machine Temperature 96.8 °F (36 °C)   Conductivity 13.7   pH Level 7.2   Chloramines 0   Alarms Activated yes   Hemodialysis Prescription   Mode of Treatment HD (hemodialysis)   Treatment Duration (hours) 3.5   Blood Flow (BFR) 400   Dialysate (K) 2   Dialysate (Ca) 2.5   Na+ Program 137   Bicarb (mEq/L) 30   UF Goal 2L   Heparin Concentration not applicable   Heparin Dosage none   Heparin Comment received in heparin drip   Dialysate Flow 500   Dialyzer Revaclear   Special Orders keep systolic BP above (specify);keep MAP above (specify)  (90;65)   Vitals   Initiation air foam detector engaged;all connections secured;parameters set, arterial;parameters set, venous   Temp 98.2 °F (36.8 °C)   Temp src Oral   Heart Rate 90   Heart Rate Source Monitor   Resp 15   Resp Rate Source Monitor   /79   Noninvasive MAP (mmHg) 122   BP Location Right  "arm   BP Method Automatic   Patient Position Lying   Oxygen Therapy   SpO2 100 %   Pulse Oximetry Type Continuous   Device (Oxygen Therapy) room air   Treatment Assessment   Blood Flow Rate (BFR) 400   Venous Pressure () 130   Arterial Pressure (AP) -150   Total Mean Pressure (TMP) 5   Ultrafiltration Rate (UFR) 220   Safety Check WDL WDL   Hemodialysis, Comments HD initiated   Hemodialysis Cath Double   No Placement date: If unknown, DO NOT use \"Add Comment\" note or Placement time: If unknown, DO NOT use \"Add Comment\" note found.   Present on Admission? Select all that apply: Unknown placement date/time  Orientation: Right  Access Location: Subclavian   Site Assessment Clean;Dry;Intact   #1 Lumen Status Blood return noted;Flushed;Infusing   #2 Lumen Status Blood return noted;Flushed;Infusing   Line Care Connections checked and tightened;Cap changed   Dressing Type Transparent;Gauze   Dressing Status Clean;Dry;Intact   Dressing Change Due 06/01/24     "

## 2024-06-05 NOTE — PROGRESS NOTES
Tavo Gudino       LOS: 5 days   Patient Care Team:  Hayden Weinstein MD as PCP - General (Nephrology)    Chief Complaint: Left below knee wound dehiscence, right ankle wound    Subjective     Interval History:     Resting comfortably in bed this morning.  Pain tolerable, no acute events overnight.    Review of Systems:     Gen- No fevers, chills  CV- No chest pain, palpitations  Resp- No cough, dyspnea  GI- No N/V/D, abd pain    Objective     Vital Signs  Vital Signs (last 24 hours)         06/02 0700  06/03 0659 06/03 0700  06/03 0805   Most Recent      Temp (°F) 97.4 -  97.8      97.9     97.9 (36.6) 06/03 0754    Heart Rate 104 -  124      86     86 06/03 0754    Resp   18      18     18 06/03 0754    /83 -  153/91      134/87     134/87 06/03 0754    SpO2 (%)     100     100 06/03 0754              Physical Exam:     No acute distress.  Nonlabored respirations.  Regular rate and rhythm.  Abdomen nondistended.  Right lower extremity: Dressing present is clean, dry, intact.  Left lower extremity: Dressing present is clean, dry, intact.     Results Review:     I reviewed the patient's new clinical results.    Medication Review:   Hospital Medications (active)         Dose Frequency Start End    acetaminophen (TYLENOL) tablet 650 mg 650 mg Every 4 Hours PRN 5/31/2024 --    Admin Instructions: If given for fever, use fever parameter: fever greater than 100.4 °F  Based on patient request - if ordered for moderate or severe pain, provider allows for administration of a medication prescribed for a lower pain scale.    Do not exceed 4 grams of acetaminophen in a 24 hr period. Max dose of 2gm for AST/ALT greater than 120 units/L.    If given for pain, use the following pain scale:   Mild Pain = Pain Score of 1-3, CPOT 1-2  Moderate Pain = Pain Score of 4-6, CPOT 3-4  Severe Pain = Pain Score of 7-10, CPOT 5-8    Route: Oral    aspirin EC tablet 81 mg 81 mg Daily 5/31/2024 --    Admin Instructions: Do not crush or chew  the capsules or tablets. The drug may not work as designed if the capsule or tablet is crushed or chewed. Swallow whole.  Do not exceed 4 grams of aspirin in a 24 hr period.    If given for pain, use the following pain scale:   Mild Pain = Pain Score of 1-3, CPOT 1-2  Moderate Pain = Pain Score of 4-6, CPOT 3-4  Severe Pain = Pain Score of 7-10, CPOT 5-8    Route: Oral    collagenase ointment 1 Application 1 Application Every 24 Hours Scheduled 5/31/2024 --    Admin Instructions: Apply to slough on left amputation site and slough on posterior right heel. .    Route: Topical    Cosign for Ordering: Accepted by Genny Saeed MD on 5/31/2024  7:38 PM    eravacycline dihydrochloride (XERAVA) 85 mg in sodium chloride 0.9 % 250 mL (0.34 mg/mL) IVPB 85 mg Every 12 Hours 6/2/2024 6/7/2024    Admin Instructions: Infuse through dedicated line or via Y-site. If the same IV line is used for sequential infusion of several drugs, flush line with NS before and after eravacycline administration. Do not mix with other drugs or add to solutions containing other drugs.  Dilute in 0.9% sodium chloride to a target concentration of 0.3 mg/mL (range 0.2-0.6 mg/mL)    Notes to Pharmacy: !! Ensure final concentration of 0.2 - 0.6 mg/mL !!    Route: Intravenous    heparin 31071 units/250 mL (100 units/mL) in 0.45 % NaCl infusion 19 Units/kg/hr × 84.8 kg Titrated 5/31/2024 --    Admin Instructions: Pharmacy dosing - Cardiac or Other NOT VTE - Boluses (No initial bolus)    Route: Intravenous    HYDROcodone-acetaminophen (NORCO) 7.5-325 MG per tablet 1 tablet 1 tablet Every 6 Hours PRN 5/31/2024 6/5/2024    Admin Instructions: Based on patient request - if ordered for moderate or severe pain, provider allows for administration of a medication prescribed for a lower pain scale.  [VICTOR HUGO]    Do not exceed 4 grams of acetaminophen in a 24 hr period. Max dose of 2gm for AST/ALT greater than 120 units/L        If given for pain, use the following  pain scale:   Mild Pain = Pain Score of 1-3, CPOT 1-2  Moderate Pain = Pain Score of 4-6, CPOT 3-4  Severe Pain = Pain Score of 7-10, CPOT 5-8    Route: Oral    levothyroxine (SYNTHROID, LEVOTHROID) tablet 75 mcg 75 mcg Every Early Morning 6/1/2024 --    Admin Instructions: Take on empty stomach.    Route: Oral    Lidocaine HCl gel (XYLOCAINE) urethral/mucosal syringe  As Needed 5/31/2024 --    Admin Instructions: Apply to urethra as needed. Based on patient request - if ordered for moderate or severe pain, provider allows for administration of a medication prescribed for a lower pain scale.    Route: Topical    lidocaine HCL topical jelly USP 2% syringe 11 mL  2 Times Daily 6/1/2024 --    Admin Instructions: Apply to areas of lower gluteals, perineum, groin and scrotum. Can mix with calazime and then apply - Glydo syringe used for Lidocaine 2% gel (unavailable)    Route: Topical    LORazepam (ATIVAN) tablet 0.25 mg 0.25 mg Every 6 Hours PRN 6/2/2024 6/7/2024    Admin Instructions:  Caution: Look alike/sound alike drug alert    Route: Oral    metoprolol succinate XL (TOPROL-XL) 24 hr tablet 50 mg 50 mg Every 24 Hours Scheduled 5/31/2024 --    Admin Instructions: Hold for SBP less than 100, DBP less than 60, or heart rate less than 50    Do not crush or chew the capsules or tablets. The drug may not work as designed if the capsule or tablet is crushed or chewed. Swallow whole.  Do not crush or chew.    Route: Oral    metoprolol tartrate (LOPRESSOR) injection 5 mg 5 mg Every 6 Hours PRN 6/1/2024 --    Admin Instructions: Hold for SBP less than 100, DBP less than 60, or heart rate less than 50. If a dose is held, please contact the provider.    Route: Intravenous    Morphine sulfate (PF) injection 4 mg 4 mg Daily PRN 5/31/2024 6/5/2024    Admin Instructions: Based on patient request - if ordered for moderate or severe pain, provider allows for administration of a medication prescribed for a lower pain scale.  Subcutaneous until IV access can be established      Caution: Look alike/sound alike drug alert    If given for pain, use the following pain scale:  Mild Pain = Pain Score of 1-3, CPOT 1-2  Moderate Pain = Pain Score of 4-6, CPOT 3-4  Severe Pain = Pain Score of 7-10, CPOT 5-8    Route: Intravenous    pantoprazole (PROTONIX) EC tablet 40 mg 40 mg Every Early Morning 6/2/2024 --    Admin Instructions: Swallow whole; do not crush, split, or chew.    Route: Oral    Pharmacy Consult - MTM  Daily 6/1/2024 --    Admin Instructions: Contact Pharmacy prior to discharge.    Route: Does not apply    Pharmacy Consult - Pharmacy to dose  Continuous PRN 6/2/2024 --    Route: Does not apply    Pharmacy to Dose Heparin  Continuous PRN 5/31/2024 --    Admin Instructions: Boluses (No initial bolus)    Route: Does not apply    sodium chloride 0.9 % flush 10 mL 10 mL Every 12 Hours Scheduled 5/31/2024 --    Route: Intravenous    sodium chloride 0.9 % flush 10 mL 10 mL As Needed 5/31/2024 --    Route: Intravenous    sodium chloride 0.9 % infusion 40 mL 40 mL As Needed 5/31/2024 --    Admin Instructions: Following administration of an IV intermittent medication, flush line with 40mL NS at 100mL/hr.    Route: Intravenous    vancomycin (VANCOCIN) capsule 125 mg 125 mg Every 6 Hours Scheduled 5/31/2024 6/10/2024    Admin Instructions: Do not crush or chew the capsules or tablets. Contact Pharmacy if needed.    Route: Oral              Assessment & Plan     69-year-old male with left below-knee amputation site ulceration, right lateral shin ulcer with exposed peroneal tendons.      NSTEMI (non-ST elevated myocardial infarction)    C. difficile colitis    History of cholecystitis s/p cholecystostomy tube    ESRD (end stage renal disease) on hemodialysis    Atrial fibrillation    Essential hypertension    Moderate malnutrition    Vascular surgery has seen and evaluated patient, no further revascularization procedures indicated at this  time.    Underwent cardiac stress testing yesterday.    I again discussed with the patient further management options including revision left below-knee amputation versus above-knee amputation, right leg debridement, irrigation, wound vacuum-assisted closure.  At this time the patient reports that his prior surgeon/wound care team felt that he was making good progress.  He is reluctant to consider further amputation, is also reluctant to consider surgical irrigation, debridement, wound vacuum-assisted closure or revision amputation of any kind.  He states that he desires to return to his prior team/wound care presumably at the [facility]. We have discussed that these are limb threatening wounds.     As he is not agreeable to surgical intervention at this time, plan for wound care per PT/wound care clinic, close observation. Follow up with [facility] wound care upon discharge.    If he were to change his mind, we will discuss further management options at that time.  Will tentatively sign off for now, please do not hesitate to reach out to orthopedic services for any further questions/concerns.    KHANG Grijalva  06/05/24  07:44 EDT

## 2024-06-05 NOTE — PROGRESS NOTES
HEPARIN INFUSION  Tavo Gudino is a  69 y.o. male receiving heparin infusion.     Therapy for (VTE/Cardiac):   ACS  Patient Weight: 84.8kg  Initial Bolus (Y/N):   N  Any Bolus (Y/N):    No bolus x 72 hours (may bolus 6/3 at 22:00)    Signs or Symptoms of Bleeding: None per RN    Cardiac or Other (Not VTE)   Initial Bolus: 60 units/kg (Max 4,000 units)  Initial rate: 12 units/kg/hr (Max 1,000 units/hr)   Anti Xa Rebolus Infusion Hold time Change infusion Dose (Units/kg/hr) Next Anti Xa or aPTT Level Due   < 0.11 50 Units/kg  (4000 Units Max) None Increase by  3 Units/kg/hr 6 hours   0.11- 0.19 25 Units/kg  (2000 Units Max) None Increase by  2 Units/kg/hr 6 hours   0.2 - 0.29 0 None Increase by  1 Units/kg/hr 6 hours   0.3 - 0.5 0 None No Change 6 hours (after 2 consecutive levels in range check qAM)   0.51 - 0.6 0 None Decrease by  1 Units/kg/hr 6 hours   0.61 - 0.8 0 30 Minutes Decrease by  2 Units/kg/hr 6 hours   0.81 - 1 0 60 Minutes Decrease by  3 Units/kg/hr 6 hours   >1 0 Hold  After Anti Xa less than 0.5 decrease previous rate by  4 Units/kg/hr  Every 2 hours until Anti Xa  less than 0.5 then when infusion restarts in 6 hours     Results from last 7 days   Lab Units 06/04/24  0449 06/03/24  0555 06/02/24  0906 06/01/24  0450 05/31/24  2148   INR   --   --   --   --  2.16*   HEMOGLOBIN g/dL 8.4* 8.5* 9.1*   < >  --    HEMATOCRIT % 27.0* 27.3* 27.5*   < >  --    PLATELETS 10*3/mm3 265 257 262   < >  --     < > = values in this interval not displayed.        Date   Time   aPTT / anti-Xa Current Rate (Unit/kg/hr) Bolus   (Units) Rate Change   (Unit/kg/hr) New Rate (Unit/kg/hr) Next   aPTT Comments  Pump Check Daily   5/31  53.1 NEW START -- +11 11 0200 DW RN     59.2 11 -- +1 12 1300 Christy 3250 -[facility]   6/1 2250 43.4 12 -- +3 15 0600 Christy 3239 -[facility]   6/2 0906 43.8 15 --  +3 18 1600 D/W RN Carolyn.    6/2 2032 53.4 18 -- +1 19 0300 D/w RN   6/3 0323 72.8 19 -- -- 19 1000 Elva 3250 -[facility]   6/3 1054 57.7 19 -- +1  20 2000 D/w JESUS Leon, pump verified by RN due to precautions.   6/3 2005 85.4 17.7 Hold 30 minutes -1.2 16.5 0400 Heparin was infusing at 17.7 units/kg/hr per RN (not 20 units/kg/hr as previously documented).  Hold 30 minutes and decrease 1.2 units/kg/hr  Joseph  3184 -acb   6/4 0449 51.9 16.5 -- +1 17.5 1200 Heparin was on hold for 20-30 minutes prior to drawing from line to aPTT level is probably falsely low. Will slightly increase and re-check in 6 hours  Joseph 3184 -acb   6/4 1319 58.9 17.5 -- +1 18.5 2200 D/w RN   6/5 0001 51.6 18.5 -- +2 20.5 0800 Paty 3250 -acb   6/5 0753 PTT 70.3  aXa: 0.56 20.5 -- -- 20.5 1500 D/w JESUS Leon, pump verified by RN given precautions. Will keep rate same given recent fluctuations/borderline levels.                                                                                                        Fer Maurer, PharmD  6/5/2024  09:42 EDT

## 2024-06-05 NOTE — PROGRESS NOTES
" LOS: 5 days   Patient Care Team:  Hayden Weinstein MD as PCP - General (Nephrology)    Chief Complaint: ESRD  NSTEMI    Subjective     HD yesterday. Developed intermittent afib w RVR during HD requiring IV metoprolol PRN. Pending NSTEMI evaluation. Doing well today.       Subjective:  Symptoms:  Stable.  No shortness of breath or chest pain.        History taken from: patient    Objective     Vital Sign Min/Max for last 24 hours  Temp  Min: 97.4 °F (36.3 °C)  Max: 98.1 °F (36.7 °C)   BP  Min: 114/82  Max: 153/71   Pulse  Min: 62  Max: 86   Resp  Min: 18  Max: 18   SpO2  Min: 100 %  Max: 100 %   No data recorded   Weight  Min: 84.8 kg (186 lb 15.2 oz)  Max: 84.8 kg (186 lb 15.2 oz)     Flowsheet Rows      Flowsheet Row First Filed Value   Admission Height 185.4 cm (73\") Documented at 05/31/2024 1826   Admission Weight 84.8 kg (187 lb) Documented at 05/31/2024 1826            No intake/output data recorded.  I/O last 3 completed shifts:  In: 480 [P.O.:170; Other:310]  Out: 3140 [Urine:625; Drains:675]    Objective       Gen: Alert, NAD   HENT: NC, AT, EOMI   NECK: Supple, no JVD, Trachea midline   LUNGS: CTA bilaterally, non labored respirtation   CVS: S1/S2 audible, RRR, no murmur   Abd: Soft, NT, ND, BS+   Ext: No pedal edema, no cyanosis   CNS: Alert, No focal deficit noted grossly  Psy: Cooperative  Skin: Warm, dry and intact        Results Review:     I reviewed the patient's new clinical results.    WBC WBC   Date Value Ref Range Status   06/04/2024 8.82 3.40 - 10.80 10*3/mm3 Final   06/03/2024 9.32 3.40 - 10.80 10*3/mm3 Final      HGB Hemoglobin   Date Value Ref Range Status   06/04/2024 8.4 (L) 13.0 - 17.7 g/dL Final   06/03/2024 8.5 (L) 13.0 - 17.7 g/dL Final      HCT Hematocrit   Date Value Ref Range Status   06/04/2024 27.0 (L) 37.5 - 51.0 % Final   06/03/2024 27.3 (L) 37.5 - 51.0 % Final      Platlets No results found for: \"LABPLAT\"   MCV MCV   Date Value Ref Range Status   06/04/2024 98.2 (H) 79.0 - 97.0 " "fL Final   06/03/2024 95.8 79.0 - 97.0 fL Final          Sodium Sodium   Date Value Ref Range Status   06/04/2024 135 (L) 136 - 145 mmol/L Final   06/03/2024 136 136 - 145 mmol/L Final      Potassium Potassium   Date Value Ref Range Status   06/04/2024 3.9 3.5 - 5.2 mmol/L Final   06/03/2024 3.4 (L) 3.5 - 5.2 mmol/L Final     Comment:     Slight hemolysis detected by analyzer. Result may be falsely elevated.      Chloride Chloride   Date Value Ref Range Status   06/04/2024 103 98 - 107 mmol/L Final   06/03/2024 108 (H) 98 - 107 mmol/L Final      CO2 CO2   Date Value Ref Range Status   06/04/2024 19.0 (L) 22.0 - 29.0 mmol/L Final   06/03/2024 16.0 (L) 22.0 - 29.0 mmol/L Final      BUN BUN   Date Value Ref Range Status   06/04/2024 38 (H) 8 - 23 mg/dL Final   06/03/2024 25 (H) 8 - 23 mg/dL Final      Creatinine Creatinine   Date Value Ref Range Status   06/04/2024 3.72 (H) 0.76 - 1.27 mg/dL Final   06/03/2024 2.69 (H) 0.76 - 1.27 mg/dL Final      Calcium Calcium   Date Value Ref Range Status   06/04/2024 8.8 8.6 - 10.5 mg/dL Final   06/03/2024 8.0 (L) 8.6 - 10.5 mg/dL Final      PO4 No results found for: \"CAPO4\"   Albumin No results found for: \"ALBUMIN\"     Magnesium Magnesium   Date Value Ref Range Status   06/04/2024 1.6 1.6 - 2.4 mg/dL Final      Uric Acid No results found for: \"URICACID\"     Medication Review: Yes    Assessment & Plan       NSTEMI (non-ST elevated myocardial infarction)    C. difficile colitis    History of cholecystitis s/p cholecystostomy tube    ESRD (end stage renal disease) on hemodialysis    Atrial fibrillation    Essential hypertension    Moderate malnutrition      Assessment & Plan    ESRD: On TTS jonathan. HD through right IJ TDC. Still makes urine. Does have cole cath+ for chronic urinary retention. Patient the family Primary nephrologist ( Dr Wan) was monitoring for renal recovery and residual renal function.      Volume status: No significant LE edema noted. Does have scrotal edema.   "   Afib w RVR: RVR during HD treatment. BP controlled. . No chest pain or sob.       Met acidosis: Mild. Management with HD.     Anemia: ACD. Transfuse at hb<7.  ALLISON on HD days     CAD: Transferred from OSH for evaluation of ACD. Cardiology following. EF 55%    Plan:  - Hd per TTS jonathan. 4k bath to minimize RVR   - Renal diet ( Doesn't want to follow)  - ALLISON with HD   - Metoprolol for RVR during HD on PRN basis.     Hayden Weinstein MD  06/05/24  12:00 EDT

## 2024-06-05 NOTE — DISCHARGE PLACEMENT REQUEST
Cali Sue (69 y.o. Male)     To [facility]  From [facility](CM at [facility]) [phone]       [facility] Encounter Date/Time: 2024 [facility]   Hospital Account: [id number]    MRN: [id number]   Patient:  Cali Sue   Contact Serial #: [id number]   SSN:          ENCOUNTER             Patient Class: Inpatient   Unit: [address] Service: Medicine     Bed: N616/1   Admitting Provider: Otilia Tay, *   Referring Physician: Nathan Whitney   Attending Provider: Otilia Tay, *   Adm Diagnosis: NSTEMI (non-ST elevated *               PATIENT             Name: Cali Sue : 1955 (69 yrs)   Address: [address] [place] [address] Sex: Male   City: [place] [address]   County: [place]   Marital Status:  Ethnicity: NOT                                                                         Race: WHITE   Primary Care Provider: Hayden Weinstein MD Patients Phone: Home Phone: [phone]     Mobile Phone: [phone]     EMERGENCY CONTACT   Contact Name Legal Guardian? Relationship to Patient Home Phone Work Phone Mobile Phone   1. JANICE SUE  2. *No Contact Specified*      Spouse    [phone]      GUARANTOR             Guarantor: Cali Sue     : 1955   Address: [address] [place] [address] Sex: [place] [address]     Relation to Patient: Self       Home Phone: [phone]   Guarantor ID: [id number]       Work Phone:     GUARANTOR EMPLOYER   Employer:           Status: RETIRED   COVERAGE          PRIMARY INSURANCE   Payor: HUMANA MEDICARE REPLACEMENT Plan: HUMANA MED ADV HMO   Group Number: [id number] Insurance Type: INDEMNITY   Subscriber Name: CALI SUE Subscriber : 1955   Subscriber ID: [id number] Coverage Address: [address] [address]   Pat. Rel. to Subscriber: Self Coverage Phone: [phone]   SECONDARY INSURANCE   Payor: N/A Plan: N/A   Group Number:   Insurance Type:     Subscriber Name:    "Subscriber :     Subscriber ID:   Coverage Address:     Pat. Rel. to Subscriber:   Coverage Phone:        Contact Serial # ([id number])         2024    Chart ID [id number])               Date of Birth   1955    Social Security Number       Address   [address] [place] [address] [facility] [address]    Home Phone   [phone]    MRN   [id number]       Hinduism   None    Marital Status                               Admission Date   24    Admission Type   Urgent    Admitting Provider   Otilia Tay MD    Attending Provider   Otilia Tay MD    Department, Room/Bed   [facility] 6A, N616/1       Discharge Date       Discharge Disposition       Discharge Destination                                 Attending Provider: Otilia Tay MD    Allergies: Penicillins, Levothyroxine, Hydromorphone    Isolation: Spore   Infection: C.difficile (24), MRSA (24)   Code Status: CPR    Ht: 185.4 cm (72.99\")   Wt: 84.8 kg (186 lb 15.2 oz)    Admission Cmt: None   Principal Problem: NSTEMI (non-ST elevated myocardial infarction) [I21.4]                   Active Insurance as of 2024       Primary Coverage       Payor Plan Insurance Group Employer/Plan Group    HUMANA MEDICARE REPLACEMENT HUMANA MED ADV HMO [id number]       Payor Plan Address Payor Plan Phone Number Payor Plan Fax Number Effective Dates    [address] [address] [phone]  2023 - None Entered    [facility] [address]         Subscriber Name Subscriber Birth Date Member ID       CALI GUDINO 1955 [id number]                     Emergency Contacts        (Rel.) Home Phone Work Phone Mobile Phone    JANICE GUDINO (Spouse) [phone] -- [phone]              Insurance Information                  HUMANA MEDICARE REPLACEMENT/HUMANA MED ADV HMO Phone: [phone]    Subscriber: Cali Gudino Subscriber#: [id number]    Group#: [id number] Precert#: [id number] "             History & Physical        Genny Saeed MD at 24 1429              [facility] Medicine Services  HISTORY AND PHYSICAL    Patient Name: Tavo Gudino  : 1955  MRN: [id number]  Primary Care Physician: Moses Ordaz MD  Date of admission: 2024      Subjective  Subjective     Chief Complaint:  Transfer from OSH due to chest pain, Afib with RVR, possible NSTEMI    HPI:  Tavo Gudino is a 69 y.o. male with hx of HTN, PAD, left BKA, ESRD on hemodialysis, Afib on Eliquis, recent acute cholecystitis s/p cholecystostomy tube placement at , chronic urinary retention with indwelling Delaney catheter, and previous hx of C.diff who presents from [facility] for Cardiology evaluation due to chest pain, elevated troponin concerning for NSTEMI, and Afib with RVR. He typically gets all of his care at [facility]. Admitted to [facility] on 24 with complaints of chest pain and palpitations. Found to have elevated troponins (18 -->97-->472) and was in Afib with RVR. CXR negative. Rate was controlled with IV Metoprolol. He was continued on Eliquis. Due to concern for NSTEMI, case discussed with Cardiology/Dr. Amador who accepted patient in transfer for [facility]. However, while awaiting transfer, he developed diarrhea and worsening leukocytosis up to 24K (WBC was normal on day of admission). C.diff toxin was negative but antigen was positive. He was started on PO Vanc.    Very limited records sent from OSH. I talked to one of the providers there today by phone who said patient did not complain of any abdominal pain, but had loose stools/diarrhea after receiving Kayexalate for hyperkalemia as well as Colace. His blood cultures from admission were negative. He last had dialysis on 24 - provider at OSH reports he gets dialysis daily at his facility? Delaney was not exchanged on admission either.       Personal History     No past medical history on  file.        No past surgical history on file.    Family History: family history is not on file.     Social History:    Social History     Social History Narrative    Not on file       Medications:  Available home medication information reviewed.       Not on File    Objective  Objective     Vital Signs:           Physical Exam   Constitutional: Awake, alert  Eyes: PERRLA, sclerae anicteric, no conjunctival injection  HENT: NCAT, mucous membranes moist  Neck: Supple, no thyromegaly, no lymphadenopathy, trachea midline  Respiratory: Clear to auscultation bilaterally, nonlabored respirations   Cardiovascular: irregularly irregular, no murmurs, rubs, or gallops, palpable pedal pulses bilaterally  Gastrointestinal: Positive bowel sounds, soft, nontender, nondistended, cholecystostomy drain in place  Musculoskeletal: No bilateral ankle edema, no clubbing or cyanosis to extremities; left BKA stump in dressing  Psychiatric: Appropriate affect, cooperative  Neurologic: Oriented x 3, strength symmetric in all extremities, Cranial Nerves grossly intact to confrontation, speech clear  Skin: No rashes      Result Review:  I have personally reviewed the results from the time of this admission to 5/31/2024 14:29 EDT and agree with these findings:  [x]  Laboratory list / accordion  []  Microbiology  []  Radiology  []  EKG/Telemetry   []  Cardiology/Vascular   []  Pathology  [x]  Old records  []  Other:      LAB RESULTS:                              UA          1/10/2024    12:59 1/31/2024    22:59 4/5/2024    17:48   Urinalysis   Squamous Epithelial Cells, UA 0-2     0-2     Unable to estimate due to obscuring WBC's (UNEWBC)       Specific [place], UA 1.013     1.011     1.015       Blood, UA Negative     Moderate     Large       Leukocytes, UA Negative     Small     Large       RBC, UA 3     4-10     Unable to estimate due to obscuring WBC's (UNEWBC)       Bacteria, UA Negative     Present     Unable to estimate due to obscuring  WBC's (UNEWBC)          Details          This result is from an external source.               Microbiology Results (last 10 days)       ** No results found for the last 240 hours. **            No radiology results from the last 24 hrs        Assessment & Plan  Assessment & Plan       NSTEMI (non-ST elevated myocardial infarction)    C. difficile colitis    History of cholecystitis s/p cholecystostomy tube    ESRD (end stage renal disease) on hemodialysis    Atrial fibrillation    Essential hypertension        70 yo M with hx of HTN, PAD, left BKA, ESRD on hemodialysis, Afib on Eliquis, recent acute cholecystitis s/p cholecystostomy tube placement at , chronic urinary retention with indwelling Delaney catheter, and previous hx of C.diff who presents from [facility] due to chest pain, elevated troponin, and Afib with RVR.     Chest pain  Elevated troponin, possible NSTEMI  --Transferred to [facility] for C with Dr. Amador  --Troponins at OSH 18 -->97-->472  --Troponin 454 here  --Monitor on telemetry  --Dr. Amador/Cardiology consulted, tentative plan for [facility] next week  --ASA, heparin drip, beta blocker    Afib with RVR  Hx of Afib/Aflutter  --Rate controlled with Metoprolol apparently per OSH notes  --Hold Eliquis for heart cath, on heparin drip as above  --Continue Metoprolol    HFrEF  Moderate MR  --Most recent Echo on file I April 2024: EF 40%, akinetic septal, anterior, and apical walls, severely dilated LA, moderate MR  --Follows with  Cardiology, had been evaluated previously for Vanessa clip  --Repeat Echo ordered here as no Echo results sent from OSH    Leukocytosis  C.diff colitis?  --Apparently WBC increased to 24K with complaints of diarrhea, had negative C.diff toxin but positive C.diff antigen? Was started on PO Vanc at OSH; of note, I talked to the provider there who stated he had loose stools following administration of Kayexalate and Colace  --Pe chart review, he had C.diff documented on 4/6/24  from   --WBC 22K here  --Will repeat C.diff testing here and continue on PO Vanc for now  --Check blood cultures, CT A/P, and UA for further workup     Recent cholecystitis s/p cholecystostomy tube  --Request records from that hospitalization, apparently was placed at  but I cannot find records of this  --Consult General Surgery for management of tube, discussed with Dr. Beckman  --Check CT A/P to ensure tube is in place  --Apparently already has follow up at  6/6/24 General Surgery    Chronic urinary retention  --Has Delaney in place, reported hx of urethral injury per OSH note (data deficit, no idea when this was Delaney placed), appears he follows with Urology at  and this is a chronic Delaney  --Replace Delaney here, send UA with culture due to leukocytosis    PAD  Hx of left BKA  --Continue ASA    Anemia  --Likely chronic due to renal disease  --Hb 7.5 here, had been 9 at OSH  --Check iron studies, B12, folate  --Monitor closely while on heparin drip    HTN  --Confirm and continue home meds    ESRD  --Nephrology consulted for dialysis  --Last HD 5/30/24     Hypothyroidism  --TSH significantly elevated at 91 at OSH, apparently free T4 was low but do not have that test result available to review  --Synthroid dose was increased to 75 mcg daily at OSH  --Will repeat TSH, free T4 here  --Continue Synthroid at above dose    Multiple wounds  --Wound care consult    Poor IV access  --Dr. Beckman plans to place central line this evening, appreciate his assistance      DVT prophylaxis:  Mechanical DVT prophylaxis orders are present.      Total time spent: >90 minutes  Time spent includes time reviewing chart, face-to-face time, counseling patient/family/caregiver, ordering medications/tests/procedures, communicating with other health care professionals, documenting clinical information in the electronic health record, and coordination of care.     CODE STATUS:    Code Status and Medical Interventions:   Ordered at: 05/31/24  1429     Code Status (Patient has no pulse and is not breathing):    CPR (Attempt to Resuscitate)     Medical Interventions (Patient has pulse or is breathing):    Full Support       Expected Discharge   Expected discharge date/ time has not been documented.     Genny Saeed MD  05/31/24      Electronically signed by Genny Saeed MD at 05/31/24 4129

## 2024-06-05 NOTE — CASE MANAGEMENT/SOCIAL WORK
Continued Stay Note  [facility]     Patient Name: Tavo Gudino  MRN: [id number]  Today's Date: 6/5/2024    Admit Date: 5/31/2024    Plan: TBD   Discharge Plan       Row Name 06/05/24 1407       Plan    Plan TBD    Patient/Family in Agreement with Plan yes    Plan Comments CM faxed dialysis checklist with orders and needed ducoments to [facility] in [place] today. CM will continue to follow.    Final Discharge Disposition Code 03 - skilled nursing facility (SNF)      Row Name 06/05/24 0820       Plan    Plan discharge plan    Plan Comments I spoke with Radha in admissions at [facility]/ and confirmed that pt was there for short term rehab( since March 2024) and received in house HD at the facility. Pt is new to HD this year. Per Radha, if pt returns, he will need a new precert. I met with pt at bedside regarding discharge plan and he is adamant about wanting to go home. He does not want to go back to rehab, especially [facility]/. He does want a HD chair/spot at [facility] in [place] and wants to see Dr AQUILINO Palacio (nephrologist) in [place]. I did confirm  with Dr Lady Palacio's office that pt has been seen by his practice. I called and spoke with pt's spouse, Nanda with pt's permission regarding discharge plan. Nanda states she will take pt home if he is able to  transfer/sit in a w/c otherwise pt may need to go back to rehab. When pt is discharged home, he wants to use [facility] transport and will need help establishing a transportation spot with them. Pt will also need a w/c at discharge if he goes home. I will initiate paper work for a chair spot at [facility]. I will ask SW to see pt regarding [facility] transport. If pt goes home, will need HH and agreeable but has no preference to a particular HH agency. CM will cont to follow.    Final Discharge Disposition Code 06 - home with home health care                   Discharge Codes    No documentation.                 Expected Discharge  Date and Time       Expected Discharge Date Expected Discharge Time    Jun 6, 2024               Catrachita Robertson RN

## 2024-06-06 ENCOUNTER — APPOINTMENT (OUTPATIENT)
Dept: NEPHROLOGY | Facility: HOSPITAL | Age: 69
End: 2024-06-06
Payer: MEDICARE

## 2024-06-06 PROBLEM — L08.9 SOFT TISSUE INFECTION: Status: ACTIVE | Noted: 2024-06-06

## 2024-06-06 PROBLEM — M86.162 ACUTE OSTEOMYELITIS OF LEFT TIBIA: Status: ACTIVE | Noted: 2024-06-06

## 2024-06-06 LAB
ALBUMIN SERPL-MCNC: 2.7 G/DL (ref 3.5–5.2)
ANION GAP SERPL CALCULATED.3IONS-SCNC: 8 MMOL/L (ref 5–15)
BACTERIA SPEC AEROBE CULT: NORMAL
BACTERIA SPEC AEROBE CULT: NORMAL
BUN SERPL-MCNC: 33 MG/DL (ref 8–23)
BUN/CREAT SERPL: 9.9 (ref 7–25)
CALCIUM SPEC-SCNC: 9.3 MG/DL (ref 8.6–10.5)
CHLORIDE SERPL-SCNC: 101 MMOL/L (ref 98–107)
CO2 SERPL-SCNC: 23 MMOL/L (ref 22–29)
CREAT SERPL-MCNC: 3.33 MG/DL (ref 0.76–1.27)
DEPRECATED RDW RBC AUTO: 58 FL (ref 37–54)
EGFRCR SERPLBLD CKD-EPI 2021: 19 ML/MIN/1.73
ERYTHROCYTE [DISTWIDTH] IN BLOOD BY AUTOMATED COUNT: 16.5 % (ref 12.3–15.4)
GLUCOSE SERPL-MCNC: 76 MG/DL (ref 65–99)
HCT VFR BLD AUTO: 29 % (ref 37.5–51)
HGB BLD-MCNC: 9.2 G/DL (ref 13–17.7)
MCH RBC QN AUTO: 30.2 PG (ref 26.6–33)
MCHC RBC AUTO-ENTMCNC: 31.7 G/DL (ref 31.5–35.7)
MCV RBC AUTO: 95.1 FL (ref 79–97)
PHOSPHATE SERPL-MCNC: 4.8 MG/DL (ref 2.5–4.5)
PLATELET # BLD AUTO: 198 10*3/MM3 (ref 140–450)
PMV BLD AUTO: 8.9 FL (ref 6–12)
POTASSIUM SERPL-SCNC: 4.6 MMOL/L (ref 3.5–5.2)
RBC # BLD AUTO: 3.05 10*6/MM3 (ref 4.14–5.8)
SODIUM SERPL-SCNC: 132 MMOL/L (ref 136–145)
WBC NRBC COR # BLD AUTO: 7.19 10*3/MM3 (ref 3.4–10.8)

## 2024-06-06 PROCEDURE — 99232 SBSQ HOSP IP/OBS MODERATE 35: CPT | Performed by: PHYSICIAN ASSISTANT

## 2024-06-06 PROCEDURE — 25010000002 ERAVACYCLINE DIHYDROCHLORIDE 50 MG RECONSTITUTED SOLUTION 1 EACH VIAL: Performed by: INTERNAL MEDICINE

## 2024-06-06 PROCEDURE — 99232 SBSQ HOSP IP/OBS MODERATE 35: CPT | Performed by: PEDIATRICS

## 2024-06-06 PROCEDURE — 85027 COMPLETE CBC AUTOMATED: CPT | Performed by: INTERNAL MEDICINE

## 2024-06-06 PROCEDURE — 25810000003 SODIUM CHLORIDE 0.9 % SOLUTION 250 ML FLEX CONT: Performed by: INTERNAL MEDICINE

## 2024-06-06 PROCEDURE — 80069 RENAL FUNCTION PANEL: CPT | Performed by: INTERNAL MEDICINE

## 2024-06-06 RX ADMIN — DILTIAZEM HYDROCHLORIDE 30 MG: 30 TABLET, FILM COATED ORAL at 00:30

## 2024-06-06 RX ADMIN — HYDROCODONE BITARTRATE AND ACETAMINOPHEN 1 TABLET: 7.5; 325 TABLET ORAL at 23:52

## 2024-06-06 RX ADMIN — ERAVACYCLINE 85 MG: 50 INJECTION, POWDER, LYOPHILIZED, FOR SOLUTION INTRAVENOUS at 16:50

## 2024-06-06 RX ADMIN — VANCOMYCIN HYDROCHLORIDE 125 MG: 125 CAPSULE ORAL at 12:07

## 2024-06-06 RX ADMIN — Medication 10 ML: at 12:08

## 2024-06-06 RX ADMIN — HYDROCODONE BITARTRATE AND ACETAMINOPHEN 1 TABLET: 7.5; 325 TABLET ORAL at 16:50

## 2024-06-06 RX ADMIN — METOPROLOL SUCCINATE 100 MG: 100 TABLET, EXTENDED RELEASE ORAL at 16:54

## 2024-06-06 RX ADMIN — VANCOMYCIN HYDROCHLORIDE 125 MG: 125 CAPSULE ORAL at 17:55

## 2024-06-06 RX ADMIN — DILTIAZEM HYDROCHLORIDE 30 MG: 30 TABLET, FILM COATED ORAL at 05:13

## 2024-06-06 RX ADMIN — FAMOTIDINE 10 MG: 20 TABLET, FILM COATED ORAL at 13:57

## 2024-06-06 RX ADMIN — VANCOMYCIN HYDROCHLORIDE 125 MG: 125 CAPSULE ORAL at 05:13

## 2024-06-06 RX ADMIN — Medication 1 CAPSULE: at 12:06

## 2024-06-06 RX ADMIN — FOLIC ACID 1 MG: 1 TABLET ORAL at 12:06

## 2024-06-06 RX ADMIN — ERAVACYCLINE 85 MG: 50 INJECTION, POWDER, LYOPHILIZED, FOR SOLUTION INTRAVENOUS at 05:13

## 2024-06-06 RX ADMIN — Medication 1 CAPSULE: at 20:54

## 2024-06-06 RX ADMIN — DILTIAZEM HYDROCHLORIDE 30 MG: 30 TABLET, FILM COATED ORAL at 12:06

## 2024-06-06 RX ADMIN — VANCOMYCIN HYDROCHLORIDE 125 MG: 125 CAPSULE ORAL at 00:30

## 2024-06-06 RX ADMIN — DILTIAZEM HYDROCHLORIDE 30 MG: 30 TABLET, FILM COATED ORAL at 23:52

## 2024-06-06 RX ADMIN — LIDOCAINE HYDROCHLORIDE: 20 JELLY TOPICAL at 20:54

## 2024-06-06 RX ADMIN — ACETAMINOPHEN 650 MG: 325 TABLET ORAL at 21:06

## 2024-06-06 RX ADMIN — APIXABAN 5 MG: 5 TABLET, FILM COATED ORAL at 20:54

## 2024-06-06 RX ADMIN — APIXABAN 5 MG: 5 TABLET, FILM COATED ORAL at 12:07

## 2024-06-06 RX ADMIN — ASPIRIN 81 MG: 81 TABLET, COATED ORAL at 12:07

## 2024-06-06 RX ADMIN — LIDOCAINE HYDROCHLORIDE: 20 JELLY TOPICAL at 12:07

## 2024-06-06 RX ADMIN — LEVOTHYROXINE SODIUM 75 MCG: 0.07 TABLET ORAL at 05:13

## 2024-06-06 RX ADMIN — VANCOMYCIN HYDROCHLORIDE 125 MG: 125 CAPSULE ORAL at 23:52

## 2024-06-06 RX ADMIN — DILTIAZEM HYDROCHLORIDE 30 MG: 30 TABLET, FILM COATED ORAL at 17:55

## 2024-06-06 NOTE — PROGRESS NOTES
"  [facility]  PROGRESS NOTE    Date of Admission: 5/31/2024  Date of Service: 06/06/24    Primary Care Physician: Hayden Weinstein MD    Chief Complaint:  f/u elevated troponin, Afib with RVR   Problem List:   NSTEMI (non-ST elevated myocardial infarction)    C. difficile colitis    History of cholecystitis s/p cholecystostomy tube    ESRD (end stage renal disease) on hemodialysis    Atrial fibrillation    Essential hypertension    Moderate malnutrition    Soft tissue infection      Subjective      Patient seen on dialysis. Denies chest pain or dyspnea. Afib with controlled rates      Objective   Vitals: /99 (BP Location: Right arm, Patient Position: Lying)   Pulse 68   Temp 98.2 °F (36.8 °C) (Oral)   Resp 16   Ht 185.4 cm (72.99\")   Wt 84.8 kg (186 lb 15.2 oz)   SpO2 100%   BMI 24.67 kg/m²     Physical Exam:  GENERAL: Alert, cooperative, in no acute distress. Chronically ill appearing   HEENT: Normocephalic, no jugular venous distention  HEART: Irregular rhythm, normal rate, and no murmurs, gallops, or rubs.   LUNGS:  No wheezing, rales or rhonchi.  NEUROLOGIC: No focal abnormalities involving strength or sensation are noted.   EXTREMITIES: LLE BKA, RLE with discoloration present and nonhealing ulcers with dressing in place.     Results:  Results from last 7 days   Lab Units 06/06/24  0502 06/04/24  0449 06/03/24  0555   WBC 10*3/mm3 7.19 8.82 9.32   HEMOGLOBIN g/dL 9.2* 8.4* 8.5*   HEMATOCRIT % 29.0* 27.0* 27.3*   PLATELETS 10*3/mm3 198 265 257     Results from last 7 days   Lab Units 06/06/24  0502 06/04/24  0449 06/03/24  0555   SODIUM mmol/L 132* 135* 136   POTASSIUM mmol/L 4.6 3.9 3.4*   CHLORIDE mmol/L 101 103 108*   CO2 mmol/L 23.0 19.0* 16.0*   BUN mg/dL 33* 38* 25*   CREATININE mg/dL 3.33* 3.72* 2.69*   GLUCOSE mg/dL 76 76 67      Lab Results   Component Value Date    AST 7 06/02/2024    ALT 7 06/02/2024             Results from last 7 days   Lab Units " 05/31/24  1421   TSH uIU/mL 65.050*   FREE T4 ng/dL 0.53*         Results from last 7 days   Lab Units 06/05/24  0753 06/05/24  0001 06/04/24  1319 06/01/24  0450 05/31/24  2148   PROTIME Seconds  --   --   --   --  24.3*   INR   --   --   --   --  2.16*   APTT seconds 70.3 51.6* 58.9*   < > 53.1*    < > = values in this interval not displayed.     Results from last 7 days   Lab Units 06/01/24  0450 05/31/24 2056 05/31/24  1421   CK TOTAL U/L 12*  --   --    HSTROP T ng/L 386* 404* 454*       Intake/Output Summary (Last 24 hours) at 6/6/2024 0849  Last data filed at 6/6/2024 0600  Gross per 24 hour   Intake --   Output 1300 ml   Net -1300 ml     I personally reviewed the patient's EKG/Telemetry data    Radiology Data:   No radiology results for the last day      Current Medications:  apixaban, 5 mg, Oral, BID  aspirin, 81 mg, Oral, Daily  collagenase, 1 Application, Topical, Q24H  dilTIAZem, 30 mg, Oral, Q6H  eravacycline dihydrochloride (XERAVA) 85 mg in sodium chloride 0.9 % 250 mL (0.34 mg/mL) IVPB, 85 mg, Intravenous, Q12H  famotidine, 10 mg, Oral, Daily  folic acid, 1 mg, Oral, Daily  lactobacillus acidophilus, 1 capsule, Oral, BID  levothyroxine, 75 mcg, Oral, Q AM  Lidocaine HCl gel, , Topical, BID  metoprolol succinate XL, 100 mg, Oral, Q24H  pharmacy consult - MTM, , Does not apply, Daily  sodium chloride, 10 mL, Intravenous, Q12H  vancomycin, 125 mg, Oral, Q6H      Pharmacy Consult - Pharmacy to dose,       Assessment and Plan:   1. NSTEMI  - elevated HS troponin at OSH with Afib with RVR and symptoms of chest pain. EKGs from OSH not available for review  - HS troponin here 454 -- 386, EKGs with Afib/RVR no acute ischemic changes   - continue ASA, BB   - echo with normal LVEF, mild MR  -Stress test shows mild small area of ischemia in the mid anterior lateral zone, long discussion regarding stress test results, at this time decision was made by patient and Dr. Amador to continue medical therapy,and defer  invasive angiography.  Rate control and antianginals for chest pain.     2. Afib with RVR  - rate control and anticoagulation   - continue BB, and Eliquia   - Metoprolol increased to 100mg and Diltiazem added 6/4 for better rate control - convert to LA Dilitazem closer to discharge      3. ESRD on dialysis  - NAL following, still making some urine      4. PAD, s/p left BKA and nonhealing ulcers of RLE  - Ortho and ID following  - Ortho has signed off as patient declined further surgical intervention of his wounds/ LE    5. C-diff  - per ID      6. Anemia  - acute/chronic  - per primary service      7. Recent cholecystitis with GB drain in place  - Dr. Beckman has seen, recommend OP follow up with      8. Hypothyroidism  - Per hospitalists     Electronically signed by Keren Escalona PA-C, 06/06/24, 8:55 AM EDT.

## 2024-06-06 NOTE — PROGRESS NOTES
Tavo Gudino  1955  [id number]    Reason for Consultation: recurrent C diff. Osteomyelitis     History of present illness:    Patient is a 69 y.o. male, with PMH chronic cholecystitis( cholecystotomy tube) DM, ESRD on HD, HTN, PVD s/p Left  BKA.  All recent care done at [facility] in [facility].    Presented to OSH with chest pain, found to be in afib with RVR possible NSTEMi, transferred to [facility] for LHC.  Developed diarrhea prior to transfer, C diff toxin negative, positive antigen, started on oral Vancomycin. He had been treated with Kayexalate for hyperkalemia, as well as Colace.  Last positive C diff PCR 4/6/24 from [facility]. Noted exposed tendon RLE wound , and ulcer of right heel, sacral area, and left residual stump. He has been afebrile. Admitting labs with WBC 22, plt 249, ALT 10 AST 10, Scr 3.47, UA with TNTC WBCs, urine culture with gram negative bacilli, blood cultures no growth. MRI Left with edema throughout soft tissue of stump, no abscess,subtle early changes of superimposed osteomyelitis distal osseous stump not excluded.  Right with diffuse soft tissue cellulitis, possible early osteomyelitis lateral malleolus, no definite evidence of osteomyelitis.  Started on Ceftriaxone Daptomycin, Flagyl, and oral Vancomycin and we were consulted for evaluation and treatment. He has had an indwelling cole catheter since April, just recently changed.  He continues to have numerous diarrhea stools and abdominal cramping.  No fever.      6/3/24: Frustrated with prolonged hospitalization but slow overnight.  Awaiting possible cardiac workup.  Legs stable.  Cole catheter in place.  Denies abdominal pain, suprapubic pain at this time.  He said he only had 1 bowel movement yesterday evening but has had 2 loose bowel movement so far today.  Overall he thinks his stool frequency has improved    6/5/2024: Resting comfortably.  Cardiac workup completed.   orthopedic surgery recommended  additional debridement of the amputation site but patient refusing stating he would like to follow-up with his previous wound care providers.  Loose stools slowing down and he has less nausea    6/6/24: Stool frequency slowing down.  Minimal abdominal discomfort.  Still has biliary drain.  Tolerating IV antibiotics. Discussed discharge planning again with patient today: He has numerous needs and will be difficult to manage at home but still reluctant to go back to prior skilled nursing facility.    ROS:  See HPI      Allergies   Allergen Reactions    Penicillins Hives     Received ceftriaxone 6/1/24    Levothyroxine Unknown - Low Severity    Hydromorphone Other (See Comments)     Confusion, encephalopathy per wife         Medication:    Current Facility-Administered Medications:     acetaminophen (TYLENOL) tablet 650 mg, 650 mg, Oral, Q4H PRN, Genny Saeed MD, 650 mg at 06/04/24 1144    apixaban (ELIQUIS) tablet 5 mg, 5 mg, Oral, BID, Otilia Tay MD, 5 mg at 06/06/24 1207    aspirin EC tablet 81 mg, 81 mg, Oral, Daily, Keren Escalona PA-C, 81 mg at 06/06/24 1207    collagenase ointment 1 Application, 1 Application, Topical, Q24H, Genny Saeed MD, 1 Application at 06/05/24 2203    dilTIAZem (CARDIZEM) tablet 30 mg, 30 mg, Oral, Q6H, Fred Amador MD, 30 mg at 06/06/24 1206    eravacycline dihydrochloride (XERAVA) 85 mg in sodium chloride 0.9 % 250 mL (0.34 mg/mL) IVPB, 85 mg, Intravenous, Q12H, José Fuentes MD, Last Rate: 250 mL/hr at 06/06/24 0513, 85 mg at 06/06/24 0513    famotidine (PEPCID) tablet 10 mg, 10 mg, Oral, Daily, Otilia Tay MD, 10 mg at 06/06/24 1357    folic acid (FOLVITE) tablet 1 mg, 1 mg, Oral, Daily, Sonya Banks MD, 1 mg at 06/06/24 1206    heparin (porcine) injection 2,000 Units, 2,000 Units, Intracatheter, PRN, Efe, Garcia Irfan, MD, 2,000 Units at 06/04/24 1101    HYDROcodone-acetaminophen (NORCO) 7.5-325 MG per tablet 1 tablet, 1  tablet, Oral, Q6H PRN, Otilia Tay MD, 1 tablet at 06/05/24 1851    lactobacillus acidophilus (RISAQUAD) capsule 1 capsule, 1 capsule, Oral, BID, José Fuentes MD, 1 capsule at 06/06/24 1206    levothyroxine (SYNTHROID, LEVOTHROID) tablet 75 mcg, 75 mcg, Oral, Q AM, Genny Saeed MD, 75 mcg at 06/06/24 0513    Lidocaine HCl gel (XYLOCAINE) urethral/mucosal syringe, , Topical, PRN, Genny Saeed MD, Given at 06/01/24 0609    lidocaine HCL topical jelly USP 2% syringe 11 mL, , Topical, BID, Genny Saeed MD, Given at 06/06/24 1207    LORazepam (ATIVAN) tablet 0.25 mg, 0.25 mg, Oral, Q6H PRN, Sonya Banks MD, 0.25 mg at 06/02/24 0927    metoprolol succinate XL (TOPROL-XL) 24 hr tablet 100 mg, 100 mg, Oral, Q24H, Keren Escalona PA-C, 100 mg at 06/05/24 1707    metoprolol tartrate (LOPRESSOR) injection 5 mg, 5 mg, Intravenous, Q6H PRN, Hayden Weinstein MD, 5 mg at 06/04/24 1020    Pharmacy Consult - [facility], , Does not apply, Daily, Freddie Sutton, [facility]    Pharmacy Consult - Pharmacy to dose, , Does not apply, Continuous PRN, José Fuentes MD    sodium chloride 0.9 % flush 10 mL, 10 mL, Intravenous, Q12H, Genny Saeed MD, 10 mL at 06/06/24 1208    sodium chloride 0.9 % flush 10 mL, 10 mL, Intravenous, PRN, Genny Saeed MD    sodium chloride 0.9 % infusion 40 mL, 40 mL, Intravenous, PRN, Genny Saeed MD    vancomycin (VANCOCIN) capsule 125 mg, 125 mg, Oral, Q6H, José Fuentes MD, 125 mg at 06/06/24 1207    Antibiotics:  Anti-Infectives (From admission, onward)      Ordered     Dose/Rate Route Frequency Start Stop    06/05/24 1356  Omadacycline Tosylate 150 MG tablet        Ordering Provider: José Fuentes MD    300 mg Oral Daily 06/05/24 0000 07/06/24 5599    06/03/24 1247  fosfomycin (MONUROL) packet 3 g        Ordering Provider: José Fuentes MD    3 g Oral Once 06/03/24 1400 06/03/24 1450    06/02/24 1421  eravacycline dihydrochloride (XERAVA)  85 mg in sodium chloride 0.9 % 250 mL (0.34 mg/mL) IVPB        Note to Pharmacy: !! Ensure final concentration of 0.2 - 0.6 mg/mL !!   Ordering Provider: José Fuentes MD    85 mg  250 mL/hr over 60 Minutes Intravenous Every 12 Hours 24 1600 06/10/24 1357    24 1503  vancomycin (VANCOCIN) capsule 125 mg        Ordering Provider: José Fuentes MD    125 mg Oral Every 6 Hours Scheduled 24 1800 24 4109            Physical Exam:   Vital Signs  Temp (24hrs), Av.7 °F (36.5 °C), Min:97.3 °F (36.3 °C), Max:98.2 °F (36.8 °C)    Temp  Min: 97.3 °F (36.3 °C)  Max: 98.2 °F (36.8 °C)  BP  Min: 128/70  Max: 176/105  Pulse  Min: 62  Max: 75  Resp  Min: 15  Max: 18  SpO2  Min: 100 %  Max: 100 %    GENERAL: Awake and alert, chronically ill-appearing but not acutely toxic  HEENT: Normocephalic, atraumatic.  PERRL. EOMI. No conjunctival injection. No icterus. Edentulous   NECK: Supple   HEART: RRR; No murmur,  .   LUNGS: Clear to auscultation bilaterally without wheezing, rales, rhonchi. Normal respiratory effort. Nonlabored.   ABDOMEN: Soft,  tender  nondistended: Biliary drain in right upper quad  EXT: Left BKA site with wound dehiscence and some slough  Multiple dry ulcerations on right foot dressed.  No new images.  Patient deferred direct examination of either site  :  Delaney catheter with clear urine  MSK: No joint effusions or erythema  SKIN: Warm and dry    NEURO: Oriented to PPT.  Motor 5/5 strength  PSYCHIATRIC: Normal insight and judgment. Cooperative with PE  Dialysis line in the right chest.  Left IJ CVC    Laboratory Data    Results from last 7 days   Lab Units 24  0502 24  0449 24  0555   WBC 10*3/mm3 7.19 8.82 9.32   HEMOGLOBIN g/dL 9.2* 8.4* 8.5*   HEMATOCRIT % 29.0* 27.0* 27.3*   PLATELETS 10*3/mm3 198 265 257     Results from last 7 days   Lab Units 24  0502   SODIUM mmol/L 132*   POTASSIUM mmol/L 4.6   CHLORIDE mmol/L 101   CO2 mmol/L 23.0   BUN  mg/dL 33*   CREATININE mg/dL 3.33*   GLUCOSE mg/dL 76   CALCIUM mg/dL 9.3     Results from last 7 days   Lab Units 06/02/24  0906   ALK PHOS U/L 240*   BILIRUBIN mg/dL 0.4   ALT (SGPT) U/L 7   AST (SGOT) U/L 7             Results from last 7 days   Lab Units 05/31/24  2056   LACTATE mmol/L 1.3     Results from last 7 days   Lab Units 06/01/24  0450   CK TOTAL U/L 12*         Estimated Creatinine Clearance: 24.4 mL/min (A) (by C-G formula based on SCr of 3.33 mg/dL (H)).      Microbiology:  Microbiology Results (last 10 days)       Procedure Component Value - Date/Time    Urine Culture - Urine, Indwelling Urethral Catheter [[id number]]  (Normal) Collected: 06/02/24 1721    Lab Status: Final result Specimen: Urine from Indwelling Urethral Catheter Updated: 06/04/24 0955     Urine Culture No growth    MRSA Screen, PCR (Inpatient) - Swab, Nares [[id number]]  (Abnormal) Collected: 06/02/24 1306    Lab Status: Final result Specimen: Swab from Nares Updated: 06/02/24 1433     MRSA PCR Positive    Narrative:      The negative predictive value of this diagnostic test is high and should only be used to consider de-escalating anti-MRSA therapy. A positive result may indicate colonization with MRSA and must be correlated clinically.    Clostridioides difficile Toxin - Stool, Per Rectum [[id number]]  (Abnormal) Collected: 06/02/24 1305    Lab Status: Final result Specimen: Stool from Per Rectum Updated: 06/02/24 1416    Narrative:      The following orders were created for panel order Clostridioides difficile Toxin - Stool, Per Rectum.  Procedure                               Abnormality         Status                     ---------                               -----------         ------                     Clostridioides difficile...[[id number]]  Abnormal            Final result                 Please view results for these tests on the individual orders.    Clostridioides difficile Toxin, PCR - Stool, Per Rectum [[id number]]   (Abnormal) Collected: 06/02/24 1305    Lab Status: Final result Specimen: Stool from Per Rectum Updated: 06/02/24 1416     Toxigenic C. difficile by PCR Detected    Narrative:      DNA from a toxigenic strain of C.difficile has been detected.    Clostridioides difficile toxin Ag, Reflex - Stool, Per Rectum [[id number]]  (Abnormal) Collected: 06/02/24 1305    Lab Status: Final result Specimen: Stool from Per Rectum Updated: 06/02/24 1515     C.diff Toxin Ag Positive    Narrative:      DNA from a toxigenic strain of C.difficile was detected, along with the presence of free toxin. These results are suggestive of C.difficile infection.    Blood Culture - Blood, Arm, Right [[id number]]  (Normal) Collected: 05/31/24 2237    Lab Status: Final result Specimen: Blood from Arm, Right Updated: 06/06/24 0745     Blood Culture No growth at 5 days    Blood Culture - Blood, Blood, Central Line [[id number]]  (Normal) Collected: 05/31/24 2237    Lab Status: Final result Specimen: Blood, Central Line Updated: 06/06/24 0801     Blood Culture No growth at 5 days    Urine Culture - Urine, Indwelling Urethral Catheter [[id number]]  (Abnormal)  (Susceptibility) Collected: 05/31/24 2219    Lab Status: Final result Specimen: Urine from Indwelling Urethral Catheter Updated: 06/03/24 0936     Urine Culture >100,000 CFU/mL Enterobacter cloacae complex     Comment:   This organism may develop resistance during prolonged therapy with 3rd generation cephalosporins (e.g. ceftriaxone) as a result of de-repression of AmpC B-lactamase.  Ceftriaxone may be a reasonable treatment option for uncomplicated cystitis or other lower severity infections when susceptibility is demonstrated.       Narrative:      Colonization of the urinary tract without infection is common. Treatment is discouraged unless the patient is symptomatic, pregnant, or undergoing an invasive urologic procedure.    Susceptibility        Enterobacter cloacae complex      PHYLLIS      Cefepime  Susceptible      Ceftazidime Susceptible      Ceftriaxone Susceptible      Gentamicin Susceptible      Levofloxacin Susceptible      Nitrofurantoin Intermediate      Piperacillin + Tazobactam Susceptible      Trimethoprim + Sulfamethoxazole Susceptible                                   Radiology:  Imaging Results (Last 72 Hours)       ** No results found for the last 72 hours. **          5/28, 5/31 blood cultures from [place], negative      Impression:   C diff colitis initially diagnosed in April 2024. Was given Lactulose, Stool softeners in [place] and developed worsening diarrhea, with positive antigen, negative EIA toxin so possible Colonization vs true infection.  Repeat testing at [facility] with positive PCR and positive EIA antigen.  Patient says stool frequency is overall improving, especially after switched to eravacycline. Improving   NSTEMI, transferred for [facility] here.  Cardiology evaluating   ESRD on HD: via tunneled HD catheter. still makes urine  Severe peripheral vascular disease: No intervention needed per vascular surgery  S/p left BKA 4/2024, with dehiscence of the amputation site and possible soft tissue infectoin  Possible left tibial early osteomyelitis:  by MRI.  Dr. Yeager recommended additional I&D surgery which the patient refused  Left lower extremity wound, with exposed tendon-  early osteomyelitis of the lateral malleolus not excluded by MRI per radiology.  Lesions dry on exam  Chronic cholecystitis, s/p percutaneous cholecystotomy tube- [facility]  Pyuria, Enterobacter bacteriuria: Had chronic indwelling Delaney catheter that was in place for almost 2 months prior to being changed on admission 5/31.  Culture from admission with Enterobacter.  Treated with fosfomycin x 1.  Repeat culture 6/2 negative  Sacral pressure ulcers    PLAN/RECOMMENDATIONS:   Follow CBC, CMP, CRP      Continue IV eravacycline while inpatient    Discussed discharge planning again with patient today.  I reiterated but  numerous providers have told him that he has too many needs it would be difficult to meet at home and that discharge home would be unsafe.  He is still having was from C. difficile and unable to ambulate to even a bedside commode.  He is reluctant to go back to prior rehab.  Case management evaluating options.    He has numerous issues with transportation and so if he discharges home I do not think he is a candidate for outpatient IV antibiotics since he will not be able to follow-up adequately.  Discussed again today that outpatient IV antibiotics would require separate tunneled line placement to facilitate eravacycline and he is unwilling to proceed with this at this time anyway.  Discussed with case management and with pharmacy: Working to get Nuzyra (omadacycline) approved for discharge. If not the best option is doxycycline.     Continue vancomycin 125 mg 4 times daily for 14 days from admission through June 14, and then taper to 125 mg twice daily for 1 week, and then 125 mg once daily for 1 week, then follow-up in the office to reassess    Continue probiotic BID       Okay to discharge from my perspective once arrangements have been made. Remove IJ CVC prior to discharge.     Complex medical decision making. Discussed with case management and pharmacy. Schedule outpatient follow-up with me in 2 weeks       José Fuentes MD  6/6/2024  16:09 EDT

## 2024-06-06 NOTE — PROGRESS NOTES
" LOS: 6 days   Patient Care Team:  Hayden Weinstein MD as PCP - General (Nephrology)    Chief Complaint: ESRD  NSTEMI    Subjective     Seen on dialysis tolerating well. No complains.     Subjective:  Symptoms:  Stable.  No shortness of breath or chest pain.      History taken from: patient    Objective     Vital Sign Min/Max for last 24 hours  Temp  Min: 97.4 °F (36.3 °C)  Max: 98.2 °F (36.8 °C)   BP  Min: 128/70  Max: 157/84   Pulse  Min: 62  Max: 75   Resp  Min: 16  Max: 18   SpO2  Min: 100 %  Max: 100 %   No data recorded   No data recorded     Flowsheet Rows      Flowsheet Row First Filed Value   Admission Height 185.4 cm (73\") Documented at 05/31/2024 1826   Admission Weight 84.8 kg (187 lb) Documented at 05/31/2024 1826            No intake/output data recorded.  I/O last 3 completed shifts:  In: 50 [P.O.:50]  Out: 2175 [Urine:1400; Drains:775]    Objective   Gen: Alert, NAD   HENT: NC, AT, EOMI   NECK: Supple, no JVD, Trachea midline   LUNGS: CTA bilaterally, non labored respirtation   CVS: S1/S2 audible, RRR, no murmur   Abd: Soft, NT, ND, BS+   Ext: Left BKA  CNS: Alert, No focal deficit noted grossly  Psy: Cooperative  Skin: Warm, dry and intact        Results Review:     I reviewed the patient's new clinical results.    WBC WBC   Date Value Ref Range Status   06/06/2024 7.19 3.40 - 10.80 10*3/mm3 Final   06/04/2024 8.82 3.40 - 10.80 10*3/mm3 Final      HGB Hemoglobin   Date Value Ref Range Status   06/06/2024 9.2 (L) 13.0 - 17.7 g/dL Final   06/04/2024 8.4 (L) 13.0 - 17.7 g/dL Final      HCT Hematocrit   Date Value Ref Range Status   06/06/2024 29.0 (L) 37.5 - 51.0 % Final   06/04/2024 27.0 (L) 37.5 - 51.0 % Final      Platlets No results found for: \"LABPLAT\"   MCV MCV   Date Value Ref Range Status   06/06/2024 95.1 79.0 - 97.0 fL Final   06/04/2024 98.2 (H) 79.0 - 97.0 fL Final          Sodium Sodium   Date Value Ref Range Status   06/06/2024 132 (L) 136 - 145 mmol/L Final   06/04/2024 135 (L) 136 - 145 " "mmol/L Final      Potassium Potassium   Date Value Ref Range Status   06/06/2024 4.6 3.5 - 5.2 mmol/L Final   06/04/2024 3.9 3.5 - 5.2 mmol/L Final      Chloride Chloride   Date Value Ref Range Status   06/06/2024 101 98 - 107 mmol/L Final   06/04/2024 103 98 - 107 mmol/L Final      CO2 CO2   Date Value Ref Range Status   06/06/2024 23.0 22.0 - 29.0 mmol/L Final   06/04/2024 19.0 (L) 22.0 - 29.0 mmol/L Final      BUN BUN   Date Value Ref Range Status   06/06/2024 33 (H) 8 - 23 mg/dL Final   06/04/2024 38 (H) 8 - 23 mg/dL Final      Creatinine Creatinine   Date Value Ref Range Status   06/06/2024 3.33 (H) 0.76 - 1.27 mg/dL Final   06/04/2024 3.72 (H) 0.76 - 1.27 mg/dL Final      Calcium Calcium   Date Value Ref Range Status   06/06/2024 9.3 8.6 - 10.5 mg/dL Final   06/04/2024 8.8 8.6 - 10.5 mg/dL Final      PO4 No results found for: \"CAPO4\"   Albumin Albumin   Date Value Ref Range Status   06/06/2024 2.7 (L) 3.5 - 5.2 g/dL Final        Magnesium Magnesium   Date Value Ref Range Status   06/04/2024 1.6 1.6 - 2.4 mg/dL Final      Uric Acid No results found for: \"URICACID\"     Medication Review: Yes    Assessment & Plan       NSTEMI (non-ST elevated myocardial infarction)    C. difficile colitis    History of cholecystitis s/p cholecystostomy tube    ESRD (end stage renal disease) on hemodialysis    Atrial fibrillation    Essential hypertension    Moderate malnutrition    Soft tissue infection    Acute osteomyelitis of left tibia      Assessment & Plan    ESRD: On TTS jonathan. HD through right IJ TDC. Still makes urine. Does have cole cath+ for chronic urinary retention. Patient the family Primary nephrologist ( Dr Wan) was monitoring for renal recovery and residual renal function.      Volume status: No significant LE edema noted. Does have scrotal edema.     Afib w RVR: RVR during HD treatment. BP controlled. . No chest pain or sob.       Met acidosis: Mild. Management with HD.     Anemia: ACD. Transfuse at hb<7.  ALLISON " on HD days     CAD: Transferred from OSH for evaluation of ACD. Cardiology following. EF 55%    Plan:  - Hemodialysis today as per schedule.   - Renal diet ( Doesn't want to follow)  - ALLISON with HD   - Metoprolol for RVR during HD on PRN basis.     Arvin Curtis MD  06/06/24  09:43 EDT

## 2024-06-06 NOTE — PLAN OF CARE
Goal Outcome Evaluation: Scheduled HD completed. Pt tolerated well. Goal reached. Blood reinfused to pt. Called report to JESUS Langston    Problem: Infection (Hemodialysis)  Goal: Absence of Infection Signs and Symptoms  Outcome: Ongoing, Progressing     Problem: Hemodynamic Instability (Hemodialysis)  Goal: Effective Tissue Perfusion  Outcome: Ongoing, Progressing     Problem: Device-Related Complication Risk (Hemodialysis)  Goal: Safe, Effective Therapy Delivery  Outcome: Ongoing, Progressing

## 2024-06-06 NOTE — CASE MANAGEMENT/SOCIAL WORK
Continued Stay Note  [facility]     Patient Name: Tavo Gudino  MRN: [id number]  Today's Date: 6/6/2024    Admit Date: 5/31/2024    Plan: discharge plan   Discharge Plan       Row Name 06/06/24 1228       Plan    Plan SW    Plan Comments SW'er met with patient , his wife and daughter at bedside due to transportation concerns. SW'er offered resource handout for [employer] Medicare transportation services. Patient is not eligible for Medicaid transportation SW'er spoke with rep to confirm [phone]; no benefits. SW'er and patient discussed private pay options for [facility] Transportation. Discussed information about the program how to arrange and set up transportation. Patient denied any other social needs at this time. [facility]                   Discharge Codes    No documentation.                 Expected Discharge Date and Time       Expected Discharge Date Expected Discharge Time    Jun 6, 2024               JACQUELYN Cardoza (Kay)

## 2024-06-06 NOTE — CASE MANAGEMENT/SOCIAL WORK
Continued Stay Note  [facility]     Patient Name: Tavo Gudino  MRN: [id number]  Today's Date: 6/6/2024    Admit Date: 5/31/2024    Plan: discharge plan   Discharge Plan       Row Name 06/06/24 1350       Plan    Plan discharge plan    Plan Comments I recieved a call from Benjamin with [facility]([phone]) and she reports pt has a chair spot/time on T, Th, Sat with 1st chair spot on Tues, 6/11/2024. Per Benjamin, [facility] does not do 1st chair times on w/e.  I met with pt, pt's spouse, and daughter in room regarding discharge plan. Pt states he does not want to go back to inpatient rehab. Spouse reports she can't take spouse home unless he is able to get in a wheelchair Pt also concerned about transportation to/from HD, cost, etc. Callie JACOBS also met with pt/family per request, regarding transportation concerns. . Spouse states if pt does go home, pt will need a lot of medical equipment and I explained CM could help order some of the medical equipment. Pt expresses concerns about not being happy with [facility] I discussed other inpatient rehab options that provide in house HD. Pt/spouse, daughter plan to discuss options and will let CM know. I provided spouse with CM number. CM will cont to follow    Final Discharge Disposition Code 03 - skilled nursing facility (SNF)      Row Name 06/06/24 1228       Plan    Plan SW    Plan Comments 'er met with patient at bedside due to transportation concerns. SW'er offered resource handout for [email or web address] Medicare transportation services. Patient is not eligible for Medicaid transportation SW'er spoke with rep to confirm [phone]; no benefits. SW'er and patient discussed private pay options for [facility] Transportation. Discussed information about the program how to arrange and set up transportation. Patient denied any other social needs at this time. [facility]er available                   Discharge Codes    No documentation.                 Expected Discharge Date and  Time       Expected Discharge Date Expected Discharge Time    Jun 6, 2024               Catrachita Robertson RN

## 2024-06-07 PROCEDURE — 25810000003 SODIUM CHLORIDE 0.9 % SOLUTION 250 ML FLEX CONT: Performed by: INTERNAL MEDICINE

## 2024-06-07 PROCEDURE — 99232 SBSQ HOSP IP/OBS MODERATE 35: CPT | Performed by: PHYSICIAN ASSISTANT

## 2024-06-07 PROCEDURE — 97530 THERAPEUTIC ACTIVITIES: CPT

## 2024-06-07 PROCEDURE — 99232 SBSQ HOSP IP/OBS MODERATE 35: CPT | Performed by: PEDIATRICS

## 2024-06-07 PROCEDURE — 25010000002 ERAVACYCLINE DIHYDROCHLORIDE 50 MG RECONSTITUTED SOLUTION 1 EACH VIAL: Performed by: INTERNAL MEDICINE

## 2024-06-07 RX ORDER — DILTIAZEM HYDROCHLORIDE 240 MG/1
240 CAPSULE, COATED, EXTENDED RELEASE ORAL
Status: DISCONTINUED | OUTPATIENT
Start: 2024-06-07 | End: 2024-06-20 | Stop reason: HOSPADM

## 2024-06-07 RX ADMIN — LEVOTHYROXINE SODIUM 75 MCG: 0.07 TABLET ORAL at 05:03

## 2024-06-07 RX ADMIN — VANCOMYCIN HYDROCHLORIDE 125 MG: 125 CAPSULE ORAL at 17:29

## 2024-06-07 RX ADMIN — FOLIC ACID 1 MG: 1 TABLET ORAL at 08:24

## 2024-06-07 RX ADMIN — ASPIRIN 81 MG: 81 TABLET, COATED ORAL at 08:24

## 2024-06-07 RX ADMIN — LIDOCAINE HYDROCHLORIDE: 20 JELLY TOPICAL at 21:26

## 2024-06-07 RX ADMIN — VANCOMYCIN HYDROCHLORIDE 125 MG: 125 CAPSULE ORAL at 05:02

## 2024-06-07 RX ADMIN — LIDOCAINE HYDROCHLORIDE: 20 JELLY TOPICAL at 08:26

## 2024-06-07 RX ADMIN — DILTIAZEM HYDROCHLORIDE 30 MG: 30 TABLET, FILM COATED ORAL at 05:03

## 2024-06-07 RX ADMIN — FAMOTIDINE 10 MG: 20 TABLET, FILM COATED ORAL at 08:24

## 2024-06-07 RX ADMIN — Medication 10 ML: at 08:27

## 2024-06-07 RX ADMIN — HYDROCODONE BITARTRATE AND ACETAMINOPHEN 1 TABLET: 7.5; 325 TABLET ORAL at 21:26

## 2024-06-07 RX ADMIN — Medication 1 CAPSULE: at 08:24

## 2024-06-07 RX ADMIN — APIXABAN 5 MG: 5 TABLET, FILM COATED ORAL at 08:24

## 2024-06-07 RX ADMIN — Medication 1 CAPSULE: at 21:26

## 2024-06-07 RX ADMIN — METOPROLOL SUCCINATE 100 MG: 100 TABLET, EXTENDED RELEASE ORAL at 17:29

## 2024-06-07 RX ADMIN — ERAVACYCLINE 85 MG: 50 INJECTION, POWDER, LYOPHILIZED, FOR SOLUTION INTRAVENOUS at 16:31

## 2024-06-07 RX ADMIN — ERAVACYCLINE 85 MG: 50 INJECTION, POWDER, LYOPHILIZED, FOR SOLUTION INTRAVENOUS at 05:02

## 2024-06-07 RX ADMIN — VANCOMYCIN HYDROCHLORIDE 125 MG: 125 CAPSULE ORAL at 11:24

## 2024-06-07 RX ADMIN — APIXABAN 5 MG: 5 TABLET, FILM COATED ORAL at 21:26

## 2024-06-07 RX ADMIN — DILTIAZEM HYDROCHLORIDE 240 MG: 240 CAPSULE, COATED, EXTENDED RELEASE ORAL at 08:30

## 2024-06-07 RX ADMIN — VANCOMYCIN HYDROCHLORIDE 125 MG: 125 CAPSULE ORAL at 23:31

## 2024-06-07 RX ADMIN — HYDROCODONE BITARTRATE AND ACETAMINOPHEN 1 TABLET: 7.5; 325 TABLET ORAL at 11:24

## 2024-06-07 NOTE — THERAPY TREATMENT NOTE
Patient Name: Tavo Gudino  : 1955    MRN: [id number]                              Today's Date: 2024       Admit Date: 2024    Visit Dx: No diagnosis found.  Patient Active Problem List   Diagnosis    NSTEMI (non-ST elevated myocardial infarction)    C. difficile colitis    History of cholecystitis s/p cholecystostomy tube    ESRD (end stage renal disease) on hemodialysis    Atrial fibrillation    Essential hypertension    Moderate malnutrition    Soft tissue infection    Acute osteomyelitis of left tibia     Past Medical History:   Diagnosis Date    Atrial fibrillation     Chronic kidney disease (CKD), stage V     ESRD on hemodialysis     Hypertension     Metabolic acidosis     Peripheral arterial disease     Pneumothorax     Secondary hyperparathyroidism      Past Surgical History:   Procedure Laterality Date    ARTERIOVENOUS FISTULA Right     BELOW KNEE LEG AMPUTATION Left     CATARACT EXTRACTION Bilateral       General Information       Row Name 24 1031          Physical Therapy Time and Intention    Document Type therapy note (daily note)  -ML     Mode of Treatment physical therapy  -ML       Row Name 24 1031          General Information    Patient Profile Reviewed yes  -ML     Existing Precautions/Restrictions fall;other (see comments)  sacral and LE wounds, drain, cole, dialysis cath, L BKA  -ML     Barriers to Rehab medically complex;previous functional deficit;physical barrier  -ML       Row Name 24 1031          Cognition    Orientation Status (Cognition) oriented x 3  -ML       Row Name 24 1031          Safety Issues, Functional Mobility    Safety Issues Affecting Function (Mobility) insight into deficits/self-awareness;safety precaution awareness  -ML     Impairments Affecting Function (Mobility) range of motion (ROM);strength;pain;coordination;endurance/activity tolerance;sensation/sensory awareness;postural/trunk control;balance  -ML               User Key  (r)  = Recorded By, (t) = Taken By, (c) = Cosigned By      Initials Name Provider Type    Camille Dunn Physical Therapist                   Mobility       Row Name 06/07/24 1032          Bed Mobility    Bed Mobility rolling right;rolling left  -ML     Rolling Left Davis (Bed Mobility) minimum assist (75% patient effort);1 person assist  -ML     Rolling Right Davis (Bed Mobility) minimum assist (75% patient effort);1 person assist  -ML     Assistive Device (Bed Mobility) bed rails  -ML       Row Name 06/07/24 1032          Bed-Chair Transfer    Bed-Chair Davis (Transfers) dependent (less than 25% patient effort)  -ML     Assistive Device (Bed-Chair Transfers) lift device  -ML               User Key  (r) = Recorded By, (t) = Taken By, (c) = Cosigned By      Initials Name Provider Type    Camille Dunn Physical Therapist                   Obj/Interventions       Row Name 06/07/24 1033          Motor Skills    Therapeutic Exercise hip;knee  -ML       Row Name 06/07/24 1033          Hip (Therapeutic Exercise)    Hip (Therapeutic Exercise) strengthening exercise  -ML     Hip Strengthening (Therapeutic Exercise) bilateral;marching while seated;other (see comments)  12 repetitions each  -ML       Row Name 06/07/24 1033          Knee (Therapeutic Exercise)    Knee (Therapeutic Exercise) strengthening exercise  -ML     Knee Strengthening (Therapeutic Exercise) bilateral;LAQ (long arc quad);15 repititions  -ML       Row Name 06/07/24 1033          Balance    Balance Assessment sitting static balance  -ML     Static Sitting Balance standby assist  -ML     Position, Sitting Balance supported;sitting in chair  -ML     Balance Interventions sitting;supported  -ML               User Key  (r) = Recorded By, (t) = Taken By, (c) = Cosigned By      Initials Name Provider Type    Camille Dunn Physical Therapist                   Goals/Plan    No documentation.                  Clinical Impression       Row Name  06/07/24 1034          Pain    Pretreatment Pain Rating 5/10  -ML     Posttreatment Pain Rating 5/10  -ML     Pain Location generalized  -ML     Pain Location - buttock  sacral area  -ML     Pain Intervention(s) Repositioned;Ambulation/increased activity  -ML       Row Name 06/07/24 1034          Plan of Care Review    Plan of Care Reviewed With patient  -ML     Progress no change  -ML     Outcome Evaluation Patient agreeable to trial sitting in chair. Patient required additional time to complete mobility due to multiple wounds. Patient with complaints of dizziness with positioning in chair and legs in dependent position. Patient reclined with improvement in symptoms. Patient continues to present below baseline for mobility and would continue to benefit from skilled PT to address functional mobility deficits. Continue current PT POC.  -ML       Row Name 06/07/24 1034          Vital Signs    Pre Systolic BP Rehab 155  -ML     Pre Treatment Diastolic BP 91  -ML     Intra Systolic BP Rehab 118  -ML     Intra Treatment Diastolic BP 80  -ML     Pre Patient Position Supine  -ML     Intra Patient Position Side Lying  -ML     Post Patient Position Sitting  -ML       Row Name 06/07/24 1034          Positioning and Restraints    Pre-Treatment Position in bed  -ML     Post Treatment Position chair  -ML     In Chair notified nsg;reclined;call light within reach;encouraged to call for assist;exit alarm on;waffle cushion;on mechanical lift sling;legs elevated;R heel elevated;with other staff  nursing wound care in room at end of treatment session  -ML               User Key  (r) = Recorded By, (t) = Taken By, (c) = Cosigned By      Initials Name Provider Type     Camille Becerra Physical Therapist                   Outcome Measures       Row Name 06/07/24 1039          How much help from another person do you currently need...    Turning from your back to your side while in flat bed without using bedrails? 3  -ML     Moving from  lying on back to sitting on the side of a flat bed without bedrails? 2  -ML     Moving to and from a bed to a chair (including a wheelchair)? 1  -ML     Standing up from a chair using your arms (e.g., wheelchair, bedside chair)? 1  -ML     Climbing 3-5 steps with a railing? 1  -ML     To walk in hospital room? 1  -ML     AM-PAC 6 Clicks Score (PT) 9  -ML     Highest Level of Mobility Goal 3 --> Sit at edge of bed  -ML       Row Name 06/07/24 1039          Functional Assessment    Outcome Measure Options AM-PAC 6 Clicks Basic Mobility (PT)  -ML               User Key  (r) = Recorded By, (t) = Taken By, (c) = Cosigned By      Initials Name Provider Type     Camille Becerra Physical Therapist                                 Physical Therapy Education       Title: PT OT SLP Therapies (In Progress)       Topic: Physical Therapy (In Progress)       Point: Mobility training (Done)       Learning Progress Summary             Patient Acceptance, E, VU,NR by ML at 6/7/2024 1039    Acceptance, E, VU,NR by ML at 6/3/2024 1610   Family Acceptance, E, VU,NR by ML at 6/3/2024 1610                         Point: Home exercise program (Done)       Learning Progress Summary             Patient Acceptance, E, VU,NR by ML at 6/7/2024 1039                         Point: Body mechanics (Not Started)       Learner Progress:  Not documented in this visit.              Point: Precautions (Done)       Learning Progress Summary             Patient Acceptance, E, VU,NR by ML at 6/7/2024 1039    Acceptance, E, VU,NR by ML at 6/3/2024 1610   Family Acceptance, E, VU,NR by ML at 6/3/2024 1610                                         User Key       Initials Effective Dates Name Provider Type Discipline     04/22/21 -  Camille Becrera Physical Therapist PT                  PT Recommendation and Plan  Planned Therapy Interventions (PT): balance training, bed mobility training, home exercise program, patient/family education, postural re-education,  strengthening, stretching, transfer training  Plan of Care Reviewed With: patient  Progress: no change  Outcome Evaluation: Patient agreeable to trial sitting in chair. Patient required additional time to complete mobility due to multiple wounds. Patient with complaints of dizziness with positioning in chair and legs in dependent position. Patient reclined with improvement in symptoms. Patient continues to present below baseline for mobility and would continue to benefit from skilled PT to address functional mobility deficits. Continue current PT POC.     Time Calculation:         PT Charges       Row Name 06/07/24 1040             Time Calculation    Start Time 1020  -ML      PT Received On 06/07/24  -ML         Timed Charges    13413 - PT Therapeutic Activity Minutes 30  -ML         Total Minutes    Timed Charges Total Minutes 30  -ML       Total Minutes 30  -ML                User Key  (r) = Recorded By, (t) = Taken By, (c) = Cosigned By      Initials Name Provider Type    Camille Dunn Physical Therapist                  Therapy Charges for Today       Code Description Service Date Service Provider Modifiers Qty    78588780983 HC PT THERAPEUTIC ACT EA 15 MIN 6/7/2024 Camille Becerra GP 2            PT G-Codes  Outcome Measure Options: AM-PAC 6 Clicks Basic Mobility (PT)  AM-PAC 6 Clicks Score (PT): 9  AM-PAC 6 Clicks Score (OT): 10  PT Discharge Summary  Anticipated Discharge Disposition (PT): skilled nursing facility    Camille Becerra  6/7/2024

## 2024-06-07 NOTE — PROGRESS NOTES
[facility] Medicine Services  PROGRESS NOTE    Patient Name: Tavo Gudino  : 1955  MRN: [id number]    Date of Admission: 2024  Primary Care Physician: Hayden Weinstein MD    Subjective   Subjective     CC:  Chest pain, A fib with RVR, NSTEMI, leukocytosis    HPI:  Patient in slightly improved spirits today.  Had only 2 stools yesterday and today had 1 with some solid consistency to it.  Otherwise doing well.    Objective   Objective     Vital Signs:   Temp:  [97.6 °F (36.4 °C)-97.9 °F (36.6 °C)] 97.8 °F (36.6 °C)  Heart Rate:  [53-86] 84  Resp:  [16-18] 18  BP: (128-165)/(66-98) 128/68     Physical Exam:  Constitutional: Awake, alert, chronically ill-appearing  HENT: NCAT, mucous membranes moist  Respiratory: Clear to auscultation bilaterally, respiratory effort normal   Cardiovascular: well perfused.  Gastrointestinal: Positive bowel sounds, soft, nontender, nondistended, percutaneous cholecystostomy tube  Musculoskeletal: left BKA  Psychiatric: flat affect cooperative  Neurologic: PERRL, symmetric facies, speech clear  Skin:  Results Reviewed:  LAB RESULTS:      Lab 24  0502 24  1613 24  0753 24  0001 24  1319 24  0449 24  1054 24  0555 24  2032 24  0906 24  2250 24  0450 24  2148 24  2056 24  1421 24  1421   WBC 7.19  --   --   --   --  8.82  --   --  9.32  --  12.36*  --  20.32*  --   --   --  22.02*   HEMOGLOBIN 9.2*  --   --   --   --  8.4*  --   --  8.5*  --  9.1*  --  6.9*  --   --   --  7.5*   HEMATOCRIT 29.0*  --   --   --   --  27.0*  --   --  27.3*  --  27.5*  --  22.1*  --   --   --  24.4*   PLATELETS 198  --   --   --   --  265  --   --  257  --  262  --  241  --   --   --  249   NEUTROS ABS  --   --   --   --   --   --   --   --   --   --  9.70*  --  16.57*  --   --   --  18.79*   IMMATURE GRANS (ABS)  --   --   --   --   --   --   --   --   --   --  0.08*   --  0.19*  --   --   --  0.16*   LYMPHS ABS  --   --   --   --   --   --   --   --   --   --  1.63  --  1.96  --   --   --  1.70   MONOS ABS  --   --   --   --   --   --   --   --   --   --  0.77  --  1.42*  --   --   --  1.23*   EOS ABS  --   --   --   --   --   --   --   --   --   --  0.11  --  0.13  --   --   --  0.09   MCV 95.1  --   --   --   --  98.2*  --   --  95.8  --  92.9  --  95.7  --   --   --  96.8   LACTATE  --   --   --   --   --   --   --   --   --   --   --   --   --   --  1.3  --   --    PROTIME  --   --   --   --   --   --   --   --   --   --   --   --   --  24.3*  --   --   --    APTT  --   --  70.3 51.6* 58.9* 51.9* 85.4   < >  --    < > 43.8*   < > 59.2* 53.1*  --    < >  --    HEPARIN ANTI-XA  --  0.36 0.56  --   --   --   --   --   --   --   --   --   --  >1.10*  --   --   --     < > = values in this interval not displayed.         Lab 06/06/24  0502 06/04/24  0449 06/03/24  0555 06/02/24  0906 06/01/24  0450 05/31/24  1421   SODIUM 132* 135* 136 133* 133* 133*   POTASSIUM 4.6 3.9 3.4* 3.1* 4.3 4.2   CHLORIDE 101 103 108* 99 100 99   CO2 23.0 19.0* 16.0* 16.0* 19.0* 22.0   ANION GAP 8.0 13.0 12.0 18.0* 14.0 12.0   BUN 33* 38* 25* 22 40* 37*   CREATININE 3.33* 3.72* 2.69* 2.79* 3.80* 3.47*   EGFR 19.0* 16.6* 24.5* 23.5* 16.2* 18.1*   GLUCOSE 76 76 67 64* 76 92   CALCIUM 9.3 8.8 8.0* 8.9 8.7 8.9   MAGNESIUM  --  1.6  --   --   --   --    PHOSPHORUS 4.8*  --   --   --   --   --    TSH  --   --   --   --   --  65.050*         Lab 06/06/24  0502 06/02/24  0906 05/31/24  1421   TOTAL PROTEIN  --  5.4* 5.3*   ALBUMIN 2.7* 2.4* 2.5*   GLOBULIN  --  3.0 2.8   ALT (SGPT)  --  7 10   AST (SGOT)  --  7 10   BILIRUBIN  --  0.4 0.3   ALK PHOS  --  240* 201*         Lab 06/01/24  0450 05/31/24  2148 05/31/24 2056 05/31/24  1421   HSTROP T 386*  --  404* 454*   PROTIME  --  24.3*  --   --    INR  --  2.16*  --   --              Lab 06/01/24  0944 06/01/24  0450 05/31/24  1421   IRON  --   --  13*   IRON  SATURATION (TSAT)  --   --  7*   TIBC  --   --  179*   TRANSFERRIN  --   --  120*   FERRITIN  --  1,181.00*  --    FOLATE  --  4.36*  --    VITAMIN B 12  --  755  --    ABO TYPING B  --  B   RH TYPING Positive  --  Positive   ANTIBODY SCREEN Negative  --   --          Brief Urine Lab Results  (Last result in the past 365 days)        Color   Clarity   Blood   Leuk Est   Nitrite   Protein   CREAT   Urine HCG        06/02/24 1721 Yellow   Clear   Trace   Trace   Negative   100 mg/dL (2+)                   Microbiology Results Abnormal       Procedure Component Value - Date/Time    Blood Culture - Blood, Blood, Central Line [[id number]]  (Normal) Collected: 05/31/24 2237    Lab Status: Final result Specimen: Blood, Central Line Updated: 06/06/24 0801     Blood Culture No growth at 5 days    Blood Culture - Blood, Arm, Right [[id number]]  (Normal) Collected: 05/31/24 2237    Lab Status: Final result Specimen: Blood from Arm, Right Updated: 06/06/24 0745     Blood Culture No growth at 5 days    Urine Culture - Urine, Indwelling Urethral Catheter [[id number]]  (Normal) Collected: 06/02/24 1721    Lab Status: Final result Specimen: Urine from Indwelling Urethral Catheter Updated: 06/04/24 0955     Urine Culture No growth            No radiology results from the last 24 hrs    Results for orders placed during the hospital encounter of 05/31/24    Adult Transthoracic Echo Complete W/ Cont if Necessary Per Protocol    Interpretation Summary    Left ventricular systolic function is normal. Left ventricular ejection fraction appears to be 51 - 55%.    Left ventricular wall thickness is consistent with borderline concentric hypertrophy.    Left atrial volume is moderately increased.    There is mild calcification of the aortic valve.    Mild mitral valve regurgitation is present.    There is a small (<1cm) circumferential pericardial effusion.      Current medications:  Scheduled Meds:apixaban, 5 mg, Oral, BID  aspirin, 81 mg,  Oral, Daily  collagenase, 1 Application, Topical, Q24H  dilTIAZem CD, 240 mg, Oral, Q24H  eravacycline dihydrochloride (XERAVA) 85 mg in sodium chloride 0.9 % 250 mL (0.34 mg/mL) IVPB, 85 mg, Intravenous, Q12H  famotidine, 10 mg, Oral, Daily  folic acid, 1 mg, Oral, Daily  lactobacillus acidophilus, 1 capsule, Oral, BID  levothyroxine, 75 mcg, Oral, Q AM  Lidocaine HCl gel, , Topical, BID  metoprolol succinate XL, 100 mg, Oral, Q24H  pharmacy consult - MTM, , Does not apply, Daily  sodium chloride, 10 mL, Intravenous, Q12H  vancomycin, 125 mg, Oral, Q6H      Continuous Infusions:Pharmacy Consult - Pharmacy to dose,       PRN Meds:.  acetaminophen    heparin (porcine)    HYDROcodone-acetaminophen    Lidocaine HCl gel    metoprolol tartrate    Pharmacy Consult - Pharmacy to dose    sodium chloride    sodium chloride    Assessment & Plan   Assessment & Plan     Active Hospital Problems    Diagnosis  POA    **NSTEMI (non-ST elevated myocardial infarction) [I21.4]  Yes    Soft tissue infection [L08.9]  Unknown    Acute osteomyelitis of left tibia [M86.162]  Unknown    Moderate malnutrition [E44.0]  Yes    C. difficile colitis [A04.72]  Yes    History of cholecystitis s/p cholecystostomy tube [K81.9]  Yes    ESRD (end stage renal disease) on hemodialysis [N18.6]  Yes    Atrial fibrillation [I48.91]  Yes    Essential hypertension [I10]  Yes      Resolved Hospital Problems   No resolved problems to display.        Brief Hospital Course to date:  Tavo Gudino is a 69 y.o. male with hx of HTN, PAD, left BKA, ESRD on hemodialysis, Afib on Eliquis, recent acute cholecystitis s/p cholecystostomy tube placement at , chronic urinary retention with indwelling Delaney catheter, and previous hx of C.diff who presented from [facility] due to chest pain, elevated troponin, and Afib with RVR.  Found to have multiple other concerning issues including active C. difficile infection, concern for possible osteomyelitis, exposed tendon  on right lower extremity.    This patient's problems and plans were partially entered by my partner and updated as appropriate by me 06/07/24.     Chest pain, resolved  Elevated troponin, possible NSTEMI  --Transferred to [facility] for C with Dr. Amador  --Dr. Amador/Cardiology consulted, stress test with small area of ischemia.  With discussion with patient the decision was made to medically manage at this time.  --ASA, beta blocker--cont Eliquis     Afib with RVR  Hx of Afib/Aflutter  -- Toprol dosing adjusted per cardiology given episodes of intermittent RVR  --restart Eliquis   --Continue Metoprolol and dilt     HFrEF  Moderate MR  --Most recent Echo on file I April 2024: EF 40%, akinetic septal, anterior, and apical walls, severely dilated LA, moderate MR  --Follows with  Cardiology, had been evaluated previously for Vanessa clip  --ECHO 6/1: LVEF 51 to 55%, borderline concentric LVH, left atrial volume moderately increased, mild MR, small less than 1 cm circumferential pericardial effusion     Leukocytosis, improving  C.diff colitis  Concern for osteomyelitis   Exposed tendon on RLE  --Pe chart review, he had C.diff documented on 4/6/24 from   --Continue PO vanc and s/p dose of fosfomycin.  Will DC on p.o. Vanco taper per ID recs  --Follow-up blood and urine cultures  --MRI left tib/fib showed cellulitis, possible osteomyelitis  --MRI right foot with cellulitis/edema  --Eravacycline per ID  --MRSA screen +  -- Arterial duplex abnormal; Vascular surgery consulted and recommended medical management with no vascular surgery intervention  --Discussed with Dr. Yeager; likely needs additional amputation on LLE d/t concern for poor wound healing, likely needs debridement right lower extremity around the tendon with wound VAC placement; patient refusing further amputation of left lower extremity, but tentatively amenable to revision amputation, right lower extremity irrigation and debridement with wound VAC  --ID  input appreciated--plan is just to continue the eravacycline for now until we have some idea of his d/c plan.  Prior auth for Nuzyra but co-pay is still $2000.  Working to see if we can get that lower.     Recent cholecystitis s/p cholecystostomy tube  --Request records from that hospitalization, apparently was placed at   --Consulted General Surgery for management of tube, my practice partner discussed with Dr. Beckman  --Apparently already has follow up at  6/6/24 General Surgery--will need to reschedule this     Chronic urinary retention  --Has Delaney in place, reported hx of urethral injury per OSH note (data deficit, no idea when this was Delaney placed), appears he follows with Urology at  and this is a chronic Delaney  --Replaced Delaney here     PAD  Hx of left BKA  --Continue ASA     Anemia  --Likely chronic due to renal disease  --Folate 4.36 --> supplementation started; vitamin B 12 level 755  --Hb 7.5 here, had been 9 at OSH     HTN  ESRD  --Nephrology consulted for dialysis   --Case management to work with patient and family in order to work on disposition location and finding an outpatient dialysis chair.     Hypothyroidism  --TSH significantly elevated at 91 at OSH, still elevated here to 65, but improved  --Synthroid dose was increased to 75 mcg daily at OSH  --Continue Synthroid at above dose and recheck in 4-6 weeks      Multiple wounds  Sacral Decubitus ulcer, POA, Stage 3  --Wound care consulted  --Ortho as above     Poor IV access  --Dr. Beckman placed central line    Expected Discharge Location and Transportation: Tentatively back to [place]  Expected Discharge   Expected Discharge Date: 6/8/2024; Expected Discharge Time:      DVT prophylaxis:  Medical and mechanical DVT prophylaxis orders are present.         AM-PAC 6 Clicks Score (PT): 9 (06/07/24 9408)    CODE STATUS:   Code Status and Medical Interventions:   Ordered at: 05/31/24 4293     Code Status (Patient has no pulse and is not  breathing):    CPR (Attempt to Resuscitate)     Medical Interventions (Patient has pulse or is breathing):    Full Support       Otilia Tay MD  06/07/24

## 2024-06-07 NOTE — PLAN OF CARE
Goal Outcome Evaluation:  Plan of Care Reviewed With: patient        Progress: no change  Outcome Evaluation: Pt required increased encouragement/education for tx participation and purpose of OOB activity. Further therapeutic activity limited by symptomatic hypotension w/ position changes. Increased time required d/t complexity of multiple wounds and overall weakness. Will continue POC to progress towards PLOF. d/c rec is SNF.      Anticipated Discharge Disposition (OT): skilled nursing facility

## 2024-06-07 NOTE — PROGRESS NOTES
"  [facility]  PROGRESS NOTE    Date of Admission: 5/31/2024  Date of Service: 06/07/24    Primary Care Physician: Hayden Weinstein MD    Chief Complaint: f/u elevated troponin, Afib with RVR   Problem List:   NSTEMI (non-ST elevated myocardial infarction)    C. difficile colitis    History of cholecystitis s/p cholecystostomy tube    ESRD (end stage renal disease) on hemodialysis    Atrial fibrillation    Essential hypertension    Moderate malnutrition    Soft tissue infection    Acute osteomyelitis of left tibia      Subjective      He denies any angina or palpitations. Remains in rate controlled Afib. He is upset about the idea of going back to [facility].       Objective   Vitals: /91   Pulse 77   Temp 97.7 °F (36.5 °C) (Oral)   Resp 16   Ht 185.4 cm (72.99\")   Wt 84.8 kg (186 lb 15.2 oz)   SpO2 100%   BMI 24.67 kg/m²     Physical Exam:  GENERAL: Alert, cooperative, in no acute distress.   HEENT: Normocephalic, no jugular venous distention  HEART: Irregular rhythm, normal rate, and no murmurs, gallops, or rubs.   LUNGS:  No wheezing, rales or rhonchi.  NEUROLOGIC: No focal abnormalities involving strength or sensation are noted.   EXTREMITIES: LLE BKA, RLE with discoloration present and nonhealing ulcers with dressing in place.        Results:  Results from last 7 days   Lab Units 06/06/24  0502 06/04/24  0449 06/03/24  0555   WBC 10*3/mm3 7.19 8.82 9.32   HEMOGLOBIN g/dL 9.2* 8.4* 8.5*   HEMATOCRIT % 29.0* 27.0* 27.3*   PLATELETS 10*3/mm3 198 265 257     Results from last 7 days   Lab Units 06/06/24  0502 06/04/24  0449 06/03/24  0555   SODIUM mmol/L 132* 135* 136   POTASSIUM mmol/L 4.6 3.9 3.4*   CHLORIDE mmol/L 101 103 108*   CO2 mmol/L 23.0 19.0* 16.0*   BUN mg/dL 33* 38* 25*   CREATININE mg/dL 3.33* 3.72* 2.69*   GLUCOSE mg/dL 76 76 67      Lab Results   Component Value Date    AST 7 06/02/2024    ALT 7 06/02/2024             Results from last 7 days "   Lab Units 05/31/24  1421   TSH uIU/mL 65.050*   FREE T4 ng/dL 0.53*         Results from last 7 days   Lab Units 06/05/24  0753 06/05/24  0001 06/04/24  1319 06/01/24  0450 05/31/24  2148   PROTIME Seconds  --   --   --   --  24.3*   INR   --   --   --   --  2.16*   APTT seconds 70.3 51.6* 58.9*   < > 53.1*    < > = values in this interval not displayed.     Results from last 7 days   Lab Units 06/01/24  0450 05/31/24 2056 05/31/24  1421   CK TOTAL U/L 12*  --   --    HSTROP T ng/L 386* 404* 454*             Intake/Output Summary (Last 24 hours) at 6/7/2024 0854  Last data filed at 6/7/2024 0427  Gross per 24 hour   Intake --   Output 2140 ml   Net -2140 ml       I personally reviewed the patient's EKG/Telemetry data    Radiology Data:   No radiology results for the last day      Current Medications:  apixaban, 5 mg, Oral, BID  aspirin, 81 mg, Oral, Daily  collagenase, 1 Application, Topical, Q24H  dilTIAZem CD, 240 mg, Oral, Q24H  eravacycline dihydrochloride (XERAVA) 85 mg in sodium chloride 0.9 % 250 mL (0.34 mg/mL) IVPB, 85 mg, Intravenous, Q12H  famotidine, 10 mg, Oral, Daily  folic acid, 1 mg, Oral, Daily  lactobacillus acidophilus, 1 capsule, Oral, BID  levothyroxine, 75 mcg, Oral, Q AM  Lidocaine HCl gel, , Topical, BID  metoprolol succinate XL, 100 mg, Oral, Q24H  pharmacy consult - MTM, , Does not apply, Daily  sodium chloride, 10 mL, Intravenous, Q12H  vancomycin, 125 mg, Oral, Q6H      Pharmacy Consult - Pharmacy to dose,         Assessment and Plan:   1. NSTEMI  - elevated HS troponin at OSH with Afib with RVR and symptoms of chest pain  - HS troponin here 454 -- 386, EKGs with Afib/RVR no acute ischemic changes   - continue ASA, BB   - echo with normal LVEF, mild MR  -Stress test shows mild small area of ischemia in the mid anterior lateral zone, long discussion regarding stress test results, at this time decision was made by patient and Dr. Amador to continue medical therapy,and defer invasive  angiography.  Rate control and antianginals for chest pain.  - he denies recurrent chest pain since admission      2. Afib with RVR  - rate control and anticoagulation   - continue BB, CCB, and Eliquis  - Metoprolol increased to 100mg and Diltiazem added this admission, rates are controlled at this time      3. ESRD on dialysis  - NAL following, still making some urine      4. PAD, s/p left BKA and nonhealing ulcers of RLE  - Ortho and ID following  - Ortho has signed off as patient declined further surgical intervention of his wounds/ LE    5. C-diff  - per ID      6. Anemia  - acute/chronic  - per primary service      7. Recent cholecystitis with GB drain in place  - Dr. Beckman has seen, recommend OP follow up with       8. Hypothyroidism  - Per hospitalists       Will see PRN over the weekend, please call with questions.      Electronically signed by Keren Escalona PA-C, 06/07/24, 9:01 AM EDT.

## 2024-06-07 NOTE — CASE MANAGEMENT/SOCIAL WORK
Continued Stay Note  [facility]     Patient Name: Tavo Gudino  MRN: [id number]  Today's Date: 6/7/2024    Admit Date: 5/31/2024    Plan: discharge plan   Discharge Plan       Row Name 06/07/24 1508       Plan    Plan Comments Spoke with patient assistance program regarding Nuzyra and assistance can only be provided for up to 30 tablets a year. Current order is written for 30 days of Nuzyra, exceeding 30 tablets. Spoke with [facility]ero, patient , who says the order will need to be re-written for a 14 day supply. [facility]ero can supply samples of medication to [facility] office for patient to resume medication for additional 14 days if needed.      Row Name 06/07/24 1423       Plan    Final Discharge Disposition Code 03 - skilled nursing facility (SNF)      Row Name 06/07/24 1417       Plan    Plan discharge plan    Plan Comments Per ID's request, I submitted a prior auth on the medication Nuzyra and it was approved. The cost after PA is $2000. Meanwhile I spoke with pt in the room and he has decided      Row Name 06/07/24 1233       Plan    Plan discharge plan    Plan Comments Per ID's request, I submitted a prior auth on the medication Nuzyra.  Meanwhile I spoke with the patient in room and he has decided to go back to [facility] at discharge. I have attempted to call admissions at [facility]([phone]) and had to leave a voice message. The facility will need to be able to offer pt a bed and then will need insurance approval. Waiting to hear back    Final Discharge Disposition Code 03 - skilled nursing facility (SNF)                   Discharge Codes    No documentation.                 Expected Discharge Date and Time       Expected Discharge Date Expected Discharge Time    Jun 8, 2024               Yesika Dumont RN

## 2024-06-07 NOTE — PROGRESS NOTES
Tavo Gudino  1955  [id number]    Reason for Consultation: recurrent C diff. Osteomyelitis     History of present illness:    Patient is a 69 y.o. male, with PMH chronic cholecystitis( cholecystotomy tube) DM, ESRD on HD, HTN, PVD s/p Left  BKA.  All recent care done at [facility] in [facility].    Presented to OSH with chest pain, found to be in afib with RVR possible NSTEMi, transferred to [facility] for [facility].  Developed diarrhea prior to transfer, C diff toxin negative, positive antigen, started on oral Vancomycin. He had been treated with Kayexalate for hyperkalemia, as well as Colace.  Last positive C diff PCR 4/6/24 from [facility]. Noted exposed tendon RLE wound , and ulcer of right heel, sacral area, and left residual stump. He has been afebrile. Admitting labs with WBC 22, plt 249, ALT 10 AST 10, Scr 3.47, UA with TNTC WBCs, urine culture with gram negative bacilli, blood cultures no growth. MRI Left with edema throughout soft tissue of stump, no abscess,subtle early changes of superimposed osteomyelitis distal osseous stump not excluded.  Right with diffuse soft tissue cellulitis, possible early osteomyelitis lateral malleolus, no definite evidence of osteomyelitis.  Started on Ceftriaxone Daptomycin, Flagyl, and oral Vancomycin and we were consulted for evaluation and treatment. He has had an indwelling cole catheter since April, just recently changed.  He continues to have numerous diarrhea stools and abdominal cramping.  No fever.      6/3/24: Frustrated with prolonged hospitalization but slow overnight.  Awaiting possible cardiac workup.  Legs stable.  Cole catheter in place.  Denies abdominal pain, suprapubic pain at this time.  He said he only had 1 bowel movement yesterday evening but has had 2 loose bowel movement so far today.  Overall he thinks his stool frequency has improved    6/5/2024: Resting comfortably.  Cardiac workup completed.   orthopedic surgery recommended  additional debridement of the amputation site but patient refusing stating he would like to follow-up with his previous wound care providers.  Loose stools slowing down and he has less nausea    6/6/24: Stool frequency slowing down.  Minimal abdominal discomfort.  Still has biliary drain.  Tolerating IV antibiotics. Discussed discharge planning again with patient today: He has numerous needs and will be difficult to manage at home but still reluctant to go back to prior skilled nursing facility.    6/7/24: Stool frequency improving.  Less abdominal discomfort.  No other new symptoms noted.  Patient still undecided about discharge plan    ROS:  See HPI      Allergies   Allergen Reactions    Penicillins Hives     Received ceftriaxone 6/1/24    Levothyroxine Unknown - Low Severity    Hydromorphone Other (See Comments)     Confusion, encephalopathy per wife         Medication:    Current Facility-Administered Medications:     acetaminophen (TYLENOL) tablet 650 mg, 650 mg, Oral, Q4H PRN, Genny Saeed MD, 650 mg at 06/06/24 2106    apixaban (ELIQUIS) tablet 5 mg, 5 mg, Oral, BID, Otilia Tay MD, 5 mg at 06/07/24 0824    aspirin EC tablet 81 mg, 81 mg, Oral, Daily, Keren Escalona PA-C, 81 mg at 06/07/24 0824    collagenase ointment 1 Application, 1 Application, Topical, Q24H, Genny Saeed MD, 1 Application at 06/05/24 2203    dilTIAZem CD (CARDIZEM CD) 24 hr capsule 240 mg, 240 mg, Oral, Q24H, Fred Amador MD, 240 mg at 06/07/24 0830    eravacycline dihydrochloride (XERAVA) 85 mg in sodium chloride 0.9 % 250 mL (0.34 mg/mL) IVPB, 85 mg, Intravenous, Q12H, José Fuentes MD, Last Rate: 250 mL/hr at 06/07/24 0502, 85 mg at 06/07/24 0502    famotidine (PEPCID) tablet 10 mg, 10 mg, Oral, Daily, Otilia Tay MD, 10 mg at 06/07/24 0824    folic acid (FOLVITE) tablet 1 mg, 1 mg, Oral, Daily, Sonya Banks MD, 1 mg at 06/07/24 0824    heparin (porcine) injection 2,000 Units,  2,000 Units, Intracatheter, PRN, Efe, Jose Hall MD, 2,000 Units at 06/04/24 1101    HYDROcodone-acetaminophen (NORCO) 7.5-325 MG per tablet 1 tablet, 1 tablet, Oral, Q6H PRN, Otilia Tay MD, 1 tablet at 06/06/24 2352    lactobacillus acidophilus (RISAQUAD) capsule 1 capsule, 1 capsule, Oral, BID, José Fuentes MD, 1 capsule at 06/07/24 0824    levothyroxine (SYNTHROID, LEVOTHROID) tablet 75 mcg, 75 mcg, Oral, Q AM, Genny Saeed MD, 75 mcg at 06/07/24 0503    Lidocaine HCl gel (XYLOCAINE) urethral/mucosal syringe, , Topical, PRN, Genny Saeed MD, Given at 06/01/24 0609    lidocaine HCL topical jelly USP 2% syringe 11 mL, , Topical, BID, Genny Saeed MD, Given at 06/07/24 0826    metoprolol succinate XL (TOPROL-XL) 24 hr tablet 100 mg, 100 mg, Oral, Q24H, Keren Escalona PA-C, 100 mg at 06/06/24 1654    metoprolol tartrate (LOPRESSOR) injection 5 mg, 5 mg, Intravenous, Q6H PRN, Hayden Weinstein MD, 5 mg at 06/04/24 1020    Pharmacy Consult - [facility], , Does not apply, Daily, Freddie Sutton, [facility]    Pharmacy Consult - Pharmacy to dose, , Does not apply, Continuous PRN, José Fuentes MD    sodium chloride 0.9 % flush 10 mL, 10 mL, Intravenous, Q12H, Genny Saeed MD, 10 mL at 06/07/24 0827    sodium chloride 0.9 % flush 10 mL, 10 mL, Intravenous, PRN, Genny Saeed MD    sodium chloride 0.9 % infusion 40 mL, 40 mL, Intravenous, PRN, Genny Saeed MD    vancomycin (VANCOCIN) capsule 125 mg, 125 mg, Oral, Q6H, José Fuentes MD, 125 mg at 06/07/24 0502    Antibiotics:  Anti-Infectives (From admission, onward)      Ordered     Dose/Rate Route Frequency Start Stop    06/05/24 1356  Omadacycline Tosylate 150 MG tablet        Ordering Provider: José Fuentes MD    300 mg Oral Daily 06/05/24 0000 07/06/24 2359    06/03/24 1247  fosfomycin (MONUROL) packet 3 g        Ordering Provider: José Fuentes MD    3 g Oral Once 06/03/24 1400 06/03/24 1450     24 1421  eravacycline dihydrochloride (XERAVA) 85 mg in sodium chloride 0.9 % 250 mL (0.34 mg/mL) IVPB        Note to Pharmacy: !! Ensure final concentration of 0.2 - 0.6 mg/mL !!   Ordering Provider: José Fuentes MD    85 mg  250 mL/hr over 60 Minutes Intravenous Every 12 Hours 24 1600 06/10/24 1357    24 1503  vancomycin (VANCOCIN) capsule 125 mg        Ordering Provider: José Fuentes MD    125 mg Oral Every 6 Hours Scheduled 24 1800 24 0501            Physical Exam:   Vital Signs  Temp (24hrs), Av.7 °F (36.5 °C), Min:97.3 °F (36.3 °C), Max:97.9 °F (36.6 °C)    Temp  Min: 97.3 °F (36.3 °C)  Max: 97.9 °F (36.6 °C)  BP  Min: 131/67  Max: 176/105  Pulse  Min: 53  Max: 86  Resp  Min: 15  Max: 18  SpO2  Min: 99 %  Max: 100 %    GENERAL: Awake and alert, chronically ill-appearing but not acutely toxic  HEENT: Normocephalic, atraumatic.  PERRL. EOMI. No conjunctival injection. No icterus. Edentulous   NECK: Supple   HEART: RRR; No murmur,  .   LUNGS: Clear to auscultation bilaterally without wheezing, rales, rhonchi. Normal respiratory effort. Nonlabored.   ABDOMEN: Soft,  tender  nondistended: Biliary drain in right upper quadrant  EXT: Left BKA site with wound dehiscence and some slough  Multiple dry ulcerations on right foot dressed.  No new images.  Patient deferred direct examination of either site  :  Delaney catheter with clear urine  MSK: No joint effusions or erythema  SKIN: Warm and dry    NEURO: Oriented to PPT.  Motor 5/5 strength  PSYCHIATRIC: Normal insight and judgment. Cooperative with PE  Dialysis line in the right chest.  Left IJ CVC    Laboratory Data    Results from last 7 days   Lab Units 24  0502 24  0449 24  0555   WBC 10*3/mm3 7.19 8.82 9.32   HEMOGLOBIN g/dL 9.2* 8.4* 8.5*   HEMATOCRIT % 29.0* 27.0* 27.3*   PLATELETS 10*3/mm3 198 265 257     Results from last 7 days   Lab Units 24  0502   SODIUM mmol/L 132*   POTASSIUM  mmol/L 4.6   CHLORIDE mmol/L 101   CO2 mmol/L 23.0   BUN mg/dL 33*   CREATININE mg/dL 3.33*   GLUCOSE mg/dL 76   CALCIUM mg/dL 9.3     Results from last 7 days   Lab Units 06/02/24  0906   ALK PHOS U/L 240*   BILIRUBIN mg/dL 0.4   ALT (SGPT) U/L 7   AST (SGOT) U/L 7             Results from last 7 days   Lab Units 05/31/24  2056   LACTATE mmol/L 1.3     Results from last 7 days   Lab Units 06/01/24  0450   CK TOTAL U/L 12*         Estimated Creatinine Clearance: 24.4 mL/min (A) (by C-G formula based on SCr of 3.33 mg/dL (H)).      Microbiology:  Microbiology Results (last 10 days)       Procedure Component Value - Date/Time    Urine Culture - Urine, Indwelling Urethral Catheter [[id number]]  (Normal) Collected: 06/02/24 1721    Lab Status: Final result Specimen: Urine from Indwelling Urethral Catheter Updated: 06/04/24 0955     Urine Culture No growth    MRSA Screen, PCR (Inpatient) - Swab, Nares [[id number]]  (Abnormal) Collected: 06/02/24 1306    Lab Status: Final result Specimen: Swab from Nares Updated: 06/02/24 1433     MRSA PCR Positive    Narrative:      The negative predictive value of this diagnostic test is high and should only be used to consider de-escalating anti-MRSA therapy. A positive result may indicate colonization with MRSA and must be correlated clinically.    Clostridioides difficile Toxin - Stool, Per Rectum [[id number]]  (Abnormal) Collected: 06/02/24 1305    Lab Status: Final result Specimen: Stool from Per Rectum Updated: 06/02/24 1416    Narrative:      The following orders were created for panel order Clostridioides difficile Toxin - Stool, Per Rectum.  Procedure                               Abnormality         Status                     ---------                               -----------         ------                     Clostridioides difficile...[[id number]]  Abnormal            Final result                 Please view results for these tests on the individual orders.    Clostridioides  difficile Toxin, PCR - Stool, Per Rectum [[id number]]  (Abnormal) Collected: 06/02/24 1305    Lab Status: Final result Specimen: Stool from Per Rectum Updated: 06/02/24 1416     Toxigenic C. difficile by PCR Detected    Narrative:      DNA from a toxigenic strain of C.difficile has been detected.    Clostridioides difficile toxin Ag, Reflex - Stool, Per Rectum [[id number]]  (Abnormal) Collected: 06/02/24 1305    Lab Status: Final result Specimen: Stool from Per Rectum Updated: 06/02/24 1515     C.diff Toxin Ag Positive    Narrative:      DNA from a toxigenic strain of C.difficile was detected, along with the presence of free toxin. These results are suggestive of C.difficile infection.    Blood Culture - Blood, Arm, Right [[id number]]  (Normal) Collected: 05/31/24 2237    Lab Status: Final result Specimen: Blood from Arm, Right Updated: 06/06/24 0745     Blood Culture No growth at 5 days    Blood Culture - Blood, Blood, Central Line [[id number]]  (Normal) Collected: 05/31/24 2237    Lab Status: Final result Specimen: Blood, Central Line Updated: 06/06/24 0801     Blood Culture No growth at 5 days    Urine Culture - Urine, Indwelling Urethral Catheter [[id number]]  (Abnormal)  (Susceptibility) Collected: 05/31/24 2219    Lab Status: Final result Specimen: Urine from Indwelling Urethral Catheter Updated: 06/03/24 0936     Urine Culture >100,000 CFU/mL Enterobacter cloacae complex     Comment:   This organism may develop resistance during prolonged therapy with 3rd generation cephalosporins (e.g. ceftriaxone) as a result of de-repression of AmpC B-lactamase.  Ceftriaxone may be a reasonable treatment option for uncomplicated cystitis or other lower severity infections when susceptibility is demonstrated.       Narrative:      Colonization of the urinary tract without infection is common. Treatment is discouraged unless the patient is symptomatic, pregnant, or undergoing an invasive urologic procedure.    Susceptibility         Enterobacter cloacae complex      PHYLLIS      Cefepime Susceptible      Ceftazidime Susceptible      Ceftriaxone Susceptible      Gentamicin Susceptible      Levofloxacin Susceptible      Nitrofurantoin Intermediate      Piperacillin + Tazobactam Susceptible      Trimethoprim + Sulfamethoxazole Susceptible                                   Radiology:  Imaging Results (Last 72 Hours)       ** No results found for the last 72 hours. **          5/28, 5/31 blood cultures from [facility], negative      Impression:   C diff colitis initially diagnosed in April 2024. Was given Lactulose, Stool softeners in [facility] and developed worsening diarrhea, with positive antigen, negative EIA toxin so possible Colonization vs true infection.  Repeat testing at [facility] with positive PCR and positive EIA antigen.  Patient says stool frequency is overall improving, especially after switched to eravacycline. Improving   NSTEMI, transferred for [facility] here.  Cardiology evaluating   ESRD on HD: via tunneled HD catheter. still makes urine  Severe peripheral vascular disease: No intervention needed per vascular surgery  S/p left BKA 4/2024, with dehiscence of the amputation site and possible soft tissue infectoin  Possible left tibial early osteomyelitis:  by MRI.  Dr. Yeager recommended additional I&D surgery which the patient refused  Left lower extremity wound, with exposed tendon-  early osteomyelitis of the lateral malleolus not excluded by MRI per radiology.  Lesions dry on exam  Chronic cholecystitis, s/p percutaneous cholecystotomy tube- [facility]  Pyuria, Enterobacter bacteriuria: Had chronic indwelling Delaney catheter that was in place for almost 2 months prior to being changed on admission 5/31.  Culture from admission with Enterobacter.  Treated with fosfomycin x 1.  Repeat culture 6/2 negative  Sacral pressure ulcers  Social barriers to care: Inadequate transportation for follow-up appointments    PLAN/RECOMMENDATIONS:   Follow  CBC, CMP, CRP      Continue IV eravacycline while inpatient    - Continue PO vancomycin 125 mg 4 times daily for 14 days from admission through June 14, and then taper to 125 mg twice daily for 1 week, and then 125 mg once daily for 1 week, then follow-up in the office to reassess  - probiotic BID     Discussed discharge planning again with patient today.  I reiterated what numerous providers have told him: his medical needs are too complex to manage at home. He is reluctant to go back to prior SNF.  Case management evaluating options.    If he discharges home I do not think he is a candidate for outpatient IV antibiotics since he will not be able to follow-up adequately and does not have enough support at home to adequately manage IV antibiotics.  If discharged home d/c eravacycline and switch to PO Nuzyra (omadacycline) for at least 3 more weeks. CM and pharmacy working on PA. If Nuzyra not approved, alternative option is PO doxycycline 100 mg BID.      If he changes his mind about skilled nursing facility placement could consider tunneled subclavian line to facilitate IV eravacycline (if facility is able to provide this) or tigecycline for a few weeks.       Okay to discharge from my perspective once arrangements have been made. Remove IJ CVC prior to discharge. Schedule outpatient follow-up with me 2 weeks after discharge     Complex MDM.     José Fuentes MD  6/7/2024  10:21 EDT

## 2024-06-07 NOTE — THERAPY TREATMENT NOTE
Patient Name: Tavo Gudino  : 1955    MRN: [id number]                              Today's Date: 2024       Admit Date: 2024    Visit Dx: No diagnosis found.  Patient Active Problem List   Diagnosis    NSTEMI (non-ST elevated myocardial infarction)    C. difficile colitis    History of cholecystitis s/p cholecystostomy tube    ESRD (end stage renal disease) on hemodialysis    Atrial fibrillation    Essential hypertension    Moderate malnutrition    Soft tissue infection    Acute osteomyelitis of left tibia     Past Medical History:   Diagnosis Date    Atrial fibrillation     Chronic kidney disease (CKD), stage V     ESRD on hemodialysis     Hypertension     Metabolic acidosis     Peripheral arterial disease     Pneumothorax     Secondary hyperparathyroidism      Past Surgical History:   Procedure Laterality Date    ARTERIOVENOUS FISTULA Right     BELOW KNEE LEG AMPUTATION Left     CATARACT EXTRACTION Bilateral       General Information       Row Name 24 1147          OT Time and Intention    Document Type therapy note (daily note)  -     Mode of Treatment occupational therapy  -       Row Name 24 1147          General Information    Patient Profile Reviewed yes  -     Existing Precautions/Restrictions fall;other (see comments)  sacral and LE wounds, drain, cole, dialysis cath, L BKA  -     Barriers to Rehab medically complex;previous functional deficit;physical barrier  -       Row Name 24 1147          Cognition    Orientation Status (Cognition) oriented x 3  -       Row Name 24 1147          Safety Issues, Functional Mobility    Safety Issues Affecting Function (Mobility) insight into deficits/self-awareness;judgment;problem-solving;safety precaution awareness;safety precautions follow-through/compliance  -     Impairments Affecting Function (Mobility) range of motion (ROM);strength;pain;coordination;endurance/activity tolerance;sensation/sensory  awareness;postural/trunk control;balance  -KL               User Key  (r) = Recorded By, (t) = Taken By, (c) = Cosigned By      Initials Name Provider Type    Mariah Duran OT Occupational Therapist                     Mobility/ADL's       Row Name 06/07/24 1148          Bed Mobility    Bed Mobility scooting/bridging  -KL     Rolling Left [place] (Bed Mobility) minimum assist (75% patient effort);1 person assist  -KL     Rolling Right [place] (Bed Mobility) minimum assist (75% patient effort);1 person assist  -KL     Scooting/Bridging [place] (Bed Mobility) dependent (less than 25% patient effort);2 person assist;verbal cues;other (see comments)  Lift device used to avoid sheering  -     Assistive Device (Bed Mobility) bed rails  -       Row Name 06/07/24 1148          Bed-Chair Transfer    Bed-Chair [place] (Transfers) dependent (less than 25% patient effort)  -     Assistive Device (Bed-Chair Transfers) lift device  -               User Key  (r) = Recorded By, (t) = Taken By, (c) = Cosigned By      Initials Name Provider Type    Mariah Duran OT Occupational Therapist                   Obj/Interventions       Row Name 06/07/24 1151          Balance    Static Sitting Balance supervision  -KL     Position, Sitting Balance supported;sitting in chair  -               User Key  (r) = Recorded By, (t) = Taken By, (c) = Cosigned By      Initials Name Provider Type    Mariah Duran OT Occupational Therapist                   Goals/Plan    No documentation.                  Clinical Impression       Row Name 06/07/24 1151          Pain Assessment    Pretreatment Pain Rating 5/10  -KL     Posttreatment Pain Rating 5/10  -KL     Pain Location generalized  -KL     Pain Location - buttock  -KL       Row Name 06/07/24 1151          Plan of Care Review    Plan of Care Reviewed With patient  -KL     Progress no change  -     Outcome Evaluation Pt required increased encouragement/education for  tx participation and purpose of OOB activity. Further therapeutic activity limited by symptomatic hypotension w/ position changes. Increased time required d/t complexity of multiple wounds and overall weakness. Will continue POC to progress towards PLOF. d/c rec is SNF.  -       Row Name 06/07/24 1151          Therapy Plan Review/Discharge Plan (OT)    Anticipated Discharge Disposition (OT) skilled nursing facility  -       Row Name 06/07/24 1151          Vital Signs    Pre Systolic BP Rehab 155  -KL     Pre Treatment Diastolic BP 91  -KL     Post Systolic BP Rehab 118  -KL     Post Treatment Diastolic BP 80  -KL     Pretreatment Heart Rate (beats/min) 89  -KL     Intratreatment Heart Rate (beats/min) 122  -KL     Posttreatment Heart Rate (beats/min) 91  -KL     Pre Patient Position Supine  -KL     Intra Patient Position Sitting  -KL     Post Patient Position Sitting  -       Row Name 06/07/24 1151          Positioning and Restraints    Pre-Treatment Position in bed  -KL     Post Treatment Position chair  -     In Chair notified nsg;reclined;sitting;call light within reach;encouraged to call for assist;exit alarm on;waffle cushion;RUE elevated;legs elevated;on mechanical lift sling  -               User Key  (r) = Recorded By, (t) = Taken By, (c) = Cosigned By      Initials Name Provider Type    Mariah Duran, LUISANA Occupational Therapist                   Outcome Measures       Row Name 06/07/24 1157          How much help from another is currently needed...    Putting on and taking off regular lower body clothing? 1  -KL     Bathing (including washing, rinsing, and drying) 2  -KL     Toileting (which includes using toilet bed pan or urinal) 1  -KL     Putting on and taking off regular upper body clothing 2  -KL     Taking care of personal grooming (such as brushing teeth) 3  -KL     Eating meals 3  -KL     AM-PAC 6 Clicks Score (OT) 12  -       Row Name 06/07/24 1039          How much help from another  person do you currently need...    Turning from your back to your side while in flat bed without using bedrails? 3  -ML     Moving from lying on back to sitting on the side of a flat bed without bedrails? 2  -ML     Moving to and from a bed to a chair (including a wheelchair)? 1  -ML     Standing up from a chair using your arms (e.g., wheelchair, bedside chair)? 1  -ML     Climbing 3-5 steps with a railing? 1  -ML     To walk in hospital room? 1  -ML     AM-PAC 6 Clicks Score (PT) 9  -ML     Highest Level of Mobility Goal 3 --> Sit at edge of bed  -ML       Row Name 06/07/24 1157 06/07/24 1039       Functional Assessment    Outcome Measure Options AM-PAC 6 Clicks Daily Activity (OT)  - AM-PAC 6 Clicks Basic Mobility (PT)  -ML              User Key  (r) = Recorded By, (t) = Taken By, (c) = Cosigned By      Initials Name Provider Type    Camille Dunn Physical Therapist    Mariah Duran OT Occupational Therapist                    Occupational Therapy Education       Title: PT OT SLP Therapies (In Progress)       Topic: Occupational Therapy (In Progress)       Point: ADL training (Done)       Description:   Instruct learner(s) on proper safety adaptation and remediation techniques during self care or transfers.   Instruct in proper use of assistive devices.                  Learning Progress Summary             Patient Acceptance, E, VU by GARRY at 6/3/2024 1544    Comment: reason for consult, evaluation results   Family Acceptance, E, VU by GARRY at 6/3/2024 1544    Comment: reason for consult, evaluation results                         Point: Home exercise program (Not Started)       Description:   Instruct learner(s) on appropriate technique for monitoring, assisting and/or progressing therapeutic exercises/activities.                  Learner Progress:  Not documented in this visit.              Point: Precautions (In Progress)       Description:   Instruct learner(s) on prescribed precautions during self-care and  functional transfers.                  Learning Progress Summary             Patient Acceptance, E, NR by  at 6/7/2024 1157                         Point: Body mechanics (In Progress)       Description:   Instruct learner(s) on proper positioning and spine alignment during self-care, functional mobility activities and/or exercises.                  Learning Progress Summary             Patient Acceptance, E, NR by  at 6/7/2024 1157                                         User Key       Initials Effective Dates Name Provider Type Discipline     07/11/23 -  Na Pitts OT Occupational Therapist OT     02/05/24 -  Mariah Foss OT Occupational Therapist OT                  OT Recommendation and Plan     Plan of Care Review  Plan of Care Reviewed With: patient  Progress: no change  Outcome Evaluation: Pt required increased encouragement/education for tx participation and purpose of OOB activity. Further therapeutic activity limited by symptomatic hypotension w/ position changes. Increased time required d/t complexity of multiple wounds and overall weakness. Will continue POC to progress towards PLOF. d/c rec is SNF.     Time Calculation:         Time Calculation- OT       Row Name 06/07/24 1158             Time Calculation- OT    OT Start Time 0930  -KL      OT Received On 06/07/24  -         Timed Charges    05847 - OT Therapeutic Activity Minutes 30  -KL         Total Minutes    Timed Charges Total Minutes 30  -KL       Total Minutes 30  -KL                User Key  (r) = Recorded By, (t) = Taken By, (c) = Cosigned By      Initials Name Provider Type    Mariah Duran OT Occupational Therapist                  Therapy Charges for Today       Code Description Service Date Service Provider Modifiers Qty    11851684666  OT THERAPEUTIC ACT EA 15 MIN 6/7/2024 Mariah Foss OT GO 2                 Mariah Foss OT  6/7/2024

## 2024-06-07 NOTE — PLAN OF CARE
Goal Outcome Evaluation:  Plan of Care Reviewed With: patient        Progress: no change  Outcome Evaluation: Patient agreeable to trial sitting in chair. Patient required additional time to complete mobility due to multiple wounds. Patient with complaints of dizziness with positioning in chair and legs in dependent position. Patient reclined with improvement in symptoms. Patient continues to present below baseline for mobility and would continue to benefit from skilled PT to address functional mobility deficits. Continue current PT POC.      Anticipated Discharge Disposition (PT): skilled nursing facility

## 2024-06-07 NOTE — NURSING NOTE
Woc follow-up.    Due to amount of drainage it is imperative that patient gets the left knee dressing change daily.    Instructions:  Left knee stump -  soak a 4 x 4 gauze and Vashe and slapped onto the wound completely covering it and let it soak for 10 minutes, when removing the gauze lightly rub the whole wound.  Apply nickel thick layer of Santyl to the slough, cover entire wound bed with multilayer Xeroform and ABD pad wrapped with Kerlix and secure with Spandage; change daily                  Right anterior groin wound left buttock wound and right sacrococcygeal wound - cleanse with normal saline, apply Thera honey gel or Thera honey sheet to wound beds and cover with silicone foam.  For the sacrococcygeal dressing please spray barrier spray in between the anus and the coccyx and let dry before applying the sacral foam; change this every other day and offload every 2    right lateral leg wound with exposed tendon - cleanse with a Vashe soaked 4 x 4 for 10 minutes after which apply SkinTegrity hydrogel to the exposed tendon cover all of wound with Mepitel and topped with Mepilex Ag, when wrapping the foot wrap all of it all the way up past this with the Kerlix secure with Spandage change every other day      The right medial heel wound - cleanse with saline topped with nickel thick layer of Santyl and cover with the silicone foam heel dressing.  Changing every other day once the heel dressing on wrapped Kerlix secured with Spandage        Keep the right lateral foot wound covered with the Allevyn heel dressing.  Changing every other day with the right lateral leg and right heel wound    It is imperative that the patient keeps wounds cleansed with Vashe the right lateral leg with tendon and the left knee wounds.  It is also important that the left knee wound with the Santyl gets a daily dressing change.  Woc will follow while inpatient

## 2024-06-07 NOTE — PLAN OF CARE
Goal Outcome Evaluation:   PT VSS, occasional bradycardia, pt rested well over night AOX4, No acute events over night.       Problem: Skin Injury Risk Increased  Goal: Skin Health and Integrity  Outcome: Ongoing, Progressing  Intervention: Optimize Skin Protection  Recent Flowsheet Documentation  Taken 6/7/2024 0210 by Skylar Perea RN  Pressure Reduction Techniques:   weight shift assistance provided   heels elevated off bed   positioned off wounds   pressure points protected   rest period provided between sit times  Head of Bed (HOB) Positioning: HOB elevated  Pressure Reduction Devices:   pressure-redistributing mattress utilized   positioning supports utilized  Skin Protection:   adhesive use limited   tubing/devices free from skin contact  Taken 6/6/2024 2349 by Skylar Perea RN  Pressure Reduction Techniques:   weight shift assistance provided   heels elevated off bed   positioned off wounds   pressure points protected   rest period provided between sit times  Head of Bed (HOB) Positioning: HOB elevated  Pressure Reduction Devices:   pressure-redistributing mattress utilized   positioning supports utilized   heel offloading device utilized   foam padding utilized  Skin Protection:   adhesive use limited   tubing/devices free from skin contact  Taken 6/6/2024 2215 by Skylar Perea RN  Pressure Reduction Techniques:   weight shift assistance provided   heels elevated off bed   positioned off wounds   pressure points protected   rest period provided between sit times  Head of Bed (HOB) Positioning: HOB elevated  Pressure Reduction Devices:   pressure-redistributing mattress utilized   positioning supports utilized  Skin Protection:   adhesive use limited   tubing/devices free from skin contact  Taken 6/6/2024 2040 by Skylar Perea RN  Pressure Reduction Techniques:   weight shift assistance provided   heels elevated off bed   positioned off wounds   pressure points protected   rest period provided  between sit times  Head of Bed (HOB) Positioning: HOB elevated  Pressure Reduction Devices:   pressure-redistributing mattress utilized   positioning supports utilized  Skin Protection:   adhesive use limited   tubing/devices free from skin contact     Problem: Adult Inpatient Plan of Care  Goal: Plan of Care Review  Outcome: Ongoing, Progressing  Goal: Patient-Specific Goal (Individualized)  Outcome: Ongoing, Progressing  Goal: Absence of Hospital-Acquired Illness or Injury  Outcome: Ongoing, Progressing  Intervention: Identify and Manage Fall Risk  Recent Flowsheet Documentation  Taken 6/7/2024 0210 by Skylar Perea RN  Safety Promotion/Fall Prevention:   safety round/check completed   room organization consistent   nonskid shoes/slippers when out of bed   fall prevention program maintained   activity supervised  Taken 6/6/2024 2349 by Skylar Perea RN  Safety Promotion/Fall Prevention:   safety round/check completed   room organization consistent   nonskid shoes/slippers when out of bed   lighting adjusted   fall prevention program maintained   activity supervised  Taken 6/6/2024 2215 by Skylar Perea RN  Safety Promotion/Fall Prevention:   safety round/check completed   room organization consistent   nonskid shoes/slippers when out of bed   fall prevention program maintained   activity supervised  Taken 6/6/2024 2040 by Skylar Perea RN  Safety Promotion/Fall Prevention:   safety round/check completed   room organization consistent   nonskid shoes/slippers when out of bed   fall prevention program maintained   activity supervised  Intervention: Prevent Skin Injury  Recent Flowsheet Documentation  Taken 6/7/2024 0210 by Skylar Perea RN  Body Position: weight shifting  Skin Protection:   adhesive use limited   tubing/devices free from skin contact  Taken 6/6/2024 2349 by Skylar Perea RN  Body Position: weight shifting  Skin Protection:   adhesive use limited   tubing/devices free from  skin contact  Taken 6/6/2024 2215 by Skylar Perea RN  Body Position: weight shifting  Skin Protection:   adhesive use limited   tubing/devices free from skin contact  Taken 6/6/2024 2040 by Skylar Perea RN  Body Position: weight shifting  Skin Protection:   adhesive use limited   tubing/devices free from skin contact  Intervention: Prevent and Manage VTE (Venous Thromboembolism) Risk  Recent Flowsheet Documentation  Taken 6/7/2024 0210 by Skylar Perea RN  Activity Management: activity encouraged  VTE Prevention/Management: other (see comments)  Taken 6/6/2024 2349 by Skylar Perea RN  Activity Management: activity encouraged  Taken 6/6/2024 2215 by Skylar Perea RN  Activity Management: activity encouraged  VTE Prevention/Management: other (see comments)  Taken 6/6/2024 2040 by Skylar Perea RN  Activity Management: activity encouraged  VTE Prevention/Management: (see mar) other (see comments)  Range of Motion: active ROM (range of motion) encouraged  Intervention: Prevent Infection  Recent Flowsheet Documentation  Taken 6/7/2024 0210 by Skylar Perea RN  Infection Prevention:   personal protective equipment utilized   hand hygiene promoted   single patient room provided  Taken 6/6/2024 2349 by Skylar Perea RN  Infection Prevention:   hand hygiene promoted   personal protective equipment utilized   rest/sleep promoted   single patient room provided  Taken 6/6/2024 2215 by Skylar Perea RN  Infection Prevention:   personal protective equipment utilized   hand hygiene promoted   single patient room provided  Taken 6/6/2024 2040 by Skylar Perea RN  Infection Prevention:   personal protective equipment utilized   hand hygiene promoted   single patient room provided  Goal: Optimal Comfort and Wellbeing  Outcome: Ongoing, Progressing  Intervention: Provide Person-Centered Care  Recent Flowsheet Documentation  Taken 6/6/2024 2040 by Skylar Perea RN  Trust  Relationship/Rapport:   care explained   choices provided  Goal: Readiness for Transition of Care  Outcome: Ongoing, Progressing     Problem: Adjustment to Illness (Sepsis/Septic Shock)  Goal: Optimal Coping  Outcome: Ongoing, Progressing  Intervention: Optimize Psychosocial Adjustment to Illness  Recent Flowsheet Documentation  Taken 6/6/2024 2040 by Skylar Perea RN  Supportive Measures: active listening utilized  Family/Support System Care: support provided     Problem: Bleeding (Sepsis/Septic Shock)  Goal: Absence of Bleeding  Outcome: Ongoing, Progressing  Intervention: Monitor and Manage Bleeding  Recent Flowsheet Documentation  Taken 6/7/2024 0210 by Skylar Perea RN  Bleeding Precautions: blood pressure closely monitored  Bleeding Management: dressing monitored  Taken 6/6/2024 2349 by Skylar Perea RN  Bleeding Precautions: blood pressure closely monitored  Bleeding Management: dressing monitored  Taken 6/6/2024 2215 by Skylar Perea RN  Bleeding Precautions: blood pressure closely monitored  Bleeding Management: dressing monitored  Taken 6/6/2024 2040 by Skylar Perea RN  Bleeding Precautions: blood pressure closely monitored  Bleeding Management: dressing monitored     Problem: Glycemic Control Impaired (Sepsis/Septic Shock)  Goal: Blood Glucose Level Within Desired Range  Outcome: Ongoing, Progressing  Intervention: Optimize Glycemic Control  Recent Flowsheet Documentation  Taken 6/6/2024 2349 by Skylar Perea RN  Glycemic Management: blood glucose monitored  Taken 6/6/2024 2040 by Skylar Perea RN  Glycemic Management: blood glucose monitored     Problem: Infection Progression (Sepsis/Septic Shock)  Goal: Absence of Infection Signs and Symptoms  Outcome: Ongoing, Progressing  Intervention: Initiate Sepsis Management  Recent Flowsheet Documentation  Taken 6/7/2024 0210 by Skylar Perea RN  Infection Prevention:   personal protective equipment utilized   hand hygiene  promoted   single patient room provided  Isolation Precautions: precautions maintained  Taken 6/6/2024 2349 by Skylar Perea RN  Infection Prevention:   hand hygiene promoted   personal protective equipment utilized   rest/sleep promoted   single patient room provided  Isolation Precautions: precautions maintained  Taken 6/6/2024 2215 by Skylar Perea RN  Infection Prevention:   personal protective equipment utilized   hand hygiene promoted   single patient room provided  Isolation Precautions: precautions maintained  Taken 6/6/2024 2040 by Skylar Perea RN  Infection Prevention:   personal protective equipment utilized   hand hygiene promoted   single patient room provided  Isolation Precautions: precautions maintained  Intervention: Promote Recovery  Recent Flowsheet Documentation  Taken 6/7/2024 0210 by Skylar Perea RN  Activity Management: activity encouraged  Taken 6/6/2024 2349 by Skylar Perea RN  Activity Management: activity encouraged  Taken 6/6/2024 2215 by Skylar Perea RN  Activity Management: activity encouraged  Taken 6/6/2024 2040 by Skylar Perea RN  Activity Management: activity encouraged  Airway/Ventilation Support: pulmonary hygiene promoted  Sleep/Rest Enhancement: awakenings minimized     Problem: Nutrition Impaired (Sepsis/Septic Shock)  Goal: Optimal Nutrition Intake  Outcome: Ongoing, Progressing     Problem: Pain Chronic (Persistent) (Comorbidity Management)  Goal: Acceptable Pain Control and Functional Ability  Outcome: Ongoing, Progressing  Intervention: Manage Persistent Pain  Recent Flowsheet Documentation  Taken 6/7/2024 0210 by Skylar Perea RN  Medication Review/Management: medications reviewed  Taken 6/6/2024 2215 by Skylar Perea RN  Medication Review/Management: medications reviewed  Taken 6/6/2024 2040 by Skylar Perea RN  Sleep/Rest Enhancement: awakenings minimized  Medication Review/Management: medications reviewed  Intervention:  Optimize Psychosocial Wellbeing  Recent Flowsheet Documentation  Taken 6/6/2024 2040 by Skylar Perea RN  Supportive Measures: active listening utilized  Diversional Activities: television  Family/Support System Care: support provided     Problem: Fall Injury Risk  Goal: Absence of Fall and Fall-Related Injury  Outcome: Ongoing, Progressing  Intervention: Identify and Manage Contributors  Recent Flowsheet Documentation  Taken 6/7/2024 0210 by Skylar Perea RN  Medication Review/Management: medications reviewed  Taken 6/6/2024 2349 by Skylar Perea RN  Self-Care Promotion: independence encouraged  Taken 6/6/2024 2215 by Skylar Perea RN  Medication Review/Management: medications reviewed  Taken 6/6/2024 2040 by Skylar Perea RN  Medication Review/Management: medications reviewed  Self-Care Promotion: independence encouraged  Intervention: Promote Injury-Free Environment  Recent Flowsheet Documentation  Taken 6/7/2024 0210 by Skylar Perea RN  Safety Promotion/Fall Prevention:   safety round/check completed   room organization consistent   nonskid shoes/slippers when out of bed   fall prevention program maintained   activity supervised  Taken 6/6/2024 2349 by Skylar Perea RN  Safety Promotion/Fall Prevention:   safety round/check completed   room organization consistent   nonskid shoes/slippers when out of bed   lighting adjusted   fall prevention program maintained   activity supervised  Taken 6/6/2024 2215 by Skylar Perea RN  Safety Promotion/Fall Prevention:   safety round/check completed   room organization consistent   nonskid shoes/slippers when out of bed   fall prevention program maintained   activity supervised  Taken 6/6/2024 2040 by Skylar Perea RN  Safety Promotion/Fall Prevention:   safety round/check completed   room organization consistent   nonskid shoes/slippers when out of bed   fall prevention program maintained   activity supervised     Problem: Device-Related  Complication Risk (Hemodialysis)  Goal: Safe, Effective Therapy Delivery  Outcome: Ongoing, Progressing  Intervention: Optimize Device Care and Function  Recent Flowsheet Documentation  Taken 6/7/2024 0210 by Skylar Perea RN  Medication Review/Management: medications reviewed  Taken 6/6/2024 2215 by Skylar Perea RN  Medication Review/Management: medications reviewed  Taken 6/6/2024 2040 by Skylar Perea RN  Medication Review/Management: medications reviewed     Problem: Hemodynamic Instability (Hemodialysis)  Goal: Effective Tissue Perfusion  Outcome: Ongoing, Progressing     Problem: Infection (Hemodialysis)  Goal: Absence of Infection Signs and Symptoms  Outcome: Ongoing, Progressing

## 2024-06-07 NOTE — CASE MANAGEMENT/SOCIAL WORK
Continued Stay Note  [facility]     Patient Name: Tavo Gudino  MRN: [id number]  Today's Date: 6/7/2024    Admit Date: 5/31/2024    Plan: discharge plan   Discharge Plan       Row Name 06/07/24 1423       Plan    Final Discharge Disposition Code 03 - skilled nursing facility (SNF)      Row Name 06/07/24 1417       Plan    Plan discharge plan    Plan Comments       Row Name 06/07/24 1233       Plan    Plan discharge plan    Plan Comments Per ID's request, I submitted a prior auth on the medication Nuzyra in the event pt goes home.  Meanwhile I spoke with the patient in room and he has decided to go back to [facility]/ at discharge. I have attempted to call admissions at [facility]/([phone]) and had to leave a voice message. The facility will need to be able to offer pt a bed and then will need insurance approval. Waiting to hear back      Addendum:  Pt's Nuzyra was approved, but pt's cost is $2000. I received a call from Vero with Patients assistance program with Nuzyra requesting  call [phone], case no. [id number] to provide information assisting with enrolling pt in the program.     Final Discharge Disposition Code 03 - skilled nursing facility (SNF)                   Discharge Codes    No documentation.                 Expected Discharge Date and Time       Expected Discharge Date Expected Discharge Time    Jun 8, 2024               Catrachita Robertson RN

## 2024-06-07 NOTE — CASE MANAGEMENT/SOCIAL WORK
Continued Stay Note  [facility]     Patient Name: Tavo Gudino  MRN: [id number]  Today's Date: 6/7/2024    Admit Date: 5/31/2024    Plan: discharge plan   Discharge Plan       Row Name 06/07/24 9212       Plan    Plan discharge plan    Plan Comments I attempted to reach out to admissions several times today and was finally told that  admission coordinator, Radha was out due to a meeting and will be back Monday. I spoke with Dr Fuentes ([facility]) regarding Nuzyra needing to be ordered for 14 days and also that pt has decided to go back to [facility], but still  waiting to hear back from the facility. If facility is able to accept pt back, he will still need insurance approval and transportation arranged at discharge. ID's final recommendations are pending on if facility can accept pt, with possible IV (eravacycline) per ID.  Pt will also need transportation at discharge. CM will follow up Monday.    Addendum: I spoke with Radha in admissions at [facility] on cell([phone]) regarding pt possibly returning to the facility and completing STR. I discussed possibly antibiotics at discharge. Radha states she will need to talk with  and CM will follow up Monday.    Final Discharge Disposition Code 03 - skilled nursing facility (SNF)      Row Name 06/07/24 7267       Plan    Plan Comments Spoke with patient assistance program regarding Nuzyra and assistance can only be provided for up to 30 tablets a year. Current order is written for 30 days of Nuzyra, exceeding 30 tablets. Spoke with Vero, patient , who says the order will need to be re-written for a 14 day supply. Vero can supply samples of medication to [facility] office for patient to resume medication for additional 14 days if needed.                   Discharge Codes    No documentation.                 Expected Discharge Date and Time       Expected Discharge Date Expected Discharge Time    Jun 8, 2024                Catrachita Robertson RN

## 2024-06-08 LAB
ANION GAP SERPL CALCULATED.3IONS-SCNC: 8 MMOL/L (ref 5–15)
BASOPHILS # BLD AUTO: 0.07 10*3/MM3 (ref 0–0.2)
BASOPHILS NFR BLD AUTO: 0.9 % (ref 0–1.5)
BUN SERPL-MCNC: 15 MG/DL (ref 8–23)
BUN/CREAT SERPL: 4.7 (ref 7–25)
CALCIUM SPEC-SCNC: 9.7 MG/DL (ref 8.6–10.5)
CHLORIDE SERPL-SCNC: 101 MMOL/L (ref 98–107)
CO2 SERPL-SCNC: 25 MMOL/L (ref 22–29)
CREAT SERPL-MCNC: 3.18 MG/DL (ref 0.76–1.27)
DEPRECATED RDW RBC AUTO: 57.5 FL (ref 37–54)
EGFRCR SERPLBLD CKD-EPI 2021: 20.1 ML/MIN/1.73
EOSINOPHIL # BLD AUTO: 0.22 10*3/MM3 (ref 0–0.4)
EOSINOPHIL NFR BLD AUTO: 3 % (ref 0.3–6.2)
ERYTHROCYTE [DISTWIDTH] IN BLOOD BY AUTOMATED COUNT: 16.4 % (ref 12.3–15.4)
GLUCOSE SERPL-MCNC: 71 MG/DL (ref 65–99)
HCT VFR BLD AUTO: 30.3 % (ref 37.5–51)
HGB BLD-MCNC: 9.6 G/DL (ref 13–17.7)
IMM GRANULOCYTES # BLD AUTO: 0.11 10*3/MM3 (ref 0–0.05)
IMM GRANULOCYTES NFR BLD AUTO: 1.5 % (ref 0–0.5)
LYMPHOCYTES # BLD AUTO: 2.59 10*3/MM3 (ref 0.7–3.1)
LYMPHOCYTES NFR BLD AUTO: 35.1 % (ref 19.6–45.3)
MAGNESIUM SERPL-MCNC: 1.8 MG/DL (ref 1.6–2.4)
MCH RBC QN AUTO: 30.1 PG (ref 26.6–33)
MCHC RBC AUTO-ENTMCNC: 31.7 G/DL (ref 31.5–35.7)
MCV RBC AUTO: 95 FL (ref 79–97)
MONOCYTES # BLD AUTO: 0.79 10*3/MM3 (ref 0.1–0.9)
MONOCYTES NFR BLD AUTO: 10.7 % (ref 5–12)
NEUTROPHILS NFR BLD AUTO: 3.59 10*3/MM3 (ref 1.7–7)
NEUTROPHILS NFR BLD AUTO: 48.8 % (ref 42.7–76)
NRBC BLD AUTO-RTO: 0.3 /100 WBC (ref 0–0.2)
PLATELET # BLD AUTO: 204 10*3/MM3 (ref 140–450)
PMV BLD AUTO: 9.7 FL (ref 6–12)
POTASSIUM SERPL-SCNC: 5.1 MMOL/L (ref 3.5–5.2)
RBC # BLD AUTO: 3.19 10*6/MM3 (ref 4.14–5.8)
SODIUM SERPL-SCNC: 134 MMOL/L (ref 136–145)
WBC NRBC COR # BLD AUTO: 7.37 10*3/MM3 (ref 3.4–10.8)

## 2024-06-08 PROCEDURE — 83735 ASSAY OF MAGNESIUM: CPT | Performed by: PEDIATRICS

## 2024-06-08 PROCEDURE — 99232 SBSQ HOSP IP/OBS MODERATE 35: CPT | Performed by: INTERNAL MEDICINE

## 2024-06-08 PROCEDURE — 25810000003 SODIUM CHLORIDE 0.9 % SOLUTION 250 ML FLEX CONT: Performed by: INTERNAL MEDICINE

## 2024-06-08 PROCEDURE — 25010000002 ERAVACYCLINE DIHYDROCHLORIDE 50 MG RECONSTITUTED SOLUTION 1 EACH VIAL: Performed by: INTERNAL MEDICINE

## 2024-06-08 PROCEDURE — 80048 BASIC METABOLIC PNL TOTAL CA: CPT | Performed by: PEDIATRICS

## 2024-06-08 PROCEDURE — 85025 COMPLETE CBC W/AUTO DIFF WBC: CPT | Performed by: PEDIATRICS

## 2024-06-08 PROCEDURE — 25010000002 ONDANSETRON PER 1 MG: Performed by: PHYSICIAN ASSISTANT

## 2024-06-08 RX ORDER — ONDANSETRON 2 MG/ML
4 INJECTION INTRAMUSCULAR; INTRAVENOUS EVERY 6 HOURS PRN
Status: DISCONTINUED | OUTPATIENT
Start: 2024-06-08 | End: 2024-06-20 | Stop reason: HOSPADM

## 2024-06-08 RX ADMIN — ASPIRIN 81 MG: 81 TABLET, COATED ORAL at 08:51

## 2024-06-08 RX ADMIN — ERAVACYCLINE 85 MG: 50 INJECTION, POWDER, LYOPHILIZED, FOR SOLUTION INTRAVENOUS at 15:36

## 2024-06-08 RX ADMIN — DILTIAZEM HYDROCHLORIDE 240 MG: 240 CAPSULE, COATED, EXTENDED RELEASE ORAL at 08:52

## 2024-06-08 RX ADMIN — Medication 1 CAPSULE: at 08:51

## 2024-06-08 RX ADMIN — VANCOMYCIN HYDROCHLORIDE 125 MG: 125 CAPSULE ORAL at 04:26

## 2024-06-08 RX ADMIN — HYDROCODONE BITARTRATE AND ACETAMINOPHEN 1 TABLET: 7.5; 325 TABLET ORAL at 09:34

## 2024-06-08 RX ADMIN — Medication 10 ML: at 08:54

## 2024-06-08 RX ADMIN — FOLIC ACID 1 MG: 1 TABLET ORAL at 08:51

## 2024-06-08 RX ADMIN — LIDOCAINE HYDROCHLORIDE: 20 JELLY TOPICAL at 20:54

## 2024-06-08 RX ADMIN — VANCOMYCIN HYDROCHLORIDE 125 MG: 125 CAPSULE ORAL at 12:54

## 2024-06-08 RX ADMIN — APIXABAN 5 MG: 5 TABLET, FILM COATED ORAL at 20:51

## 2024-06-08 RX ADMIN — Medication 1 CAPSULE: at 20:51

## 2024-06-08 RX ADMIN — FAMOTIDINE 10 MG: 20 TABLET, FILM COATED ORAL at 08:52

## 2024-06-08 RX ADMIN — APIXABAN 5 MG: 5 TABLET, FILM COATED ORAL at 08:51

## 2024-06-08 RX ADMIN — VANCOMYCIN HYDROCHLORIDE 125 MG: 125 CAPSULE ORAL at 17:31

## 2024-06-08 RX ADMIN — HYDROCODONE BITARTRATE AND ACETAMINOPHEN 1 TABLET: 7.5; 325 TABLET ORAL at 15:32

## 2024-06-08 RX ADMIN — LEVOTHYROXINE SODIUM 75 MCG: 0.07 TABLET ORAL at 04:26

## 2024-06-08 RX ADMIN — ONDANSETRON 4 MG: 2 INJECTION INTRAMUSCULAR; INTRAVENOUS at 04:56

## 2024-06-08 RX ADMIN — LIDOCAINE HYDROCHLORIDE: 20 JELLY TOPICAL at 08:53

## 2024-06-08 RX ADMIN — COLLAGENASE SANTYL 1 APPLICATION: 250 OINTMENT TOPICAL at 08:54

## 2024-06-08 RX ADMIN — ERAVACYCLINE 85 MG: 50 INJECTION, POWDER, LYOPHILIZED, FOR SOLUTION INTRAVENOUS at 04:26

## 2024-06-08 RX ADMIN — VANCOMYCIN HYDROCHLORIDE 125 MG: 125 CAPSULE ORAL at 23:05

## 2024-06-08 NOTE — PROGRESS NOTES
" LOS: 8 days   Patient Care Team:  Hayden Weinstein MD as PCP - General (Nephrology)    Chief Complaint: ESRD  NSTEMI    Subjective     Seen and examined at bedside, no overnight issues.   Denies chest pain or shortness of breath.     History taken from: patient    Objective     Vital Sign Min/Max for last 24 hours  Temp  Min: 97.5 °F (36.4 °C)  Max: 98.4 °F (36.9 °C)   BP  Min: 106/83  Max: 172/94   Pulse  Min: 54  Max: 90   Resp  Min: 18  Max: 18   SpO2  Min: 96 %  Max: 96 %   No data recorded   No data recorded     Flowsheet Rows      Flowsheet Row First Filed Value   Admission Height 185.4 cm (73\") Documented at 05/31/2024 1826   Admission Weight 84.8 kg (187 lb) Documented at 05/31/2024 1826            No intake/output data recorded.  I/O last 3 completed shifts:  In: 200 [P.O.:200]  Out: 1775 [Urine:1050; Drains:725]    Objective   Gen: Alert, NAD   HENT: NC, AT, EOMI   NECK: Supple, no JVD, Trachea midline   LUNGS: CTA bilaterally, non labored respirtation   CVS: S1/S2 audible, RRR, no murmur   Abd: Soft, NT, ND, BS+   : + Delaney   Ext: Left BKA  CNS: Alert, No focal deficit noted grossly  Psy: Cooperative  Skin: Warm, dry and intact      Results Review:     I reviewed the patient's new clinical results.    WBC WBC   Date Value Ref Range Status   06/08/2024 7.37 3.40 - 10.80 10*3/mm3 Final   06/06/2024 7.19 3.40 - 10.80 10*3/mm3 Final      HGB Hemoglobin   Date Value Ref Range Status   06/08/2024 9.6 (L) 13.0 - 17.7 g/dL Final   06/06/2024 9.2 (L) 13.0 - 17.7 g/dL Final      HCT Hematocrit   Date Value Ref Range Status   06/08/2024 30.3 (L) 37.5 - 51.0 % Final   06/06/2024 29.0 (L) 37.5 - 51.0 % Final      Platlets No results found for: \"LABPLAT\"   MCV MCV   Date Value Ref Range Status   06/08/2024 95.0 79.0 - 97.0 fL Final   06/06/2024 95.1 79.0 - 97.0 fL Final          Sodium Sodium   Date Value Ref Range Status   06/08/2024 134 (L) 136 - 145 mmol/L Final   06/06/2024 132 (L) 136 - 145 mmol/L Final    " "  Potassium Potassium   Date Value Ref Range Status   06/08/2024 5.1 3.5 - 5.2 mmol/L Final   06/06/2024 4.6 3.5 - 5.2 mmol/L Final      Chloride Chloride   Date Value Ref Range Status   06/08/2024 101 98 - 107 mmol/L Final   06/06/2024 101 98 - 107 mmol/L Final      CO2 CO2   Date Value Ref Range Status   06/08/2024 25.0 22.0 - 29.0 mmol/L Final   06/06/2024 23.0 22.0 - 29.0 mmol/L Final      BUN BUN   Date Value Ref Range Status   06/08/2024 15 8 - 23 mg/dL Final   06/06/2024 33 (H) 8 - 23 mg/dL Final      Creatinine Creatinine   Date Value Ref Range Status   06/08/2024 3.18 (H) 0.76 - 1.27 mg/dL Final   06/06/2024 3.33 (H) 0.76 - 1.27 mg/dL Final      Calcium Calcium   Date Value Ref Range Status   06/08/2024 9.7 8.6 - 10.5 mg/dL Final   06/06/2024 9.3 8.6 - 10.5 mg/dL Final      PO4 No results found for: \"CAPO4\"   Albumin Albumin   Date Value Ref Range Status   06/06/2024 2.7 (L) 3.5 - 5.2 g/dL Final        Magnesium Magnesium   Date Value Ref Range Status   06/08/2024 1.8 1.6 - 2.4 mg/dL Final        Uric Acid No results found for: \"URICACID\"     Medication Review: Yes    Assessment & Plan       NSTEMI (non-ST elevated myocardial infarction)    C. difficile colitis    History of cholecystitis s/p cholecystostomy tube    ESRD (end stage renal disease) on hemodialysis    Atrial fibrillation    Essential hypertension    Moderate malnutrition    Soft tissue infection    Acute osteomyelitis of left tibia      Assessment & Plan    ESRD: On TTS jonathan. HD through right IJ TDC. Still makes urine. Does have cole cath+ for chronic urinary retention. Patient the family Primary nephrologist ( Dr Wan) was monitoring for renal recovery and residual renal function.      Volume status: No significant LE edema noted. Does have scrotal edema.     Afib w RVR: RVR during HD treatment. BP controlled. . No chest pain or sob.       Met acidosis: Mild. Management with HD.     Anemia: ACD. Transfuse at hb<7.  ALLISON on HD days     CAD: " Transferred from OSH for evaluation of ACD. Cardiology following. EF 55%    Plan:  - No need for hemodialysis today.  Still making decent urine output.  Will hold dialysis today.   - Renal diet ( Doesn't want to follow)  - ALLISON with HD   - Metoprolol for RVR during HD on PRN basis.     Arvin Curtis MD  06/08/24  18:25 EDT

## 2024-06-08 NOTE — PROGRESS NOTES
" LOS: 7 days   Patient Care Team:  Hayden Weinstein MD as PCP - General (Nephrology)    Chief Complaint: ESRD  NSTEMI    Subjective     Seen and examined at bedside.  Reports feeling better.   No chest pain or shortness of breath.     History taken from: patient    Objective     Vital Sign Min/Max for last 24 hours  Temp  Min: 97.5 °F (36.4 °C)  Max: 98.1 °F (36.7 °C)   BP  Min: 128/68  Max: 171/55   Pulse  Min: 53  Max: 84   Resp  Min: 16  Max: 18   SpO2  Min: 96 %  Max: 99 %   No data recorded   No data recorded     Flowsheet Rows      Flowsheet Row First Filed Value   Admission Height 185.4 cm (73\") Documented at 05/31/2024 1826   Admission Weight 84.8 kg (187 lb) Documented at 05/31/2024 1826            No intake/output data recorded.  I/O last 3 completed shifts:  In: -   Out: 2140 [Urine:700; Drains:400]    Objective   Gen: Alert, NAD   HENT: NC, AT, EOMI   NECK: Supple, no JVD, Trachea midline   LUNGS: CTA bilaterally, non labored respirtation   CVS: S1/S2 audible, RRR, no murmur   Abd: Soft, NT, ND, BS+   : + Delaney   Ext: Left BKA  CNS: Alert, No focal deficit noted grossly  Psy: Cooperative  Skin: Warm, dry and intact        Results Review:     I reviewed the patient's new clinical results.    WBC WBC   Date Value Ref Range Status   06/06/2024 7.19 3.40 - 10.80 10*3/mm3 Final      HGB Hemoglobin   Date Value Ref Range Status   06/06/2024 9.2 (L) 13.0 - 17.7 g/dL Final      HCT Hematocrit   Date Value Ref Range Status   06/06/2024 29.0 (L) 37.5 - 51.0 % Final      Platlets No results found for: \"LABPLAT\"   MCV MCV   Date Value Ref Range Status   06/06/2024 95.1 79.0 - 97.0 fL Final          Sodium Sodium   Date Value Ref Range Status   06/06/2024 132 (L) 136 - 145 mmol/L Final      Potassium Potassium   Date Value Ref Range Status   06/06/2024 4.6 3.5 - 5.2 mmol/L Final      Chloride Chloride   Date Value Ref Range Status   06/06/2024 101 98 - 107 mmol/L Final      CO2 CO2   Date Value Ref Range Status " "  06/06/2024 23.0 22.0 - 29.0 mmol/L Final      BUN BUN   Date Value Ref Range Status   06/06/2024 33 (H) 8 - 23 mg/dL Final      Creatinine Creatinine   Date Value Ref Range Status   06/06/2024 3.33 (H) 0.76 - 1.27 mg/dL Final      Calcium Calcium   Date Value Ref Range Status   06/06/2024 9.3 8.6 - 10.5 mg/dL Final      PO4 No results found for: \"CAPO4\"   Albumin Albumin   Date Value Ref Range Status   06/06/2024 2.7 (L) 3.5 - 5.2 g/dL Final        Magnesium No results found for: \"MG\"     Uric Acid No results found for: \"URICACID\"     Medication Review: Yes    Assessment & Plan       NSTEMI (non-ST elevated myocardial infarction)    C. difficile colitis    History of cholecystitis s/p cholecystostomy tube    ESRD (end stage renal disease) on hemodialysis    Atrial fibrillation    Essential hypertension    Moderate malnutrition    Soft tissue infection    Acute osteomyelitis of left tibia      Assessment & Plan    ESRD: On TTS jonathan. HD through right IJ TDC. Still makes urine. Does have cole cath+ for chronic urinary retention. Patient the family Primary nephrologist ( Dr Wan) was monitoring for renal recovery and residual renal function.      Volume status: No significant LE edema noted. Does have scrotal edema.     Afib w RVR: RVR during HD treatment. BP controlled. . No chest pain or sob.       Met acidosis: Mild. Management with HD.     Anemia: ACD. Transfuse at hb<7.  ALLISON on HD days     CAD: Transferred from OSH for evaluation of ACD. Cardiology following. EF 55%    Plan:  - No need for hemodialysis today.  Will check BMP tomorrow; and will decide if he needs dialysis.   - Renal diet ( Doesn't want to follow)  - ALLISON with HD   - Metoprolol for RVR during HD on PRN basis.     Arvin Curtis MD  06/07/24  20:19 EDT            "

## 2024-06-08 NOTE — PLAN OF CARE
Goal Outcome Evaluation:   VSS, pt rested well, RA, pt IJ dressing changed, Episode of Nausea and vomiting this AM. No BM or abdominal pain.      Problem: Skin Injury Risk Increased  Goal: Skin Health and Integrity  Outcome: Ongoing, Progressing  Intervention: Optimize Skin Protection  Recent Flowsheet Documentation  Taken 6/8/2024 0402 by Skylar Perea RN  Pressure Reduction Techniques:   weight shift assistance provided   heels elevated off bed   positioned off wounds   pressure points protected   rest period provided between sit times  Head of Bed (HOB) Positioning: HOB elevated  Pressure Reduction Devices:   pressure-redistributing mattress utilized   positioning supports utilized  Skin Protection:   adhesive use limited   tubing/devices free from skin contact  Taken 6/8/2024 0200 by Skylar Perea RN  Pressure Reduction Techniques:   weight shift assistance provided   heels elevated off bed   positioned off wounds   pressure points protected   rest period provided between sit times  Head of Bed (HOB) Positioning: [facility] elevated  Pressure Reduction Devices:   pressure-redistributing mattress utilized   positioning supports utilized  Skin Protection:   adhesive use limited   tubing/devices free from skin contact  Taken 6/8/2024 0050 by Skylar Perea RN  Pressure Reduction Techniques:   weight shift assistance provided   positioned off wounds   heels elevated off bed   pressure points protected   rest period provided between sit times  Head of Bed (HOB) Positioning: HOB elevated  Pressure Reduction Devices:   pressure-redistributing mattress utilized   positioning supports utilized   heel offloading device utilized  Skin Protection:   adhesive use limited   tubing/devices free from skin contact  Taken 6/7/2024 2125 by Skylra Perea RN  Pressure Reduction Techniques:   weight shift assistance provided   heels elevated off bed   positioned off wounds   pressure points protected   rest period provided  between sit times  Head of Bed (HOB) Positioning: HOB elevated  Pressure Reduction Devices:   pressure-redistributing mattress utilized   positioning supports utilized   heel offloading device utilized  Skin Protection:   adhesive use limited   tubing/devices free from skin contact     Problem: Adult Inpatient Plan of Care  Goal: Plan of Care Review  Outcome: Ongoing, Progressing  Goal: Patient-Specific Goal (Individualized)  Outcome: Ongoing, Progressing  Goal: Absence of Hospital-Acquired Illness or Injury  Outcome: Ongoing, Progressing  Intervention: Identify and Manage Fall Risk  Recent Flowsheet Documentation  Taken 6/8/2024 0402 by Skylar Perea RN  Safety Promotion/Fall Prevention:   safety round/check completed   room organization consistent   nonskid shoes/slippers when out of bed   fall prevention program maintained   activity supervised  Taken 6/8/2024 0200 by Skylar Perea RN  Safety Promotion/Fall Prevention:   safety round/check completed   room organization consistent   nonskid shoes/slippers when out of bed   fall prevention program maintained   activity supervised  Taken 6/8/2024 0050 by Skylar Perea RN  Safety Promotion/Fall Prevention:   safety round/check completed   room organization consistent   nonskid shoes/slippers when out of bed   lighting adjusted   fall prevention program maintained   activity supervised  Taken 6/7/2024 2125 by Skylar Perea RN  Safety Promotion/Fall Prevention:   safety round/check completed   room organization consistent   nonskid shoes/slippers when out of bed   lighting adjusted   fall prevention program maintained   activity supervised  Intervention: Prevent Skin Injury  Recent Flowsheet Documentation  Taken 6/8/2024 0402 by Skylar Perea RN  Body Position: weight shifting  Skin Protection:   adhesive use limited   tubing/devices free from skin contact  Taken 6/8/2024 0200 by Skylar Perea RN  Body Position: weight shifting  Skin  Protection:   adhesive use limited   tubing/devices free from skin contact  Taken 6/8/2024 0050 by Skylar Perea RN  Body Position: weight shifting  Skin Protection:   adhesive use limited   tubing/devices free from skin contact  Taken 6/7/2024 2125 by Skylar Perea RN  Body Position: weight shifting  Skin Protection:   adhesive use limited   tubing/devices free from skin contact  Intervention: Prevent and Manage VTE (Venous Thromboembolism) Risk  Recent Flowsheet Documentation  Taken 6/8/2024 0402 by Skylar Perea RN  Activity Management: activity encouraged  VTE Prevention/Management: other (see comments)  Taken 6/8/2024 0200 by Skylar Perea RN  Activity Management: activity encouraged  VTE Prevention/Management: other (see comments)  Taken 6/8/2024 0050 by Skylar Perea RN  Activity Management: activity encouraged  Taken 6/7/2024 2125 by Skylar Perea RN  Activity Management: activity encouraged  Range of Motion: active ROM (range of motion) encouraged  Intervention: Prevent Infection  Recent Flowsheet Documentation  Taken 6/8/2024 0402 by Skylar Perea RN  Infection Prevention:   personal protective equipment utilized   hand hygiene promoted   single patient room provided  Taken 6/8/2024 0200 by Skylar Perea RN  Infection Prevention:   personal protective equipment utilized   hand hygiene promoted   single patient room provided  Taken 6/8/2024 0050 by Skylar Perea RN  Infection Prevention:   environmental surveillance performed   equipment surfaces disinfected   hand hygiene promoted   personal protective equipment utilized   rest/sleep promoted  Taken 6/7/2024 2125 by Skylar Perea RN  Infection Prevention:   personal protective equipment utilized   hand hygiene promoted   rest/sleep promoted   single patient room provided  Goal: Optimal Comfort and Wellbeing  Outcome: Ongoing, Progressing  Intervention: Provide Person-Centered Care  Recent Flowsheet  Documentation  Taken 6/7/2024 2125 by Skylar Perea RN  Trust Relationship/Rapport:   care explained   choices provided  Goal: Readiness for Transition of Care  Outcome: Ongoing, Progressing     Problem: Adjustment to Illness (Sepsis/Septic Shock)  Goal: Optimal Coping  Outcome: Ongoing, Progressing  Intervention: Optimize Psychosocial Adjustment to Illness  Recent Flowsheet Documentation  Taken 6/7/2024 2125 by Skylar Perea RN  Supportive Measures:   active listening utilized   counseling provided  Family/Support System Care: support provided     Problem: Bleeding (Sepsis/Septic Shock)  Goal: Absence of Bleeding  Outcome: Ongoing, Progressing  Intervention: Monitor and Manage Bleeding  Recent Flowsheet Documentation  Taken 6/8/2024 0402 by Skylar Perea RN  Bleeding Precautions: blood pressure closely monitored  Bleeding Management: dressing monitored  Taken 6/8/2024 0200 by Skylar Perea RN  Bleeding Precautions: blood pressure closely monitored  Bleeding Management: dressing monitored  Taken 6/8/2024 0050 by Skylar Perea RN  Bleeding Precautions: blood pressure closely monitored  Bleeding Management: dressing monitored  Taken 6/7/2024 2125 by Skylar Perea RN  Bleeding Precautions: blood pressure closely monitored  Bleeding Management: dressing monitored     Problem: Glycemic Control Impaired (Sepsis/Septic Shock)  Goal: Blood Glucose Level Within Desired Range  Outcome: Ongoing, Progressing  Intervention: Optimize Glycemic Control  Recent Flowsheet Documentation  Taken 6/7/2024 2125 by Skylar Perea RN  Glycemic Management: blood glucose monitored     Problem: Infection Progression (Sepsis/Septic Shock)  Goal: Absence of Infection Signs and Symptoms  Outcome: Ongoing, Progressing  Intervention: Initiate Sepsis Management  Recent Flowsheet Documentation  Taken 6/8/2024 0402 by Skylar Perea RN  Infection Prevention:   personal protective equipment utilized   hand hygiene  promoted   single patient room provided  Isolation Precautions: precautions maintained  Taken 6/8/2024 0200 by Skylar Perea RN  Infection Prevention:   personal protective equipment utilized   hand hygiene promoted   single patient room provided  Isolation Precautions: precautions maintained  Taken 6/8/2024 0050 by Skylar Perea RN  Infection Prevention:   environmental surveillance performed   equipment surfaces disinfected   hand hygiene promoted   personal protective equipment utilized   rest/sleep promoted  Isolation Precautions: precautions maintained  Taken 6/7/2024 2125 by Skylar Perea RN  Infection Prevention:   personal protective equipment utilized   hand hygiene promoted   rest/sleep promoted   single patient room provided  Isolation Precautions: precautions maintained  Intervention: Promote Recovery  Recent Flowsheet Documentation  Taken 6/8/2024 0402 by Skylar Perea RN  Activity Management: activity encouraged  Taken 6/8/2024 0200 by Skylar Perea RN  Activity Management: activity encouraged  Taken 6/8/2024 0050 by Skylar Perea RN  Activity Management: activity encouraged  Taken 6/7/2024 2125 by Skylar Perea RN  Activity Management: activity encouraged  Sleep/Rest Enhancement: awakenings minimized     Problem: Nutrition Impaired (Sepsis/Septic Shock)  Goal: Optimal Nutrition Intake  Outcome: Ongoing, Progressing     Problem: Pain Chronic (Persistent) (Comorbidity Management)  Goal: Acceptable Pain Control and Functional Ability  Outcome: Ongoing, Progressing  Intervention: Manage Persistent Pain  Recent Flowsheet Documentation  Taken 6/8/2024 0402 by Skylar Perea RN  Medication Review/Management: medications reviewed  Taken 6/8/2024 0200 by Skylar Perea RN  Medication Review/Management: medications reviewed  Taken 6/7/2024 2125 by Skylar Perea RN  Bowel Elimination Promotion: adequate fluid intake promoted  Sleep/Rest Enhancement: awakenings  minimized  Medication Review/Management: medications reviewed  Intervention: Optimize Psychosocial Wellbeing  Recent Flowsheet Documentation  Taken 6/7/2024 2125 by Skylar Perea RN  Supportive Measures:   active listening utilized   counseling provided  Diversional Activities: television  Family/Support System Care: support provided     Problem: Fall Injury Risk  Goal: Absence of Fall and Fall-Related Injury  Outcome: Ongoing, Progressing  Intervention: Identify and Manage Contributors  Recent Flowsheet Documentation  Taken 6/8/2024 0402 by Skylar Perea RN  Medication Review/Management: medications reviewed  Taken 6/8/2024 0200 by Skylar Perea RN  Medication Review/Management: medications reviewed  Taken 6/7/2024 2125 by Skylar Perea RN  Medication Review/Management: medications reviewed  Self-Care Promotion: independence encouraged  Intervention: Promote Injury-Free Environment  Recent Flowsheet Documentation  Taken 6/8/2024 0402 by Skylar Perea RN  Safety Promotion/Fall Prevention:   safety round/check completed   room organization consistent   nonskid shoes/slippers when out of bed   fall prevention program maintained   activity supervised  Taken 6/8/2024 0200 by Skylar Perea RN  Safety Promotion/Fall Prevention:   safety round/check completed   room organization consistent   nonskid shoes/slippers when out of bed   fall prevention program maintained   activity supervised  Taken 6/8/2024 0050 by Skylar Perea RN  Safety Promotion/Fall Prevention:   safety round/check completed   room organization consistent   nonskid shoes/slippers when out of bed   lighting adjusted   fall prevention program maintained   activity supervised  Taken 6/7/2024 2125 by Skylar Perea RN  Safety Promotion/Fall Prevention:   safety round/check completed   room organization consistent   nonskid shoes/slippers when out of bed   lighting adjusted   fall prevention program maintained   activity  supervised     Problem: Device-Related Complication Risk (Hemodialysis)  Goal: Safe, Effective Therapy Delivery  Outcome: Ongoing, Progressing  Intervention: Optimize Device Care and Function  Recent Flowsheet Documentation  Taken 6/8/2024 0402 by Skylar Perea RN  Medication Review/Management: medications reviewed  Taken 6/8/2024 0200 by Skylar Perea RN  Medication Review/Management: medications reviewed  Taken 6/7/2024 2125 by Skylar Perea RN  Medication Review/Management: medications reviewed     Problem: Hemodynamic Instability (Hemodialysis)  Goal: Effective Tissue Perfusion  Outcome: Ongoing, Progressing     Problem: Infection (Hemodialysis)  Goal: Absence of Infection Signs and Symptoms  Outcome: Ongoing, Progressing

## 2024-06-08 NOTE — PROGRESS NOTES
[facility] Medicine Services  PROGRESS NOTE    Patient Name: Tavo Gudino  : 1955  MRN: [id number]    Date of Admission: 2024  Primary Care Physician: Hayden Weinstein MD    Subjective   Subjective     CC:  Cellulitis    HPI:  Patient is frustrated being in the hospital, states if he leaves the hospital and goes to a nursing facility his leg is going to get worse.  He states that he would rather go home.      Objective   Objective     Vital Signs:   Temp:  [97.5 °F (36.4 °C)-98.1 °F (36.7 °C)] 97.5 °F (36.4 °C)  Heart Rate:  [54-90] 65  Resp:  [16-18] 18  BP: (128-172)/(52-94) 155/52     Physical Exam:  Constitutional: No acute distress, awake, alert  Respiratory: Clear to auscultation bilaterally, respiratory effort normal   Cardiovascular: RRR, no murmurs, rubs, or gallops  Gastrointestinal: Positive bowel sounds, soft, nontender, nondistended  Musculoskeletal: Left BKA  Psychiatric: Appropriate affect, cooperative  Neurologic: Oriented x 3, no focal neurological deficits  Skin: Right lower extremity in dressings, left IJ in place, right tunneled HD catheter      Results Reviewed:  LAB RESULTS:      Lab 24  0433 24  0502 24  1613 24  0753 24  0001 24  1319 24  0449 24  1054 24  0555 24  0906   WBC 7.37 7.19  --   --   --   --  8.82  --   --  9.32  --  12.36*   HEMOGLOBIN 9.6* 9.2*  --   --   --   --  8.4*  --   --  8.5*  --  9.1*   HEMATOCRIT 30.3* 29.0*  --   --   --   --  27.0*  --   --  27.3*  --  27.5*   PLATELETS 204 198  --   --   --   --  265  --   --  257  --  262   NEUTROS ABS 3.59  --   --   --   --   --   --   --   --   --   --  9.70*   IMMATURE GRANS (ABS) 0.11*  --   --   --   --   --   --   --   --   --   --  0.08*   LYMPHS ABS 2.59  --   --   --   --   --   --   --   --   --   --  1.63   MONOS ABS 0.79  --   --   --   --   --   --   --   --   --   --  0.77   EOS ABS 0.22  --    --   --   --   --   --   --   --   --   --  0.11   MCV 95.0 95.1  --   --   --   --  98.2*  --   --  95.8  --  92.9   APTT  --   --   --  70.3 51.6* 58.9* 51.9* 85.4   < >  --    < > 43.8*   HEPARIN ANTI-XA  --   --  0.36 0.56  --   --   --   --   --   --   --   --     < > = values in this interval not displayed.         Lab 06/08/24  0433 06/06/24  0502 06/04/24  0449 06/03/24  0555 06/02/24  0906   SODIUM 134* 132* 135* 136 133*   POTASSIUM 5.1 4.6 3.9 3.4* 3.1*   CHLORIDE 101 101 103 108* 99   CO2 25.0 23.0 19.0* 16.0* 16.0*   ANION GAP 8.0 8.0 13.0 12.0 18.0*   BUN 15 33* 38* 25* 22   CREATININE 3.18* 3.33* 3.72* 2.69* 2.79*   EGFR 20.1* 19.0* 16.6* 24.5* 23.5*   GLUCOSE 71 76 76 67 64*   CALCIUM 9.7 9.3 8.8 8.0* 8.9   MAGNESIUM 1.8  --  1.6  --   --    PHOSPHORUS  --  4.8*  --   --   --          Lab 06/06/24  0502 06/02/24  0906   TOTAL PROTEIN  --  5.4*   ALBUMIN 2.7* 2.4*   GLOBULIN  --  3.0   ALT (SGPT)  --  7   AST (SGOT)  --  7   BILIRUBIN  --  0.4   ALK PHOS  --  240*                 Lab 06/01/24  0944   ABO TYPING B   RH TYPING Positive   ANTIBODY SCREEN Negative         Brief Urine Lab Results  (Last result in the past 365 days)        Color   Clarity   Blood   Leuk Est   Nitrite   Protein   CREAT   Urine HCG        06/02/24 1721 Yellow   Clear   Trace   Trace   Negative   100 mg/dL (2+)                   Microbiology Results Abnormal       Procedure Component Value - Date/Time    Blood Culture - Blood, Blood, Central Line [[id number]]  (Normal) Collected: 05/31/24 2237    Lab Status: Final result Specimen: Blood, Central Line Updated: 06/06/24 0801     Blood Culture No growth at 5 days    Blood Culture - Blood, Arm, Right [[id number]]  (Normal) Collected: 05/31/24 2237    Lab Status: Final result Specimen: Blood from Arm, Right Updated: 06/06/24 0745     Blood Culture No growth at 5 days    Urine Culture - Urine, Indwelling Urethral Catheter [[id number]]  (Normal) Collected: 06/02/24 1721    Lab Status:  Final result Specimen: Urine from Indwelling Urethral Catheter Updated: 06/04/24 0955     Urine Culture No growth            No radiology results from the last 24 hrs    Results for orders placed during the hospital encounter of 05/31/24    Adult Transthoracic Echo Complete W/ Cont if Necessary Per Protocol    Interpretation Summary    Left ventricular systolic function is normal. Left ventricular ejection fraction appears to be 51 - 55%.    Left ventricular wall thickness is consistent with borderline concentric hypertrophy.    Left atrial volume is moderately increased.    There is mild calcification of the aortic valve.    Mild mitral valve regurgitation is present.    There is a small (<1cm) circumferential pericardial effusion.      Current medications:  Scheduled Meds:apixaban, 5 mg, Oral, BID  aspirin, 81 mg, Oral, Daily  collagenase, 1 Application, Topical, Q24H  dilTIAZem CD, 240 mg, Oral, Q24H  eravacycline dihydrochloride (XERAVA) 85 mg in sodium chloride 0.9 % 250 mL (0.34 mg/mL) IVPB, 85 mg, Intravenous, Q12H  famotidine, 10 mg, Oral, Daily  folic acid, 1 mg, Oral, Daily  lactobacillus acidophilus, 1 capsule, Oral, BID  levothyroxine, 75 mcg, Oral, Q AM  Lidocaine HCl gel, , Topical, BID  metoprolol succinate XL, 100 mg, Oral, Q24H  pharmacy consult - MTM, , Does not apply, Daily  sodium chloride, 10 mL, Intravenous, Q12H  vancomycin, 125 mg, Oral, Q6H      Continuous Infusions:Pharmacy Consult - Pharmacy to dose,       PRN Meds:.  acetaminophen    heparin (porcine)    HYDROcodone-acetaminophen    Lidocaine HCl gel    metoprolol tartrate    ondansetron    Pharmacy Consult - Pharmacy to dose    sodium chloride    sodium chloride    Assessment & Plan   Assessment & Plan     Active Hospital Problems    Diagnosis  POA    **NSTEMI (non-ST elevated myocardial infarction) [I21.4]  Yes    Soft tissue infection [L08.9]  Unknown    Acute osteomyelitis of left tibia [M86.162]  Unknown    Moderate malnutrition  [E44.0]  Yes    C. difficile colitis [A04.72]  Yes    History of cholecystitis s/p cholecystostomy tube [K81.9]  Yes    ESRD (end stage renal disease) on hemodialysis [N18.6]  Yes    Atrial fibrillation [I48.91]  Yes    Essential hypertension [I10]  Yes      Resolved Hospital Problems   No resolved problems to display.        Brief Hospital Course to date:  Tavo Gudino is a 69 y.o. male with hx of HTN, PAD, left BKA, ESRD on hemodialysis, Afib on Eliquis, recent acute cholecystitis s/p cholecystostomy tube placement at , chronic urinary retention with indwelling Delaney catheter, and previous hx of C.diff who presented from [facility] due to chest pain, elevated troponin, and Afib with RVR.  Found to have multiple other concerning issues including active C. difficile infection, concern for possible osteomyelitis, exposed tendon on right lower extremity.     Chest pain, resolved  Elevated troponin, possible NSTEMI  -Transferred to [facility] for C with Dr. Amador  -Dr. Amador/Cardiology consulted, stress test with small area of ischemia.  With discussion with patient the decision was made to medically manage at this time.  -Continue ASA, beta blocker  -Continue Eliquis     Afib with RVR  Hx of Afib/Aflutter  -Toprol dosing adjusted per cardiology given episodes of intermittent RVR  -Continue Eliquis  -Continue Metoprolol and dilt     HFrEF  Moderate MR  --Most recent Echo on file I April 2024: EF 40%, akinetic septal, anterior, and apical walls, severely dilated LA, moderate MR  --Follows with  Cardiology, had been evaluated previously for Vanessa clip  --ECHO 6/1: LVEF 51 to 55%, borderline concentric LVH, left atrial volume moderately increased, mild MR, small less than 1 cm circumferential pericardial effusion     Leukocytosis, improving     Concern for osteomyelitis   Exposed tendon on RLE  --MRI left tib/fib showed cellulitis, possible osteomyelitis  --MRI right foot with cellulitis/edema  -- Arterial duplex  abnormal; Vascular surgery consulted and recommended medical management with no vascular surgery intervention  --Eravacycline per ID  --MRSA screen +  --Follow-up blood and urine cultures  -Dr. Yeager following. Patient likely needs additional amputation of left lower extremity due to concern for poor wound healing, likely needs debridement of right lower extremity around the tendon with wound VAC placement.  Patient refusing further amputation of left lower extremity or any other procedures.   --ID following. Patient is not likely a candidate for outpatient IV antibiotics he will not be able to follow-up adequately and doesn't not have support at home. Plan is just to continue the eravacycline for now until we have some idea of his d/c plan.  Prior auth for Nuzyra but co-pay is still $2000.  Working to see if we can get that lower. If he goes home, may consider doxycycline BID.   -IF discharges to SNF, CVC will be removed and tunneled line will need to be replaced  -Patient wishes to follow-up with wound care at .  -f/u 2 weeks after discharge.     C.diff colitis  -s/p fosphofmycin  -Continue p.o. vancomycin 125 mg 4 times a day for 14 days from admission through June 14 and then taper to 125 mg twice daily for 1 week and then 125 mg once daily for 1 week  -Follow-up with ID  -Continue probiotic     Recent cholecystitis s/p cholecystostomy tube  --Request records from that hospitalization, apparently was placed at   --Consulted General Surgery for management of tube  --Apparently already has follow up at  6/6/24 General Surgery--will need to reschedule this     Chronic urinary retention  --Has Delaney in place, reported hx of urethral injury per OSH note (data deficit, no idea when this was Delaney placed), appears he follows with Urology at  and this is a chronic Delaney  --Replaced Delaney here     PAD  Hx of left BKA  --Continue ASA     Anemia  --Likely chronic due to renal disease  --Folate 4.36 -->  supplementation started; vitamin B 12 level 755  --Hb 7.5 here, had been 9 at OSH     HTN  ESRD  --Nephrology consulted for dialysis   --Case management to work with patient and family in order to work on disposition location and finding an outpatient dialysis chair.     Hypothyroidism  --TSH significantly elevated at 91 at OSH, still elevated here to 65, but improved  --Synthroid dose was increased to 75 mcg daily at OSH  --Continue Synthroid at above dose and recheck in 4-6 weeks      Multiple wounds  Sacral Decubitus ulcer, POA, Stage 3  --Wound care consulted  --Ortho as above     Poor IV access  --Dr. Beckman placed central line     Expected Discharge Location and Transportation: Tentatively back to [place]  Expected Discharge   Expected Discharge Date: 6/8/2024; Expected Discharge Time:      DVT prophylaxis:  Medical and mechanical DVT prophylaxis orders are present.         AM-PAC 6 Clicks Score (PT): 9 (06/07/24 4096)    CODE STATUS:   Code Status and Medical Interventions:   Ordered at: 05/31/24 8180     Code Status (Patient has no pulse and is not breathing):    CPR (Attempt to Resuscitate)     Medical Interventions (Patient has pulse or is breathing):    Full Support     I have prepared this progress note with copied portions of the prior day's progress note of my own authorship to preserve accuracy and maintain consistency of documentation. I have reviewed these portions and edited them for correctness. I verify that the above documentation accurately and truly represents the evaluation and management performed on today's date.       Carina Burgos MD  06/08/24

## 2024-06-09 LAB
ANION GAP SERPL CALCULATED.3IONS-SCNC: 10 MMOL/L (ref 5–15)
BASOPHILS # BLD AUTO: 0.06 10*3/MM3 (ref 0–0.2)
BASOPHILS NFR BLD AUTO: 0.8 % (ref 0–1.5)
BUN SERPL-MCNC: 41 MG/DL (ref 8–23)
BUN/CREAT SERPL: 10.6 (ref 7–25)
CALCIUM SPEC-SCNC: 9.3 MG/DL (ref 8.6–10.5)
CHLORIDE SERPL-SCNC: 103 MMOL/L (ref 98–107)
CO2 SERPL-SCNC: 20 MMOL/L (ref 22–29)
CREAT SERPL-MCNC: 3.88 MG/DL (ref 0.76–1.27)
DEPRECATED RDW RBC AUTO: 57.3 FL (ref 37–54)
EGFRCR SERPLBLD CKD-EPI 2021: 15.8 ML/MIN/1.73
EOSINOPHIL # BLD AUTO: 0.21 10*3/MM3 (ref 0–0.4)
EOSINOPHIL NFR BLD AUTO: 2.7 % (ref 0.3–6.2)
ERYTHROCYTE [DISTWIDTH] IN BLOOD BY AUTOMATED COUNT: 16.7 % (ref 12.3–15.4)
GLUCOSE SERPL-MCNC: 75 MG/DL (ref 65–99)
HCT VFR BLD AUTO: 29 % (ref 37.5–51)
HGB BLD-MCNC: 9.1 G/DL (ref 13–17.7)
IMM GRANULOCYTES # BLD AUTO: 0.11 10*3/MM3 (ref 0–0.05)
IMM GRANULOCYTES NFR BLD AUTO: 1.4 % (ref 0–0.5)
LYMPHOCYTES # BLD AUTO: 2.32 10*3/MM3 (ref 0.7–3.1)
LYMPHOCYTES NFR BLD AUTO: 29.9 % (ref 19.6–45.3)
MCH RBC QN AUTO: 29.6 PG (ref 26.6–33)
MCHC RBC AUTO-ENTMCNC: 31.4 G/DL (ref 31.5–35.7)
MCV RBC AUTO: 94.5 FL (ref 79–97)
MONOCYTES # BLD AUTO: 0.7 10*3/MM3 (ref 0.1–0.9)
MONOCYTES NFR BLD AUTO: 9 % (ref 5–12)
NEUTROPHILS NFR BLD AUTO: 4.36 10*3/MM3 (ref 1.7–7)
NEUTROPHILS NFR BLD AUTO: 56.2 % (ref 42.7–76)
NRBC BLD AUTO-RTO: 0.4 /100 WBC (ref 0–0.2)
PLATELET # BLD AUTO: 172 10*3/MM3 (ref 140–450)
PMV BLD AUTO: 9.2 FL (ref 6–12)
POTASSIUM SERPL-SCNC: 5.9 MMOL/L (ref 3.5–5.2)
RBC # BLD AUTO: 3.07 10*6/MM3 (ref 4.14–5.8)
SODIUM SERPL-SCNC: 133 MMOL/L (ref 136–145)
WBC NRBC COR # BLD AUTO: 7.76 10*3/MM3 (ref 3.4–10.8)

## 2024-06-09 PROCEDURE — 85025 COMPLETE CBC W/AUTO DIFF WBC: CPT | Performed by: INTERNAL MEDICINE

## 2024-06-09 PROCEDURE — 99232 SBSQ HOSP IP/OBS MODERATE 35: CPT | Performed by: INTERNAL MEDICINE

## 2024-06-09 PROCEDURE — 25810000003 SODIUM CHLORIDE 0.9 % SOLUTION 250 ML FLEX CONT: Performed by: INTERNAL MEDICINE

## 2024-06-09 PROCEDURE — 25010000002 ERAVACYCLINE DIHYDROCHLORIDE 50 MG RECONSTITUTED SOLUTION 1 EACH VIAL: Performed by: INTERNAL MEDICINE

## 2024-06-09 PROCEDURE — 80048 BASIC METABOLIC PNL TOTAL CA: CPT | Performed by: INTERNAL MEDICINE

## 2024-06-09 RX ORDER — UREA 10 %
5 LOTION (ML) TOPICAL NIGHTLY PRN
Status: DISCONTINUED | OUTPATIENT
Start: 2024-06-09 | End: 2024-06-20 | Stop reason: HOSPADM

## 2024-06-09 RX ADMIN — HYDROCODONE BITARTRATE AND ACETAMINOPHEN 1 TABLET: 7.5; 325 TABLET ORAL at 11:45

## 2024-06-09 RX ADMIN — APIXABAN 5 MG: 5 TABLET, FILM COATED ORAL at 08:48

## 2024-06-09 RX ADMIN — LEVOTHYROXINE SODIUM 75 MCG: 0.07 TABLET ORAL at 05:41

## 2024-06-09 RX ADMIN — HYDROCODONE BITARTRATE AND ACETAMINOPHEN 1 TABLET: 7.5; 325 TABLET ORAL at 05:50

## 2024-06-09 RX ADMIN — ERAVACYCLINE 85 MG: 50 INJECTION, POWDER, LYOPHILIZED, FOR SOLUTION INTRAVENOUS at 04:29

## 2024-06-09 RX ADMIN — FOLIC ACID 1 MG: 1 TABLET ORAL at 08:48

## 2024-06-09 RX ADMIN — LIDOCAINE HYDROCHLORIDE: 20 JELLY TOPICAL at 08:52

## 2024-06-09 RX ADMIN — METOPROLOL SUCCINATE 100 MG: 100 TABLET, EXTENDED RELEASE ORAL at 17:06

## 2024-06-09 RX ADMIN — SODIUM ZIRCONIUM CYCLOSILICATE 10 G: 10 POWDER, FOR SUSPENSION ORAL at 08:49

## 2024-06-09 RX ADMIN — Medication 5 MG: at 23:45

## 2024-06-09 RX ADMIN — VANCOMYCIN HYDROCHLORIDE 125 MG: 125 CAPSULE ORAL at 22:48

## 2024-06-09 RX ADMIN — COLLAGENASE SANTYL 1 APPLICATION: 250 OINTMENT TOPICAL at 08:49

## 2024-06-09 RX ADMIN — LIDOCAINE HYDROCHLORIDE: 20 JELLY TOPICAL at 21:57

## 2024-06-09 RX ADMIN — VANCOMYCIN HYDROCHLORIDE 125 MG: 125 CAPSULE ORAL at 17:06

## 2024-06-09 RX ADMIN — ASPIRIN 81 MG: 81 TABLET, COATED ORAL at 08:48

## 2024-06-09 RX ADMIN — VANCOMYCIN HYDROCHLORIDE 125 MG: 125 CAPSULE ORAL at 05:41

## 2024-06-09 RX ADMIN — DILTIAZEM HYDROCHLORIDE 240 MG: 240 CAPSULE, COATED, EXTENDED RELEASE ORAL at 08:49

## 2024-06-09 RX ADMIN — Medication 10 ML: at 21:58

## 2024-06-09 RX ADMIN — Medication 1 CAPSULE: at 21:57

## 2024-06-09 RX ADMIN — Medication 1 CAPSULE: at 08:48

## 2024-06-09 RX ADMIN — APIXABAN 5 MG: 5 TABLET, FILM COATED ORAL at 21:57

## 2024-06-09 RX ADMIN — FAMOTIDINE 10 MG: 20 TABLET, FILM COATED ORAL at 08:48

## 2024-06-09 RX ADMIN — VANCOMYCIN HYDROCHLORIDE 125 MG: 125 CAPSULE ORAL at 11:45

## 2024-06-09 RX ADMIN — ERAVACYCLINE 85 MG: 50 INJECTION, POWDER, LYOPHILIZED, FOR SOLUTION INTRAVENOUS at 17:05

## 2024-06-09 RX ADMIN — Medication 10 ML: at 08:52

## 2024-06-09 NOTE — PLAN OF CARE
Goal Outcome Evaluation:         Patient non-tele and on RA. All wounds cleaned and changed. Delaney care complete. Patient turned every two hours. No complaints at this time. Family remains at bedside. Will continue with plan of care.

## 2024-06-09 NOTE — PROGRESS NOTES
" LOS: 9 days   Patient Care Team:  Hayden Weinstein MD as PCP - General (Nephrology)    Chief Complaint: ESRD  NSTEMI    Subjective     No overnight issues, denies chest pain or shortness of breath    History taken from: patient    Objective     Vital Sign Min/Max for last 24 hours  Temp  Min: 97.5 °F (36.4 °C)  Max: 98 °F (36.7 °C)   BP  Min: 135/81  Max: 178/107   No data recorded   Resp  Min: 18  Max: 18   No data recorded   No data recorded   No data recorded     Flowsheet Rows      Flowsheet Row First Filed Value   Admission Height 185.4 cm (73\") Documented at 05/31/2024 1826   Admission Weight 84.8 kg (187 lb) Documented at 05/31/2024 1826            I/O this shift:  In: 150 [P.O.:150]  Out: 150 [Drains:150]  I/O last 3 completed shifts:  In: 200 [P.O.:200]  Out: 1525 [Urine:1050; Drains:475]    Objective   Gen: Alert, NAD   HENT: NC, AT, EOMI   NECK: Supple, no JVD, Trachea midline   LUNGS: CTA bilaterally, non labored respirtation   CVS: S1/S2 audible, RRR, no murmur   Abd: Soft, NT, ND, BS+   : + Delaney   Ext: Left BKA  CNS: Alert, No focal deficit noted grossly  Psy: Cooperative  Skin: Warm, dry and intact      Results Review:     I reviewed the patient's new clinical results.    WBC WBC   Date Value Ref Range Status   06/09/2024 7.76 3.40 - 10.80 10*3/mm3 Final   06/08/2024 7.37 3.40 - 10.80 10*3/mm3 Final      HGB Hemoglobin   Date Value Ref Range Status   06/09/2024 9.1 (L) 13.0 - 17.7 g/dL Final   06/08/2024 9.6 (L) 13.0 - 17.7 g/dL Final      HCT Hematocrit   Date Value Ref Range Status   06/09/2024 29.0 (L) 37.5 - 51.0 % Final   06/08/2024 30.3 (L) 37.5 - 51.0 % Final      Platlets No results found for: \"LABPLAT\"   MCV MCV   Date Value Ref Range Status   06/09/2024 94.5 79.0 - 97.0 fL Final   06/08/2024 95.0 79.0 - 97.0 fL Final          Sodium Sodium   Date Value Ref Range Status   06/09/2024 133 (L) 136 - 145 mmol/L Final   06/08/2024 134 (L) 136 - 145 mmol/L Final      Potassium Potassium   Date " "Value Ref Range Status   06/09/2024 5.9 (H) 3.5 - 5.2 mmol/L Final   06/08/2024 5.1 3.5 - 5.2 mmol/L Final      Chloride Chloride   Date Value Ref Range Status   06/09/2024 103 98 - 107 mmol/L Final   06/08/2024 101 98 - 107 mmol/L Final      CO2 CO2   Date Value Ref Range Status   06/09/2024 20.0 (L) 22.0 - 29.0 mmol/L Final   06/08/2024 25.0 22.0 - 29.0 mmol/L Final      BUN BUN   Date Value Ref Range Status   06/09/2024 41 (H) 8 - 23 mg/dL Final   06/08/2024 15 8 - 23 mg/dL Final      Creatinine Creatinine   Date Value Ref Range Status   06/09/2024 3.88 (H) 0.76 - 1.27 mg/dL Final   06/08/2024 3.18 (H) 0.76 - 1.27 mg/dL Final      Calcium Calcium   Date Value Ref Range Status   06/09/2024 9.3 8.6 - 10.5 mg/dL Final   06/08/2024 9.7 8.6 - 10.5 mg/dL Final      PO4 No results found for: \"CAPO4\"   Albumin No results found for: \"ALBUMIN\"       Magnesium Magnesium   Date Value Ref Range Status   06/08/2024 1.8 1.6 - 2.4 mg/dL Final        Uric Acid No results found for: \"URICACID\"     Medication Review: Yes    Assessment & Plan       NSTEMI (non-ST elevated myocardial infarction)    C. difficile colitis    History of cholecystitis s/p cholecystostomy tube    ESRD (end stage renal disease) on hemodialysis    Atrial fibrillation    Essential hypertension    Moderate malnutrition    Soft tissue infection    Acute osteomyelitis of left tibia      Assessment & Plan    ESRD: On TTS jonathan. HD through right IJ TDC. Still makes urine. Does have cole cath+ for chronic urinary retention. Patient the family Primary nephrologist ( Dr Wan) was monitoring for renal recovery and residual renal function.      Volume status: No significant LE edema noted. Does have scrotal edema.     Afib w RVR: RVR during HD treatment. BP controlled. . No chest pain or sob.       Met acidosis: Mild. Management with HD.     Anemia: ACD. Transfuse at hb<7.  ALLISON on HD days     CAD: Transferred from OSH for evaluation of ACD. Cardiology following. EF " 55%    Plan:  - No need for hemodialysis today.  Still making decent urine output.  Hemodialysis 6/6.   -Hyperkalemia: Will give Lokelma and check BMP in a.m..   - Renal diet (Doesn't want to follow)  - ALLISON with HD   - Metoprolol for RVR during HD on PRN basis.     Arvin Curtis MD  06/09/24  16:54 EDT

## 2024-06-09 NOTE — PROGRESS NOTES
[facility]  PROGRESS NOTE    Patient Name: Tavo Gudino  : 1955  MRN: [id number]    Date of Admission: 2024  Primary Care Physician: Hayden Weinstein MD    Subjective   Subjective     CC:  Stump infection    HPI:  Patient is frustrated at his situation. Wants to eat food.       Objective   Objective     Vital Signs:   Temp:  [97.5 °F (36.4 °C)-98.4 °F (36.9 °C)] 98 °F (36.7 °C)  Heart Rate:  [55-59] 55  Resp:  [18] 18  BP: (129-178)/() 178/107     Physical Exam:  Constitutional: No acute distress, awake, alert, chronically ill elderly gentleman  Respiratory: Clear to auscultation bilaterally, respiratory effort normal   Cardiovascular: RR  Gastrointestinal: Positive bowel sounds, soft, nontender, nondistended, cholecystostomy tube in place with dark green drainage  Musculoskeletal: Left AKA in dressings  Psychiatric: Appropriate affect, cooperative  Neurologic: Oriented x 3, no focal deficits  Skin: left IJ in place,       Results Reviewed:  LAB RESULTS:      Lab 24  0451 24  0433 24  0502 24  1613 24  0753 24  0001 24  1319 24  0449 24  1054 24  0555 242 24  0906   WBC 7.76 7.37 7.19  --   --   --   --  8.82  --   --  9.32  --  12.36*   HEMOGLOBIN 9.1* 9.6* 9.2*  --   --   --   --  8.4*  --   --  8.5*  --  9.1*   HEMATOCRIT 29.0* 30.3* 29.0*  --   --   --   --  27.0*  --   --  27.3*  --  27.5*   PLATELETS 172 204 198  --   --   --   --  265  --   --  257  --  262   NEUTROS ABS 4.36 3.59  --   --   --   --   --   --   --   --   --   --  9.70*   IMMATURE GRANS (ABS) 0.11* 0.11*  --   --   --   --   --   --   --   --   --   --  0.08*   LYMPHS ABS 2.32 2.59  --   --   --   --   --   --   --   --   --   --  1.63   MONOS ABS 0.70 0.79  --   --   --   --   --   --   --   --   --   --  0.77   EOS ABS 0.21 0.22  --   --   --   --   --   --   --   --   --   --  0.11   MCV  94.5 95.0 95.1  --   --   --   --  98.2*  --   --  95.8  --  92.9   APTT  --   --   --   --  70.3 51.6* 58.9* 51.9* 85.4   < >  --    < > 43.8*   HEPARIN ANTI-XA  --   --   --  0.36 0.56  --   --   --   --   --   --   --   --     < > = values in this interval not displayed.         Lab 06/09/24  0451 06/08/24  0433 06/06/24  0502 06/04/24  0449 06/03/24  0555   SODIUM 133* 134* 132* 135* 136   POTASSIUM 5.9* 5.1 4.6 3.9 3.4*   CHLORIDE 103 101 101 103 108*   CO2 20.0* 25.0 23.0 19.0* 16.0*   ANION GAP 10.0 8.0 8.0 13.0 12.0   BUN 41* 15 33* 38* 25*   CREATININE 3.88* 3.18* 3.33* 3.72* 2.69*   EGFR 15.8* 20.1* 19.0* 16.6* 24.5*   GLUCOSE 75 71 76 76 67   CALCIUM 9.3 9.7 9.3 8.8 8.0*   MAGNESIUM  --  1.8  --  1.6  --    PHOSPHORUS  --   --  4.8*  --   --          Lab 06/06/24  0502 06/02/24  0906   TOTAL PROTEIN  --  5.4*   ALBUMIN 2.7* 2.4*   GLOBULIN  --  3.0   ALT (SGPT)  --  7   AST (SGOT)  --  7   BILIRUBIN  --  0.4   ALK PHOS  --  240*                     Brief Urine Lab Results  (Last result in the past 365 days)        Color   Clarity   Blood   Leuk Est   Nitrite   Protein   CREAT   Urine HCG        06/02/24 1721 Yellow   Clear   Trace   Trace   Negative   100 mg/dL (2+)                   Microbiology Results Abnormal       Procedure Component Value - Date/Time    Blood Culture - Blood, Blood, Central Line [[id number]]  (Normal) Collected: 05/31/24 2237    Lab Status: Final result Specimen: Blood, Central Line Updated: 06/06/24 0801     Blood Culture No growth at 5 days    Blood Culture - Blood, Arm, Right [[id number]]  (Normal) Collected: 05/31/24 2237    Lab Status: Final result Specimen: Blood from Arm, Right Updated: 06/06/24 0745     Blood Culture No growth at 5 days    Urine Culture - Urine, Indwelling Urethral Catheter [[id number]]  (Normal) Collected: 06/02/24 1721    Lab Status: Final result Specimen: Urine from Indwelling Urethral Catheter Updated: 06/04/24 0955     Urine Culture No growth            No  radiology results from the last 24 hrs    Results for orders placed during the hospital encounter of 05/31/24    Adult Transthoracic Echo Complete W/ Cont if Necessary Per Protocol    Interpretation Summary    Left ventricular systolic function is normal. Left ventricular ejection fraction appears to be 51 - 55%.    Left ventricular wall thickness is consistent with borderline concentric hypertrophy.    Left atrial volume is moderately increased.    There is mild calcification of the aortic valve.    Mild mitral valve regurgitation is present.    There is a small (<1cm) circumferential pericardial effusion.      Current medications:  Scheduled Meds:apixaban, 5 mg, Oral, BID  aspirin, 81 mg, Oral, Daily  collagenase, 1 Application, Topical, Q24H  dilTIAZem CD, 240 mg, Oral, Q24H  eravacycline dihydrochloride (XERAVA) 85 mg in sodium chloride 0.9 % 250 mL (0.34 mg/mL) IVPB, 85 mg, Intravenous, Q12H  famotidine, 10 mg, Oral, Daily  folic acid, 1 mg, Oral, Daily  lactobacillus acidophilus, 1 capsule, Oral, BID  levothyroxine, 75 mcg, Oral, Q AM  Lidocaine HCl gel, , Topical, BID  metoprolol succinate XL, 100 mg, Oral, Q24H  pharmacy consult - MTM, , Does not apply, Daily  sodium chloride, 10 mL, Intravenous, Q12H  vancomycin, 125 mg, Oral, Q6H      Continuous Infusions:Pharmacy Consult - Pharmacy to dose,       PRN Meds:.  acetaminophen    heparin (porcine)    HYDROcodone-acetaminophen    Lidocaine HCl gel    metoprolol tartrate    ondansetron    Pharmacy Consult - Pharmacy to dose    sodium chloride    sodium chloride    Assessment & Plan   Assessment & Plan     Active Hospital Problems    Diagnosis  POA    **NSTEMI (non-ST elevated myocardial infarction) [I21.4]  Yes    Soft tissue infection [L08.9]  Unknown    Acute osteomyelitis of left tibia [M86.162]  Unknown    Moderate malnutrition [E44.0]  Yes    C. difficile colitis [A04.72]  Yes    History of cholecystitis s/p cholecystostomy tube [K81.9]  Yes    ESRD (end  stage renal disease) on hemodialysis [N18.6]  Yes    Atrial fibrillation [I48.91]  Yes    Essential hypertension [I10]  Yes      Resolved Hospital Problems   No resolved problems to display.        Brief Hospital Course to date:  Tavo Gudino is a 69 y.o. male with hx of HTN, PAD, left BKA, ESRD on hemodialysis, Afib on Eliquis, recent acute cholecystitis s/p cholecystostomy tube placement at , chronic urinary retention with indwelling Delaney catheter, and previous hx of C.diff who presented from [facility] due to chest pain, elevated troponin, and Afib with RVR.  Found to have multiple other concerning issues including active C. difficile infection, concern for possible osteomyelitis, exposed tendon on right lower extremity.     Chest pain, resolved  Elevated troponin, possible NSTEMI  -Transferred to [facility] for C with Dr. Amador  -Dr. Amador/Cardiology consulted, stress test with small area of ischemia.  With discussion with patient the decision was made to medically manage at this time.  -Continue ASA, beta blocker  -Continue Eliquis     Afib with RVR  Hx of Afib/Aflutter  -Toprol dosing adjusted per cardiology given episodes of intermittent RVR  -Continue Eliquis  -Continue Metoprolol and dilt     HFrEF  Moderate MR  --Most recent Echo on file I April 2024: EF 40%, akinetic septal, anterior, and apical walls, severely dilated LA, moderate MR  --Follows with  Cardiology, had been evaluated previously for Vanessa clip  --ECHO 6/1: LVEF 51 to 55%, borderline concentric LVH, left atrial volume moderately increased, mild MR, small less than 1 cm circumferential pericardial effusion     Leukocytosis, improving     Concern for osteomyelitis   Exposed tendon on RLE  --MRI left tib/fib showed cellulitis, possible osteomyelitis  --MRI right foot with cellulitis/edema  -- Arterial duplex abnormal; Vascular surgery consulted and recommended medical management with no vascular surgery intervention  --Eravacycline per  ID  --MRSA screen +  --Follow-up blood and urine cultures  -Dr. Yeager following. Patient likely needs additional amputation of left lower extremity due to concern for poor wound healing, likely needs debridement of right lower extremity around the tendon with wound VAC placement.  Patient refusing further amputation of left lower extremity or any other procedures.   --ID following. Patient is not likely a candidate for outpatient IV antibiotics he will not be able to follow-up adequately and doesn't not have support at home. Plan is just to continue the eravacycline for now until we have some idea of his d/c plan.  Prior auth for Nuzyra but co-pay is still $2000.  Working to see if we can get that lower. If he goes home, may consider doxycycline BID.   -IF discharges to SNF, CVC will be removed and tunneled line will need to be replaced  -Patient wishes to follow-up with wound care at .  -f/u 2 weeks after discharge.     C.diff colitis  -s/p fosphofmycin  -Continue p.o. vancomycin 125 mg 4 times a day for 14 days from admission through June 14 and then taper to 125 mg twice daily for 1 week and then 125 mg once daily for 1 week  -Follow-up with ID  -Continue probiotic     Recent cholecystitis s/p cholecystostomy tube  --Request records from that hospitalization, apparently was placed at   --Consulted General Surgery for management of tube  --Apparently already has follow up at  6/6/24 General Surgery--will need to reschedule this     Chronic urinary retention  --Has Delaney in place, reported hx of urethral injury per OSH note (data deficit, no idea when this was Delaney placed), appears he follows with Urology at  and this is a chronic Delaney  --Replaced Delaney here     PAD  Hx of left BKA  --Continue ASA     Anemia  --Likely chronic due to renal disease  --Folate 4.36 --> supplementation started; vitamin B 12 level 755  --Hb 7.5 here, had been 9 at OSH     HTN  ESRD  --Nephrology consulted for dialysis    --Case management to work with patient and family in order to work on disposition location and finding an outpatient dialysis chair.    Hyperkalemia  -Potassium of 5.9 this morning, will give dose of lokelma. Not sure when next HD session is      Hypothyroidism  --TSH significantly elevated at 91 at OSH, still elevated here to 65, but improved  --Synthroid dose was increased to 75 mcg daily at OSH  --Continue Synthroid at above dose and recheck in 4-6 weeks      Multiple wounds  Sacral Decubitus ulcer, POA, Stage 3  --Wound care consulted  --Ortho as above     Poor IV access  --Dr. Beckman placed central line, will likely need tunneled line of antibiotics if plan is for rehab placement.      Expected Discharge Location and Transportation: Tentatively back to [place]  Expected Discharge   Expected Discharge Date: 6/12/2024; Expected Discharge Time:      DVT prophylaxis:  Medical and mechanical DVT prophylaxis orders are present.         AM-PAC 6 Clicks Score (PT): 9 (06/09/24 0517)    CODE STATUS:   Code Status and Medical Interventions:   Ordered at: 05/31/24 1429     Code Status (Patient has no pulse and is not breathing):    CPR (Attempt to Resuscitate)     Medical Interventions (Patient has pulse or is breathing):    Full Support     I have prepared this progress note with copied portions of the prior day's progress note of my own authorship to preserve accuracy and maintain consistency of documentation. I have reviewed these portions and edited them for correctness. I verify that the above documentation accurately and truly represents the evaluation and management performed on today's date.       Carina Burgos MD  06/09/24

## 2024-06-09 NOTE — PLAN OF CARE
Goal Outcome Evaluation:      VSS. Pt non-tele. Pt groin dressing changed overnight. Iv abx continued per MD orders.

## 2024-06-10 ENCOUNTER — APPOINTMENT (OUTPATIENT)
Dept: NEPHROLOGY | Facility: HOSPITAL | Age: 69
End: 2024-06-10
Payer: MEDICARE

## 2024-06-10 LAB
ANION GAP SERPL CALCULATED.3IONS-SCNC: 9 MMOL/L (ref 5–15)
BUN SERPL-MCNC: 51 MG/DL (ref 8–23)
BUN/CREAT SERPL: 11.6 (ref 7–25)
CALCIUM SPEC-SCNC: 9.2 MG/DL (ref 8.6–10.5)
CHLORIDE SERPL-SCNC: 105 MMOL/L (ref 98–107)
CO2 SERPL-SCNC: 20 MMOL/L (ref 22–29)
CREAT SERPL-MCNC: 4.41 MG/DL (ref 0.76–1.27)
DEPRECATED RDW RBC AUTO: 59.7 FL (ref 37–54)
EGFRCR SERPLBLD CKD-EPI 2021: 13.6 ML/MIN/1.73
ERYTHROCYTE [DISTWIDTH] IN BLOOD BY AUTOMATED COUNT: 16.8 % (ref 12.3–15.4)
GLUCOSE SERPL-MCNC: 64 MG/DL (ref 65–99)
HCT VFR BLD AUTO: 30.6 % (ref 37.5–51)
HGB BLD-MCNC: 9.4 G/DL (ref 13–17.7)
MCH RBC QN AUTO: 30 PG (ref 26.6–33)
MCHC RBC AUTO-ENTMCNC: 30.7 G/DL (ref 31.5–35.7)
MCV RBC AUTO: 97.8 FL (ref 79–97)
PLATELET # BLD AUTO: 176 10*3/MM3 (ref 140–450)
PMV BLD AUTO: 9.9 FL (ref 6–12)
POTASSIUM SERPL-SCNC: 6 MMOL/L (ref 3.5–5.2)
RBC # BLD AUTO: 3.13 10*6/MM3 (ref 4.14–5.8)
SODIUM SERPL-SCNC: 134 MMOL/L (ref 136–145)
WBC NRBC COR # BLD AUTO: 7.82 10*3/MM3 (ref 3.4–10.8)

## 2024-06-10 PROCEDURE — 25810000003 SODIUM CHLORIDE 0.9 % SOLUTION 250 ML FLEX CONT: Performed by: INTERNAL MEDICINE

## 2024-06-10 PROCEDURE — 99232 SBSQ HOSP IP/OBS MODERATE 35: CPT | Performed by: INTERNAL MEDICINE

## 2024-06-10 PROCEDURE — 85027 COMPLETE CBC AUTOMATED: CPT | Performed by: INTERNAL MEDICINE

## 2024-06-10 PROCEDURE — 80048 BASIC METABOLIC PNL TOTAL CA: CPT | Performed by: INTERNAL MEDICINE

## 2024-06-10 PROCEDURE — 99232 SBSQ HOSP IP/OBS MODERATE 35: CPT | Performed by: PHYSICIAN ASSISTANT

## 2024-06-10 PROCEDURE — 25010000002 ERAVACYCLINE DIHYDROCHLORIDE 50 MG RECONSTITUTED SOLUTION 1 EACH VIAL: Performed by: INTERNAL MEDICINE

## 2024-06-10 RX ADMIN — APIXABAN 5 MG: 5 TABLET, FILM COATED ORAL at 21:16

## 2024-06-10 RX ADMIN — LIDOCAINE HYDROCHLORIDE: 20 JELLY TOPICAL at 21:16

## 2024-06-10 RX ADMIN — METOPROLOL SUCCINATE 100 MG: 100 TABLET, EXTENDED RELEASE ORAL at 17:18

## 2024-06-10 RX ADMIN — FAMOTIDINE 10 MG: 20 TABLET, FILM COATED ORAL at 09:02

## 2024-06-10 RX ADMIN — APIXABAN 5 MG: 5 TABLET, FILM COATED ORAL at 09:02

## 2024-06-10 RX ADMIN — Medication 1 CAPSULE: at 21:16

## 2024-06-10 RX ADMIN — ERAVACYCLINE 85 MG: 50 INJECTION, POWDER, LYOPHILIZED, FOR SOLUTION INTRAVENOUS at 05:46

## 2024-06-10 RX ADMIN — LIDOCAINE HYDROCHLORIDE: 20 JELLY TOPICAL at 09:03

## 2024-06-10 RX ADMIN — COLLAGENASE SANTYL 1 APPLICATION: 250 OINTMENT TOPICAL at 09:03

## 2024-06-10 RX ADMIN — FOLIC ACID 1 MG: 1 TABLET ORAL at 09:02

## 2024-06-10 RX ADMIN — ERAVACYCLINE 85 MG: 50 INJECTION, POWDER, LYOPHILIZED, FOR SOLUTION INTRAVENOUS at 17:17

## 2024-06-10 RX ADMIN — ASPIRIN 81 MG: 81 TABLET, COATED ORAL at 09:02

## 2024-06-10 RX ADMIN — Medication 5 MG: at 21:15

## 2024-06-10 RX ADMIN — Medication 10 ML: at 21:16

## 2024-06-10 RX ADMIN — Medication 1 CAPSULE: at 09:02

## 2024-06-10 RX ADMIN — VANCOMYCIN HYDROCHLORIDE 125 MG: 125 CAPSULE ORAL at 05:46

## 2024-06-10 RX ADMIN — VANCOMYCIN HYDROCHLORIDE 125 MG: 125 CAPSULE ORAL at 23:48

## 2024-06-10 RX ADMIN — LEVOTHYROXINE SODIUM 75 MCG: 0.07 TABLET ORAL at 05:46

## 2024-06-10 RX ADMIN — Medication 10 ML: at 09:03

## 2024-06-10 RX ADMIN — DILTIAZEM HYDROCHLORIDE 240 MG: 240 CAPSULE, COATED, EXTENDED RELEASE ORAL at 09:02

## 2024-06-10 RX ADMIN — VANCOMYCIN HYDROCHLORIDE 125 MG: 125 CAPSULE ORAL at 17:16

## 2024-06-10 NOTE — PROGRESS NOTES
" LOS: 10 days   Patient Care Team:  Hayden Weinstein MD as PCP - General (Nephrology)    Chief Complaint: ESRD  NSTEMI    Subjective     Seen on dialysis; denies chest pain or shortness of breath.     [Patient is normally getting dialysis on TTS schedule; held dialysis on Saturday because of good urine output; today noted to have hyperkalemia; that is why dialyzing him; plan to keep on TTS schedule]     History taken from: patient    Objective     Vital Sign Min/Max for last 24 hours  Temp  Min: 97.3 °F (36.3 °C)  Max: 98.1 °F (36.7 °C)   BP  Min: 138/74  Max: 188/87   Pulse  Min: 43  Max: 62   Resp  Min: 12  Max: 18   SpO2  Min: 93 %  Max: 100 %   No data recorded   No data recorded     Flowsheet Rows      Flowsheet Row First Filed Value   Admission Height 185.4 cm (73\") Documented at 05/31/2024 1826   Admission Weight 84.8 kg (187 lb) Documented at 05/31/2024 1826            No intake/output data recorded.  I/O last 3 completed shifts:  In: 150 [P.O.:150]  Out: 2700 [Urine:2250; Drains:450]    Objective   Gen: Alert, NAD   HENT: NC, AT, EOMI   NECK: Supple, no JVD, Trachea midline   LUNGS: CTA bilaterally, non labored respirtation   CVS: S1/S2 audible, RRR, no murmur   Abd: Soft, NT, ND, BS+   : + Delaney   Ext: Left BKA  CNS: Alert, No focal deficit noted grossly  Psy: Cooperative  Skin: Warm, dry and intact      Results Review:     I reviewed the patient's new clinical results.    WBC WBC   Date Value Ref Range Status   06/10/2024 7.82 3.40 - 10.80 10*3/mm3 Final   06/09/2024 7.76 3.40 - 10.80 10*3/mm3 Final   06/08/2024 7.37 3.40 - 10.80 10*3/mm3 Final      HGB Hemoglobin   Date Value Ref Range Status   06/10/2024 9.4 (L) 13.0 - 17.7 g/dL Final   06/09/2024 9.1 (L) 13.0 - 17.7 g/dL Final   06/08/2024 9.6 (L) 13.0 - 17.7 g/dL Final      HCT Hematocrit   Date Value Ref Range Status   06/10/2024 30.6 (L) 37.5 - 51.0 % Final   06/09/2024 29.0 (L) 37.5 - 51.0 % Final   06/08/2024 30.3 (L) 37.5 - 51.0 % Final    " "  Platlets No results found for: \"LABPLAT\"   MCV MCV   Date Value Ref Range Status   06/10/2024 97.8 (H) 79.0 - 97.0 fL Final   06/09/2024 94.5 79.0 - 97.0 fL Final   06/08/2024 95.0 79.0 - 97.0 fL Final          Sodium Sodium   Date Value Ref Range Status   06/10/2024 134 (L) 136 - 145 mmol/L Final   06/09/2024 133 (L) 136 - 145 mmol/L Final   06/08/2024 134 (L) 136 - 145 mmol/L Final      Potassium Potassium   Date Value Ref Range Status   06/10/2024 6.0 (H) 3.5 - 5.2 mmol/L Final   06/09/2024 5.9 (H) 3.5 - 5.2 mmol/L Final   06/08/2024 5.1 3.5 - 5.2 mmol/L Final      Chloride Chloride   Date Value Ref Range Status   06/10/2024 105 98 - 107 mmol/L Final   06/09/2024 103 98 - 107 mmol/L Final   06/08/2024 101 98 - 107 mmol/L Final      CO2 CO2   Date Value Ref Range Status   06/10/2024 20.0 (L) 22.0 - 29.0 mmol/L Final   06/09/2024 20.0 (L) 22.0 - 29.0 mmol/L Final   06/08/2024 25.0 22.0 - 29.0 mmol/L Final      BUN BUN   Date Value Ref Range Status   06/10/2024 51 (H) 8 - 23 mg/dL Final   06/09/2024 41 (H) 8 - 23 mg/dL Final   06/08/2024 15 8 - 23 mg/dL Final      Creatinine Creatinine   Date Value Ref Range Status   06/10/2024 4.41 (H) 0.76 - 1.27 mg/dL Final   06/09/2024 3.88 (H) 0.76 - 1.27 mg/dL Final   06/08/2024 3.18 (H) 0.76 - 1.27 mg/dL Final      Calcium Calcium   Date Value Ref Range Status   06/10/2024 9.2 8.6 - 10.5 mg/dL Final   06/09/2024 9.3 8.6 - 10.5 mg/dL Final   06/08/2024 9.7 8.6 - 10.5 mg/dL Final      PO4 No results found for: \"CAPO4\"   Albumin No results found for: \"ALBUMIN\"       Magnesium Magnesium   Date Value Ref Range Status   06/08/2024 1.8 1.6 - 2.4 mg/dL Final        Uric Acid No results found for: \"URICACID\"     Medication Review: Yes    Assessment & Plan       NSTEMI (non-ST elevated myocardial infarction)    C. difficile colitis    History of cholecystitis s/p cholecystostomy tube    ESRD (end stage renal disease) on hemodialysis    Atrial fibrillation    Essential hypertension    " Moderate malnutrition    Soft tissue infection    Acute osteomyelitis of left tibia      Assessment & Plan    ESRD: On TTS jonathan. HD through right IJ TDC. Still makes urine. Does have cole cath+ for chronic urinary retention. Patient the family Primary nephrologist ( Dr Wan) was monitoring for renal recovery and residual renal function.      Volume status: No significant LE edema noted. Does have scrotal edema.     Afib w RVR: RVR during HD treatment. BP controlled. . No chest pain or sob.       Met acidosis: Mild. Management with HD.     Anemia: ACD. Transfuse at hb<7.  ALLISON on HD days     CAD: Transferred from OSH for evaluation of ACD. Cardiology following. EF 55%    Plan:  - Hemodialysis today . Still making decent urine output.  -Hyperkalemia: Will add lokelma daily.,    - Renal diet (Doesn't want to follow)  - ALLISON with HD   - Metoprolol for RVR during HD on PRN basis.     Arvin Curtis MD  06/10/24  15:33 EDT

## 2024-06-10 NOTE — PROGRESS NOTES
[facility] Medicine Services  PROGRESS NOTE    Patient Name: Tavo Gudino  : 1955  MRN: [id number]    Date of Admission: 2024  Primary Care Physician: Hayden Weinstein MD    Subjective   Subjective     CC:  Stump infection    HPI:  Patient just came from hemodialysis.  Looks very exhausted.  Does not have any specific complaint.  No chills.  No chest pain or palpitation.      Objective   Objective     Vital Signs:   Temp:  [97.3 °F (36.3 °C)-98.1 °F (36.7 °C)] 97.3 °F (36.3 °C)  Heart Rate:  [43-62] 61  Resp:  [12-18] 16  BP: (138-204)/() 204/81     Physical Exam:  Constitutional: No acute distress, awake, alert, chronically ill elderly gentleman  Respiratory: Clear to auscultation bilaterally, respiratory effort normal   Cardiovascular: RR, no murmur gallop or rub audible.  Gastrointestinal: Positive bowel sounds, soft, nontender, nondistended, cholecystostomy tube in place with dark green drainage  Musculoskeletal: Left AKA in dressings  Psychiatric: Flat affect, cooperative  Neurologic: Oriented x 3, no focal deficits  Skin: Pale looking      Results Reviewed:  LAB RESULTS:      Lab 06/10/24  0440 24  0451 24  0433 24  0502 24  1613 24  0753 24  0001 24  1319 24  0449 24   WBC 7.82 7.76 7.37 7.19  --   --   --   --  8.82  --    HEMOGLOBIN 9.4* 9.1* 9.6* 9.2*  --   --   --   --  8.4*  --    HEMATOCRIT 30.6* 29.0* 30.3* 29.0*  --   --   --   --  27.0*  --    PLATELETS 176 172 204 198  --   --   --   --  265  --    NEUTROS ABS  --  4.36 3.59  --   --   --   --   --   --   --    IMMATURE GRANS (ABS)  --  0.11* 0.11*  --   --   --   --   --   --   --    LYMPHS ABS  --  2.32 2.59  --   --   --   --   --   --   --    MONOS ABS  --  0.70 0.79  --   --   --   --   --   --   --    EOS ABS  --  0.21 0.22  --   --   --   --   --   --   --    MCV 97.8* 94.5 95.0 95.1  --   --   --   --  98.2*  --    APTT  --   --   --   --    --  70.3 51.6* 58.9* 51.9* 85.4   HEPARIN ANTI-XA  --   --   --   --  0.36 0.56  --   --   --   --          Lab 06/10/24  0440 06/09/24  0451 06/08/24  0433 06/06/24  0502 06/04/24  0449   SODIUM 134* 133* 134* 132* 135*   POTASSIUM 6.0* 5.9* 5.1 4.6 3.9   CHLORIDE 105 103 101 101 103   CO2 20.0* 20.0* 25.0 23.0 19.0*   ANION GAP 9.0 10.0 8.0 8.0 13.0   BUN 51* 41* 15 33* 38*   CREATININE 4.41* 3.88* 3.18* 3.33* 3.72*   EGFR 13.6* 15.8* 20.1* 19.0* 16.6*   GLUCOSE 64* 75 71 76 76   CALCIUM 9.2 9.3 9.7 9.3 8.8   MAGNESIUM  --   --  1.8  --  1.6   PHOSPHORUS  --   --   --  4.8*  --          Lab 06/06/24  0502   ALBUMIN 2.7*                     Brief Urine Lab Results  (Last result in the past 365 days)        Color   Clarity   Blood   Leuk Est   Nitrite   Protein   CREAT   Urine HCG        06/02/24 1721 Yellow   Clear   Trace   Trace   Negative   100 mg/dL (2+)                   Microbiology Results Abnormal       Procedure Component Value - Date/Time    Blood Culture - Blood, Blood, Central Line [[id number]]  (Normal) Collected: 05/31/24 2237    Lab Status: Final result Specimen: Blood, Central Line Updated: 06/06/24 0801     Blood Culture No growth at 5 days    Blood Culture - Blood, Arm, Right [[id number]]  (Normal) Collected: 05/31/24 2237    Lab Status: Final result Specimen: Blood from Arm, Right Updated: 06/06/24 0745     Blood Culture No growth at 5 days    Urine Culture - Urine, Indwelling Urethral Catheter [[id number]]  (Normal) Collected: 06/02/24 1721    Lab Status: Final result Specimen: Urine from Indwelling Urethral Catheter Updated: 06/04/24 0955     Urine Culture No growth            No radiology results from the last 24 hrs    Results for orders placed during the hospital encounter of 05/31/24    Adult Transthoracic Echo Complete W/ Cont if Necessary Per Protocol    Interpretation Summary    Left ventricular systolic function is normal. Left ventricular ejection fraction appears to be 51 - 55%.    Left  ventricular wall thickness is consistent with borderline concentric hypertrophy.    Left atrial volume is moderately increased.    There is mild calcification of the aortic valve.    Mild mitral valve regurgitation is present.    There is a small (<1cm) circumferential pericardial effusion.      Current medications:  Scheduled Meds:apixaban, 5 mg, Oral, BID  aspirin, 81 mg, Oral, Daily  collagenase, 1 Application, Topical, Q24H  dilTIAZem CD, 240 mg, Oral, Q24H  eravacycline dihydrochloride (XERAVA) 85 mg in sodium chloride 0.9 % 250 mL (0.34 mg/mL) IVPB, 85 mg, Intravenous, Q12H  famotidine, 10 mg, Oral, Daily  folic acid, 1 mg, Oral, Daily  lactobacillus acidophilus, 1 capsule, Oral, BID  levothyroxine, 75 mcg, Oral, Q AM  Lidocaine HCl gel, , Topical, BID  metoprolol succinate XL, 100 mg, Oral, Q24H  pharmacy consult - MTM, , Does not apply, Daily  sodium chloride, 10 mL, Intravenous, Q12H  [START ON 6/11/2024] sodium zirconium cyclosilicate, 10 g, Oral, Daily  vancomycin, 125 mg, Oral, Q6H      Continuous Infusions:Pharmacy Consult - Pharmacy to dose,       PRN Meds:.  acetaminophen    heparin (porcine)    Lidocaine HCl gel    melatonin    metoprolol tartrate    ondansetron    Pharmacy Consult - Pharmacy to dose    sodium chloride    sodium chloride    Assessment & Plan   Assessment & Plan     Active Hospital Problems    Diagnosis  POA    **NSTEMI (non-ST elevated myocardial infarction) [I21.4]  Yes    Soft tissue infection [L08.9]  Unknown    Acute osteomyelitis of left tibia [M86.162]  Unknown    Moderate malnutrition [E44.0]  Yes    C. difficile colitis [A04.72]  Yes    History of cholecystitis s/p cholecystostomy tube [K81.9]  Yes    ESRD (end stage renal disease) on hemodialysis [N18.6]  Yes    Atrial fibrillation [I48.91]  Yes    Essential hypertension [I10]  Yes      Resolved Hospital Problems   No resolved problems to display.        Brief Hospital Course to date:  Tavo Gudino is a 69 y.o. male with hx  of HTN, PAD, left BKA, ESRD on hemodialysis, Afib on Eliquis, recent acute cholecystitis s/p cholecystostomy tube placement at , chronic urinary retention with indwelling Delaney catheter, and previous hx of C.diff who presented from [facility] due to chest pain, elevated troponin, and Afib with RVR.  Found to have multiple other concerning issues including active C. difficile infection, concern for possible osteomyelitis, exposed tendon on right lower extremity.     Chest pain, resolved  Elevated troponin, possible NSTEMI  -Transferred to [facility] for C with Dr. Amador  -Dr. Amador/Cardiology consulted, stress test with small area of ischemia.  With discussion with patient the decision was made to medically manage at this time.     Afib with RVR  Hx of Afib/Aflutter  -Toprol dosing adjusted per cardiology given episodes of intermittent RVR  -Continue Eliquis     HFrEF  Moderate MR  --Most recent Echo on file I April 2024: EF 40%, akinetic septal, anterior, and apical walls, severely dilated LA, moderate MR  --Follows with  Cardiology, had been evaluated previously for Vanessa clip  --ECHO 6/1: LVEF 51 to 55%, borderline concentric LVH, left atrial volume moderately increased, mild MR, small less than 1 cm circumferential pericardial effusion     Leukocytosis, resolved.     Concern for osteomyelitis   Exposed tendon on RLE  --MRI left tib/fib showed cellulitis, possible osteomyelitis  --MRI right foot with cellulitis/edema  -- Arterial duplex abnormal; Vascular surgery consulted and recommended medical management with no vascular surgery intervention  --Eravacycline per ID  --MRSA screen +  --Follow-up blood and urine cultures  -Dr. Yeager following. Patient likely needs additional amputation of left lower extremity due to concern for poor wound healing, likely needs debridement of right lower extremity around the tendon with wound VAC placement.  Patient refusing further amputation of left lower extremity or  any other procedures.   --ID following. Patient is not likely a candidate for outpatient IV antibiotics he will not be able to follow-up adequately and doesn't not have support at home. Plan is just to continue the eravacycline for now until we have some idea of his d/c plan.  Prior auth for Nuzyra but co-pay is still $2000.  Working to see if we can get that lower. If he goes home, may consider doxycycline BID.   -IF discharges to SNF, CVC will be removed and tunneled line will need to be replaced  -Patient wishes to follow-up with wound care at .  -f/u 2 weeks after discharge.     C.diff colitis  -s/p fosphofmycin  -Continue p.o. vancomycin 125 mg 4 times a day for 14 days from admission through June 14 and then taper to 125 mg twice daily for 1 week and then 125 mg once daily for 1 week  -Follow-up with ID     Recent cholecystitis s/p cholecystostomy tube  --Request records from that hospitalization, apparently was placed at   --Consulted General Surgery for management of tube  --Apparently already has follow up at  6/6/24 General Surgery--will need to reschedule this     Chronic urinary retention  --Has Delaney in place, reported hx of urethral injury per OSH note (data deficit, no idea when this was Delaney placed), appears he follows with Urology at  and this is a chronic Delaney  --Replaced Delaney here     PAD  Hx of left BKA  --Continue ASA     Anemia  --Likely chronic due to renal disease  --Folate 4.36 --> supplementation started; vitamin B 12 level 755  --Hb 7.5 here, had been 9 at OSH     HTN  ESRD  --Nephrology consulted for dialysis   --Case management to work with patient and family in order to work on disposition location and finding an outpatient dialysis chair.    Hyperkalemia  -Potassium of 5.9 this morning, will give dose of lokelma. Not sure when next HD session is      Hypothyroidism  --TSH significantly elevated at 91 at OSH, still elevated here to 65, but improved  --Synthroid dose was  increased to 75 mcg daily at OSH  --Continue Synthroid at above dose and recheck in 4-6 weeks      Multiple wounds  Sacral Decubitus ulcer, POA, Stage 3  --Wound care consulted  --Ortho as above     Poor IV access  --Dr. Beckman placed central line, will likely need tunneled line of antibiotics if plan is for rehab placement.      Expected Discharge Location and Transportation: Tentatively back to [place]  Expected Discharge   Expected Discharge Date: 6/12/2024; Expected Discharge Time:      DVT prophylaxis:  Medical and mechanical DVT prophylaxis orders are present.         AM-PAC 6 Clicks Score (PT): 12 (06/10/24 2551)    CODE STATUS:   Code Status and Medical Interventions:   Ordered at: 05/31/24 1429     Code Status (Patient has no pulse and is not breathing):    CPR (Attempt to Resuscitate)     Medical Interventions (Patient has pulse or is breathing):    Full Support     I have prepared this progress note with copied portions of the prior day's progress note of my own authorship to preserve accuracy and maintain consistency of documentation. I have reviewed these portions and edited them for correctness. I verify that the above documentation accurately and truly represents the evaluation and management performed on today's date.       Terrence Phillips MD  06/10/24

## 2024-06-10 NOTE — CASE MANAGEMENT/SOCIAL WORK
Continued Stay Note  [facility]     Patient Name: Tavo Gudino  MRN: [id number]  Today's Date: 6/10/2024    Admit Date: 5/31/2024    Plan: TBD   Discharge Plan       Row Name 06/10/24 1711       Plan    Plan TBD    Patient/Family in Agreement with Plan yes    Plan Comments Per Mari with admissions at [facility], they will decline to offer Mr. Gudino a skilled rehab bed.  I have updated Mr. Gudino at bedside today.  He states that he will require a facility that is close to his home so his wife may be able to visit.  I have explained that with his skilled nursing care needs and Hemodialysis that this may not be attainable. He also may not be able to tolerae wheelchair transport to HD r/t wounds. I have suggested [facility] which also has on -site HD.  He states that it is too far but that he will discuss this with his wife.  CM will cont to follow the plan of care and touch base with him in the am regarding pursuing other skilled nursing facilities.    Final Discharge Disposition Code 03 - skilled nursing facility (SNF)                   Discharge Codes    No documentation.                 Expected Discharge Date and Time       Expected Discharge Date Expected Discharge Time    Jun 12, 2024               Catrachita Falcon RN

## 2024-06-10 NOTE — PLAN OF CARE
Problem: Device-Related Complication Risk (Hemodialysis)  Goal: Safe, Effective Therapy Delivery  Outcome: Ongoing, Progressing  Intervention: Optimize Device Care and Function  Recent Flowsheet Documentation  Taken 6/10/2024 1240 by Delmer Sánchez, RN  Medication Review/Management:   medications reviewed   high-risk medications identified     Problem: Hemodynamic Instability (Hemodialysis)  Goal: Effective Tissue Perfusion  Outcome: Ongoing, Progressing     Problem: Infection (Hemodialysis)  Goal: Absence of Infection Signs and Symptoms  Outcome: Ongoing, Progressing   Goal Outcome Evaluation:               HD completed. UF goal of 520 reached. Tolerated well. VS stable, BP elevated during tx. MD aware. Blood returned to pt. Report to JESUS Fishman

## 2024-06-10 NOTE — PROGRESS NOTES
"  [facility]  PROGRESS NOTE    Date of Admission: 5/31/2024  Date of Service: 06/10/24    Primary Care Physician: Hayden Weinstein MD    Chief Complaint: f/u elevated troponin, Afib with RVR   Problem List:   NSTEMI (non-ST elevated myocardial infarction)    C. difficile colitis    History of cholecystitis s/p cholecystostomy tube    ESRD (end stage renal disease) on hemodialysis    Atrial fibrillation    Essential hypertension    Moderate malnutrition    Soft tissue infection    Acute osteomyelitis of left tibia      Subjective      Patient resting in bed.  He continues to be frustrated and does not want to go back to the nursing home from which he came from.  He thinks that he will get worse again there.  Patient denies any chest pain or shortness of breath this morning.      Objective   Vitals: /69 (BP Location: Right arm, Patient Position: Lying)   Pulse 57   Temp 97.8 °F (36.6 °C) (Oral)   Resp 18   Ht 185.4 cm (72.99\")   Wt 84.8 kg (186 lb 15.2 oz)   SpO2 99%   BMI 24.67 kg/m²     Physical Exam:  GENERAL: Alert, cooperative, in no acute distress.   HEENT: Normocephalic, no jugular venous distention  HEART: Irregular rhythm, normal rate, and no murmurs, gallops, or rubs.   LUNGS:  No wheezing, rales or rhonchi.  NEUROLOGIC: No focal abnormalities involving strength or sensation are noted.   EXTREMITIES: LLE BKA, RLE with discoloration present and nonhealing ulcers with dressing in place.        Results:  Results from last 7 days   Lab Units 06/10/24  0440 06/09/24  0451 06/08/24  0433   WBC 10*3/mm3 7.82 7.76 7.37   HEMOGLOBIN g/dL 9.4* 9.1* 9.6*   HEMATOCRIT % 30.6* 29.0* 30.3*   PLATELETS 10*3/mm3 176 172 204     Results from last 7 days   Lab Units 06/10/24  0440 06/09/24  0451 06/08/24  0433   SODIUM mmol/L 134* 133* 134*   POTASSIUM mmol/L 6.0* 5.9* 5.1   CHLORIDE mmol/L 105 103 101   CO2 mmol/L 20.0* 20.0* 25.0   BUN mg/dL 51* 41* 15   CREATININE mg/dL " 4.41* 3.88* 3.18*   GLUCOSE mg/dL 64* 75 71      Lab Results   Component Value Date    AST 7 06/02/2024    ALT 7 06/02/2024                       Results from last 7 days   Lab Units 06/05/24  0753 06/05/24  0001 06/04/24  1319   APTT seconds 70.3 51.6* 58.9*                   Intake/Output Summary (Last 24 hours) at 6/10/2024 1211  Last data filed at 6/10/2024 0549  Gross per 24 hour   Intake 150 ml   Output 1300 ml   Net -1150 ml       I personally reviewed the patient's EKG/Telemetry data    Radiology Data:   No radiology results for the last day      Current Medications:  apixaban, 5 mg, Oral, BID  aspirin, 81 mg, Oral, Daily  collagenase, 1 Application, Topical, Q24H  dilTIAZem CD, 240 mg, Oral, Q24H  eravacycline dihydrochloride (XERAVA) 85 mg in sodium chloride 0.9 % 250 mL (0.34 mg/mL) IVPB, 85 mg, Intravenous, Q12H  famotidine, 10 mg, Oral, Daily  folic acid, 1 mg, Oral, Daily  lactobacillus acidophilus, 1 capsule, Oral, BID  levothyroxine, 75 mcg, Oral, Q AM  Lidocaine HCl gel, , Topical, BID  metoprolol succinate XL, 100 mg, Oral, Q24H  pharmacy consult - MTM, , Does not apply, Daily  sodium chloride, 10 mL, Intravenous, Q12H  vancomycin, 125 mg, Oral, Q6H      Pharmacy Consult - Pharmacy to dose,         Assessment and Plan:   1. NSTEMI  - elevated HS troponin at OSH with Afib with RVR and symptoms of chest pain  - HS troponin here 454 -- 386, EKGs with Afib/RVR no acute ischemic changes   - continue ASA, BB   - echo with normal LVEF, mild MR  -Stress test shows mild small area of ischemia in the mid anterior lateral zone, long discussion regarding stress test results, at this time decision was made by patient and Dr. Amador to continue medical therapy,and defer invasive angiography.  Rate control and antianginals for chest pain.  - he denies recurrent chest pain since admission      2. Afib with RVR  - rate control and anticoagulation   - continue BB, CCB, and Eliquis  - Metoprolol increased to 100mg  and Diltiazem added this admission, rates are controlled at this time      3. ESRD on dialysis  - NAL following, still making some urine      4. PAD, s/p left BKA and nonhealing ulcers of RLE  - Ortho and ID following  - Ortho has signed off as patient declined further surgical intervention of his wounds/ LE    5. C-diff  - per ID      6. Anemia  - acute/chronic  - per primary service      7. Recent cholecystitis with GB drain in place  - Dr. Beckman has seen, recommend OP follow up with       8. Hypothyroidism  - Per hospitalists       Heart rate is well-controlled.  Patient is awaiting placement at this time.  Will continue to follow as needed while inpatient.    Electronically signed by Jessy Schmidt PA-C, 06/10/24, 12:17 PM EDT.

## 2024-06-10 NOTE — PROGRESS NOTES
Tavo Gudino  1955  [id number]    Reason for Consultation: recurrent C diff. Osteomyelitis     History of present illness:    Patient is a 69 y.o. male, with PMH chronic cholecystitis( cholecystotomy tube) DM, ESRD on HD, HTN, PVD s/p Left  BKA.  All recent care done at [facility] in [facility].    Presented to OSH with chest pain, found to be in afib with RVR possible NSTEMi, transferred to [facility] for LHC.  Developed diarrhea prior to transfer, C diff toxin negative, positive antigen, started on oral Vancomycin. He had been treated with Kayexalate for hyperkalemia, as well as Colace.  Last positive C diff PCR 4/6/24 from [facility]. Noted exposed tendon RLE wound , and ulcer of right heel, sacral area, and left residual stump. He has been afebrile. Admitting labs with WBC 22, plt 249, ALT 10 AST 10, Scr 3.47, UA with TNTC WBCs, urine culture with gram negative bacilli, blood cultures no growth. MRI Left with edema throughout soft tissue of stump, no abscess,subtle early changes of superimposed osteomyelitis distal osseous stump not excluded.  Right with diffuse soft tissue cellulitis, possible early osteomyelitis lateral malleolus, no definite evidence of osteomyelitis.  Started on Ceftriaxone Daptomycin, Flagyl, and oral Vancomycin and we were consulted for evaluation and treatment. He has had an indwelling cole catheter since April, just recently changed.  He continues to have numerous diarrhea stools and abdominal cramping.  No fever.      6/3/24: Frustrated with prolonged hospitalization but slow overnight.  Awaiting possible cardiac workup.  Legs stable.  Cole catheter in place.  Denies abdominal pain, suprapubic pain at this time.  He said he only had 1 bowel movement yesterday evening but has had 2 loose bowel movement so far today.  Overall he thinks his stool frequency has improved    6/5/2024: Resting comfortably.  Cardiac workup completed.   orthopedic surgery recommended  additional debridement of the amputation site but patient refusing stating he would like to follow-up with his previous wound care providers.  Loose stools slowing down and he has less nausea    6/6/24: Stool frequency slowing down.  Minimal abdominal discomfort.  Still has biliary drain.  Tolerating IV antibiotics. Discussed discharge planning again with patient today: He has numerous needs and will be difficult to manage at home but still reluctant to go back to prior skilled nursing facility.    6/10/24: Stool frequency is overall improved.  Had a bowel movement this morning but cannot tell me if it was liquid or solid.  Only 1 bowel movement charted in past 24 hours.  No abdominal pain.  Biliary drain remains in place.  No worsening swelling or pain in either lower extremity.  Tolerating IV antibiotics through left IJ CVC.  Patient agreeable to skilled nursing facility    ROS:  Afebrile and hemodynamically stable. No nausea. No rash. See above, otherwise negative.      Allergies   Allergen Reactions    Penicillins Hives     Received ceftriaxone 6/1/24    Levothyroxine Unknown - Low Severity    Hydromorphone Other (See Comments)     Confusion, encephalopathy per wife         Medication:    Current Facility-Administered Medications:     acetaminophen (TYLENOL) tablet 650 mg, 650 mg, Oral, Q4H PRN, Genny Saeed MD, 650 mg at 06/06/24 2106    apixaban (ELIQUIS) tablet 5 mg, 5 mg, Oral, BID, Otilia Tay MD, 5 mg at 06/10/24 0902    aspirin EC tablet 81 mg, 81 mg, Oral, Daily, Keren Escalona PA-C, 81 mg at 06/10/24 0902    collagenase ointment 1 Application, 1 Application, Topical, Q24H, Genny Saeed MD, 1 Application at 06/10/24 0903    dilTIAZem CD (CARDIZEM CD) 24 hr capsule 240 mg, 240 mg, Oral, Q24H, Fred Amador MD, 240 mg at 06/10/24 0902    famotidine (PEPCID) tablet 10 mg, 10 mg, Oral, Daily, Otilia Tay MD, 10 mg at 06/10/24 0902    folic acid (FOLVITE) tablet 1  mg, 1 mg, Oral, Daily, Sonya Banks MD, 1 mg at 06/10/24 0902    heparin (porcine) injection 2,000 Units, 2,000 Units, Intracatheter, PRN, EfeJose MD, 2,000 Units at 06/04/24 1101    lactobacillus acidophilus (RISAQUAD) capsule 1 capsule, 1 capsule, Oral, BID, José Fuentes MD, 1 capsule at 06/10/24 0902    levothyroxine (SYNTHROID, LEVOTHROID) tablet 75 mcg, 75 mcg, Oral, Q AM, Genny Saeed MD, 75 mcg at 06/10/24 0546    Lidocaine HCl gel (XYLOCAINE) urethral/mucosal syringe, , Topical, PRN, Genny Saeed MD, Given at 06/01/24 0609    lidocaine HCL topical jelly USP 2% syringe 11 mL, , Topical, BID, Genny Saeed MD, Given at 06/10/24 0903    melatonin tablet 5 mg, 5 mg, Oral, Nightly PRN, Radha Vasques, APRN, 5 mg at 06/09/24 2345    metoprolol succinate XL (TOPROL-XL) 24 hr tablet 100 mg, 100 mg, Oral, Q24H, Keren Escalona PA-C, 100 mg at 06/09/24 1706    metoprolol tartrate (LOPRESSOR) injection 5 mg, 5 mg, Intravenous, Q6H PRN, Hayden Weinstein MD, 5 mg at 06/04/24 1020    ondansetron (ZOFRAN) injection 4 mg, 4 mg, Intravenous, Q6H PRN, Sita Santizo PA-C, 4 mg at 06/08/24 0456    Pharmacy Consult - MT, , Does not apply, Daily, Freddie Sutton [facility], Given at 06/09/24 0853    Pharmacy Consult - Pharmacy to dose, , Does not apply, Continuous PRN, José Fuentes MD    sodium chloride 0.9 % flush 10 mL, 10 mL, Intravenous, Q12H, Genny Saeed MD, 10 mL at 06/10/24 0903    sodium chloride 0.9 % flush 10 mL, 10 mL, Intravenous, PRN, Genny Saeed MD    sodium chloride 0.9 % infusion 40 mL, 40 mL, Intravenous, PRN, Genny Saeed MD    vancomycin (VANCOCIN) capsule 125 mg, 125 mg, Oral, Q6H, José Fuentes MD, 125 mg at 06/10/24 0546    Antibiotics:  Anti-Infectives (From admission, onward)      Ordered     Dose/Rate Route Frequency Start Stop    06/05/24 1356  Omadacycline Tosylate 150 MG tablet        Ordering Provider: José Fuentes MD     300 mg Oral Daily 24 0000 24 2359    24 1247  fosfomycin (MONUROL) packet 3 g        Ordering Provider: José Fuentes MD    3 g Oral Once 24 1400 24 1450    24 1421  eravacycline dihydrochloride (XERAVA) 85 mg in sodium chloride 0.9 % 250 mL (0.34 mg/mL) IVPB        Note to Pharmacy: !! Ensure final concentration of 0.2 - 0.6 mg/mL !!   Ordering Provider: José Fuentes MD    85 mg  250 mL/hr over 60 Minutes Intravenous Every 12 Hours 24 1600 06/10/24 0646    24 1503  vancomycin (VANCOCIN) capsule 125 mg        Ordering Provider: José Fuentes MD    125 mg Oral Every 6 Hours Scheduled 24 1800 24 5205            Physical Exam:   Vital Signs  Temp (24hrs), Av.8 °F (36.6 °C), Min:97.6 °F (36.4 °C), Max:98.1 °F (36.7 °C)    Temp  Min: 97.6 °F (36.4 °C)  Max: 98.1 °F (36.7 °C)  BP  Min: 138/74  Max: 156/69  Pulse  Min: 57  Max: 62  Resp  Min: 12  Max: 18  SpO2  Min: 99 %  Max: 99 %    GENERAL: Awake and alert, chronically ill-appearing but not acutely toxic  HEENT: Normocephalic, atraumatic.  PERRL. EOMI. No conjunctival injection. No icterus. Edentulous   NECK: Supple   HEART: RRR; No murmur,  .   LUNGS: Clear to auscultation bilaterally without wheezing, rales, rhonchi. Normal respiratory effort. Nonlabored.   ABDOMEN: Soft,  tender  nondistended: Biliary drain in right upper quadrant  EXT: Left BKA site with wound dehiscence and some necrotic tissue.  No significant erythema or purulent drainage.  Overall stable  Multiple dry ulcerations on right foot dressed.  No new images.  Patient deferred direct examination of either site  :  Delaney catheter with clear urine  MSK: No joint effusions or erythema  SKIN: Warm and dry    NEURO: Oriented to PPT.  Motor 5/5 strength  PSYCHIATRIC: Normal insight and judgment. Cooperative with PE  Dialysis line in the right chest.  Left IJ CVC    Laboratory Data    Results from last 7 days   Lab Units  06/10/24  0440 06/09/24  0451 06/08/24  0433   WBC 10*3/mm3 7.82 7.76 7.37   HEMOGLOBIN g/dL 9.4* 9.1* 9.6*   HEMATOCRIT % 30.6* 29.0* 30.3*   PLATELETS 10*3/mm3 176 172 204     Results from last 7 days   Lab Units 06/10/24  0440   SODIUM mmol/L 134*   POTASSIUM mmol/L 6.0*   CHLORIDE mmol/L 105   CO2 mmol/L 20.0*   BUN mg/dL 51*   CREATININE mg/dL 4.41*   GLUCOSE mg/dL 64*   CALCIUM mg/dL 9.2                                   Estimated Creatinine Clearance: 18.4 mL/min (A) (by C-G formula based on SCr of 4.41 mg/dL (H)).      Microbiology:  Microbiology Results (last 10 days)       Procedure Component Value - Date/Time    Urine Culture - Urine, Indwelling Urethral Catheter [[id number]]  (Normal) Collected: 06/02/24 1721    Lab Status: Final result Specimen: Urine from Indwelling Urethral Catheter Updated: 06/04/24 0955     Urine Culture No growth    MRSA Screen, PCR (Inpatient) - Swab, Nares [[id number]]  (Abnormal) Collected: 06/02/24 1306    Lab Status: Final result Specimen: Swab from Nares Updated: 06/02/24 1433     MRSA PCR Positive    Narrative:      The negative predictive value of this diagnostic test is high and should only be used to consider de-escalating anti-MRSA therapy. A positive result may indicate colonization with MRSA and must be correlated clinically.    Clostridioides difficile Toxin - Stool, Per Rectum [[id number]]  (Abnormal) Collected: 06/02/24 1305    Lab Status: Final result Specimen: Stool from Per Rectum Updated: 06/02/24 1416    Narrative:      The following orders were created for panel order Clostridioides difficile Toxin - Stool, Per Rectum.  Procedure                               Abnormality         Status                     ---------                               -----------         ------                     Clostridioides difficile...[[id number]]  Abnormal            Final result                 Please view results for these tests on the individual orders.    Clostridioides  difficile Toxin, PCR - Stool, Per Rectum [[id number]]  (Abnormal) Collected: 06/02/24 1305    Lab Status: Final result Specimen: Stool from Per Rectum Updated: 06/02/24 1416     Toxigenic C. difficile by PCR Detected    Narrative:      DNA from a toxigenic strain of C.difficile has been detected.    Clostridioides difficile toxin Ag, Reflex - Stool, Per Rectum [[id number]]  (Abnormal) Collected: 06/02/24 1305    Lab Status: Final result Specimen: Stool from Per Rectum Updated: 06/02/24 1515     C.diff Toxin Ag Positive    Narrative:      DNA from a toxigenic strain of C.difficile was detected, along with the presence of free toxin. These results are suggestive of C.difficile infection.    Blood Culture - Blood, Arm, Right [[id number]]  (Normal) Collected: 05/31/24 2237    Lab Status: Final result Specimen: Blood from Arm, Right Updated: 06/06/24 0745     Blood Culture No growth at 5 days    Blood Culture - Blood, Blood, Central Line [[id number]]  (Normal) Collected: 05/31/24 2237    Lab Status: Final result Specimen: Blood, Central Line Updated: 06/06/24 0801     Blood Culture No growth at 5 days    Urine Culture - Urine, Indwelling Urethral Catheter [[id number]]  (Abnormal)  (Susceptibility) Collected: 05/31/24 2219    Lab Status: Final result Specimen: Urine from Indwelling Urethral Catheter Updated: 06/03/24 0936     Urine Culture >100,000 CFU/mL Enterobacter cloacae complex     Comment:   This organism may develop resistance during prolonged therapy with 3rd generation cephalosporins (e.g. ceftriaxone) as a result of de-repression of AmpC B-lactamase.  Ceftriaxone may be a reasonable treatment option for uncomplicated cystitis or other lower severity infections when susceptibility is demonstrated.       Narrative:      Colonization of the urinary tract without infection is common. Treatment is discouraged unless the patient is symptomatic, pregnant, or undergoing an invasive urologic procedure.    Susceptibility         Enterobacter cloacae complex      PHYLLIS      Cefepime Susceptible      Ceftazidime Susceptible      Ceftriaxone Susceptible      Gentamicin Susceptible      Levofloxacin Susceptible      Nitrofurantoin Intermediate      Piperacillin + Tazobactam Susceptible      Trimethoprim + Sulfamethoxazole Susceptible                                   Radiology:  Imaging Results (Last 72 Hours)       ** No results found for the last 72 hours. **          5/28, 5/31 blood cultures from [facility], negative      Impression:   C diff colitis initially diagnosed in April 2024. Was given Lactulose, Stool softeners in [facility] and developed worsening diarrhea, with positive antigen, negative EIA toxin so possible Colonization vs true infection.  Repeat testing at [facility] with positive PCR and positive EIA antigen.  Patient says stool frequency is overall improving, especially after switched to eravacycline. Improving   NSTEMI,  Cardiology evaluated  ESRD on HD: via tunneled HD catheter. still makes urine  Severe peripheral vascular disease: No intervention needed per vascular surgery  S/p left BKA 4/2024, with dehiscence of the amputation site and possible soft tissue infection  Possible left tibial early osteomyelitis:  by MRI.  Dr. Yeager recommended additional I&D surgery which the patient refused  Left lower extremity wound, with exposed tendon-  early osteomyelitis of the lateral malleolus not excluded by MRI per radiology.  Lesions dry on exam  Chronic cholecystitis, s/p percutaneous cholecystotomy tube-   Pyuria, Enterobacter bacteriuria: Had chronic indwelling Delaney catheter that was in place for almost 2 months prior to being changed on admission 5/31.  Culture from admission with Enterobacter.  Treated with fosfomycin x 1.  Repeat culture 6/2 negative  Sacral pressure ulcers  Social barriers to care: Inadequate transportation for follow-up appointments    PLAN/RECOMMENDATIONS:   Follow CBC, CMP, CRP      Continue IV  eravacycline while inpatient    - Continue PO vancomycin 125 mg 4 times daily for 14 days from admission through June 14, and then taper to 125 mg twice daily for 1 week, and then 125 mg once daily for 1 week, then follow-up in the office to reassess  - probiotic BID     Discussed discharge planning again with patient today.  I reiterated what numerous providers have told him: his medical needs are too complex to manage at home. He is reluctant to go back to prior SNF.  Case management evaluating options.    Discussed discharge planning with patient again today.  Also previously discussed extensively with case management.  Patient is now reluctantly agreeable to skilled nursing facility.  Waiting to hear back from skilled nursing facility as to whether or not they can do IV eravacycline or tigecycline.  At this time patient is currently refusing tunneled subclavian line to facilitate IV antibiotics.  He says he prefers to treat with oral medications if at all possible.  I think this is not unreasonable, especially since he is refusing surgical intervention on the left lower extremity, his chances for healing or poor so the additional benefit of IV antibiotics overall oral antibiotics is probably slim.     If possible I think the best oral antibiotic for him is Nuzyra (omadacycline) through June 28 (4 weeks). CM and pharmacy working on PA. If Nuzyra not approved, alternative option is PO doxycycline 100 mg BID. CM and pharmacy working on PA and drug assistance programs if available.  If this is not feasible will transition to oral doxycycline for 6 weeks total       Okay to discharge from my perspective once arrangements have been made. Remove IJ CVC prior to discharge. Schedule outpatient follow-up with me 2 weeks after discharge     Highly complex MDM.     José Fuentes MD  6/10/2024  10:27 EDT

## 2024-06-11 LAB
ALBUMIN SERPL-MCNC: 2.5 G/DL (ref 3.5–5.2)
ANION GAP SERPL CALCULATED.3IONS-SCNC: 10 MMOL/L (ref 5–15)
BUN SERPL-MCNC: 29 MG/DL (ref 8–23)
BUN/CREAT SERPL: 9.9 (ref 7–25)
CALCIUM SPEC-SCNC: 8.6 MG/DL (ref 8.6–10.5)
CHLORIDE SERPL-SCNC: 102 MMOL/L (ref 98–107)
CO2 SERPL-SCNC: 21 MMOL/L (ref 22–29)
CREAT SERPL-MCNC: 2.92 MG/DL (ref 0.76–1.27)
EGFRCR SERPLBLD CKD-EPI 2021: 22.2 ML/MIN/1.73
GLUCOSE SERPL-MCNC: 75 MG/DL (ref 65–99)
PHOSPHATE SERPL-MCNC: 4.6 MG/DL (ref 2.5–4.5)
POTASSIUM SERPL-SCNC: 5 MMOL/L (ref 3.5–5.2)
SODIUM SERPL-SCNC: 133 MMOL/L (ref 136–145)

## 2024-06-11 PROCEDURE — 25010000002 ERAVACYCLINE DIHYDROCHLORIDE 50 MG RECONSTITUTED SOLUTION 1 EACH VIAL: Performed by: INTERNAL MEDICINE

## 2024-06-11 PROCEDURE — 80069 RENAL FUNCTION PANEL: CPT | Performed by: INTERNAL MEDICINE

## 2024-06-11 PROCEDURE — 99232 SBSQ HOSP IP/OBS MODERATE 35: CPT | Performed by: INTERNAL MEDICINE

## 2024-06-11 PROCEDURE — 25810000003 SODIUM CHLORIDE 0.9 % SOLUTION 250 ML FLEX CONT: Performed by: INTERNAL MEDICINE

## 2024-06-11 RX ADMIN — Medication 5 MG: at 21:42

## 2024-06-11 RX ADMIN — Medication 1 CAPSULE: at 08:31

## 2024-06-11 RX ADMIN — ACETAMINOPHEN 650 MG: 325 TABLET ORAL at 18:57

## 2024-06-11 RX ADMIN — LIDOCAINE HYDROCHLORIDE: 20 JELLY TOPICAL at 21:42

## 2024-06-11 RX ADMIN — ACETAMINOPHEN 650 MG: 325 TABLET ORAL at 07:05

## 2024-06-11 RX ADMIN — Medication 10 ML: at 21:42

## 2024-06-11 RX ADMIN — METOPROLOL SUCCINATE 100 MG: 100 TABLET, EXTENDED RELEASE ORAL at 17:28

## 2024-06-11 RX ADMIN — ERAVACYCLINE 85 MG: 50 INJECTION, POWDER, LYOPHILIZED, FOR SOLUTION INTRAVENOUS at 05:13

## 2024-06-11 RX ADMIN — SODIUM ZIRCONIUM CYCLOSILICATE 10 G: 10 POWDER, FOR SUSPENSION ORAL at 08:32

## 2024-06-11 RX ADMIN — FAMOTIDINE 10 MG: 20 TABLET, FILM COATED ORAL at 08:31

## 2024-06-11 RX ADMIN — APIXABAN 5 MG: 5 TABLET, FILM COATED ORAL at 21:42

## 2024-06-11 RX ADMIN — LEVOTHYROXINE SODIUM 75 MCG: 0.07 TABLET ORAL at 05:13

## 2024-06-11 RX ADMIN — DILTIAZEM HYDROCHLORIDE 240 MG: 240 CAPSULE, COATED, EXTENDED RELEASE ORAL at 08:31

## 2024-06-11 RX ADMIN — ERAVACYCLINE 85 MG: 50 INJECTION, POWDER, LYOPHILIZED, FOR SOLUTION INTRAVENOUS at 17:33

## 2024-06-11 RX ADMIN — COLLAGENASE SANTYL 1 APPLICATION: 250 OINTMENT TOPICAL at 12:20

## 2024-06-11 RX ADMIN — VANCOMYCIN HYDROCHLORIDE 125 MG: 125 CAPSULE ORAL at 12:18

## 2024-06-11 RX ADMIN — APIXABAN 5 MG: 5 TABLET, FILM COATED ORAL at 08:31

## 2024-06-11 RX ADMIN — ACETAMINOPHEN 650 MG: 325 TABLET ORAL at 02:27

## 2024-06-11 RX ADMIN — VANCOMYCIN HYDROCHLORIDE 125 MG: 125 CAPSULE ORAL at 05:13

## 2024-06-11 RX ADMIN — VANCOMYCIN HYDROCHLORIDE 125 MG: 125 CAPSULE ORAL at 17:28

## 2024-06-11 RX ADMIN — FOLIC ACID 1 MG: 1 TABLET ORAL at 08:31

## 2024-06-11 RX ADMIN — LIDOCAINE HYDROCHLORIDE: 20 JELLY TOPICAL at 12:18

## 2024-06-11 RX ADMIN — ASPIRIN 81 MG: 81 TABLET, COATED ORAL at 08:31

## 2024-06-11 RX ADMIN — Medication 1 CAPSULE: at 21:42

## 2024-06-11 NOTE — PLAN OF CARE
Patient reported no pain throughout shift. Tolerated wound care well.     Problem: Adult Inpatient Plan of Care  Goal: Plan of Care Review  Outcome: Ongoing, Not Progressing  Flowsheets  Taken 6/11/2024 1835 by Cristiana Whelan RN  Progress: no change  Taken 6/7/2024 1151 by Mariah Foss OT  Plan of Care Reviewed With: patient  Goal: Patient-Specific Goal (Individualized)  Outcome: Ongoing, Not Progressing  Goal: Absence of Hospital-Acquired Illness or Injury  Outcome: Ongoing, Not Progressing  Intervention: Identify and Manage Fall Risk  Flowsheets  Taken 6/11/2024 1800  Safety Promotion/Fall Prevention:   activity supervised   assistive device/personal items within reach   clutter free environment maintained   fall prevention program maintained  Taken 6/11/2024 1600  Safety Promotion/Fall Prevention:   activity supervised   assistive device/personal items within reach  Taken 6/11/2024 1400  Safety Promotion/Fall Prevention:   activity supervised   fall prevention program maintained  Taken 6/11/2024 1200  Safety Promotion/Fall Prevention: activity supervised  Taken 6/11/2024 1000  Safety Promotion/Fall Prevention:   activity supervised   assistive device/personal items within reach   clutter free environment maintained   elopement precautions   fall prevention program maintained  Taken 6/11/2024 0800  Safety Promotion/Fall Prevention:   activity supervised   assistive device/personal items within reach   clutter free environment maintained   elopement precautions   fall prevention program maintained  Intervention: Prevent Skin Injury  Flowsheets  Taken 6/11/2024 1800  Body Position:   turned   right  Skin Protection:   adhesive use limited   incontinence pads utilized   silicone foam dressing in place   tubing/devices free from skin contact  Taken 6/11/2024 1600  Body Position: position changed independently  Skin Protection:   adhesive use limited   silicone foam dressing in place   tubing/devices free from skin  contact  Taken 6/11/2024 1400  Body Position: position changed independently  Skin Protection:   adhesive use limited   incontinence pads utilized   silicone foam dressing in place  Taken 6/11/2024 1200  Body Position: position changed independently  Skin Protection:   protective footwear used   tubing/devices free from skin contact   silicone foam dressing in place  Taken 6/11/2024 1000  Body Position: position changed independently  Skin Protection: adhesive use limited  Taken 6/11/2024 0800  Body Position: position changed independently  Skin Protection:   adhesive use limited   incontinence pads utilized   silicone foam dressing in place   tubing/devices free from skin contact  Intervention: Prevent and Manage VTE (Venous Thromboembolism) Risk  Flowsheets  Taken 6/11/2024 1800 by Cristiana Whelan RN  Activity Management: activity encouraged  Taken 6/11/2024 1600 by Cristiana Whelan RN  Activity Management: activity encouraged  Taken 6/11/2024 1400 by Cristiana Whelan RN  Activity Management: activity encouraged  Taken 6/11/2024 1200 by Cristiana Whelan RN  Activity Management: activity minimized  Taken 6/11/2024 1000 by Cristiana Whelan RN  Activity Management: activity encouraged  Taken 6/11/2024 0800 by Cristiana Whelan RN  Activity Management: activity minimized  Range of Motion: active ROM (range of motion) encouraged  Taken 6/10/2024 2114 by Wilfredo Helm RN  VTE Prevention/Management: (see mar) other (see comments)  Intervention: Prevent Infection  Flowsheets  Taken 6/11/2024 1800  Infection Prevention:   environmental surveillance performed   hand hygiene promoted  Taken 6/11/2024 1600  Infection Prevention:   environmental surveillance performed   hand hygiene promoted  Taken 6/11/2024 1400  Infection Prevention:   environmental surveillance performed   hand hygiene promoted  Taken 6/11/2024 1200  Infection Prevention:   equipment surfaces disinfected   hand hygiene promoted  Taken  6/11/2024 1000  Infection Prevention: personal protective equipment utilized  Taken 6/11/2024 0800  Infection Prevention:   personal protective equipment utilized   hand hygiene promoted  Goal: Optimal Comfort and Wellbeing  Outcome: Ongoing, Not Progressing  Intervention: Monitor Pain and Promote Comfort  Flowsheets  Taken 6/11/2024 1800  Pain Management Interventions:   see MAR   care clustered  Taken 6/11/2024 1600  Pain Management Interventions:   see MAR   care clustered  Taken 6/11/2024 1400  Pain Management Interventions:   see MAR   care clustered  Taken 6/11/2024 1200  Pain Management Interventions:   see MAR   care clustered  Taken 6/11/2024 1000  Pain Management Interventions:   see MAR   care clustered   pillow support provided   position adjusted  Taken 6/11/2024 0800  Pain Management Interventions:   care clustered   pillow support provided   position adjusted   quiet environment facilitated  Intervention: Provide Person-Centered Care  Flowsheets  Taken 6/11/2024 1000  Trust Relationship/Rapport:   care explained   choices provided   emotional support provided   empathic listening provided   questions answered  Taken 6/11/2024 0800  Trust Relationship/Rapport:   care explained   choices provided   emotional support provided   empathic listening provided  Goal: Readiness for Transition of Care  Outcome: Ongoing, Not Progressing  Intervention: Mutually Develop Transition Plan  Flowsheets (Taken 6/3/2024 1658 by Catrachita Robertson, RN)  Readmission Within the Last 30 Days: no previous admission in last 30 days     Problem: Adjustment to Illness (Sepsis/Septic Shock)  Goal: Optimal Coping  Outcome: Ongoing, Not Progressing  Intervention: Optimize Psychosocial Adjustment to Illness  Flowsheets  Taken 6/11/2024 1000 by Cristiana Whelan, RN  Family/Support System Care: support provided  Taken 6/11/2024 0800 by Cristiana Whelan, RN  Family/Support System Care: support provided  Taken 6/7/2024 2125 by Eliazar  JESUS Cheng  Supportive Measures:   active listening utilized   counseling provided     Problem: Bleeding (Sepsis/Septic Shock)  Goal: Absence of Bleeding  Outcome: Ongoing, Not Progressing  Intervention: Monitor and Manage Bleeding  Flowsheets  Taken 6/11/2024 1000  Bleeding Precautions: foot protection facilitated  Bleeding Management: dressing monitored  Taken 6/11/2024 0800  Bleeding Precautions: foot protection facilitated  Bleeding Management: dressing monitored     Problem: Glycemic Control Impaired (Sepsis/Septic Shock)  Goal: Blood Glucose Level Within Desired Range  Outcome: Ongoing, Not Progressing  Intervention: Optimize Glycemic Control  Flowsheets (Taken 6/7/2024 2125 by Skylar Perea RN)  Glycemic Management: blood glucose monitored     Problem: Infection Progression (Sepsis/Septic Shock)  Goal: Absence of Infection Signs and Symptoms  Outcome: Ongoing, Not Progressing  Intervention: Initiate Sepsis Management  Flowsheets  Taken 6/11/2024 1800  Infection Prevention:   environmental surveillance performed   hand hygiene promoted  Isolation Precautions:   precautions maintained   contact   spore  Taken 6/11/2024 1600  Infection Prevention:   environmental surveillance performed   hand hygiene promoted  Isolation Precautions:   precautions maintained   contact   spore  Taken 6/11/2024 1400  Infection Prevention:   environmental surveillance performed   hand hygiene promoted  Isolation Precautions:   spore   contact   precautions maintained  Taken 6/11/2024 1200  Infection Prevention:   equipment surfaces disinfected   hand hygiene promoted  Isolation Precautions:   spore   contact   precautions maintained  Taken 6/11/2024 1000  Infection Prevention: personal protective equipment utilized  Isolation Precautions:   spore   contact   precautions maintained  Taken 6/11/2024 0800  Infection Prevention:   personal protective equipment utilized   hand hygiene promoted  Isolation Precautions:   spore    contact   precautions maintained  Intervention: Promote Recovery  Flowsheets  Taken 6/11/2024 1800 by Cristiana Whelan, RN  Activity Management: activity encouraged  Taken 6/11/2024 1600 by Cristiana Whelan, RN  Activity Management: activity encouraged  Taken 6/11/2024 1400 by Cristiana Whelan, RN  Activity Management: activity encouraged  Taken 6/11/2024 1200 by Cristiana Whelan, RN  Activity Management: activity minimized  Taken 6/11/2024 1000 by Cristiana Whelan, RN  Activity Management: activity encouraged  Taken 6/11/2024 0800 by Cristiana Whelan RN  Activity Management: activity minimized  Taken 6/7/2024 2125 by Skylar Perea RN  Sleep/Rest Enhancement: awakenings minimized  Taken 6/6/2024 2040 by Skylar Perea RN  Airway/Ventilation Support: pulmonary hygiene promoted  Intervention: Promote Stabilization  Flowsheets (Taken 6/11/2024 0800)  Fluid/Electrolyte Management: fluids provided     Problem: Nutrition Impaired (Sepsis/Septic Shock)  Goal: Optimal Nutrition Intake  Outcome: Ongoing, Not Progressing     Problem: Pain Chronic (Persistent) (Comorbidity Management)  Goal: Acceptable Pain Control and Functional Ability  Outcome: Ongoing, Not Progressing  Intervention: Manage Persistent Pain  Flowsheets  Taken 6/11/2024 0800 by Cristiana Whelan RN  Bowel Elimination Promotion:   adequate fluid intake promoted   commode/bedpan at bedside  Taken 6/10/2024 2114 by Wilfredo Helm RN  Medication Review/Management: medications reviewed  Taken 6/7/2024 2125 by Skylar Perea RN  Sleep/Rest Enhancement: awakenings minimized  Intervention: Develop Pain Management Plan  Flowsheets  Taken 6/11/2024 1800  Pain Management Interventions:   see MAR   care clustered  Taken 6/11/2024 1600  Pain Management Interventions:   see MAR   care clustered  Taken 6/11/2024 1400  Pain Management Interventions:   see MAR   care clustered  Taken 6/11/2024 1200  Pain Management Interventions:   see MAR   care  clustered  Taken 6/11/2024 1000  Pain Management Interventions:   see MAR   care clustered   pillow support provided   position adjusted  Taken 6/11/2024 0800  Pain Management Interventions:   care clustered   pillow support provided   position adjusted   quiet environment facilitated  Intervention: Optimize Psychosocial Wellbeing  Flowsheets  Taken 6/11/2024 1000 by Cristiana Whelan, RN  Family/Support System Care: support provided  Taken 6/11/2024 0800 by Cristiana Whelan RN  Family/Support System Care: support provided  Taken 6/10/2024 2114 by Wilfredo Helm RN  Diversional Activities:   smartphone   television  Taken 6/7/2024 2125 by Skylar Perea RN  Supportive Measures:   active listening utilized   counseling provided     Problem: Fall Injury Risk  Goal: Absence of Fall and Fall-Related Injury  Outcome: Ongoing, Not Progressing  Intervention: Identify and Manage Contributors  Flowsheets  Taken 6/10/2024 2114 by Wilfredo Helm RN  Medication Review/Management: medications reviewed  Taken 6/7/2024 2125 by Skylar Perea RN  Self-Care Promotion: independence encouraged  Intervention: Promote Injury-Free Environment  Flowsheets  Taken 6/11/2024 1800  Safety Promotion/Fall Prevention:   activity supervised   assistive device/personal items within reach   clutter free environment maintained   fall prevention program maintained  Taken 6/11/2024 1600  Safety Promotion/Fall Prevention:   activity supervised   assistive device/personal items within reach  Taken 6/11/2024 1400  Safety Promotion/Fall Prevention:   activity supervised   fall prevention program maintained  Taken 6/11/2024 1200  Safety Promotion/Fall Prevention: activity supervised  Taken 6/11/2024 1000  Safety Promotion/Fall Prevention:   activity supervised   assistive device/personal items within reach   clutter free environment maintained   elopement precautions   fall prevention program maintained  Taken 6/11/2024 0800  Safety  Promotion/Fall Prevention:   activity supervised   assistive device/personal items within reach   clutter free environment maintained   elopement precautions   fall prevention program maintained     Problem: Device-Related Complication Risk (Hemodialysis)  Goal: Safe, Effective Therapy Delivery  Outcome: Ongoing, Not Progressing  Intervention: Optimize Device Care and Function  Flowsheets (Taken 6/10/2024 2114 by Wilfredo Helm RN)  Medication Review/Management: medications reviewed     Problem: Hemodynamic Instability (Hemodialysis)  Goal: Effective Tissue Perfusion  Outcome: Ongoing, Not Progressing     Problem: Infection (Hemodialysis)  Goal: Absence of Infection Signs and Symptoms  Outcome: Ongoing, Not Progressing   Goal Outcome Evaluation:           Progress: no change

## 2024-06-11 NOTE — CASE MANAGEMENT/SOCIAL WORK
Continued Stay Note  [facility]     Patient Name: Tavo Gudino  MRN: [id number]  Today's Date: 6/11/2024    Admit Date: 5/31/2024    Plan: discharge plan   Discharge Plan       Row Name 06/11/24 1556       Plan    Plan discharge plan    Plan Comments I spoke with pt in room and then spouse, Nanda on phone(per pt request). Pt is interested in going to a facility in [place] but aware there are only 2 skilled  nursing facilities in [place] and neither do in-house HD. Pt and spouse are aware that [facility] unable to accept pt back. The  next closest options are [facility](which pt and spouse are not interested in), [facility], [facility] in [place] and possibly a [facility] in [place]. A referral was made to [facility], [facility] facility in [place], and left a message with [facility] in [place]. CM will follow up tomorrow.    Final Discharge Disposition Code 03 - skilled nursing facility (SNF)                   Discharge Codes    No documentation.                 Expected Discharge Date and Time       Expected Discharge Date Expected Discharge Time    Jun 12, 2024               Catrachita Robertson RN

## 2024-06-11 NOTE — PROGRESS NOTES
[facility] Medicine Services  PROGRESS NOTE    Patient Name: Tavo Gudino  : 1955  MRN: [id number]    Date of Admission: 2024  Primary Care Physician: Hayden Weisntein MD    Subjective   Subjective     CC:  Stump infection    HPI:  Resting in bed in no acute distress and overall comfortable.  I do long conversation with the patient about plan of care.  No fever or chills.  No chest pain or palpitation or shortness of breath at rest.  No nausea vomiting or diarrhea.      Objective   Objective     Vital Signs:   Temp:  [96.9 °F (36.1 °C)-98.7 °F (37.1 °C)] 98.5 °F (36.9 °C)  Heart Rate:  [62-69] 69  Resp:  [12-18] 18  BP: (136-155)/() 141/57     Physical Exam:  Constitutional: No acute distress, chronically ill looking  Respiratory: Clear to auscultation bilaterally, respiratory effort normal   Cardiovascular: RR, no murmur gallop or rub audible.  Gastrointestinal: Positive bowel sounds, soft, nontender, nondistended, cholecystostomy tube in place with dark green drainage  Musculoskeletal: Left AKA in dressings, no clear sign of infection.  Psychiatric: Flat affect, cooperative  Neurologic: Oriented x 3, no focal deficits, speech is clear  Skin: Pale looking      Results Reviewed:  LAB RESULTS:      Lab 06/10/24  0440 24  0451 24  0433 24  0502 24  1613 24  0753 24  0001   WBC 7.82 7.76 7.37 7.19  --   --   --    HEMOGLOBIN 9.4* 9.1* 9.6* 9.2*  --   --   --    HEMATOCRIT 30.6* 29.0* 30.3* 29.0*  --   --   --    PLATELETS 176 172 204 198  --   --   --    NEUTROS ABS  --  4.36 3.59  --   --   --   --    IMMATURE GRANS (ABS)  --  0.11* 0.11*  --   --   --   --    LYMPHS ABS  --  2.32 2.59  --   --   --   --    MONOS ABS  --  0.70 0.79  --   --   --   --    EOS ABS  --  0.21 0.22  --   --   --   --    MCV 97.8* 94.5 95.0 95.1  --   --   --    APTT  --   --   --   --   --  70.3 51.6*   HEPARIN ANTI-XA  --   --   --   --  0.36 0.56  --           Lab 06/11/24  0716 06/10/24  0440 06/09/24  0451 06/08/24  0433 06/06/24  0502   SODIUM 133* 134* 133* 134* 132*   POTASSIUM 5.0 6.0* 5.9* 5.1 4.6   CHLORIDE 102 105 103 101 101   CO2 21.0* 20.0* 20.0* 25.0 23.0   ANION GAP 10.0 9.0 10.0 8.0 8.0   BUN 29* 51* 41* 15 33*   CREATININE 2.92* 4.41* 3.88* 3.18* 3.33*   EGFR 22.2* 13.6* 15.8* 20.1* 19.0*   GLUCOSE 75 64* 75 71 76   CALCIUM 8.6 9.2 9.3 9.7 9.3   MAGNESIUM  --   --   --  1.8  --    PHOSPHORUS 4.6*  --   --   --  4.8*         Lab 06/11/24  0716 06/06/24  0502   ALBUMIN 2.5* 2.7*                     Brief Urine Lab Results  (Last result in the past 365 days)        Color   Clarity   Blood   Leuk Est   Nitrite   Protein   CREAT   Urine HCG        06/02/24 1721 Yellow   Clear   Trace   Trace   Negative   100 mg/dL (2+)                   Microbiology Results Abnormal       Procedure Component Value - Date/Time    Blood Culture - Blood, Blood, Central Line [[id number]]  (Normal) Collected: 05/31/24 2237    Lab Status: Final result Specimen: Blood, Central Line Updated: 06/06/24 0801     Blood Culture No growth at 5 days    Blood Culture - Blood, Arm, Right [[id number]]  (Normal) Collected: 05/31/24 2237    Lab Status: Final result Specimen: Blood from Arm, Right Updated: 06/06/24 0745     Blood Culture No growth at 5 days    Urine Culture - Urine, Indwelling Urethral Catheter [[id number]]  (Normal) Collected: 06/02/24 1721    Lab Status: Final result Specimen: Urine from Indwelling Urethral Catheter Updated: 06/04/24 0955     Urine Culture No growth            No radiology results from the last 24 hrs    Results for orders placed during the hospital encounter of 05/31/24    Adult Transthoracic Echo Complete W/ Cont if Necessary Per Protocol    Interpretation Summary    Left ventricular systolic function is normal. Left ventricular ejection fraction appears to be 51 - 55%.    Left ventricular wall thickness is consistent with borderline concentric hypertrophy.     Left atrial volume is moderately increased.    There is mild calcification of the aortic valve.    Mild mitral valve regurgitation is present.    There is a small (<1cm) circumferential pericardial effusion.      Current medications:  Scheduled Meds:apixaban, 5 mg, Oral, BID  aspirin, 81 mg, Oral, Daily  collagenase, 1 Application, Topical, Q24H  dilTIAZem CD, 240 mg, Oral, Q24H  eravacycline dihydrochloride (XERAVA) 85 mg in sodium chloride 0.9 % 250 mL (0.34 mg/mL) IVPB, 85 mg, Intravenous, Q12H  famotidine, 10 mg, Oral, Daily  folic acid, 1 mg, Oral, Daily  lactobacillus acidophilus, 1 capsule, Oral, BID  levothyroxine, 75 mcg, Oral, Q AM  Lidocaine HCl gel, , Topical, BID  metoprolol succinate XL, 100 mg, Oral, Q24H  pharmacy consult - MTM, , Does not apply, Daily  sodium chloride, 10 mL, Intravenous, Q12H  sodium zirconium cyclosilicate, 10 g, Oral, Daily  vancomycin, 125 mg, Oral, Q6H      Continuous Infusions:Pharmacy Consult - Pharmacy to dose,       PRN Meds:.  acetaminophen    heparin (porcine)    Lidocaine HCl gel    melatonin    metoprolol tartrate    ondansetron    Pharmacy Consult - Pharmacy to dose    sodium chloride    sodium chloride    Assessment & Plan   Assessment & Plan     Active Hospital Problems    Diagnosis  POA    **NSTEMI (non-ST elevated myocardial infarction) [I21.4]  Yes    Soft tissue infection [L08.9]  Unknown    Acute osteomyelitis of left tibia [M86.162]  Unknown    Moderate malnutrition [E44.0]  Yes    C. difficile colitis [A04.72]  Yes    History of cholecystitis s/p cholecystostomy tube [K81.9]  Yes    ESRD (end stage renal disease) on hemodialysis [N18.6]  Yes    Atrial fibrillation [I48.91]  Yes    Essential hypertension [I10]  Yes      Resolved Hospital Problems   No resolved problems to display.        Brief Hospital Course to date:  Tavo Gudino is a 69 y.o. male with hx of HTN, PAD, left BKA, ESRD on hemodialysis, Afib on Eliquis, recent acute cholecystitis s/p  cholecystostomy tube placement at , chronic urinary retention with indwelling Delaney catheter, and previous hx of C.diff who presented from [facility] due to chest pain, elevated troponin, and Afib with RVR.  Found to have multiple other concerning issues including active C. difficile infection, concern for possible osteomyelitis, exposed tendon on right lower extremity.     Chest pain, resolved  Elevated troponin, possible NSTEMI  -[facility]. Amador  -Dr. Amador/Cardiology consulted, stress test with small area of ischemia.  With discussion with patient the decision was made to medically manage at this time.     Afib with RVR  Hx of Afib/Aflutter  -Toprol dosing adjusted per cardiology given episodes of intermittent RVR  -Continue Eliquis     HFrEF  Moderate MR  --Most recent Echo on file I April 2024: EF 40%, akinetic septal, anterior, and apical walls, severely dilated LA, moderate MR  --Follows with  Cardiology, had been evaluated previously for Vanessa clip  --ECHO 6/1: LVEF 51 to 55%, borderline concentric LVH, left atrial volume moderately increased, mild MR, small less than 1 cm circumferential pericardial effusion     Leukocytosis, resolved.     Concern for osteomyelitis   Exposed tendon on RLE  --MRI left tib/fib showed cellulitis, possible osteomyelitis  --MRI right foot with cellulitis/edema  -- Arterial duplex abnormal; Vascular surgery consulted and recommended medical management with no vascular surgery intervention  --Eravacycline per ID  --MRSA screen +  --Follow-up blood and urine cultures  -Dr. Yeager following. Patient likely needs additional amputation of left lower extremity due to concern for poor wound healing, likely needs debridement of right lower extremity around the tendon with wound VAC placement.  Patient refusing further amputation of left lower extremity or any other procedures.   --ID following. Patient is not likely a candidate for outpatient IV  antibiotics he will not be able to follow-up adequately and doesn't not have support at home. Plan is just to continue the eravacycline for now until we have some idea of his d/c plan.  Prior auth for Nuzyra but co-pay is still $2000.  Working to see if we can get that lower. If he goes home, may consider doxycycline BID.   -IF discharges to SNF, CVC will be removed and tunneled line will need to be replaced  -Patient wishes to follow-up with wound care at .  -f/u 2 weeks after discharge.     C.diff colitis  -s/p fosphofmycin  -Continue p.o. vancomycin 125 mg 4 times a day for 14 days from admission through June 14 and then taper to 125 mg twice daily for 1 week and then 125 mg once daily for 1 week  -Follow-up with ID     Recent cholecystitis s/p cholecystostomy tube  --Request records from that hospitalization, apparently was placed at   --Consulted General Surgery for management of tube  --Apparently already has follow up at  6/6/24 General Surgery--will need to reschedule this     Chronic urinary retention  --Has Delaney in place, reported hx of urethral injury per OSH note (data deficit, no idea when this was Delaney placed), appears he follows with Urology at  and this is a chronic Delaney  --Replaced Delaney here     PAD  Hx of left BKA  --Continue ASA     Anemia  --Likely chronic due to renal disease  --Folate 4.36 --> supplementation started; vitamin B 12 level 755  --Hb 7.5 here, had been 9 at OSH     HTN  ESRD  --Nephrology consulted for dialysis   --Case management to work with patient and family in order to work on disposition location and finding an outpatient dialysis chair.    Hyperkalemia  -Potassium of 5.9 this morning, will give dose of lokelma. Not sure when next HD session is      Hypothyroidism  --TSH significantly elevated at 91 at OSH, still elevated here to 65, but improved  --Synthroid dose was increased to 75 mcg daily at OSH  --Continue Synthroid at above dose and recheck in 4-6 weeks       Multiple wounds  Sacral Decubitus ulcer, POA, Stage 3  --Wound care consulted  --Ortho as above     Poor IV access  --Dr. Beckman placed central line, will likely need tunneled line of antibiotics if plan is for rehab placement.      Expected Discharge Location and Transportation: Tentatively back to [place]  Expected Discharge   Expected Discharge Date: 6/12/2024; Expected Discharge Time:      DVT prophylaxis:  Pharmacologic & mechanical VTE prophylaxis orders are present.         AM-PAC 6 Clicks Score (PT): 9 (06/11/24 0800)    CODE STATUS:   Code Status and Medical Interventions:   Ordered at: 05/31/24 1429     Code Status (Patient has no pulse and is not breathing):    CPR (Attempt to Resuscitate)     Medical Interventions (Patient has pulse or is breathing):    Full Support            Terrence Phillips MD  06/11/24

## 2024-06-11 NOTE — PROGRESS NOTES
" LOS: 11 days   Patient Care Team:  Hayden Weinstein MD as PCP - General (Nephrology)    Chief Complaint: ESRD  NSTEMI    Subjective          Patient is normally getting dialysis on TTS schedule; held dialysis on Saturday because of good urine output;     History taken from: patient    Objective     Vital Sign Min/Max for last 24 hours  Temp  Min: 96.9 °F (36.1 °C)  Max: 98.7 °F (37.1 °C)   BP  Min: 136/60  Max: 204/81   Pulse  Min: 46  Max: 69   Resp  Min: 12  Max: 18   SpO2  Min: 93 %  Max: 100 %   No data recorded   No data recorded     Flowsheet Rows      Flowsheet Row First Filed Value   Admission Height 185.4 cm (73\") Documented at 05/31/2024 1826   Admission Weight 84.8 kg (187 lb) Documented at 05/31/2024 1826            I/O this shift:  In: -   Out: 850 [Urine:500; Emesis/NG output:350]  I/O last 3 completed shifts:  In: -   Out: 1550 [Urine:1300; Drains:250]    Objective   General Appearance: Alert, oriented, no obvious distress.  Eyes: PER, EOMI.  Neck: Supple no JVD.  Lungs: Clear auscultation, no rales rhonchi's, equal chest movement, nonlabored.  Heart: Grade 2/6 murmur, rub, RRR.  Abdomen: Soft, nontender, positive bowel sounds, no organomegaly.  Extremities: Left BKA no edema, no cyanosis.  Neuro: No focal deficit, moving all extremities, alert oriented X 3  Delaney catheter in place.  Tunneled dialysis catheter right IJ.      Results Review:     I reviewed the patient's new clinical results.    WBC WBC   Date Value Ref Range Status   06/10/2024 7.82 3.40 - 10.80 10*3/mm3 Final   06/09/2024 7.76 3.40 - 10.80 10*3/mm3 Final      HGB Hemoglobin   Date Value Ref Range Status   06/10/2024 9.4 (L) 13.0 - 17.7 g/dL Final   06/09/2024 9.1 (L) 13.0 - 17.7 g/dL Final      HCT Hematocrit   Date Value Ref Range Status   06/10/2024 30.6 (L) 37.5 - 51.0 % Final   06/09/2024 29.0 (L) 37.5 - 51.0 % Final      Platlets No results found for: \"LABPLAT\"   MCV MCV   Date Value Ref Range Status   06/10/2024 97.8 (H) 79.0 - " "97.0 fL Final   06/09/2024 94.5 79.0 - 97.0 fL Final          Sodium Sodium   Date Value Ref Range Status   06/11/2024 133 (L) 136 - 145 mmol/L Final   06/10/2024 134 (L) 136 - 145 mmol/L Final   06/09/2024 133 (L) 136 - 145 mmol/L Final      Potassium Potassium   Date Value Ref Range Status   06/11/2024 5.0 3.5 - 5.2 mmol/L Final   06/10/2024 6.0 (H) 3.5 - 5.2 mmol/L Final   06/09/2024 5.9 (H) 3.5 - 5.2 mmol/L Final      Chloride Chloride   Date Value Ref Range Status   06/11/2024 102 98 - 107 mmol/L Final   06/10/2024 105 98 - 107 mmol/L Final   06/09/2024 103 98 - 107 mmol/L Final      CO2 CO2   Date Value Ref Range Status   06/11/2024 21.0 (L) 22.0 - 29.0 mmol/L Final   06/10/2024 20.0 (L) 22.0 - 29.0 mmol/L Final   06/09/2024 20.0 (L) 22.0 - 29.0 mmol/L Final      BUN BUN   Date Value Ref Range Status   06/11/2024 29 (H) 8 - 23 mg/dL Final   06/10/2024 51 (H) 8 - 23 mg/dL Final   06/09/2024 41 (H) 8 - 23 mg/dL Final      Creatinine Creatinine   Date Value Ref Range Status   06/11/2024 2.92 (H) 0.76 - 1.27 mg/dL Final   06/10/2024 4.41 (H) 0.76 - 1.27 mg/dL Final   06/09/2024 3.88 (H) 0.76 - 1.27 mg/dL Final      Calcium Calcium   Date Value Ref Range Status   06/11/2024 8.6 8.6 - 10.5 mg/dL Final   06/10/2024 9.2 8.6 - 10.5 mg/dL Final   06/09/2024 9.3 8.6 - 10.5 mg/dL Final      PO4 No results found for: \"CAPO4\"   Albumin Albumin   Date Value Ref Range Status   06/11/2024 2.5 (L) 3.5 - 5.2 g/dL Final          Magnesium No results found for: \"MG\"       Uric Acid No results found for: \"URICACID\"     Medication Review: Yes    Assessment & Plan       NSTEMI (non-ST elevated myocardial infarction)    C. difficile colitis    History of cholecystitis s/p cholecystostomy tube    ESRD (end stage renal disease) on hemodialysis    Atrial fibrillation    Essential hypertension    Moderate malnutrition    Soft tissue infection    Acute osteomyelitis of left tibia         1.  ESRD: On TTS jonathan. HD through right IJ TDC. Still " makes urine. Does have cole cath+ for chronic urinary retention. Patient the family Primary nephrologist ( Dr Wan) was monitoring for renal recovery and residual renal function.      2.  Volume status: No significant LE edema noted. Does have scrotal edema.     3.  Afib w RVR: RVR during HD treatment. BP controlled. . No chest pain or sob.       4.  Met acidosis: Mild. Management with HD.     5.  Anemia: ACD. Transfuse at hb<7.  ALLISON on HD days     6.  CAD: Transferred from OSH for evaluation of ACD. Cardiology following. EF 55%    Plan:  Will check labs in the morning, depending on the urine output and creatinine dialysis will be done.  Potassium acceptable range today.  Continue with oral Lokelma daily.  Renal diet but patient does not want to follow.  ALLISON with HD  Metoprolol for RVR during HD on as needed basis.  Risk complex patient.    José Shoemaker MD  06/11/24  13:49 EDT

## 2024-06-12 ENCOUNTER — APPOINTMENT (OUTPATIENT)
Dept: NEPHROLOGY | Facility: HOSPITAL | Age: 69
End: 2024-06-12
Payer: MEDICARE

## 2024-06-12 LAB
ALBUMIN SERPL-MCNC: 2.6 G/DL (ref 3.5–5.2)
ANION GAP SERPL CALCULATED.3IONS-SCNC: 10 MMOL/L (ref 5–15)
BUN SERPL-MCNC: 39 MG/DL (ref 8–23)
BUN/CREAT SERPL: 10.8 (ref 7–25)
CALCIUM SPEC-SCNC: 9.3 MG/DL (ref 8.6–10.5)
CHLORIDE SERPL-SCNC: 104 MMOL/L (ref 98–107)
CO2 SERPL-SCNC: 20 MMOL/L (ref 22–29)
CREAT SERPL-MCNC: 3.6 MG/DL (ref 0.76–1.27)
EGFRCR SERPLBLD CKD-EPI 2021: 17.3 ML/MIN/1.73
GLUCOSE SERPL-MCNC: 71 MG/DL (ref 65–99)
PHOSPHATE SERPL-MCNC: 5.8 MG/DL (ref 2.5–4.5)
POTASSIUM SERPL-SCNC: 5 MMOL/L (ref 3.5–5.2)
SODIUM SERPL-SCNC: 134 MMOL/L (ref 136–145)

## 2024-06-12 PROCEDURE — 80069 RENAL FUNCTION PANEL: CPT | Performed by: INTERNAL MEDICINE

## 2024-06-12 PROCEDURE — 25810000003 SODIUM CHLORIDE 0.9 % SOLUTION 250 ML FLEX CONT: Performed by: INTERNAL MEDICINE

## 2024-06-12 PROCEDURE — 97530 THERAPEUTIC ACTIVITIES: CPT

## 2024-06-12 PROCEDURE — 99232 SBSQ HOSP IP/OBS MODERATE 35: CPT | Performed by: INTERNAL MEDICINE

## 2024-06-12 PROCEDURE — 25010000002 ERAVACYCLINE DIHYDROCHLORIDE 50 MG RECONSTITUTED SOLUTION 1 EACH VIAL: Performed by: INTERNAL MEDICINE

## 2024-06-12 RX ADMIN — COLLAGENASE SANTYL 1 APPLICATION: 250 OINTMENT TOPICAL at 11:29

## 2024-06-12 RX ADMIN — ACETAMINOPHEN 650 MG: 325 TABLET ORAL at 13:42

## 2024-06-12 RX ADMIN — APIXABAN 5 MG: 5 TABLET, FILM COATED ORAL at 21:17

## 2024-06-12 RX ADMIN — Medication 10 ML: at 21:18

## 2024-06-12 RX ADMIN — VANCOMYCIN HYDROCHLORIDE 125 MG: 125 CAPSULE ORAL at 23:32

## 2024-06-12 RX ADMIN — Medication 10 ML: at 11:35

## 2024-06-12 RX ADMIN — APIXABAN 5 MG: 5 TABLET, FILM COATED ORAL at 11:28

## 2024-06-12 RX ADMIN — LIDOCAINE HYDROCHLORIDE: 20 JELLY TOPICAL at 11:29

## 2024-06-12 RX ADMIN — ERAVACYCLINE 85 MG: 50 INJECTION, POWDER, LYOPHILIZED, FOR SOLUTION INTRAVENOUS at 06:06

## 2024-06-12 RX ADMIN — SODIUM ZIRCONIUM CYCLOSILICATE 10 G: 10 POWDER, FOR SUSPENSION ORAL at 11:29

## 2024-06-12 RX ADMIN — DILTIAZEM HYDROCHLORIDE 240 MG: 240 CAPSULE, COATED, EXTENDED RELEASE ORAL at 11:28

## 2024-06-12 RX ADMIN — LIDOCAINE HYDROCHLORIDE: 20 JELLY TOPICAL at 21:18

## 2024-06-12 RX ADMIN — METOPROLOL SUCCINATE 100 MG: 100 TABLET, EXTENDED RELEASE ORAL at 17:38

## 2024-06-12 RX ADMIN — VANCOMYCIN HYDROCHLORIDE 125 MG: 125 CAPSULE ORAL at 17:38

## 2024-06-12 RX ADMIN — FAMOTIDINE 10 MG: 20 TABLET, FILM COATED ORAL at 11:28

## 2024-06-12 RX ADMIN — VANCOMYCIN HYDROCHLORIDE 125 MG: 125 CAPSULE ORAL at 11:28

## 2024-06-12 RX ADMIN — VANCOMYCIN HYDROCHLORIDE 125 MG: 125 CAPSULE ORAL at 01:29

## 2024-06-12 RX ADMIN — VANCOMYCIN HYDROCHLORIDE 125 MG: 125 CAPSULE ORAL at 06:09

## 2024-06-12 RX ADMIN — Medication 5 MG: at 21:17

## 2024-06-12 RX ADMIN — FOLIC ACID 1 MG: 1 TABLET ORAL at 11:28

## 2024-06-12 RX ADMIN — LEVOTHYROXINE SODIUM 75 MCG: 0.07 TABLET ORAL at 06:08

## 2024-06-12 RX ADMIN — Medication 1 CAPSULE: at 21:17

## 2024-06-12 RX ADMIN — Medication 1 CAPSULE: at 11:28

## 2024-06-12 RX ADMIN — ERAVACYCLINE 85 MG: 50 INJECTION, POWDER, LYOPHILIZED, FOR SOLUTION INTRAVENOUS at 17:38

## 2024-06-12 RX ADMIN — ASPIRIN 81 MG: 81 TABLET, COATED ORAL at 11:28

## 2024-06-12 NOTE — PLAN OF CARE
Goal Outcome Evaluation:  Plan of Care Reviewed With: patient        Progress: no change  Outcome Evaluation: The pt continues to present below his functional baseline with L BKA limitations, RLE wound/pain limitation, generalized weakness, decreased activity tolerance, and balance deficits. Grooming ADL performed with set up. The pt rolled left and right with SBA, was dependently lifted to the chair. The pt declined standing attempt stating he is NWB RLE, however OT unable to locate a WB status within the chart. Potentially trial supine <> sit transfer, sliding board, or other ways of transferring at future sessions as pt states his goal includes transferring to/from a  and BSC. OT will continue to follow. Continue to recommend a d/c to SNF for best outcome.      Anticipated Discharge Disposition (OT): skilled nursing facility

## 2024-06-12 NOTE — THERAPY TREATMENT NOTE
Patient Name: Tavo Gudino  : 1955    MRN: [id number]                              Today's Date: 2024       Admit Date: 2024    Visit Dx: No diagnosis found.  Patient Active Problem List   Diagnosis    NSTEMI (non-ST elevated myocardial infarction)    C. difficile colitis    History of cholecystitis s/p cholecystostomy tube    ESRD (end stage renal disease) on hemodialysis    Atrial fibrillation    Essential hypertension    Moderate malnutrition    Soft tissue infection    Acute osteomyelitis of left tibia     Past Medical History:   Diagnosis Date    Atrial fibrillation     Chronic kidney disease (CKD), stage V     ESRD on hemodialysis     Hypertension     Metabolic acidosis     Peripheral arterial disease     Pneumothorax     Secondary hyperparathyroidism      Past Surgical History:   Procedure Laterality Date    ARTERIOVENOUS FISTULA Right     BELOW KNEE LEG AMPUTATION Left     CATARACT EXTRACTION Bilateral       General Information       Row Name 24 1342          OT Time and Intention    Document Type therapy note (daily note)  -KF     Mode of Treatment occupational therapy  -KF       Row Name 24 1342          General Information    Patient Profile Reviewed yes  -KF     Existing Precautions/Restrictions fall;other (see comments)  sacral and LE wounds, L BKA, drain, cole, dialysis cath; unsure of RLE WB status  -KF     Barriers to Rehab medically complex;previous functional deficit;physical barrier  -KF       Row Name 24 1342          Cognition    Orientation Status (Cognition) oriented x 3  -KF       Row Name 24 1342          Safety Issues, Functional Mobility    Safety Issues Affecting Function (Mobility) insight into deficits/self-awareness;safety precaution awareness;safety precautions follow-through/compliance  -KF     Impairments Affecting Function (Mobility) range of motion (ROM);strength;pain;endurance/activity tolerance;sensation/sensory awareness;balance  -KF                User Key  (r) = Recorded By, (t) = Taken By, (c) = Cosigned By      Initials Name Provider Type    Zenaida Wills OT Occupational Therapist                     Mobility/ADL's       Row Name 06/12/24 1342          Bed Mobility    Bed Mobility rolling left;rolling right  -KF     Rolling Left [place] (Bed Mobility) standby assist  -KF     Rolling Right [place] (Bed Mobility) standby assist  -KF     Comment, (Bed Mobility) No physical assistance needed this date  -KF       Row Name 06/12/24 1342          Transfers    Transfers bed-chair transfer  -KF     Comment, (Transfers) Pt declined standing attempt, stating he is RLE NWB. Unable to find WB status within chart. Possibly attempt sliding board or other means of transferring as pt states his goal is to [place] able [place] [facility] [place]  -KF       Row Name 06/12/24 1342          Bed-Chair Transfer    Bed-Chair [place] (Transfers) dependent (less than 25% patient effort);2 person assist  -KF     Assistive Device (Bed-Chair Transfers) lift device  -KF       Row Name 06/12/24 1342          Activities of Daily Living    BADL Assessment/Intervention grooming  -KF       Row Name 06/12/24 1342          Grooming Assessment/Training    [place] Level (Grooming) wash face, hands;set up  -KF     Position (Grooming) sitting up in bed  -KF               User Key  (r) = Recorded By, (t) = Taken By, (c) = Cosigned By      Initials Name Provider Type    Zenaida Wills OT Occupational Therapist                   Obj/Interventions       Row Name 06/12/24 1346          Balance    Balance Assessment sitting static balance  -KF     Static Sitting Balance supervision  -KF     Position, Sitting Balance supported;sitting in chair  -KF     Balance Interventions sitting;supported  -KF               User Key  (r) = Recorded By, (t) = Taken By, (c) = Cosigned By      Initials Name Provider Type    Zenaida Wills OT Occupational Therapist                    Goals/Plan    No documentation.                  Clinical Impression       Row Name 06/12/24 1346          Pain Assessment    Pretreatment Pain Rating 8/10  -KF     Posttreatment Pain Rating 8/10  -KF     Pain Location generalized  -KF     Pain Location - buttock  -KF     Pain Intervention(s) Repositioned;Ambulation/increased activity;Nursing Notified  -KF       Row Name 06/12/24 1346          Plan of Care Review    Plan of Care Reviewed With patient  -KF     Progress no change  -KF     Outcome Evaluation The pt continues to present below his functional baseline with L BKA limitations, RLE wound/pain limitation, generalized weakness, decreased activity tolerance, and balance deficits. Grooming ADL performed with set up. The pt rolled left and right with SBA, was dependently lifted to the chair. The pt declined standing attempt stating he is NWB RLE, however OT unable to locate a WB status within the chart. Potentially trial supine <> sit transfer, sliding board, or other ways of transferring at future sessions as pt states his goal includes transferring to/from a  and [facility]. OT will continue to follow. Continue to recommend a d/c to SNF for best outcome.  -KF       Row Name 06/12/24 1346          Therapy Assessment/Plan (OT)    Rehab Potential (OT) fair, will monitor progress closely  -KF     Criteria for Skilled Therapeutic Interventions Met (OT) yes;meets criteria;skilled treatment is necessary  -KF     Therapy Frequency (OT) 3 times/wk  -KF       Row Name 06/12/24 1346          Therapy Plan Review/Discharge Plan (OT)    Anticipated Discharge Disposition (OT) skilled nursing facility  -       Row Name 06/12/24 1346          Vital Signs    Pre Systolic BP Rehab 110  -KF     Pre Treatment Diastolic BP 96  -KF     Pre Patient Position Supine  -KF     Intra Patient Position Side Lying  -KF     Post Patient Position Sitting  -KF       Row Name 06/12/24 1346          Positioning and Restraints     Pre-Treatment Position in bed  -KF     Post Treatment Position chair  -KF     In Chair with PT  -KF               User Key  (r) = Recorded By, (t) = Taken By, (c) = Cosigned By      Initials Name Provider Type    Zenaida Wills OT Occupational Therapist                   Outcome Measures       Row Name 06/12/24 1350          How much help from another is currently needed...    Putting on and taking off regular lower body clothing? 1  -KF     Bathing (including washing, rinsing, and drying) 2  -KF     Toileting (which includes using toilet bed pan or urinal) 1  -KF     Putting on and taking off regular upper body clothing 3  -KF     Taking care of personal grooming (such as brushing teeth) 3  -KF     Eating meals 3  -KF     AM-PAC 6 Clicks Score (OT) 13  -KF       Row Name 06/12/24 1359          Functional Assessment    Outcome Measure Options AM-PAC 6 Clicks Daily Activity (OT)  -KF               User Key  (r) = Recorded By, (t) = Taken By, (c) = Cosigned By      Initials Name Provider Type    Zenaida Wills OT Occupational Therapist                    Occupational Therapy Education       Title: PT OT SLP Therapies (In Progress)       Topic: Occupational Therapy (In Progress)       Point: ADL training (Done)       Description:   Instruct learner(s) on proper safety adaptation and remediation techniques during self care or transfers.   Instruct in proper use of assistive devices.                  Learning Progress Summary             Patient Acceptance, E,TB, VU,DU by LUCIA at 6/12/2024 1315    Acceptance, E, VU by GARRY at 6/3/2024 1544    Comment: reason for consult, evaluation results   Family Acceptance, E, VU by GARRY at 6/3/2024 1544    Comment: reason for consult, evaluation results                         Point: Home exercise program (Not Started)       Description:   Instruct learner(s) on appropriate technique for monitoring, assisting and/or progressing therapeutic exercises/activities.                   Learner Progress:  Not documented in this visit.              Point: Precautions (Done)       Description:   Instruct learner(s) on prescribed precautions during self-care and functional transfers.                  Learning Progress Summary             Patient Acceptance, E,TB, VU,DU by KF at 6/12/2024 1315    Acceptance, E, NR by  at 6/7/2024 1157                         Point: Body mechanics (Done)       Description:   Instruct learner(s) on proper positioning and spine alignment during self-care, functional mobility activities and/or exercises.                  Learning Progress Summary             Patient Acceptance, E,TB, VU,DU by KF at 6/12/2024 1315    Acceptance, E, NR by KL at 6/7/2024 1157                                         User Key       Initials Effective Dates Name Provider Type Discipline    GARRY 07/11/23 -  Na Pitts, OT Occupational Therapist OT     08/09/23 -  Zenaida Oden OT Occupational Therapist OT     02/05/24 -  Mariah Foss OT Occupational Therapist OT                  OT Recommendation and Plan  Therapy Frequency (OT): 3 times/wk  Plan of Care Review  Plan of Care Reviewed With: patient  Progress: no change  Outcome Evaluation: The pt continues to present below his functional baseline with L BKA limitations, RLE wound/pain limitation, generalized weakness, decreased activity tolerance, and balance deficits. Grooming ADL performed with set up. The pt rolled left and right with SBA, was dependently lifted to the chair. The pt declined standing attempt stating he is NWB RLE, however OT unable to locate a WB status within the chart. Potentially trial supine <> sit transfer, sliding board, or other ways of transferring at future sessions as pt states his goal includes transferring to/from a  and [facility]. OT will continue to follow. Continue to recommend a d/c to SNF for best outcome.     Time Calculation:         Time Calculation- OT       Row Name 06/12/24 2350             Time  Calculation- OT    OT Start Time 1315  -KF      OT Received On 06/12/24  -KF      OT Goal Re-Cert Due Date 06/13/24  -KF         Timed Charges    93200 - OT Therapeutic Activity Minutes 7  -KF      29719 - OT Self Care/Mgmt Minutes 3  -KF         Total Minutes    Timed Charges Total Minutes 10  -KF       Total Minutes 10  -KF                User Key  (r) = Recorded By, (t) = Taken By, (c) = Cosigned By      Initials Name Provider Type    KF Zenaida Oden OT Occupational Therapist                  Therapy Charges for Today       Code Description Service Date Service Provider Modifiers Qty    [id number]  OT THERAPEUTIC ACT EA 15 MIN 6/12/2024 Zenaida Oden OT GO 1                 Zenaida Oden OT  6/12/2024

## 2024-06-12 NOTE — DISCHARGE PLACEMENT REQUEST
"Cali Sue (69 y.o. Male)     To Solo in [place]  From Marta(CM at [facility]) [phone]      Date of Birth   1955    Social Security Number       Address   [address] [place], [place] [address]    Home Phone   [phone]    MRN   [id number]       Zoroastrian   None    Marital Status                               Admission Date   24    Admission Type   Urgent    Admitting Provider   Terrence Phillips MD    Attending Provider   Terrence Phillips MD    Department, Room/Bed   [facility] 6A, N616/1       Discharge Date       Discharge Disposition       Discharge Destination                                 Attending Provider: Terrence Phillips MD    Allergies: Penicillins, Levothyroxine, Hydromorphone    Isolation: Spore   Infection: C.difficile (24), MRSA (24)   Code Status: CPR    Ht: 185.4 cm (72.99\")   Wt: 84.8 kg (186 lb 15.2 oz)    Admission Cmt: None   Principal Problem: NSTEMI (non-ST elevated myocardial infarction) [I21.4]                   Active Insurance as of 2024       Primary Coverage       Payor Plan Insurance Group Employer/Plan Group    HUMANA MEDICARE REPLACEMENT HUMANA MED ADV HMO [id number]       Payor Plan Address Payor Plan Phone Number Payor Plan Fax Number Effective Dates    [address] [address] [phone]  2023 - None Entered    [facility] [address]         Subscriber Name Subscriber Birth Date Member ID       CALI SUE 1955 [id number]                     Emergency Contacts        (Rel.) Home Phone Work Phone Mobile Phone    JANICE SUE (Spouse) [phone] -- [phone]                 History & Physical        Genny Saeed MD at [facility]              [facility] Medicine Services  HISTORY AND PHYSICAL    Patient Name: Cali Sue  : 1955  MRN: [id number]  Primary Care Physician: Moses Ordaz MD  Date of admission: 2024      Subjective  Subjective "     Chief Complaint:  Transfer from OSH due to chest pain, Afib with RVR, possible NSTEMI    HPI:  Tavo Gudino is a 69 y.o. male with hx of HTN, PAD, left BKA, ESRD on hemodialysis, Afib on Eliquis, recent acute cholecystitis s/p cholecystostomy tube placement at , chronic urinary retention with indwelling Delaney catheter, and previous hx of C.diff who presents from [facility] for Cardiology evaluation due to chest pain, elevated troponin concerning for NSTEMI, and Afib with RVR. He typically gets all of his care at [facility]. Admitted to [facility] on 5/28/24 with complaints of chest pain and palpitations. Found to have elevated troponins (18 -->97-->472) and was in Afib with RVR. CXR negative. Rate was controlled with IV Metoprolol. He was continued on Eliquis. Due to concern for NSTEMI, case discussed with Cardiology/Dr. Amador who accepted patient in transfer for [facility]. However, while awaiting transfer, he developed diarrhea and worsening leukocytosis up to 24K (WBC was normal on day of admission). C.diff toxin was negative but antigen was positive. He was started on PO Vanc.    Very limited records sent from OSH. I talked to one of the providers there today by phone who said patient did not complain of any abdominal pain, but had loose stools/diarrhea after receiving Kayexalate for hyperkalemia as well as Colace. His blood cultures from admission were negative. He last had dialysis on 5/30/24 - provider at OSH reports he gets dialysis daily at his facility? Delaney was not exchanged on admission either.       Personal History     No past medical history on file.        No past surgical history on file.    Family History: family history is not on file.     Social History:    Social History     Social History Narrative    Not on file       Medications:  Available home medication information reviewed.       Not on File    Objective  Objective     Vital Signs:           Physical Exam    Constitutional: Awake, alert  Eyes: PERRLA, sclerae anicteric, no conjunctival injection  HENT: NCAT, mucous membranes moist  Neck: Supple, no thyromegaly, no lymphadenopathy, trachea midline  Respiratory: Clear to auscultation bilaterally, nonlabored respirations   Cardiovascular: irregularly irregular, no murmurs, rubs, or gallops, palpable pedal pulses bilaterally  Gastrointestinal: Positive bowel sounds, soft, nontender, nondistended, cholecystostomy drain in place  Musculoskeletal: No bilateral ankle edema, no clubbing or cyanosis to extremities; left BKA stump in dressing  Psychiatric: Appropriate affect, cooperative  Neurologic: Oriented x 3, strength symmetric in all extremities, Cranial Nerves grossly intact to confrontation, speech clear  Skin: No rashes      Result Review:  I have personally reviewed the results from the time of this admission to 5/31/2024 14:29 EDT and agree with these findings:  [x]  Laboratory list / accordion  []  Microbiology  []  Radiology  []  EKG/Telemetry   []  Cardiology/Vascular   []  Pathology  [x]  Old records  []  Other:      LAB RESULTS:                              UA          1/10/2024    12:59 1/31/2024    22:59 4/5/2024    17:48   Urinalysis   Squamous Epithelial Cells, UA 0-2     0-2     Unable to estimate due to obscuring WBC's (UNEWBC)       Specific [place], UA 1.013     1.011     1.015       Blood, UA Negative     Moderate     Large       Leukocytes, UA Negative     Small     Large       RBC, UA 3     4-10     Unable to estimate due to obscuring WBC's (UNEWBC)       Bacteria, UA Negative     Present     Unable to estimate due to obscuring WBC's (UNEWBC)          Details          This result is from an external source.               Microbiology Results (last 10 days)       ** No results found for the last 240 hours. **            No radiology results from the last 24 hrs        Assessment & Plan  Assessment & Plan       NSTEMI (non-ST elevated myocardial  infarction)    C. difficile colitis    History of cholecystitis s/p cholecystostomy tube    ESRD (end stage renal disease) on hemodialysis    Atrial fibrillation    Essential hypertension        68 yo M with hx of HTN, PAD, left BKA, ESRD on hemodialysis, Afib on Eliquis, recent acute cholecystitis s/p cholecystostomy tube placement at , chronic urinary retention with indwelling Delaney catheter, and previous hx of C.diff who presents from [facility] due to chest pain, elevated troponin, and Afib with RVR.     Chest pain  Elevated troponin, possible NSTEMI  --Transferred to [facility] for LHC with Dr. Amador  --Troponins at OSH 18 -->97-->472  --Troponin 454 here  --Monitor on telemetry  --Dr. Amador/Cardiology consulted, tentative plan for [facility] next week  --ASA, heparin drip, beta blocker    Afib with RVR  Hx of Afib/Aflutter  --Rate controlled with Metoprolol apparently per OSH notes  --Hold Eliquis for heart cath, on heparin drip as above  --Continue Metoprolol    HFrEF  Moderate MR  --Most recent Echo on file I April 2024: EF 40%, akinetic septal, anterior, and apical walls, severely dilated LA, moderate MR  --Follows with  Cardiology, had been evaluated previously for Vanessa clip  --Repeat Echo ordered here as no Echo results sent from OSH    Leukocytosis  C.diff colitis?  --Apparently WBC increased to 24K with complaints of diarrhea, had negative C.diff toxin but positive C.diff antigen? Was started on PO Vanc at OSH; of note, I talked to the provider there who stated he had loose stools following administration of Kayexalate and Colace  --Pe chart review, he had C.diff documented on 4/6/24 from   --WBC 22K here  --Will repeat C.diff testing here and continue on PO Vanc for now  --Check blood cultures, CT A/P, and UA for further workup     Recent cholecystitis s/p cholecystostomy tube  --Request records from that hospitalization, apparently was placed at  but I cannot find records of this  --Consult  General Surgery for management of tube, discussed with Dr. Beckman  --Check CT A/P to ensure tube is in place  --Apparently already has follow up at  6/6/24 General Surgery    Chronic urinary retention  --Has Delaney in place, reported hx of urethral injury per OSH note (data deficit, no idea when this was Delaney placed), appears he follows with Urology at  and this is a chronic Delaney  --Replace Delaney here, send UA with culture due to leukocytosis    PAD  Hx of left BKA  --Continue ASA    Anemia  --Likely chronic due to renal disease  --Hb 7.5 here, had been 9 at OSH  --Check iron studies, B12, folate  --Monitor closely while on heparin drip    HTN  --Confirm and continue home meds    ESRD  --Nephrology consulted for dialysis  --Last HD 5/30/24     Hypothyroidism  --TSH significantly elevated at 91 at OSH, apparently free T4 was low but do not have that test result available to review  --Synthroid dose was increased to 75 mcg daily at OSH  --Will repeat TSH, free T4 here  --Continue Synthroid at above dose    Multiple wounds  --Wound care consult    Poor IV access  --Dr. Beckman plans to place central line this evening, appreciate his assistance      DVT prophylaxis:  Mechanical DVT prophylaxis orders are present.      Total time spent: >90 minutes  Time spent includes time reviewing chart, face-to-face time, counseling patient/family/caregiver, ordering medications/tests/procedures, communicating with other health care professionals, documenting clinical information in the electronic health record, and coordination of care.     CODE STATUS:    Code Status and Medical Interventions:   Ordered at: 05/31/24 1429     Code Status (Patient has no pulse and is not breathing):    CPR (Attempt to Resuscitate)     Medical Interventions (Patient has pulse or is breathing):    Full Support       Expected Discharge   Expected discharge date/ time has not been documented.     Genny Saeed MD  05/31/24      Electronically signed  by Genny Saeed MD at 05/31/24 1846       Current Facility-Administered Medications   Medication Dose Route Frequency Provider Last Rate Last Admin    acetaminophen (TYLENOL) tablet 650 mg  650 mg Oral Q4H PRN Genny Saeed MD   650 mg at 06/11/24 1857    apixaban (ELIQUIS) tablet 5 mg  5 mg Oral BID Otilia Tay MD   5 mg at 06/12/24 1128    aspirin EC tablet 81 mg  81 mg Oral Daily Keren Escalona PA-C   81 mg at 06/12/24 1128    collagenase ointment 1 Application  1 Application Topical Q24H Genny Saeed MD   1 Application at 06/12/24 1129    dilTIAZem CD (CARDIZEM CD) 24 hr capsule 240 mg  240 mg Oral Q24H Fred Amador MD   240 mg at 06/12/24 1128    eravacycline dihydrochloride (XERAVA) 85 mg in sodium chloride 0.9 % 250 mL (0.34 mg/mL) IVPB  85 mg Intravenous Q12H José Fuentes  mL/hr at 06/12/24 0606 85 mg at 06/12/24 0606    famotidine (PEPCID) tablet 10 mg  10 mg Oral Daily Otilia Tay MD   10 mg at 06/12/24 1128    folic acid (FOLVITE) tablet 1 mg  1 mg Oral Daily Sonya Banks MD   1 mg at 06/12/24 1128    heparin (porcine) injection 2,000 Units  2,000 Units Intracatheter PRN EfeJose MD   2,000 Units at 06/04/24 1101    lactobacillus acidophilus (RISAQUAD) capsule 1 capsule  1 capsule Oral BID José Fuentes MD   1 capsule at 06/12/24 1128    levothyroxine (SYNTHROID, LEVOTHROID) tablet 75 mcg  75 mcg Oral Q AM Genny Saeed MD   75 mcg at 06/12/24 0608    Lidocaine HCl gel (XYLOCAINE) urethral/mucosal syringe   Topical PRN Genny Saeed MD   Given at 06/01/24 0609    lidocaine HCL topical jelly USP 2% syringe 11 mL   Topical BID Genny Saeed MD   Given at 06/12/24 1129    melatonin tablet 5 mg  5 mg Oral Nightly PRN Radha Vasques APRN   5 mg at 06/11/24 2142    metoprolol succinate XL (TOPROL-XL) 24 hr tablet 100 mg  100 mg Oral Q24H Keren Escalona PA-C   100 mg at 06/11/24 1728    metoprolol tartrate  (LOPRESSOR) injection 5 mg  5 mg Intravenous Q6H PRN Hayden Weinstein MD   5 mg at 06/04/24 1020    ondansetron (ZOFRAN) injection 4 mg  4 mg Intravenous Q6H PRN Sita Santizo PA-C   4 mg at 06/08/24 0456    Pharmacy Consult - MTM   Does not apply Daily LesleyFreddie finley, RPH   Given at 06/09/24 0853    Pharmacy Consult - Pharmacy to dose   Does not apply Continuous PRN José Fuentes MD        sodium chloride 0.9 % flush 10 mL  10 mL Intravenous Q12H Genny Saeed MD   10 mL at 06/12/24 1135    sodium chloride 0.9 % flush 10 mL  10 mL Intravenous PRN Genny Saeed MD        sodium chloride 0.9 % infusion 40 mL  40 mL Intravenous PRN Genny Saeed MD        sodium zirconium cyclosilicate (LOKELMA) packet 10 g  10 g Oral Daily Arvin Curtis MD   10 g at 06/12/24 1129    vancomycin (VANCOCIN) capsule 125 mg  125 mg Oral Q6H José Fuentes MD   125 mg at 06/12/24 1128        Physician Progress Notes (most recent note)        José Fuentes MD at 06/12/24 1012              Tavo Gudino  1955  [id number]    Reason for Consultation: recurrent C diff. Osteomyelitis     History of present illness:    Patient is a 69 y.o. male, with PMH chronic cholecystitis( cholecystotomy tube) DM, ESRD on HD, HTN, PVD s/p Left  BKA.  All recent care done at [facility] in [facility].    Presented to OSH with chest pain, found to be in afib with RVR possible NSTEMi, transferred to [facility] for C.  Developed diarrhea prior to transfer, C diff toxin negative, positive antigen, started on oral Vancomycin. He had been treated with Kayexalate for hyperkalemia, as well as Colace.  Last positive C diff PCR 4/6/24 from [facility]. Noted exposed tendon RLE wound , and ulcer of right heel, sacral area, and left residual stump. He has been afebrile. Admitting labs with WBC 22, plt 249, ALT 10 AST 10, Scr 3.47, UA with TNTC WBCs, urine culture with gram negative bacilli, blood cultures no growth.  MRI Left with edema throughout soft tissue of stump, no abscess,subtle early changes of superimposed osteomyelitis distal osseous stump not excluded.  Right with diffuse soft tissue cellulitis, possible early osteomyelitis lateral malleolus, no definite evidence of osteomyelitis.  Started on Ceftriaxone Daptomycin, Flagyl, and oral Vancomycin and we were consulted for evaluation and treatment. He has had an indwelling cole catheter since April, just recently changed.  He continues to have numerous diarrhea stools and abdominal cramping.  No fever.      6/3/24: Frustrated with prolonged hospitalization but slow overnight.  Awaiting possible cardiac workup.  Legs stable.  Cole catheter in place.  Denies abdominal pain, suprapubic pain at this time.  He said he only had 1 bowel movement yesterday evening but has had 2 loose bowel movement so far today.  Overall he thinks his stool frequency has improved    6/5/2024: Resting comfortably.  Cardiac workup completed.   orthopedic surgery recommended additional debridement of the amputation site but patient refusing stating he would like to follow-up with his previous wound care providers.  Loose stools slowing down and he has less nausea    6/6/24: Stool frequency slowing down.  Minimal abdominal discomfort.  Still has biliary drain.  Tolerating IV antibiotics. Discussed discharge planning again with patient today: He has numerous needs and will be difficult to manage at home but still reluctant to go back to prior skilled nursing facility.    6/10/24: Stool frequency is overall improved.  Had a bowel movement this morning but cannot tell me if it was liquid or solid.  Only 1 bowel movement charted in past 24 hours.  No abdominal pain.  Biliary drain remains in place.  No worsening swelling or pain in either lower extremity.  Tolerating IV antibiotics through left IJ CVC.  Patient agreeable to skilled nursing facility    6/12/24: Afebrile.  Blood pressure stable.  Seen  in dialysis and complains of feeling cold during dialysis but otherwise no chills.  Still refusing  placement of tunneled line to facilitate IV antibiotics after discharge but is agreeable to taking oral antibiotics. Complex discharge planning in process.  Stool frequency improved.  No worsening swelling, pain, drainage from the lower extremity    ROS:  Afebrile and hemodynamically stable. No nausea. No rash. See above, otherwise negative.      Allergies   Allergen Reactions    Penicillins Hives     Received ceftriaxone 6/1/24    Levothyroxine Unknown - Low Severity    Hydromorphone Other (See Comments)     Confusion, encephalopathy per wife         Medication:    Current Facility-Administered Medications:     acetaminophen (TYLENOL) tablet 650 mg, 650 mg, Oral, Q4H PRN, Genny Saeed MD, 650 mg at 06/11/24 1857    apixaban (ELIQUIS) tablet 5 mg, 5 mg, Oral, BID, Otilia Tay MD, 5 mg at 06/11/24 2142    aspirin EC tablet 81 mg, 81 mg, Oral, Daily, Keren Escalona PA-C, 81 mg at 06/11/24 0831    collagenase ointment 1 Application, 1 Application, Topical, Q24H, Genny Saeed MD, 1 Application at 06/11/24 1220    dilTIAZem CD (CARDIZEM CD) 24 hr capsule 240 mg, 240 mg, Oral, Q24H, Fred Amador MD, 240 mg at 06/11/24 0831    eravacycline dihydrochloride (XERAVA) 85 mg in sodium chloride 0.9 % 250 mL (0.34 mg/mL) IVPB, 85 mg, Intravenous, Q12H, José Fuentes MD, Last Rate: 250 mL/hr at 06/12/24 0606, 85 mg at 06/12/24 0606    famotidine (PEPCID) tablet 10 mg, 10 mg, Oral, Daily, Otilia Tay MD, 10 mg at 06/11/24 0831    folic acid (FOLVITE) tablet 1 mg, 1 mg, Oral, Daily, Sonya Banks MD, 1 mg at 06/11/24 0831    heparin (porcine) injection 2,000 Units, 2,000 Units, Intracatheter, PRN, Efe, Jose Hall MD, 2,000 Units at 06/04/24 1101    lactobacillus acidophilus (RISAQUAD) capsule 1 capsule, 1 capsule, Oral, BID, José Fuentes MD, 1 capsule at 06/11/24  2142    levothyroxine (SYNTHROID, LEVOTHROID) tablet 75 mcg, 75 mcg, Oral, Q AM, Genny Saeed MD, 75 mcg at 06/12/24 0608    Lidocaine HCl gel (XYLOCAINE) urethral/mucosal syringe, , Topical, PRN, Genny Saeed MD, Given at 06/01/24 0609    lidocaine HCL topical jelly USP 2% syringe 11 mL, , Topical, BID, Genny Saeed MD, Given at 06/11/24 2142    melatonin tablet 5 mg, 5 mg, Oral, Nightly PRN, Radha Vasques APRN, 5 mg at 06/11/24 2142    metoprolol succinate XL (TOPROL-XL) 24 hr tablet 100 mg, 100 mg, Oral, Q24H, Keren Escalona PA-C, 100 mg at 06/11/24 1728    metoprolol tartrate (LOPRESSOR) injection 5 mg, 5 mg, Intravenous, Q6H PRN, Hayden Weinstein MD, 5 mg at 06/04/24 1020    ondansetron (ZOFRAN) injection 4 mg, 4 mg, Intravenous, Q6H PRN, Sita Santizo PA-C, 4 mg at 06/08/24 0456    Pharmacy Consult - [facility], , Does not apply, Daily, Freddie Sutton, [facility], Given at 06/09/24 0853    Pharmacy Consult - Pharmacy to dose, , Does not apply, Continuous PRN, José Fuentes MD    sodium chloride 0.9 % flush 10 mL, 10 mL, Intravenous, Q12H, Genny Saeed MD, 10 mL at 06/11/24 2142    sodium chloride 0.9 % flush 10 mL, 10 mL, Intravenous, PRN, Genny Saeed MD    sodium chloride 0.9 % infusion 40 mL, 40 mL, Intravenous, PRN, Genny Saeed MD    sodium zirconium cyclosilicate (LOKELMA) packet 10 g, 10 g, Oral, Daily, Arvin Curtis MD, 10 g at 06/11/24 0832    vancomycin (VANCOCIN) capsule 125 mg, 125 mg, Oral, Q6H, José Fuentes MD, 125 mg at 06/12/24 0609    Antibiotics:  Anti-Infectives (From admission, onward)      Ordered     Dose/Rate Route Frequency Start Stop    06/10/24 1030  eravacycline dihydrochloride (XERAVA) 85 mg in sodium chloride 0.9 % 250 mL (0.34 mg/mL) IVPB        Note to Pharmacy: !! Ensure final concentration of 0.2 - 0.6 mg/mL !!   Ordering Provider: José Fuentes MD    85 mg  250 mL/hr over 60 Minutes Intravenous Every 12 Hours 06/10/24  1800 06/15/24 1759    06/10/24 1037  Omadacycline Tosylate 150 MG tablet        Ordering Provider: José Fuentes MD    300 mg Oral Daily 06/10/24 0000      24 1247  fosfomycin (MONUROL) packet 3 g        Ordering Provider: José Fuentes MD    3 g Oral Once 24 1400 24 1450    24 1421  eravacycline dihydrochloride (XERAVA) 85 mg in sodium chloride 0.9 % 250 mL (0.34 mg/mL) IVPB        Note to Pharmacy: !! Ensure final concentration of 0.2 - 0.6 mg/mL !!   Ordering Provider: José Fuentes MD    85 mg  250 mL/hr over 60 Minutes Intravenous Every 12 Hours 24 1600 06/10/24 0646    24 1503  vancomycin (VANCOCIN) capsule 125 mg        Ordering Provider: José Fuentes MD    125 mg Oral Every 6 Hours Scheduled 24 1800 24 175            Physical Exam:   Vital Signs  Temp (24hrs), Av °F (36.7 °C), Min:97.6 °F (36.4 °C), Max:98.7 °F (37.1 °C)    Temp  Min: 97.6 °F (36.4 °C)  Max: 98.7 °F (37.1 °C)  BP  Min: 100/80  Max: 160/98  Pulse  Min: 63  Max: 89  Resp  Min: 16  Max: 18  SpO2  Min: 100 %  Max: 100 %    GENERAL: Awake and alert, chronically ill-appearing but not acutely toxic  HEENT: Normocephalic, atraumatic.  PERRL. EOMI. No conjunctival injection. No icterus. Edentulous   NECK: Supple   HEART: RRR; No murmur,  .   LUNGS: Clear to auscultation bilaterally without wheezing, rales, rhonchi. Normal respiratory effort. Nonlabored.   ABDOMEN: Soft,  tender  nondistended: Biliary drain in right upper quadrant  EXT: Left BKA site dressed, not directly examined. No new images in chart  Right foot lesions dressed. No new images in chart  :  Delaney catheter with clear urine  MSK: No joint effusions or erythema  SKIN: Warm and dry    NEURO: Oriented to PPT.  Motor 5/5 strength  PSYCHIATRIC: Normal insight and judgment. Cooperative with PE  Dialysis line in the right chest.  Left IJ CVC    Laboratory Data    Results from last 7 days   Lab Units  06/10/24  0440 06/09/24  0451 06/08/24  0433   WBC 10*3/mm3 7.82 7.76 7.37   HEMOGLOBIN g/dL 9.4* 9.1* 9.6*   HEMATOCRIT % 30.6* 29.0* 30.3*   PLATELETS 10*3/mm3 176 172 204     Results from last 7 days   Lab Units 06/12/24  0351   SODIUM mmol/L 134*   POTASSIUM mmol/L 5.0   CHLORIDE mmol/L 104   CO2 mmol/L 20.0*   BUN mg/dL 39*   CREATININE mg/dL 3.60*   GLUCOSE mg/dL 71   CALCIUM mg/dL 9.3                                   Estimated Creatinine Clearance: 22.6 mL/min (A) (by C-G formula based on SCr of 3.6 mg/dL (H)).      Microbiology:  Microbiology Results (last 10 days)       Procedure Component Value - Date/Time    Urine Culture - Urine, Indwelling Urethral Catheter [[id number]]  (Normal) Collected: 06/02/24 1721    Lab Status: Final result Specimen: Urine from Indwelling Urethral Catheter Updated: 06/04/24 0955     Urine Culture No growth    MRSA Screen, PCR (Inpatient) - Swab, Nares [[id number]]  (Abnormal) Collected: 06/02/24 1306    Lab Status: Final result Specimen: Swab from Nares Updated: 06/02/24 1433     MRSA PCR Positive    Narrative:      The negative predictive value of this diagnostic test is high and should only be used to consider de-escalating anti-MRSA therapy. A positive result may indicate colonization with MRSA and must be correlated clinically.    Clostridioides difficile Toxin - Stool, Per Rectum [[id number]]  (Abnormal) Collected: 06/02/24 1305    Lab Status: Final result Specimen: Stool from Per Rectum Updated: 06/02/24 1416    Narrative:      The following orders were created for panel order Clostridioides difficile Toxin - Stool, Per Rectum.  Procedure                               Abnormality         Status                     ---------                               -----------         ------                     Clostridioides difficile...[[id number]]  Abnormal            Final result                 Please view results for these tests on the individual orders.    Clostridioides difficile  Toxin, PCR - Stool, Per Rectum [[id number]]  (Abnormal) Collected: 06/02/24 1305    Lab Status: Final result Specimen: Stool from Per Rectum Updated: 06/02/24 1416     Toxigenic C. difficile by PCR Detected    Narrative:      DNA from a toxigenic strain of C.difficile has been detected.    Clostridioides difficile toxin Ag, Reflex - Stool, Per Rectum [[id number]]  (Abnormal) Collected: 06/02/24 1305    Lab Status: Final result Specimen: Stool from Per Rectum Updated: 06/02/24 1515     C.diff Toxin Ag Positive    Narrative:      DNA from a toxigenic strain of C.difficile was detected, along with the presence of free toxin. These results are suggestive of C.difficile infection.            Radiology:  Imaging Results (Last 72 Hours)       ** No results found for the last 72 hours. **          5/28, 5/31 blood cultures from [place], negative      Impression:   C diff colitis initially diagnosed in April 2024. Was given Lactulose, Stool softeners in [place] and developed worsening diarrhea, with positive antigen, negative EIA toxin so possible Colonization vs true infection.  Repeat testing at [facility] with positive PCR and positive EIA antigen.  Patient says stool frequency is overall improving, especially after switched to eravacycline. Improving   NSTEMI,  Cardiology evaluated  ESRD on HD: via tunneled HD catheter. still makes urine  Severe peripheral vascular disease: No intervention needed per vascular surgery  S/p left BKA 4/2024, with dehiscence of the amputation site and possible soft tissue infection  Possible left tibial early osteomyelitis:  by MRI.  Dr. Yeager recommended additional I&D surgery which the patient refused. CRP improving   Left lower extremity wound, with exposed tendon-  early osteomyelitis of the lateral malleolus not excluded by MRI per radiology.  Lesions dry on exam  Chronic cholecystitis, s/p percutaneous cholecystotomy tube- [facility]  Pyuria, Enterobacter bacteriuria: Had chronic indwelling  Delaney catheter that was in place for almost 2 months prior to being changed on admission 5/31.  Culture from admission with Enterobacter.  Treated with fosfomycin x 1.  Repeat culture 6/2 negative  Sacral pressure ulcers  Social barriers to care: Inadequate transportation for follow-up appointments    PLAN/RECOMMENDATIONS:   Follow CBC, CMP, CRP      Continue IV eravacycline while inpatient    - Continue PO vancomycin 125 mg 4 times daily for 14 days from admission through June 14, and then taper to 125 mg twice daily for 1 week, and then 125 mg once daily for 1 week, then follow-up in the office to reassess  - probiotic BID     Patient agreeable to skilled nursing facility placement and case management working on options.  Complex discharge planning due to numerous committees and high level of needs after discharge.    Patient still refusing tunneled line placement to facilitate IV antibiotics after discharge but is agreeable to taking oral antibiotics.  [facility] Nuzyra through June 28 (4 weeks since admission), that is preferred.  Case management and pharmacy working on prior authorization.  Alternative option if Nuzyra is cost prohibitive so oral doxycycline 100 mg twice daily through July 12 (6 weeks)     Overall prognosis is poor due to numerous comorbidities, refusal of recommended aggressive surgical debridement and IV antibiotics.  High risk for higher level amputation and recurrent sepsis.         Okay to discharge from my perspective once arrangements have been made. Remove IJ CVC prior to discharge. Schedule outpatient follow-up with me 2 weeks after discharge        José Fuentes MD  6/12/2024  10:12 EDT             Electronically signed by José Fuentes MD at 06/12/24 1017          Consult Notes (most recent note)        Camille Rangel PA-C at 06/03/24 1154        Consult Orders    1. Inpatient Vascular Surgery Consult [[id number]] ordered by  Marsha Hobson PA at 06/03/24 0805              Attestation signed by Campos Linares MD at 06/03/24 1216    This is an unfortunate 69-year-old male with multiple medical issues who was admitted for NSTEMI.  Patient has bilateral lower extremity wounds.  His left below the knee amputation stump has a large surface area wound at the distal aspect with what appears to be tension from the tibia bone likely contributing to tissue breakdown.  He has a palpable popliteal pulse on the left and from vascular perspective has adequate arterial supply.  On the right he has several different wounds including a left heel wound.  He has a palpable dorsalis pedis pulse.  This should be adequate to heal any debridement.  High risk of failure due to multiple medical issues and malnutrition.  No arterial intervention necessary at this time given physical exam and vascular studies.  Dr. Yeager is on board for wound care management.  Vascular surgery service will sign off.             Summary:Vascular Consult - [place]                 Vascular Surgery Consult Note      Reason for consultation: PAD with bilateral leg wounds  Consult requested by: Marsha Hobson PA-C    HPI:  Mr. Gudino is a 69-year-old male with medical history ESRD on HD, A-fib on Eliquis, recent acute cholecystitis status post cholecystostomy tube placement at , C. difficile, chronic urinary retention with indwelling Delaney catheter, PAD and left BKA with bilateral nonhealing wounds who presented to  from outside facility with complaints of chest pain found to have NSTEMI.  Vascular has been consulted in regards to nonhealing lower extremity wounds and evaluation of peripheral arterial disease.  Patient states that left BKA was performed at [facility] march 2024. Prior to this hospitalization he was at a rehab facility where he was receiving therapy and wound care services for nonhealing wound on his left BKA stump.  He is currently  not open to having further amputation if wound care can be attempted for his left BKA. He also has left heel ulceration that has failed to heal despite outpatient conservative measures.  Vascular workup thus far has revealed noncompressible GOLDIE on the right with tibial level disease bilaterally on arterial duplex.    Review of Systems:  General: No recent fevers and chills/sweats  HEENT: No visual changes, hearing deficits, nasal congestion, neck or throat pain  Respiratory: No shortness of breath, cough, wheezing, hemoptysis  Cardiovascular: No chest pain, palpitations, syncope  Gastrointestinal: no constipation, nausea, vomiting, diarrhea, melena, hematochezia  Genitourinary: No hematuria, dysuria, frequency  Hema/Lymph: No bleeding disorders or DVT, bruising tendency, swollen lymph glands  Endocrine: No excessive thirst, excessive hunger  Extremities: as noted above.   Musculoskeletal: as noted above.   Skin: as noted above.   Neurologic: No dizziness, history of stroke  Psychiatric: No anxiety, depression    History  Past Medical History:   Diagnosis Date    Atrial fibrillation     Chronic kidney disease (CKD), stage V     ESRD on hemodialysis     Hypertension     Metabolic acidosis     Peripheral arterial disease     Pneumothorax     Secondary hyperparathyroidism      Past Surgical History:   Procedure Laterality Date    ARTERIOVENOUS FISTULA Right     BELOW KNEE LEG AMPUTATION Left     CATARACT EXTRACTION Bilateral      Family History   Problem Relation Age of Onset    Cancer Father         Bladder     Social History     Tobacco Use    Smoking status: Never    Smokeless tobacco: Never   Vaping Use    Vaping status: Never Used   Substance Use Topics    Alcohol use: Never    Drug use: Never     Medications Prior to Admission   Medication Sig Dispense Refill Last Dose    amiodarone (PACERONE) 200 MG tablet Take 1 tablet by mouth Daily.   Unknown    apixaban (ELIQUIS) 5 MG tablet tablet Take 1 tablet by mouth 2  (Two) Times a Day.   Unknown    finasteride (PROSCAR) 5 MG tablet Take 1 tablet by mouth Daily.   Unknown    hydrALAZINE (APRESOLINE) 25 MG tablet Take 1 tablet by mouth 3 (Three) Times a Day.   Unknown    levothyroxine (SYNTHROID, LEVOTHROID) 25 MCG tablet Take 1 tablet by mouth Every Morning.   Unknown    sodium bicarbonate 650 MG tablet Take 2 tablets by mouth 2 (Two) Times a Day.   Unknown    tamsulosin (FLOMAX) 0.4 MG capsule 24 hr capsule Take 1 capsule by mouth Daily.   Unknown    torsemide (DEMADEX) 100 MG tablet Take 1 tablet by mouth Daily.   Unknown    traZODone (DESYREL) 50 MG tablet Take 1 tablet by mouth Every Night.   Unknown     Allergies:  Levothyroxine, Penicillins, and Hydromorphone    Vital Signs  Temp:  [97.4 °F (36.3 °C)-97.9 °F (36.6 °C)] 97.9 °F (36.6 °C)  Heart Rate:  [] 86  Resp:  [18] 18  BP: (126-153)/() 142/95    Physical Exam:  General: no acute distress, alert and oriented x3, elderly  male lying in bed, chronically ill-appearing  HEENT: normocephalic, sclerae anicteric, extraocular movements intact, ears and nose externally normal, oral mucosa moist  Chest:  normal respiratory effort; clear to auscultation BILATERALLY  Abdomen:  soft, non-distended, + cholecystostomy tube with bilious output  Right lower extremity: Warm, distal leg hyperpigmentation, full-thickness plantar heel ulceration, motor and sensory intact  Left lower extremity: BKA with distal stump superficial ulceration with posterior eschar, warm, motor and sensory intact, no edema  Vascular: Palpable femoral pulses bilaterally, palpable right DPA pulse and left popliteal pulse  Neuro:  cranial nerves II-XII grossly intact, following commands appropriately, speech normal  Psychiatric:  cooperative, appropriate mood and affect for situation    Results Review:    I reviewed the patient's new clinical results.  I reviewed the patient's new imaging results and agree with the interpretation.    Assessment  and Plan:    Mr. Gudino is a 69-year-old chronically ill male with peripheral arterial disease with left BKA wound ([facility], 2024) and right heel ulceration.  Arterial studies reviewed and demonstrate tibial level disease bilaterally but no overt occlusion.  Exam significant for palpable right DPA and left popliteal pulse.  He should have adequate arterial flow for wounds to heal. However, he is at high risk of needing further amputation and has a poor healing prognosis given his renal disease, malnutrition and nonhealing chronic wounds. No vascular intervention recommended.  Continue aspirin and start statin.  Anticoagulation per cardiology for A-fib.      Patient was seen in conjunction with Dr. Linares who assisted with directing the above plan of care. Vascular surgery signing off. Please contact the Vascular Surgery with any questions/concerns.     I discussed the patients findings and my recommendations with patient and nursing staff.       Camille Rangel PA-C  24  11:54 EDT      Electronically signed by Campos Linares MD at 24 1216          Physical Therapy Notes (most recent note)        Camille Becerra at 24 1020  Version 1 of 1         Patient Name: Tavo Gudino  : 1955    MRN: [id number]                              Today's Date: 2024       Admit Date: 2024    Visit Dx: No diagnosis found.  Patient Active Problem List   Diagnosis    NSTEMI (non-ST elevated myocardial infarction)    C. difficile colitis    History of cholecystitis s/p cholecystostomy tube    ESRD (end stage renal disease) on hemodialysis    Atrial fibrillation    Essential hypertension    Moderate malnutrition    Soft tissue infection    Acute osteomyelitis of left tibia     Past Medical History:   Diagnosis Date    Atrial fibrillation     Chronic kidney disease (CKD), stage V     ESRD on hemodialysis     Hypertension     Metabolic acidosis     Peripheral arterial disease      Pneumothorax     Secondary hyperparathyroidism      Past Surgical History:   Procedure Laterality Date    ARTERIOVENOUS FISTULA Right     BELOW KNEE LEG AMPUTATION Left     CATARACT EXTRACTION Bilateral       General Information       Row Name 06/07/24 1031          Physical Therapy Time and Intention    Document Type therapy note (daily note)  -ML     Mode of Treatment physical therapy  -       Row Name 06/07/24 1031          General Information    Patient Profile Reviewed yes  -ML     Existing Precautions/Restrictions fall;other (see comments)  sacral and LE wounds, drain, cole, dialysis cath, L BKA  -ML     Barriers to Rehab medically complex;previous functional deficit;physical barrier  -ML       Row Name 06/07/24 1031          Cognition    Orientation Status (Cognition) oriented x 3  -ML       Row Name 06/07/24 1031          Safety Issues, Functional Mobility    Safety Issues Affecting Function (Mobility) insight into deficits/self-awareness;safety precaution awareness  -ML     Impairments Affecting Function (Mobility) range of motion (ROM);strength;pain;coordination;endurance/activity tolerance;sensation/sensory awareness;postural/trunk control;balance  -ML               User Key  (r) = Recorded By, (t) = Taken By, (c) = Cosigned By      Initials Name Provider Type    ML Camille Becerra Physical Therapist                   Mobility       Row Name 06/07/24 1032          Bed Mobility    Bed Mobility rolling right;rolling left  -ML     Rolling Left [place] (Bed Mobility) minimum assist (75% patient effort);1 person assist  -ML     Rolling Right [place] (Bed Mobility) minimum assist (75% patient effort);1 person assist  -ML     Assistive Device (Bed Mobility) bed rails  -       Row Name 06/07/24 1032          Bed-Chair Transfer    Bed-Chair [place] (Transfers) dependent (less than 25% patient effort)  -ML     Assistive Device (Bed-Chair Transfers) lift device  -ML               User Key  (r) =  Recorded By, (t) = Taken By, (c) = Cosigned By      Initials Name Provider Type    ML Camille Becerra Physical Therapist                   Obj/Interventions       Row Name 06/07/24 1033          Motor Skills    Therapeutic Exercise hip;knee  -ML       Row Name 06/07/24 1033          Hip (Therapeutic Exercise)    Hip (Therapeutic Exercise) strengthening exercise  -ML     Hip Strengthening (Therapeutic Exercise) bilateral;marching while seated;other (see comments)  12 repetitions each  -ML       Row Name 06/07/24 1033          Knee (Therapeutic Exercise)    Knee (Therapeutic Exercise) strengthening exercise  -ML     Knee Strengthening (Therapeutic Exercise) bilateral;LAQ (long arc quad);15 repititions  -ML       Row Name 06/07/24 1033          Balance    Balance Assessment sitting static balance  -ML     Static Sitting Balance standby assist  -ML     Position, Sitting Balance supported;sitting in chair  -ML     Balance Interventions sitting;supported  -ML               User Key  (r) = Recorded By, (t) = Taken By, (c) = Cosigned By      Initials Name Provider Type    ML Camille Becerra Physical Therapist                   Goals/Plan    No documentation.                  Clinical Impression       Row Name 06/07/24 1034          Pain    Pretreatment Pain Rating 5/10  -ML     Posttreatment Pain Rating 5/10  -ML     Pain Location generalized  -ML     Pain Location - buttock  sacral area  -ML     Pain Intervention(s) Repositioned;Ambulation/increased activity  -ML       Row Name 06/07/24 1034          Plan of Care Review    Plan of Care Reviewed With patient  -ML     Progress no change  -ML     Outcome Evaluation Patient agreeable to trial sitting in chair. Patient required additional time to complete mobility due to multiple wounds. Patient with complaints of dizziness with positioning in chair and legs in dependent position. Patient reclined with improvement in symptoms. Patient continues to present below baseline for mobility  and would continue to benefit from skilled PT to address functional mobility deficits. Continue current PT POC.  -ML       Row Name 06/07/24 1034          Vital Signs    Pre Systolic BP Rehab 155  -ML     Pre Treatment Diastolic BP 91  -ML     Intra Systolic BP Rehab 118  -ML     Intra Treatment Diastolic BP 80  -ML     Pre Patient Position Supine  -ML     Intra Patient Position Side Lying  -ML     Post Patient Position Sitting  -ML       [facility] Name 06/07/24 1034          Positioning and Restraints    Pre-Treatment Position in bed  -ML     Post Treatment Position chair  -ML     In Chair notified nsg;reclined;call light within reach;encouraged to call for assist;exit alarm on;waffle cushion;on mechanical lift sling;legs elevated;R heel elevated;with other staff  nursing wound care in room at end of treatment session  -ML               User Key  (r) = Recorded By, (t) = Taken By, (c) = Cosigned By      Initials Name Provider Type    Camille Dunn Physical Therapist                   Outcome Measures       [facility] Name 06/07/24 1039          How much help from another person do you currently need...    Turning from your back to your side while in flat bed without using bedrails? 3  -ML     Moving from lying on back to sitting on the side of a flat bed without bedrails? 2  -ML     Moving to and from a bed to a chair (including a wheelchair)? 1  -ML     Standing up from a chair using your arms (e.g., wheelchair, bedside chair)? 1  -ML     Climbing 3-5 steps with a railing? 1  -ML     To walk in hospital room? 1  -ML     AM-PAC 6 Clicks Score (PT) 9  -ML     Highest Level of Mobility Goal 3 --> Sit at edge of bed  -ML       Row Name 06/07/24 1039          Functional Assessment    Outcome Measure Options AM-PAC 6 Clicks Basic Mobility (PT)  -ML               User Key  (r) = Recorded By, (t) = Taken By, (c) = Cosigned By      Initials Name Provider Type    Camille Dunn Physical Therapist                                  Physical Therapy Education       Title: PT OT SLP Therapies (In Progress)       Topic: Physical Therapy (In Progress)       Point: Mobility training (Done)       Learning Progress Summary             Patient Acceptance, E, VU,NR by ML at 6/7/2024 1039    Acceptance, E, VU,NR by ML at 6/3/2024 1610   Family Acceptance, E, VU,NR by ML at 6/3/2024 1610                         Point: Home exercise program (Done)       Learning Progress Summary             Patient Acceptance, E, VU,NR by ML at 6/7/2024 1039                         Point: Body mechanics (Not Started)       Learner Progress:  Not documented in this visit.              Point: Precautions (Done)       Learning Progress Summary             Patient Acceptance, E, VU,NR by ML at 6/7/2024 1039    Acceptance, E, VU,NR by ML at 6/3/2024 1610   Family Acceptance, E, VU,NR by ML at 6/3/2024 1610                                         User Key       Initials Effective Dates Name Provider Type Discipline     04/22/21 -  Camille Becerra Physical Therapist PT                  PT Recommendation and Plan  Planned Therapy Interventions (PT): balance training, bed mobility training, home exercise program, patient/family education, postural re-education, strengthening, stretching, transfer training  Plan of Care Reviewed With: patient  Progress: no change  Outcome Evaluation: Patient agreeable to trial sitting in chair. Patient required additional time to complete mobility due to multiple wounds. Patient with complaints of dizziness with positioning in chair and legs in dependent position. Patient reclined with improvement in symptoms. Patient continues to present below baseline for mobility and would continue to benefit from skilled PT to address functional mobility deficits. Continue current PT POC.     Time Calculation:         PT Charges       Row Name 06/07/24 1040             Time Calculation    Start Time 1020  -ML      PT Received On 06/07/24  -ML         Timed  Charges    18183 - PT Therapeutic Activity Minutes 30  -ML         Total Minutes    Timed Charges Total Minutes 30  -ML       Total Minutes 30  -ML                User Key  (r) = Recorded By, (t) = Taken By, (c) = Cosigned By      Initials Name Provider Type    Camille Dunn Physical Therapist                  Therapy Charges for Today       Code Description Service Date Service Provider Modifiers Qty    [id number] HC PT THERAPEUTIC ACT EA 15 MIN 2024 Camille Becerra GP 2            PT G-Codes  Outcome Measure Options: AM-PAC 6 Clicks Basic Mobility (PT)  AM-PAC 6 Clicks Score (PT): 9  AM-PAC 6 Clicks Score (OT): 10  PT Discharge Summary  Anticipated Discharge Disposition (PT): skilled nursing facility    Camille Becerra  2024      Electronically signed by Camille Becerra at 24 1040          Occupational Therapy Notes (most recent note)        Mariah Foss, OT at 24 0989          Patient Name: Tavo Gudino  : 1955    MRN: [id number]                              Today's Date: 2024       Admit Date: 2024    Visit Dx: No diagnosis found.  Patient Active Problem List   Diagnosis    NSTEMI (non-ST elevated myocardial infarction)    C. difficile colitis    History of cholecystitis s/p cholecystostomy tube    ESRD (end stage renal disease) on hemodialysis    Atrial fibrillation    Essential hypertension    Moderate malnutrition    Soft tissue infection    Acute osteomyelitis of left tibia     Past Medical History:   Diagnosis Date    Atrial fibrillation     Chronic kidney disease (CKD), stage V     ESRD on hemodialysis     Hypertension     Metabolic acidosis     Peripheral arterial disease     Pneumothorax     Secondary hyperparathyroidism      Past Surgical History:   Procedure Laterality Date    ARTERIOVENOUS FISTULA Right     BELOW KNEE LEG AMPUTATION Left     CATARACT EXTRACTION Bilateral       General Information       Row Name 24 1147          OT Time and Intention    Document Type  therapy note (daily note)  -     Mode of Treatment occupational therapy  -       Row Name 06/07/24 1147          General Information    Patient Profile Reviewed yes  -     Existing Precautions/Restrictions fall;other (see comments)  sacral and LE wounds, drain, cole, dialysis cath, L BKA  -     Barriers to Rehab medically complex;previous functional deficit;physical barrier  -       Row Name 06/07/24 1147          Cognition    Orientation Status (Cognition) oriented x 3  -       Row Name 06/07/24 1147          Safety Issues, Functional Mobility    Safety Issues Affecting Function (Mobility) insight into deficits/self-awareness;judgment;problem-solving;safety precaution awareness;safety precautions follow-through/compliance  -     Impairments Affecting Function (Mobility) range of motion (ROM);strength;pain;coordination;endurance/activity tolerance;sensation/sensory awareness;postural/trunk control;balance  -               User Key  (r) = Recorded By, (t) = Taken By, (c) = Cosigned By      Initials Name Provider Type     Mariah Foss OT Occupational Therapist                     Mobility/ADL's       Row Name 06/07/24 1148          Bed Mobility    Bed Mobility scooting/bridging  -     Rolling Left [place] (Bed Mobility) minimum assist (75% patient effort);1 person assist  -     Rolling Right [place] (Bed Mobility) minimum assist (75% patient effort);1 person assist  -     Scooting/Bridging [place] (Bed Mobility) dependent (less than 25% patient effort);2 person assist;verbal cues;other (see comments)  Lift device used to avoid sheering  -     Assistive Device (Bed Mobility) bed rails  -       Row Name 06/07/24 1148          Bed-Chair Transfer    Bed-Chair [place] (Transfers) dependent (less than 25% patient effort)  -     Assistive Device (Bed-Chair Transfers) lift device  -               User Key  (r) = Recorded By, (t) = Taken By, (c) = Cosigned By      Initials  Name Provider Type    Mariah Duran OT Occupational Therapist                   Obj/Interventions       Row Name 06/07/24 1151          Balance    Static Sitting Balance supervision  -     Position, Sitting Balance supported;sitting in chair  -               User Key  (r) = Recorded By, (t) = Taken By, (c) = Cosigned By      Initials Name Provider Type    Mariah Duran OT Occupational Therapist                   Goals/Plan    No documentation.                  Clinical Impression       Row Name 06/07/24 1151          Pain Assessment    Pretreatment Pain Rating 5/10  -KL     Posttreatment Pain Rating 5/10  -KL     Pain Location generalized  -     Pain Location - buttock  -       Row Name 06/07/24 1151          Plan of Care Review    Plan of Care Reviewed With patient  -     Progress no change  -     Outcome Evaluation Pt required increased encouragement/education for tx participation and purpose of OOB activity. Further therapeutic activity limited by symptomatic hypotension w/ position changes. Increased time required d/t complexity of multiple wounds and overall weakness. Will continue POC to progress towards PLOF. d/c rec is SNF.  -       Row Name 06/07/24 1151          Therapy Plan Review/Discharge Plan (OT)    Anticipated Discharge Disposition (OT) skilled nursing facility  -       Row Name 06/07/24 1151          Vital Signs    Pre Systolic BP Rehab 155  -KL     Pre Treatment Diastolic BP 91  -KL     Post Systolic BP Rehab 118  -KL     Post Treatment Diastolic BP 80  -KL     Pretreatment Heart Rate (beats/min) 89  -KL     Intratreatment Heart Rate (beats/min) 122  -KL     Posttreatment Heart Rate (beats/min) 91  -KL     Pre Patient Position Supine  -KL     Intra Patient Position Sitting  -KL     Post Patient Position Sitting  -       Row Name 06/07/24 1151          Positioning and Restraints    Pre-Treatment Position in bed  -KL     Post Treatment Position chair  -     In Chair notified  nsg;reclined;sitting;call light within reach;encouraged to call for assist;exit alarm on;waffle cushion;RUE elevated;legs elevated;on mechanical lift sling  -KL               User Key  (r) = Recorded By, (t) = Taken By, (c) = Cosigned By      Initials Name Provider Type    Mariah Duran OT Occupational Therapist                   Outcome Measures       Row Name 06/07/24 1157          How much help from another is currently needed...    Putting on and taking off regular lower body clothing? 1  -KL     Bathing (including washing, rinsing, and drying) 2  -KL     Toileting (which includes using toilet bed pan or urinal) 1  -KL     Putting on and taking off regular upper body clothing 2  -KL     Taking care of personal grooming (such as brushing teeth) 3  -KL     Eating meals 3  -KL     AM-PAC 6 Clicks Score (OT) 12  -KL       Row Name 06/07/24 1039          How much help from another person do you currently need...    Turning from your back to your side while in flat bed without using bedrails? 3  -ML     Moving from lying on back to sitting on the side of a flat bed without bedrails? 2  -ML     Moving to and from a bed to a chair (including a wheelchair)? 1  -ML     Standing up from a chair using your arms (e.g., wheelchair, bedside chair)? 1  -ML     Climbing 3-5 steps with a railing? 1  -ML     To walk in hospital room? 1  -ML     AM-PAC 6 Clicks Score (PT) 9  -ML     Highest Level of Mobility Goal 3 --> Sit at edge of bed  -ML       Row Name 06/07/24 1157 06/07/24 1039       Functional Assessment    Outcome Measure Options AM-PAC 6 Clicks Daily Activity (OT)  - AM-PAC 6 Clicks Basic Mobility (PT)  -ML              User Key  (r) = Recorded By, (t) = Taken By, (c) = Cosigned By      Initials Name Provider Type    Camille Dunn Physical Therapist    Mariah Duran OT Occupational Therapist                    Occupational Therapy Education       Title: PT OT SLP Therapies (In Progress)       Topic: Occupational  Therapy (In Progress)       Point: ADL training (Done)       Description:   Instruct learner(s) on proper safety adaptation and remediation techniques during self care or transfers.   Instruct in proper use of assistive devices.                  Learning Progress Summary             Patient Acceptance, E, VU by GARRY at 6/3/2024 1544    Comment: reason for consult, evaluation results   Family Acceptance, E, VU by GARRY at 6/3/2024 1544    Comment: reason for consult, evaluation results                         Point: Home exercise program (Not Started)       Description:   Instruct learner(s) on appropriate technique for monitoring, assisting and/or progressing therapeutic exercises/activities.                  Learner Progress:  Not documented in this visit.              Point: Precautions (In Progress)       Description:   Instruct learner(s) on prescribed precautions during self-care and functional transfers.                  Learning Progress Summary             Patient Acceptance, E, NR by  at 6/7/2024 1157                         Point: Body mechanics (In Progress)       Description:   Instruct learner(s) on proper positioning and spine alignment during self-care, functional mobility activities and/or exercises.                  Learning Progress Summary             Patient Acceptance, E, NR by  at 6/7/2024 1157                                         User Key       Initials Effective Dates Name Provider Type Discipline    GARRY 07/11/23 -  Na Pitts, OT Occupational Therapist OT     02/05/24 -  Mariah Foss OT Occupational Therapist OT                  OT Recommendation and Plan     Plan of Care Review  Plan of Care Reviewed With: patient  Progress: no change  Outcome Evaluation: Pt required increased encouragement/education for tx participation and purpose of OOB activity. Further therapeutic activity limited by symptomatic hypotension w/ position changes. Increased time required d/t complexity of multiple  wounds and overall weakness. Will continue POC to progress towards PLOF. d/c rec is SNF.     Time Calculation:         Time Calculation- OT       Row Name 06/07/24 1158             Time Calculation- OT    OT Start Time 0930  -KL      OT Received On 06/07/24  -KL         Timed Charges    76127 - OT Therapeutic Activity Minutes 30  -KL         Total Minutes    Timed Charges Total Minutes 30  -KL       Total Minutes 30  -KL                User Key  (r) = Recorded By, (t) = Taken By, (c) = Cosigned By      Initials Name Provider Type    Mariah Duran OT Occupational Therapist                  Therapy Charges for Today       Code Description Service Date Service Provider Modifiers Qty    [id number]  OT THERAPEUTIC ACT EA 15 MIN 6/7/2024 Mariah Foss OT GO 2                 Mariah Foss OT  6/7/2024    Electronically signed by Mariah Foss OT at 06/07/24 1159

## 2024-06-12 NOTE — CASE MANAGEMENT/SOCIAL WORK
Continued Stay Note  [facility]     Patient Name: Tavo Gudino  MRN: [id number]  Today's Date: 6/12/2024    Admit Date: 5/31/2024    Plan:    Discharge Plan       Row Name 06/12/24 1334       Plan    Plan SW    Plan Comments 'er assisting with locating inhouse HD options for patient. [facility] with [facility]; fax[facility], [facility], [facility]              Discharge Codes    No documentation.                 Expected Discharge Date and Time       Expected Discharge Date Expected Discharge Time    Jun 12, 2024               JACQUELYN Cardoza (Kay)

## 2024-06-12 NOTE — PROGRESS NOTES
Tavo Gudino  1955  [id number]    Reason for Consultation: recurrent C diff. Osteomyelitis     History of present illness:    Patient is a 69 y.o. male, with PMH chronic cholecystitis( cholecystotomy tube) DM, ESRD on HD, HTN, PVD s/p Left  BKA.  All recent care done at [facility] in [facility].    Presented to OSH with chest pain, found to be in afib with RVR possible NSTEMi, transferred to [facility] [place], operated by [facility] for LHC.  Developed diarrhea prior to transfer, C diff toxin negative, positive antigen, started on oral Vancomycin. He had been treated with Kayexalate for hyperkalemia, as well as Colace.  Last positive C diff PCR 4/6/24 from [facility]. Noted exposed tendon RLE wound , and ulcer of right heel, sacral area, and left residual stump. He has been afebrile. Admitting labs with WBC 22, plt 249, ALT 10 AST 10, Scr 3.47, UA with TNTC WBCs, urine culture with gram negative bacilli, blood cultures no growth. MRI Left with edema throughout soft tissue of stump, no abscess,subtle early changes of superimposed osteomyelitis distal osseous stump not excluded.  Right with diffuse soft tissue cellulitis, possible early osteomyelitis lateral malleolus, no definite evidence of osteomyelitis.  Started on Ceftriaxone Daptomycin, Flagyl, and oral Vancomycin and we were consulted for evaluation and treatment. He has had an indwelling cole catheter since April, just recently changed.  He continues to have numerous diarrhea stools and abdominal cramping.  No fever.      6/3/24: Frustrated with prolonged hospitalization but slow overnight.  Awaiting possible cardiac workup.  Legs stable.  Cole catheter in place.  Denies abdominal pain, suprapubic pain at this time.  He said he only had 1 bowel movement yesterday evening but has had 2 loose bowel movement so far today.  Overall he thinks his stool frequency has improved    6/5/2024: Resting comfortably.  Cardiac workup completed.   orthopedic surgery recommended  additional debridement of the amputation site but patient refusing stating he would like to follow-up with his previous wound care providers.  Loose stools slowing down and he has less nausea    6/6/24: Stool frequency slowing down.  Minimal abdominal discomfort.  Still has biliary drain.  Tolerating IV antibiotics. Discussed discharge planning again with patient today: He has numerous needs and will be difficult to manage at home but still reluctant to go back to prior skilled nursing facility.    6/10/24: Stool frequency is overall improved.  Had a bowel movement this morning but cannot tell me if it was liquid or solid.  Only 1 bowel movement charted in past 24 hours.  No abdominal pain.  Biliary drain remains in place.  No worsening swelling or pain in either lower extremity.  Tolerating IV antibiotics through left IJ CVC.  Patient agreeable to skilled nursing facility    6/12/24: Afebrile.  Blood pressure stable.  Seen in dialysis and complains of feeling cold during dialysis but otherwise no chills.  Still refusing  placement of tunneled line to facilitate IV antibiotics after discharge but is agreeable to taking oral antibiotics. Complex discharge planning in process.  Stool frequency improved.  No worsening swelling, pain, drainage from the lower extremity    ROS:  Afebrile and hemodynamically stable. No nausea. No rash. See above, otherwise negative.      Allergies   Allergen Reactions    Penicillins Hives     Received ceftriaxone 6/1/24    Levothyroxine Unknown - Low Severity    Hydromorphone Other (See Comments)     Confusion, encephalopathy per wife         Medication:    Current Facility-Administered Medications:     acetaminophen (TYLENOL) tablet 650 mg, 650 mg, Oral, Q4H PRN, Genny Saeed MD, 650 mg at 06/11/24 1857    apixaban (ELIQUIS) tablet 5 mg, 5 mg, Oral, BID, Otilia Tay MD, 5 mg at 06/11/24 2142    aspirin EC tablet 81 mg, 81 mg, Oral, Daily, Keren Escalona PA-C, 81  mg at 06/11/24 0831    collagenase ointment 1 Application, 1 Application, Topical, Q24H, Genny Saeed MD, 1 Application at 06/11/24 1220    dilTIAZem CD (CARDIZEM CD) 24 hr capsule 240 mg, 240 mg, Oral, Q24H, Fred Amador MD, 240 mg at 06/11/24 0831    eravacycline dihydrochloride (XERAVA) 85 mg in sodium chloride 0.9 % 250 mL (0.34 mg/mL) IVPB, 85 mg, Intravenous, Q12H, José Fuentes MD, Last Rate: 250 mL/hr at 06/12/24 0606, 85 mg at 06/12/24 0606    famotidine (PEPCID) tablet 10 mg, 10 mg, Oral, Daily, Otilia Tay MD, 10 mg at 06/11/24 0831    folic acid (FOLVITE) tablet 1 mg, 1 mg, Oral, Daily, Sonya Banks MD, 1 mg at 06/11/24 0831    heparin (porcine) injection 2,000 Units, 2,000 Units, Intracatheter, PRN, Efe, Jose Hall MD, 2,000 Units at 06/04/24 1101    lactobacillus acidophilus (RISAQUAD) capsule 1 capsule, 1 capsule, Oral, BID, José Fuentes MD, 1 capsule at 06/11/24 2142    levothyroxine (SYNTHROID, LEVOTHROID) tablet 75 mcg, 75 mcg, Oral, Q AM, Genny Saeed MD, 75 mcg at 06/12/24 0608    Lidocaine HCl gel (XYLOCAINE) urethral/mucosal syringe, , Topical, PRN, Genny Saeed MD, Given at 06/01/24 0609    lidocaine HCL topical jelly USP 2% syringe 11 mL, , Topical, BID, Genny Saeed MD, Given at 06/11/24 2142    melatonin tablet 5 mg, 5 mg, Oral, Nightly PRN, Radha Vasques, APRN, 5 mg at 06/11/24 2142    metoprolol succinate XL (TOPROL-XL) 24 hr tablet 100 mg, 100 mg, Oral, Q24H, Keren Escalona PA-C, 100 mg at 06/11/24 1728    metoprolol tartrate (LOPRESSOR) injection 5 mg, 5 mg, Intravenous, Q6H PRN, Hayden Weinstein MD, 5 mg at 06/04/24 1020    ondansetron (ZOFRAN) injection 4 mg, 4 mg, Intravenous, Q6H PRN, Sita Santizo PA-C, 4 mg at 06/08/24 0456    Pharmacy Consult - MTM, , Does not apply, Daily, Freddie Sutton, [facility], Given at 06/09/24 0853    Pharmacy Consult - Pharmacy to dose, , Does not apply, Continuous PRN, José Fuentes  MD KATY    sodium chloride 0.9 % flush 10 mL, 10 mL, Intravenous, Q12H, Genny Saeed MD, 10 mL at 24 2142    sodium chloride 0.9 % flush 10 mL, 10 mL, Intravenous, PRN, Genny Saeed MD    sodium chloride 0.9 % infusion 40 mL, 40 mL, Intravenous, PRN, Genny Saeed MD    sodium zirconium cyclosilicate (LOKELMA) packet 10 g, 10 g, Oral, Daily, Arvin Curtis MD, 10 g at 24 0832    vancomycin (VANCOCIN) capsule 125 mg, 125 mg, Oral, Q6H, José Fuentes MD, 125 mg at 24 0609    Antibiotics:  Anti-Infectives (From admission, onward)      Ordered     Dose/Rate Route Frequency Start Stop    06/10/24 1030  eravacycline dihydrochloride (XERAVA) 85 mg in sodium chloride 0.9 % 250 mL (0.34 mg/mL) IVPB        Note to Pharmacy: !! Ensure final concentration of 0.2 - 0.6 mg/mL !!   Ordering Provider: José Fuentes MD    85 mg  250 mL/hr over 60 Minutes Intravenous Every 12 Hours 06/10/24 1800 06/15/24 1759    06/10/24 1037  Omadacycline Tosylate 150 MG tablet        Ordering Provider: José Fuentes MD    300 mg Oral Daily 06/10/24 0000      24 1247  fosfomycin (MONUROL) packet 3 g        Ordering Provider: José Fuentes MD    3 g Oral Once 24 1400 24 1450    24 1421  eravacycline dihydrochloride (XERAVA) 85 mg in sodium chloride 0.9 % 250 mL (0.34 mg/mL) IVPB        Note to Pharmacy: !! Ensure final concentration of 0.2 - 0.6 mg/mL !!   Ordering Provider: José Fuentes MD    85 mg  250 mL/hr over 60 Minutes Intravenous Every 12 Hours 24 1600 06/10/24 0646    24 1503  vancomycin (VANCOCIN) capsule 125 mg        Ordering Provider: José Fuentes MD    125 mg Oral Every 6 Hours Scheduled 24 1800 24 7607            Physical Exam:   Vital Signs  Temp (24hrs), Av °F (36.7 °C), Min:97.6 °F (36.4 °C), Max:98.7 °F (37.1 °C)    Temp  Min: 97.6 °F (36.4 °C)  Max: 98.7 °F (37.1 °C)  BP  Min: 100/80  Max: 160/98  Pulse   Min: 63  Max: 89  Resp  Min: 16  Max: 18  SpO2  Min: 100 %  Max: 100 %    GENERAL: Awake and alert, chronically ill-appearing but not acutely toxic  HEENT: Normocephalic, atraumatic.  PERRL. EOMI. No conjunctival injection. No icterus. Edentulous   NECK: Supple   HEART: RRR; No murmur,  .   LUNGS: Clear to auscultation bilaterally without wheezing, rales, rhonchi. Normal respiratory effort. Nonlabored.   ABDOMEN: Soft,  tender  nondistended: Biliary drain in right upper quadrant  EXT: Left BKA site dressed, not directly examined. No new images in chart  Right foot lesions dressed. No new images in chart  :  Delaney catheter with clear urine  MSK: No joint effusions or erythema  SKIN: Warm and dry    NEURO: Oriented to PPT.  Motor 5/5 strength  PSYCHIATRIC: Normal insight and judgment. Cooperative with PE  Dialysis line in the right chest.  Left IJ CVC    Laboratory Data    Results from last 7 days   Lab Units 06/10/24  0440 06/09/24  0451 06/08/24  0433   WBC 10*3/mm3 7.82 7.76 7.37   HEMOGLOBIN g/dL 9.4* 9.1* 9.6*   HEMATOCRIT % 30.6* 29.0* 30.3*   PLATELETS 10*3/mm3 176 172 204     Results from last 7 days   Lab Units 06/12/24  0351   SODIUM mmol/L 134*   POTASSIUM mmol/L 5.0   CHLORIDE mmol/L 104   CO2 mmol/L 20.0*   BUN mg/dL 39*   CREATININE mg/dL 3.60*   GLUCOSE mg/dL 71   CALCIUM mg/dL 9.3                                   Estimated Creatinine Clearance: 22.6 mL/min (A) (by C-G formula based on SCr of 3.6 mg/dL (H)).      Microbiology:  Microbiology Results (last 10 days)       Procedure Component Value - Date/Time    Urine Culture - Urine, Indwelling Urethral Catheter [[id number]]  (Normal) Collected: 06/02/24 1721    Lab Status: Final result Specimen: Urine from Indwelling Urethral Catheter Updated: 06/04/24 0955     Urine Culture No growth    MRSA Screen, PCR (Inpatient) - Swab, Nares [[id number]]  (Abnormal) Collected: 06/02/24 1306    Lab Status: Final result Specimen: Swab from Nares Updated: 06/02/24  1433     MRSA PCR Positive    Narrative:      The negative predictive value of this diagnostic test is high and should only be used to consider de-escalating anti-MRSA therapy. A positive result may indicate colonization with MRSA and must be correlated clinically.    Clostridioides difficile Toxin - Stool, Per Rectum [[id number]]  (Abnormal) Collected: 06/02/24 1305    Lab Status: Final result Specimen: Stool from Per Rectum Updated: 06/02/24 1416    Narrative:      The following orders were created for panel order Clostridioides difficile Toxin - Stool, Per Rectum.  Procedure                               Abnormality         Status                     ---------                               -----------         ------                     Clostridioides difficile...[[id number]]  Abnormal            Final result                 Please view results for these tests on the individual orders.    Clostridioides difficile Toxin, PCR - Stool, Per Rectum [[id number]]  (Abnormal) Collected: 06/02/24 1305    Lab Status: Final result Specimen: Stool from Per Rectum Updated: 06/02/24 1416     Toxigenic C. difficile by PCR Detected    Narrative:      DNA from a toxigenic strain of C.difficile has been detected.    Clostridioides difficile toxin Ag, Reflex - Stool, Per Rectum [[id number]]  (Abnormal) Collected: 06/02/24 1305    Lab Status: Final result Specimen: Stool from Per Rectum Updated: 06/02/24 1515     C.diff Toxin Ag Positive    Narrative:      DNA from a toxigenic strain of C.difficile was detected, along with the presence of free toxin. These results are suggestive of C.difficile infection.            Radiology:  Imaging Results (Last 72 Hours)       ** No results found for the last 72 hours. **          5/28, 5/31 blood cultures from [facility], negative      Impression:   C diff colitis initially diagnosed in April 2024. Was given Lactulose, Stool softeners in [facility] and developed worsening diarrhea, with positive  antigen, negative EIA toxin so possible Colonization vs true infection.  Repeat testing at [facility] with positive PCR and positive EIA antigen.  Patient says stool frequency is overall improving, especially after switched to eravacycline. Improving   NSTEMI,  Cardiology evaluated  ESRD on HD: via tunneled HD catheter. still makes urine  Severe peripheral vascular disease: No intervention needed per vascular surgery  S/p left BKA 4/2024, with dehiscence of the amputation site and possible soft tissue infection  Possible left tibial early osteomyelitis:  by MRI.  Dr. Yeager recommended additional I&D surgery which the patient refused. CRP improving   Left lower extremity wound, with exposed tendon-  early osteomyelitis of the lateral malleolus not excluded by MRI per radiology.  Lesions dry on exam  Chronic cholecystitis, s/p percutaneous cholecystotomy tube- [facility]  Pyuria, Enterobacter bacteriuria: Had chronic indwelling Delaney catheter that was in place for almost 2 months prior to being changed on admission 5/31.  Culture from admission with Enterobacter.  Treated with fosfomycin x 1.  Repeat culture 6/2 negative  Sacral pressure ulcers  Social barriers to care: Inadequate transportation for follow-up appointments    PLAN/RECOMMENDATIONS:   Follow CBC, CMP, CRP      Continue IV eravacycline while inpatient    - Continue PO vancomycin 125 mg 4 times daily for 14 days from admission through June 14, and then taper to 125 mg twice daily for 1 week, and then 125 mg once daily for 1 week, then follow-up in the office to reassess  - probiotic BID     Patient agreeable to skilled nursing facility placement and case management working on options.  Complex discharge planning due to numerous committees and high level of needs after discharge.    Patient still refusing tunneled line placement to facilitate IV antibiotics after discharge but is agreeable to taking oral antibiotics.  If the excepting facility can accommodate him  on PO Nuzyra through June 28 (4 weeks since admission), that is preferred.  Case management and pharmacy working on prior authorization.  Alternative option if Nuzyra is cost prohibitive so oral doxycycline 100 mg twice daily through July 12 (6 weeks)     Overall prognosis is poor due to numerous comorbidities, refusal of recommended aggressive surgical debridement and IV antibiotics.  High risk for higher level amputation and recurrent sepsis.         Okay to discharge from my perspective once arrangements have been made. Remove IJ CVC prior to discharge. Schedule outpatient follow-up with me 2 weeks after discharge        José Fuentes MD  6/12/2024  10:12 EDT

## 2024-06-12 NOTE — THERAPY TREATMENT NOTE
Patient Name: Tavo Gudino  : 1955    MRN: [id number]                              Today's Date: 2024       Admit Date: 2024    Visit Dx: No diagnosis found.  Patient Active Problem List   Diagnosis    NSTEMI (non-ST elevated myocardial infarction)    C. difficile colitis    History of cholecystitis s/p cholecystostomy tube    ESRD (end stage renal disease) on hemodialysis    Atrial fibrillation    Essential hypertension    Moderate malnutrition    Soft tissue infection    Acute osteomyelitis of left tibia     Past Medical History:   Diagnosis Date    Atrial fibrillation     Chronic kidney disease (CKD), stage V     ESRD on hemodialysis     Hypertension     Metabolic acidosis     Peripheral arterial disease     Pneumothorax     Secondary hyperparathyroidism      Past Surgical History:   Procedure Laterality Date    ARTERIOVENOUS FISTULA Right     BELOW KNEE LEG AMPUTATION Left     CATARACT EXTRACTION Bilateral       General Information       Row Name 24 1541          Physical Therapy Time and Intention    Document Type therapy note (daily note)  -KE     Mode of Treatment physical therapy  -KE       Row Name 24 1542          General Information    Patient Profile Reviewed yes  -KE     Existing Precautions/Restrictions fall;other (see comments)  sacral and LE wounds, L BKA, drain, cole, dialysis cath; unsure of RLE WB status  -KE     Barriers to Rehab medically complex;previous functional deficit;physical barrier  -KE       Row Name 24 1545          Cognition    Orientation Status (Cognition) oriented x 3  -KE       Row Name 24 1545          Safety Issues, Functional Mobility    Safety Issues Affecting Function (Mobility) insight into deficits/self-awareness;awareness of need for assistance;problem-solving;safety precaution awareness;safety precautions follow-through/compliance;ability to follow commands  -KE     Impairments Affecting Function (Mobility) range of motion  (ROM);strength;pain;endurance/activity tolerance;sensation/sensory awareness;balance  -               User Key  (r) = Recorded By, (t) = Taken By, (c) = Cosigned By      Initials Name Provider Type    Sulma Brown PT Physical Therapist                   Mobility       Row Name 06/12/24 1546          Bed Mobility    Bed Mobility rolling left;rolling right  -MART     Rolling Left [place] (Bed Mobility) standby assist  -KE     Rolling Right [place] (Bed Mobility) standby assist  -MART     Assistive Device (Bed Mobility) bed rails  -       Row Name 06/12/24 1546          Transfers    Comment, (Transfers) Pt declining any STS this date, states he is NWB R LE. Unable to clarify status  via chart review, per RN has no WBing restrictions of R LE. Pt dep lifted to chair.  -       Row Name 06/12/24 1546          Bed-Chair Transfer    Bed-Chair [place] (Transfers) dependent (less than 25% patient effort);2 person assist  -MART     Assistive Device (Bed-Chair Transfers) lift device  -               User Key  (r) = Recorded By, (t) = Taken By, (c) = Cosigned By      Initials Name Provider Type    Sulma Brown PT Physical Therapist                   Obj/Interventions       Row Name 06/12/24 1548          Motor Skills    Therapeutic Exercise knee;ankle  -       Row Name 06/12/24 1548          Knee (Therapeutic Exercise)    Knee (Therapeutic Exercise) strengthening exercise;isometric exercises  -MART     Knee Isometrics (Therapeutic Exercise) bilateral;10 repetitions;quad sets  -MART     Knee Strengthening (Therapeutic Exercise) LAQ (long arc quad);SLR (straight leg raise);bilateral;10 repetitions  -       Row Name 06/12/24 1548          Balance    Balance Assessment sitting static balance;sitting dynamic balance  -     Static Sitting Balance standby assist  -MART     Position, Sitting Balance supported;sitting in chair  -MART     Balance Interventions sitting  -               User Key  (r) =  Recorded By, (t) = Taken By, (c) = Cosigned By      Initials Name Provider Type    Sulma Brown PT Physical Therapist                   Goals/Plan    No documentation.                  Clinical Impression       Row Name 06/12/24 1549          Pain    Pretreatment Pain Rating 8/10  -KE     Posttreatment Pain Rating 8/10  -KE     Pain Location - Side/Orientation Right  -KE     Pain Location generalized  -KE     Pain Location - buttock  -KE     Pre/Posttreatment Pain Comment RN aware and managing  -KE     Pain Intervention(s) Ambulation/increased activity;Repositioned  -KE       Row Name 06/12/24 1549          Plan of Care Review    Plan of Care Reviewed With patient  -KE     Progress no change  -KE     Outcome Evaluation Pt continues to present with generalized weakenss, decreased activity tolerance, and balance deficits below functional baseline. Pt remains limited by high pain levels at this time. Declining STS t/f this date stating he is NWB R LE, PT unable to clarify WBing status via chart review. Plan to progress current PT POC and trial transfer and w/c training.  -       Row Name 06/12/24 1549          Vital Signs    Pre Systolic BP Rehab 110  -KE     Pre Treatment Diastolic BP 96  -KE     O2 Delivery Pre Treatment room air  -KE     O2 Delivery Intra Treatment room air  -KE     O2 Delivery Post Treatment room air  -KE     Pre Patient Position Supine  -KE     Intra Patient Position Sitting  -KE     Post Patient Position Sitting  -KE       Row Name 06/12/24 1549          Positioning and Restraints    Pre-Treatment Position in bed  -KE     Post Treatment Position chair  -KE     In Chair notified nsg;reclined;waffle cushion;on mechanical lift sling;call light within reach;encouraged to call for assist;exit alarm on  -KE               User Key  (r) = Recorded By, (t) = Taken By, (c) = Cosigned By      Initials Name Provider Type    Sulma Brown PT Physical Therapist                   Outcome  Measures       Row Name 06/12/24 1553 06/12/24 1500       How much help from another person do you currently need...    Turning from your back to your side while in flat bed without using bedrails? 3  -KE 3  -KM    Moving from lying on back to sitting on the side of a flat bed without bedrails? 2  -KE 2  -KM    Moving to and from a bed to a chair (including a wheelchair)? 1  -KE 1  -KM    Standing up from a chair using your arms (e.g., wheelchair, bedside chair)? 1  -KE 1  -KM    Climbing 3-5 steps with a railing? 1  -KE 1  -KM    To walk in hospital room? 1  -KE 1  -KM    AM-PAC 6 Clicks Score (PT) 9  -KE 9  -KM    Highest Level of Mobility Goal 3 --> Sit at edge of bed  -KE 3 --> Sit at edge of bed  -KM      Row Name 06/12/24 1553 06/12/24 1350       Functional Assessment    Outcome Measure Options AM-PAC 6 Clicks Basic Mobility (PT)  -KE AM-PAC 6 Clicks Daily Activity (OT)  -KF              User Key  (r) = Recorded By, (t) = Taken By, (c) = Cosigned By      Initials Name Provider Type    Bhavna Infante, RN Registered Nurse    Zenaida Wills, LUISANA Occupational Therapist    Sulma Brown, PT Physical Therapist                                 Physical Therapy Education       Title: PT OT SLP Therapies (In Progress)       Topic: Physical Therapy (Done)       Point: Mobility training (Done)       Learning Progress Summary             Patient Acceptance, E, VU by KE at 6/12/2024 1317    Acceptance, E, VU,NR by ML at 6/7/2024 1039    Acceptance, E, VU,NR by ML at 6/3/2024 1610   Family Acceptance, E, VU,NR by ML at 6/3/2024 1610                         Point: Home exercise program (Done)       Learning Progress Summary             Patient Acceptance, E, VU,NR by ML at 6/7/2024 1039                         Point: Body mechanics (Done)       Learning Progress Summary             Patient Acceptance, E, VU by KE at 6/12/2024 1317                         Point: Precautions (Done)       Learning Progress Summary              Patient Acceptance, E, VU by  at 6/12/2024 1317    Acceptance, E, VU,NR by  at 6/7/2024 1039    Acceptance, E, VU,NR by  at 6/3/2024 1610   Family Acceptance, E, VU,NR by  at 6/3/2024 1610                                         User Key       Initials Effective Dates Name Provider Type Discipline     04/22/21 -  Camille Becerra Physical Therapist PT     11/16/23 -  Sulma Reed PT Physical Therapist PT                  PT Recommendation and Plan     Plan of Care Reviewed With: patient  Progress: no change  Outcome Evaluation: Pt continues to present with generalized weakenss, decreased activity tolerance, and balance deficits below functional baseline. Pt remains limited by high pain levels at this time. Declining STS t/f this date stating he is NWB R LE, PT unable to clarify WBing status via chart review. Plan to progress current PT POC and trial transfer and w/c training.     Time Calculation:         PT Charges       Row Name 06/12/24 1554             Time Calculation    Start Time 1317  -KE      PT Received On 06/12/24  -KE         Timed Charges    34601 - PT Therapeutic Activity Minutes 12  -KE         Total Minutes    Timed Charges Total Minutes 12  -KE       Total Minutes 12  -KE                User Key  (r) = Recorded By, (t) = Taken By, (c) = Cosigned By      Initials Name Provider Type    Sulma Brown PT Physical Therapist                  Therapy Charges for Today       Code Description Service Date Service Provider Modifiers Qty    [id number]  PT THERAPEUTIC ACT EA 15 MIN 6/12/2024 Sulma Reed PT GP 1            PT G-Codes  Outcome Measure Options: AM-PAC 6 Clicks Basic Mobility (PT)  AM-PAC 6 Clicks Score (PT): 9  AM-PAC 6 Clicks Score (OT): 13  PT Discharge Summary  Anticipated Discharge Disposition (PT): skilled nursing facility    Sulma Reed PT  6/12/2024

## 2024-06-12 NOTE — NURSING NOTE
Woc follow-up assessment.    Wound care done per nursing yesterday.  Woc really just needed to follow-up on the left knee stump as there is no direct orders written and the sacrum I have been trying to get to that for a couple days now.    The sacrum and left gluteal are much smaller and healing there is epithelialization there is granulation there clean noninfected.  Continue with current orders.              That right anterior thigh cath site was very painful and had some weird drainage on the dressing I just cleansed it out with saline and applied a little more honey and put it on we will address on Friday when I do wound care.      I did this let me amputation left side there is a lot of loosening of this slough I would love to now I think would be safe time to try a little bit of sharp debridement however patient was very adamant about it before and so I am going to respect his wishes.  I do believe a daily change however will allow for this slough to be pulled off with the Santyl.  I do believe it will heal it is already smaller the granulation tissue looks better there is epithelialization around the edges.  Just take a long time patient encouraged to eat protein and nutritiously.                Plan on meeting wife on Friday to do wound care instructions however she cannot make it for I will have somebody filled me.

## 2024-06-12 NOTE — CASE MANAGEMENT/SOCIAL WORK
Continued Stay Note  [facility]     Patient Name: Tavo Gudino  MRN: [id number]  Today's Date: 6/12/2024    Admit Date: 5/31/2024    Plan: discharge plan   Discharge Plan       Row Name 06/12/24 1223       Plan    Plan discharge plan    Plan Comments Per April(liason for [facility]), pt is not appropriate for [facility]. I spoke with Rochelle(liason for [facility]) and she has submitted referral to the 2 [facility] facilities in [place] that do in house HD. I also spoke with Sofi in admissions at [facility] in [place] ([phone])and faxed the referral to [phone]. CM will cont to follow.    Final Discharge Disposition Code 03 - skilled nursing facility (SNF)                   Discharge Codes    No documentation.                 Expected Discharge Date and Time       Expected Discharge Date Expected Discharge Time    Jun 12, 2024               Catrachita Robertson RN

## 2024-06-12 NOTE — PROGRESS NOTES
[facility] Medicine Services  PROGRESS NOTE    Patient Name: Tavo Gudino  : 1955  MRN: [id number]    Date of Admission: 2024  Primary Care Physician: Hayden Weinstein MD    Subjective   Subjective     CC:  Stump infection    HPI:  Resting in bed in no acute distress and overall comfortable.  Had dialysis today and tolerated well.  No fever or chills.  No chest pain or palpitation or shortness of breath at rest.  No nausea vomiting or diarrhea.      Objective   Objective     Vital Signs:   Temp:  [97.6 °F (36.4 °C)-97.7 °F (36.5 °C)] 97.6 °F (36.4 °C)  Heart Rate:  [63-89] 74  Resp:  [16-18] 18  BP: (100-160)/(54-99) 118/56     Physical Exam:  Constitutional: No acute distress, chronically ill looking  Respiratory: Clear to auscultation bilaterally, respiratory effort normal   Cardiovascular: RR, no murmur gallop or rub audible.  Gastrointestinal: Positive bowel sounds, soft, nontender, nondistended, cholecystostomy tube in place with dark green drainage  Musculoskeletal: Left AKA in dressings, no clear sign of infection.  Psychiatric: Flat affect, cooperative  Neurologic: Oriented x 3, no focal deficits, speech is clear  Skin: Pale looking      Results Reviewed:  LAB RESULTS:      Lab 06/10/24  0440 24  04524  0433 24  0502   WBC 7.82 7.76 7.37 7.19   HEMOGLOBIN 9.4* 9.1* 9.6* 9.2*   HEMATOCRIT 30.6* 29.0* 30.3* 29.0*   PLATELETS 176 172 204 198   NEUTROS ABS  --  4.36 3.59  --    IMMATURE GRANS (ABS)  --  0.11* 0.11*  --    LYMPHS ABS  --  2.32 2.59  --    MONOS ABS  --  0.70 0.79  --    EOS ABS  --  0.21 0.22  --    MCV 97.8* 94.5 95.0 95.1         Lab 24  0351 24  0716 06/10/24  0440 24  0451 24  0433 24  0502   SODIUM 134* 133* 134* 133* 134* 132*   POTASSIUM 5.0 5.0 6.0* 5.9* 5.1 4.6   CHLORIDE 104 102 105 103 101 101   CO2 20.0* 21.0* 20.0* 20.0* 25.0 23.0   ANION GAP 10.0 10.0 9.0 10.0 8.0 8.0   BUN 39* 29* 51* 41* 15 33*    CREATININE 3.60* 2.92* 4.41* 3.88* 3.18* 3.33*   EGFR 17.3* 22.2* 13.6* 15.8* 20.1* 19.0*   GLUCOSE 71 75 64* 75 71 76   CALCIUM 9.3 8.6 9.2 9.3 9.7 9.3   MAGNESIUM  --   --   --   --  1.8  --    PHOSPHORUS 5.8* 4.6*  --   --   --  4.8*         Lab 06/12/24  0351 06/11/24  0716 06/06/24  0502   ALBUMIN 2.6* 2.5* 2.7*                     Brief Urine Lab Results  (Last result in the past 365 days)        Color   Clarity   Blood   Leuk Est   Nitrite   Protein   CREAT   Urine HCG        06/02/24 1721 Yellow   Clear   Trace   Trace   Negative   100 mg/dL (2+)                   Microbiology Results Abnormal       Procedure Component Value - Date/Time    Blood Culture - Blood, Blood, Central Line [[id number]]  (Normal) Collected: 05/31/24 2237    Lab Status: Final result Specimen: Blood, Central Line Updated: 06/06/24 0801     Blood Culture No growth at 5 days    Blood Culture - Blood, Arm, Right [[id number]]  (Normal) Collected: 05/31/24 2237    Lab Status: Final result Specimen: Blood from Arm, Right Updated: 06/06/24 0745     Blood Culture No growth at 5 days    Urine Culture - Urine, Indwelling Urethral Catheter [[id number]]  (Normal) Collected: 06/02/24 1721    Lab Status: Final result Specimen: Urine from Indwelling Urethral Catheter Updated: 06/04/24 0955     Urine Culture No growth            No radiology results from the last 24 hrs    Results for orders placed during the hospital encounter of 05/31/24    Adult Transthoracic Echo Complete W/ Cont if Necessary Per Protocol    Interpretation Summary    Left ventricular systolic function is normal. Left ventricular ejection fraction appears to be 51 - 55%.    Left ventricular wall thickness is consistent with borderline concentric hypertrophy.    Left atrial volume is moderately increased.    There is mild calcification of the aortic valve.    Mild mitral valve regurgitation is present.    There is a small (<1cm) circumferential pericardial effusion.      Current  medications:  Scheduled Meds:apixaban, 5 mg, Oral, BID  aspirin, 81 mg, Oral, Daily  collagenase, 1 Application, Topical, Q24H  dilTIAZem CD, 240 mg, Oral, Q24H  eravacycline dihydrochloride (XERAVA) 85 mg in sodium chloride 0.9 % 250 mL (0.34 mg/mL) IVPB, 85 mg, Intravenous, Q12H  famotidine, 10 mg, Oral, Daily  folic acid, 1 mg, Oral, Daily  lactobacillus acidophilus, 1 capsule, Oral, BID  levothyroxine, 75 mcg, Oral, Q AM  Lidocaine HCl gel, , Topical, BID  metoprolol succinate XL, 100 mg, Oral, Q24H  pharmacy consult - MTM, , Does not apply, Daily  sodium chloride, 10 mL, Intravenous, Q12H  sodium zirconium cyclosilicate, 10 g, Oral, Daily  vancomycin, 125 mg, Oral, Q6H      Continuous Infusions:Pharmacy Consult - Pharmacy to dose,       PRN Meds:.  acetaminophen    heparin (porcine)    Lidocaine HCl gel    melatonin    metoprolol tartrate    ondansetron    Pharmacy Consult - Pharmacy to dose    sodium chloride    sodium chloride    Assessment & Plan   Assessment & Plan     Active Hospital Problems    Diagnosis  POA    **NSTEMI (non-ST elevated myocardial infarction) [I21.4]  Yes    Soft tissue infection [L08.9]  Unknown    Acute osteomyelitis of left tibia [M86.162]  Unknown    Moderate malnutrition [E44.0]  Yes    C. difficile colitis [A04.72]  Yes    History of cholecystitis s/p cholecystostomy tube [K81.9]  Yes    ESRD (end stage renal disease) on hemodialysis [N18.6]  Yes    Atrial fibrillation [I48.91]  Yes    Essential hypertension [I10]  Yes      Resolved Hospital Problems   No resolved problems to display.        Brief Hospital Course to date:  Tavo Gudino is a 69 y.o. male with hx of HTN, PAD, left BKA, ESRD on hemodialysis, Afib on Eliquis, recent acute cholecystitis s/p cholecystostomy tube placement at , chronic urinary retention with indwelling Delaney catheter, and previous hx of C.diff who presented from [facility] due to chest pain, elevated troponin, and Afib with RVR.  Found to have  multiple other concerning issues including active C. difficile infection, concern for possible osteomyelitis, exposed tendon on right lower extremity.     Chest pain, resolved  Elevated troponin, possible NSTEMI  -Transferred to [facility] for C with Dr. Amador  -Dr. Amador/Cardiology consulted, stress test with small area of ischemia.  With discussion with patient the decision was made to medically manage at this time.     Afib with RVR  Hx of Afib/Aflutter  -Toprol dosing adjusted per cardiology given episodes of intermittent RVR  -Continue Eliquis     HFrEF  Moderate MR  --Most recent Echo on file I April 2024: EF 40%, akinetic septal, anterior, and apical walls, severely dilated LA, moderate MR  --Follows with  Cardiology, had been evaluated previously for Vanessa clip  --ECHO 6/1: LVEF 51 to 55%, borderline concentric LVH, left atrial volume moderately increased, mild MR, small less than 1 cm circumferential pericardial effusion     Leukocytosis, resolved.     Concern for osteomyelitis   Exposed tendon on RLE  --MRI left tib/fib showed cellulitis, possible osteomyelitis  --MRI right foot with cellulitis/edema  -- Arterial duplex abnormal; Vascular surgery consulted and recommended medical management with no vascular surgery intervention  --Eravacycline per ID  --MRSA screen +  --Follow-up blood and urine cultures  -Dr. Yeager following. Patient likely needs additional amputation of left lower extremity due to concern for poor wound healing, likely needs debridement of right lower extremity around the tendon with wound VAC placement.  Patient refusing further amputation of left lower extremity or any other procedures.   --ID following. Patient is not likely a candidate for outpatient IV antibiotics he will not be able to follow-up adequately and doesn't not have support at home. Plan is just to continue the eravacycline for now until we have some idea of his d/c plan.  Prior auth for Nuzyra but co-pay is still  $2000.  Working to see if we can get that lower. If he goes home, may consider doxycycline BID.   -IF discharges to SNF, CVC will be removed and tunneled line will need to be replaced  -Patient wishes to follow-up with wound care at .       C.diff colitis  -s/p fosphofmycin  -Continue p.o. vancomycin 125 mg 4 times a day for 14 days from admission through June 14 and then taper to 125 mg twice daily for 1 week and then 125 mg once daily for 1 week  -Follow-up with ID     Recent cholecystitis s/p cholecystostomy tube  --Request records from that hospitalization, apparently was placed at   --Consulted General Surgery for management of tube  --Apparently already has follow up at  6/6/24 General Surgery--will need to reschedule this     Chronic urinary retention  --Has Delaney in place, reported hx of urethral injury per OSH note (data deficit, no idea when this was Delaney placed), appears he follows with Urology at  and this is a chronic Delaney  --Replaced Delaney here     PAD  Hx of left BKA  --Continue ASA     Anemia  --Likely chronic due to renal disease  --Folate 4.36 --> supplementation started; vitamin B 12 level 755  --Hb 7.5 here, had been 9 at OSH     HTN  ESRD  --Nephrology consulted for dialysis   --Case management to work with patient and family in order to work on disposition location and finding an outpatient dialysis chair.    Hyperkalemia  -Potassium of 5.9 this morning, will give dose of lokelma. Not sure when next HD session is      Hypothyroidism  --TSH significantly elevated at 91 at OSH, still elevated here to 65, but improved  --Synthroid dose was increased to 75 mcg daily at OSH  --Continue Synthroid at above dose and recheck in 4-6 weeks      Multiple wounds  Sacral Decubitus ulcer, POA, Stage 3  --Wound care consulted  --Ortho as above     Poor IV access  --Dr. Beckman placed central line, will likely need tunneled line of antibiotics if plan is for rehab placement.      Expected Discharge  Location and Transportation: Tentatively back to [place]  Expected Discharge   Expected Discharge Date: 6/12/2024; Expected Discharge Time:      DVT prophylaxis:  Pharmacologic & mechanical VTE prophylaxis orders are present.         AM-PAC 6 Clicks Score (PT): 9 (06/12/24 6794)    CODE STATUS:   Code Status and Medical Interventions:   Ordered at: 05/31/24 1429     Code Status (Patient has no pulse and is not breathing):    CPR (Attempt to Resuscitate)     Medical Interventions (Patient has pulse or is breathing):    Full Support            Terrence Phillips MD  06/12/24

## 2024-06-12 NOTE — PROGRESS NOTES
"          Clinical Nutrition Assessment     Patient Name: Tavo Gudino  YOB: 1955  MRN: [id number]  Date of Encounter: 06/12/24 12:08 EDT  Admission date: 5/31/2024  Reason for Visit: Follow-up protocol    Assessment   Nutrition Assessment   Admission Diagnosis:  NSTEMI (non-ST elevated myocardial infarction) [I21.4]    Problem List:    NSTEMI (non-ST elevated myocardial infarction)    C. difficile colitis    History of cholecystitis s/p cholecystostomy tube    ESRD (end stage renal disease) on hemodialysis    Atrial fibrillation    Essential hypertension    Moderate malnutrition    Soft tissue infection    Acute osteomyelitis of left tibia      PMH:   He  has a past medical history of Atrial fibrillation, Chronic kidney disease (CKD), stage V, ESRD on hemodialysis, Hypertension, Metabolic acidosis, Peripheral arterial disease, Pneumothorax, and Secondary hyperparathyroidism.    PSH:  He  has a past surgical history that includes Below knee leg amputation (Left); AV fistula placement (Right); and Cataract extraction (Bilateral).    Applicable Nutrition History:   4/24 BKA  Note stump wound and heel and sacral PI's      Anthropometrics     Height: Height: 185.4 cm (72.99\")  Last Filed Weight: Weight: 84.8 kg (186 lb 15.2 oz) (06/04/24 1413)  Method: Weight Method: Bed scale  BMI: BMI (Calculated): 24.7    UBW:  Per EMR wt of 237 lbs on 1/8/24 176 lbs on 4/16/24 prior to BKA   Weight change: Apparent loss of 61 bs 26% body wt over 4 months - uncertain of contribution of fluid status Likely enough true loss for significance.     Nutrition Focused Physical Exam    Date: 6/3    Patient meets criteria for malnutrition diagnosis, see MSA note.     Subjective   Reported/Observed/Food/Nutrition Related History:   6/12:  Patient at dialysis at time of visit.  Per Nephrology note, patient does not want to follow renal diet.    6/3  Family rpt pt is \"picky\" w food give some preferences: no carrots or broccoli. " Pt/family allow will not want shake type suppl. Will try magic cup. Describe intake of < 3/4 usual for the last 6 months. Note rpt pt will take cornbread w milk this evening for dinner.     Current Nutrition Prescription   PO: Diet: Regular/House; Fluid Consistency: Thin (IDDSI 0)  Oral Nutrition Supplement:  Magic Cup daily added  Intake: Insufficient data Only three meals recorded over multiple days    Assessment & Plan   Nutrition Diagnosis   Date:  6/3            Updated:    Problem Malnutrition  moderate chronic   Etiology Limited food prefs and depressed appetite   Signs/Symptoms Intake hx w Wasting    Status:  Ongoing    Date:   6/3  Updated:     Problem Increased nutrient needs   Etiology Skin breakdown   Signs/Symptoms Stump wound and heel and sacral PI's   Status:  Ongoing    Goal / Objectives:   Nutrition to support treatment and Establish PO      Nutrition Intervention      Follow treatment progress, Care plan reviewed       Monitoring/Evaluation:   Per protocol, I&O, PO intake, Supplement intake, Pertinent labs, Weight, Skin status, GI status, Symptoms    Awa Blandon, MORENO  Time Spent:  35 min

## 2024-06-12 NOTE — PROGRESS NOTES
" LOS: 12 days   Patient Care Team:  Hayden Weinstein MD as PCP - General (Nephrology)    Chief Complaint: ESRD  NSTEMI    Subjective     Seen on dialysis, no complaints.   No chest pain or shortness of breath.     History taken from: patient    Objective     Vital Sign Min/Max for last 24 hours  Temp  Min: 97.6 °F (36.4 °C)  Max: 98.7 °F (37.1 °C)   BP  Min: 100/80  Max: 160/98   Pulse  Min: 63  Max: 80   Resp  Min: 16  Max: 18   SpO2  Min: 100 %  Max: 100 %   No data recorded   No data recorded     Flowsheet Rows      Flowsheet Row First Filed Value   Admission Height 185.4 cm (73\") Documented at 05/31/2024 1826   Admission Weight 84.8 kg (187 lb) Documented at 05/31/2024 1826            No intake/output data recorded.  I/O last 3 completed shifts:  In: -   Out: 1880 [Urine:1300; Emesis/NG output:350; Drains:230]    Objective   Gen: Alert, NAD   HENT: NC, AT, EOMI   NECK: Supple, no JVD, Trachea midline   LUNGS: CTA bilaterally, non labored respirtation   CVS: S1/S2 audible, RRR, no murmur   Abd: Soft, NT, ND, BS+   : + Delaney   Ext: Left BKA  CNS: Alert, No focal deficit noted grossly  Psy: Cooperative  Skin: Warm, dry and intact      Results Review:     I reviewed the patient's new clinical results.    WBC WBC   Date Value Ref Range Status   06/10/2024 7.82 3.40 - 10.80 10*3/mm3 Final      HGB Hemoglobin   Date Value Ref Range Status   06/10/2024 9.4 (L) 13.0 - 17.7 g/dL Final      HCT Hematocrit   Date Value Ref Range Status   06/10/2024 30.6 (L) 37.5 - 51.0 % Final      Platlets No results found for: \"LABPLAT\"   MCV MCV   Date Value Ref Range Status   06/10/2024 97.8 (H) 79.0 - 97.0 fL Final          Sodium Sodium   Date Value Ref Range Status   06/12/2024 134 (L) 136 - 145 mmol/L Final   06/11/2024 133 (L) 136 - 145 mmol/L Final   06/10/2024 134 (L) 136 - 145 mmol/L Final      Potassium Potassium   Date Value Ref Range Status   06/12/2024 5.0 3.5 - 5.2 mmol/L Final   06/11/2024 5.0 3.5 - 5.2 mmol/L Final " "  06/10/2024 6.0 (H) 3.5 - 5.2 mmol/L Final      Chloride Chloride   Date Value Ref Range Status   06/12/2024 104 98 - 107 mmol/L Final   06/11/2024 102 98 - 107 mmol/L Final   06/10/2024 105 98 - 107 mmol/L Final      CO2 CO2   Date Value Ref Range Status   06/12/2024 20.0 (L) 22.0 - 29.0 mmol/L Final   06/11/2024 21.0 (L) 22.0 - 29.0 mmol/L Final   06/10/2024 20.0 (L) 22.0 - 29.0 mmol/L Final      BUN BUN   Date Value Ref Range Status   06/12/2024 39 (H) 8 - 23 mg/dL Final   06/11/2024 29 (H) 8 - 23 mg/dL Final   06/10/2024 51 (H) 8 - 23 mg/dL Final      Creatinine Creatinine   Date Value Ref Range Status   06/12/2024 3.60 (H) 0.76 - 1.27 mg/dL Final   06/11/2024 2.92 (H) 0.76 - 1.27 mg/dL Final   06/10/2024 4.41 (H) 0.76 - 1.27 mg/dL Final      Calcium Calcium   Date Value Ref Range Status   06/12/2024 9.3 8.6 - 10.5 mg/dL Final   06/11/2024 8.6 8.6 - 10.5 mg/dL Final   06/10/2024 9.2 8.6 - 10.5 mg/dL Final      PO4 No results found for: \"CAPO4\"   Albumin Albumin   Date Value Ref Range Status   06/12/2024 2.6 (L) 3.5 - 5.2 g/dL Final   06/11/2024 2.5 (L) 3.5 - 5.2 g/dL Final          Magnesium No results found for: \"MG\"       Uric Acid No results found for: \"URICACID\"     Medication Review: Yes    Assessment & Plan       NSTEMI (non-ST elevated myocardial infarction)    C. difficile colitis    History of cholecystitis s/p cholecystostomy tube    ESRD (end stage renal disease) on hemodialysis    Atrial fibrillation    Essential hypertension    Moderate malnutrition    Soft tissue infection    Acute osteomyelitis of left tibia      Assessment & Plan    ESRD: On TTS jonathan. HD through right IJ TDC. Still makes urine. Does have cole cath+ for chronic urinary retention. Patient the family Primary nephrologist (Dr Wan) was monitoring for renal recovery and residual renal function.      Volume status: No significant LE edema noted.      Afib w RVR: RVR during HD treatment. BP controlled. . No chest pain or sob.     "   Met acidosis: Mild. Management with HD.     Anemia: ACD. Transfuse at hb<7.  ALLISON on HD days     CAD: Transferred from OSH for evaluation of ACD. Cardiology following. EF 55%    Plan:  - Hemodialysis today >> will switch back to TTS schedule. Still making decent urine output.   - Renal diet (Doesn't want to follow)  - ALLISON with HD   - Metoprolol for RVR during HD on PRN basis.     Arvin Curtis MD  06/12/24  09:31 EDT

## 2024-06-12 NOTE — PLAN OF CARE
Goal Outcome Evaluation:  Plan of Care Reviewed With: patient        Progress: no change  Outcome Evaluation: Pt continues to present with generalized weakenss, decreased activity tolerance, and balance deficits below functional baseline. Pt remains limited by high pain levels at this time. Declining STS t/f this date stating he is NWB R LE, PT unable to clarify WBing status via chart review. Plan to progress current PT POC and trial transfer and w/c training.      Anticipated Discharge Disposition (PT): skilled nursing facility

## 2024-06-13 PROCEDURE — 99232 SBSQ HOSP IP/OBS MODERATE 35: CPT | Performed by: INTERNAL MEDICINE

## 2024-06-13 PROCEDURE — 25010000002 ERAVACYCLINE DIHYDROCHLORIDE 50 MG RECONSTITUTED SOLUTION 1 EACH VIAL: Performed by: INTERNAL MEDICINE

## 2024-06-13 PROCEDURE — 25810000003 SODIUM CHLORIDE 0.9 % SOLUTION 250 ML FLEX CONT: Performed by: INTERNAL MEDICINE

## 2024-06-13 RX ADMIN — APIXABAN 5 MG: 5 TABLET, FILM COATED ORAL at 22:09

## 2024-06-13 RX ADMIN — ERAVACYCLINE 85 MG: 50 INJECTION, POWDER, LYOPHILIZED, FOR SOLUTION INTRAVENOUS at 22:08

## 2024-06-13 RX ADMIN — LIDOCAINE HYDROCHLORIDE: 20 JELLY TOPICAL at 09:00

## 2024-06-13 RX ADMIN — LEVOTHYROXINE SODIUM 75 MCG: 0.07 TABLET ORAL at 05:38

## 2024-06-13 RX ADMIN — SODIUM ZIRCONIUM CYCLOSILICATE 10 G: 10 POWDER, FOR SUSPENSION ORAL at 09:12

## 2024-06-13 RX ADMIN — ACETAMINOPHEN 650 MG: 325 TABLET ORAL at 12:57

## 2024-06-13 RX ADMIN — APIXABAN 5 MG: 5 TABLET, FILM COATED ORAL at 09:02

## 2024-06-13 RX ADMIN — ACETAMINOPHEN 650 MG: 325 TABLET ORAL at 09:02

## 2024-06-13 RX ADMIN — Medication 10 ML: at 09:00

## 2024-06-13 RX ADMIN — Medication 1 CAPSULE: at 22:09

## 2024-06-13 RX ADMIN — DILTIAZEM HYDROCHLORIDE 240 MG: 240 CAPSULE, COATED, EXTENDED RELEASE ORAL at 09:02

## 2024-06-13 RX ADMIN — LIDOCAINE HYDROCHLORIDE: 20 JELLY TOPICAL at 22:10

## 2024-06-13 RX ADMIN — FOLIC ACID 1 MG: 1 TABLET ORAL at 09:01

## 2024-06-13 RX ADMIN — ACETAMINOPHEN 650 MG: 325 TABLET ORAL at 22:09

## 2024-06-13 RX ADMIN — FAMOTIDINE 10 MG: 20 TABLET, FILM COATED ORAL at 09:01

## 2024-06-13 RX ADMIN — ASPIRIN 81 MG: 81 TABLET, COATED ORAL at 09:02

## 2024-06-13 RX ADMIN — Medication 1 CAPSULE: at 09:12

## 2024-06-13 RX ADMIN — VANCOMYCIN HYDROCHLORIDE 125 MG: 125 CAPSULE ORAL at 05:38

## 2024-06-13 RX ADMIN — COLLAGENASE SANTYL 1 APPLICATION: 250 OINTMENT TOPICAL at 09:03

## 2024-06-13 RX ADMIN — VANCOMYCIN HYDROCHLORIDE 125 MG: 125 CAPSULE ORAL at 12:51

## 2024-06-13 RX ADMIN — ERAVACYCLINE 85 MG: 50 INJECTION, POWDER, LYOPHILIZED, FOR SOLUTION INTRAVENOUS at 05:38

## 2024-06-13 RX ADMIN — Medication 5 MG: at 22:09

## 2024-06-13 RX ADMIN — Medication 10 ML: at 22:11

## 2024-06-13 NOTE — PROGRESS NOTES
[facility] Medicine Services  PROGRESS NOTE    Patient Name: Tavo Gudino  : 1955  MRN: [id number]    Date of Admission: 2024  Primary Care Physician: Hayden Weinstein MD    Subjective   Subjective     CC:  Stump infection    HPI:  Resting in bed in no acute distress and overall comfortable.  Has no specific complaint.  No fever or chills.  No chest pain or palpitation or shortness of breath at rest.  No nausea vomiting or diarrhea.      Objective   Objective     Vital Signs:   Temp:  [97.3 °F (36.3 °C)-98.3 °F (36.8 °C)] 98.2 °F (36.8 °C)  Heart Rate:  [64] 64  Resp:  [18] 18  BP: (132-195)/(78-95) 195/78     Physical Exam:  Constitutional: No acute distress, chronically ill looking  Respiratory: Clear to auscultation bilaterally, respiratory effort normal   Cardiovascular: RR, no murmur gallop or rub audible.  Gastrointestinal: Positive bowel sounds, soft, nontender, nondistended, cholecystostomy tube in place with dark green drainage  Musculoskeletal: Left AKA in dressings, no clear sign of infection.  Psychiatric: Flat affect, cooperative  Neurologic: Oriented x 3, no focal deficits, speech is clear  Skin: Pale looking      Results Reviewed:  LAB RESULTS:      Lab 06/10/24  0440 24   WBC 7.82 7.76 7.37   HEMOGLOBIN 9.4* 9.1* 9.6*   HEMATOCRIT 30.6* 29.0* 30.3*   PLATELETS 176 172 204   NEUTROS ABS  --  4.36 3.59   IMMATURE GRANS (ABS)  --  0.11* 0.11*   LYMPHS ABS  --  2.32 2.59   MONOS ABS  --  0.70 0.79   EOS ABS  --  0.21 0.22   MCV 97.8* 94.5 95.0         Lab 24  0351 24  0716 06/10/24  0440 24  04524  0433   SODIUM 134* 133* 134* 133* 134*   POTASSIUM 5.0 5.0 6.0* 5.9* 5.1   CHLORIDE 104 102 105 103 101   CO2 20.0* 21.0* 20.0* 20.0* 25.0   ANION GAP 10.0 10.0 9.0 10.0 8.0   BUN 39* 29* 51* 41* 15   CREATININE 3.60* 2.92* 4.41* 3.88* 3.18*   EGFR 17.3* 22.2* 13.6* 15.8* 20.1*   GLUCOSE 71 75 64* 75 71   CALCIUM 9.3 8.6  9.2 9.3 9.7   MAGNESIUM  --   --   --   --  1.8   PHOSPHORUS 5.8* 4.6*  --   --   --          Lab 06/12/24  0351 06/11/24  0716   ALBUMIN 2.6* 2.5*                     Brief Urine Lab Results  (Last result in the past 365 days)        Color   Clarity   Blood   Leuk Est   Nitrite   Protein   CREAT   Urine HCG        06/02/24 1721 Yellow   Clear   Trace   Trace   Negative   100 mg/dL (2+)                   Microbiology Results Abnormal       Procedure Component Value - Date/Time    Blood Culture - Blood, Blood, Central Line [[id number]]  (Normal) Collected: 05/31/24 2237    Lab Status: Final result Specimen: Blood, Central Line Updated: 06/06/24 0801     Blood Culture No growth at 5 days    Blood Culture - Blood, Arm, Right [[id number]]  (Normal) Collected: 05/31/24 2237    Lab Status: Final result Specimen: Blood from Arm, Right Updated: 06/06/24 0745     Blood Culture No growth at 5 days    Urine Culture - Urine, Indwelling Urethral Catheter [[id number]]  (Normal) Collected: 06/02/24 1721    Lab Status: Final result Specimen: Urine from Indwelling Urethral Catheter Updated: 06/04/24 0955     Urine Culture No growth            No radiology results from the last 24 hrs    Results for orders placed during the hospital encounter of 05/31/24    Adult Transthoracic Echo Complete W/ Cont if Necessary Per Protocol    Interpretation Summary    Left ventricular systolic function is normal. Left ventricular ejection fraction appears to be 51 - 55%.    Left ventricular wall thickness is consistent with borderline concentric hypertrophy.    Left atrial volume is moderately increased.    There is mild calcification of the aortic valve.    Mild mitral valve regurgitation is present.    There is a small (<1cm) circumferential pericardial effusion.      Current medications:  Scheduled Meds:apixaban, 5 mg, Oral, BID  aspirin, 81 mg, Oral, Daily  collagenase, 1 Application, Topical, Q24H  dilTIAZem CD, 240 mg, Oral, Q24H  eravacycline  dihydrochloride (XERAVA) 85 mg in sodium chloride 0.9 % 250 mL (0.34 mg/mL) IVPB, 85 mg, Intravenous, Q12H  famotidine, 10 mg, Oral, Daily  folic acid, 1 mg, Oral, Daily  lactobacillus acidophilus, 1 capsule, Oral, BID  levothyroxine, 75 mcg, Oral, Q AM  Lidocaine HCl gel, , Topical, BID  metoprolol succinate XL, 100 mg, Oral, Q24H  pharmacy consult - MTM, , Does not apply, Daily  sodium chloride, 10 mL, Intravenous, Q12H  sodium zirconium cyclosilicate, 10 g, Oral, Daily  vancomycin, 125 mg, Oral, Q6H      Continuous Infusions:Pharmacy Consult - Pharmacy to dose,       PRN Meds:.  acetaminophen    heparin (porcine)    Lidocaine HCl gel    melatonin    metoprolol tartrate    ondansetron    Pharmacy Consult - Pharmacy to dose    sodium chloride    sodium chloride    Assessment & Plan   Assessment & Plan     Active Hospital Problems    Diagnosis  POA    **NSTEMI (non-ST elevated myocardial infarction) [I21.4]  Yes    Soft tissue infection [L08.9]  Unknown    Acute osteomyelitis of left tibia [M86.162]  Unknown    Moderate malnutrition [E44.0]  Yes    C. difficile colitis [A04.72]  Yes    History of cholecystitis s/p cholecystostomy tube [K81.9]  Yes    ESRD (end stage renal disease) on hemodialysis [N18.6]  Yes    Atrial fibrillation [I48.91]  Yes    Essential hypertension [I10]  Yes      Resolved Hospital Problems   No resolved problems to display.        Brief Hospital Course to date:  Tavo Gudino is a 69 y.o. male with hx of HTN, PAD, left BKA, ESRD on hemodialysis, Afib on Eliquis, recent acute cholecystitis s/p cholecystostomy tube placement at , chronic urinary retention with indwelling Delaney catheter, and previous hx of C.diff who presented from [facility] due to chest pain, elevated troponin, and Afib with RVR.  Found to have multiple other concerning issues including active C. difficile infection, concern for possible osteomyelitis, exposed tendon on right lower extremity.     Chest pain,  resolved  Elevated troponin, possible NSTEMI  -Transferred to [facility] for LHC with Dr. Amador  -Dr. Amador/Cardiology consulted, stress test with small area of ischemia.  With discussion with patient the decision was made to medically manage at this time.     Afib with RVR  Hx of Afib/Aflutter  -Toprol dosing adjusted per cardiology given episodes of intermittent RVR  -Continue Eliquis     HFrEF  Moderate MR  --Most recent Echo on file I April 2024: EF 40%, akinetic septal, anterior, and apical walls, severely dilated LA, moderate MR  --Follows with [facility], had been evaluated previously for Vanessa clip  --ECHO 6/1: LVEF 51 to 55%, borderline concentric LVH, left atrial volume moderately increased, mild MR, small less than 1 cm circumferential pericardial effusion     Leukocytosis, resolved.     Concern for osteomyelitis   Exposed tendon on RLE  --MRI left tib/fib showed cellulitis, possible osteomyelitis  --MRI right foot with cellulitis/edema  -- Arterial duplex abnormal; Vascular surgery consulted and recommended medical management with no vascular surgery intervention  --Eravacycline per ID  --MRSA screen +  --Follow-up blood and urine cultures  -Dr. Yeager following. Patient likely needs additional amputation of left lower extremity due to concern for poor wound healing, likely needs debridement of right lower extremity around the tendon with wound VAC placement.  Patient refusing further amputation of left lower extremity or any other procedures.   --ID following. Patient is not likely a candidate for outpatient IV antibiotics he will not be able to follow-up adequately and doesn't not have support at home. Plan is just to continue the eravacycline for now until we have some idea of his d/c plan.  Prior auth for Nuzyra but co-pay is still $2000.  Working to see if we can get that lower. If he goes home, may consider doxycycline BID.   -IF discharges to SNF, CVC will be removed and tunneled line will need to  be replaced  -Patient wishes to follow-up with wound care at .       C.diff colitis  -s/p fosphofmycin  -Continue p.o. vancomycin 125 mg 4 times a day for 14 days from admission through June 14 and then taper to 125 mg twice daily for 1 week and then 125 mg once daily for 1 week  -Follow-up with ID     Recent cholecystitis s/p cholecystostomy tube  --Request records from that hospitalization, apparently was placed at   --Consulted General Surgery for management of tube  --Apparently already has follow up at  6/6/24 General Surgery--will need to reschedule this     Chronic urinary retention  --Has Delaney in place, reported hx of urethral injury per OSH note (data deficit, no idea when this was Delaney placed), appears he follows with Urology at  and this is a chronic Delaney  --Replaced Delaney here     PAD  Hx of left BKA  --Continue ASA     Anemia  --Likely chronic due to renal disease  --Folate 4.36 --> supplementation started; vitamin B 12 level 755  --Hb 7.5 here, had been 9 at OSH     HTN  ESRD  --Nephrology consulted for dialysis   --Case management to work with patient and family in order to work on disposition location and finding an outpatient dialysis chair.    Hyperkalemia  -Potassium of 5.9 this morning, will give dose of lokelma. Not sure when next HD session is      Hypothyroidism  --TSH significantly elevated at 91 at OSH, still elevated here to 65, but improved  --Synthroid dose was increased to 75 mcg daily at OSH  --Continue Synthroid at above dose and recheck in 4-6 weeks      Multiple wounds  Sacral Decubitus ulcer, POA, Stage 3  --Wound care consulted  --Ortho as above     Poor IV access  --Dr. Beckman placed central line, will likely need tunneled line of antibiotics if plan is for rehab placement.      Expected Discharge Location and Transportation: Tentatively back to [place]  Expected Discharge   Expected Discharge Date: 6/12/2024; Expected Discharge Time:      DVT  prophylaxis:  Pharmacologic & mechanical VTE prophylaxis orders are present.         AM-PAC 6 Clicks Score (PT): 9 (06/12/24 9319)    CODE STATUS:   Code Status and Medical Interventions:   Ordered at: 05/31/24 3153     Code Status (Patient has no pulse and is not breathing):    CPR (Attempt to Resuscitate)     Medical Interventions (Patient has pulse or is breathing):    Full Support            Terrence Phillips MD  06/13/24

## 2024-06-13 NOTE — CASE MANAGEMENT/SOCIAL WORK
Continued Stay Note  [facility]     Patient Name: Tavo Gudino  MRN: [id number]  Today's Date: 6/13/2024    Admit Date: 5/31/2024    Plan: discharge plan   Discharge Plan       Row Name 06/13/24 9200       Plan    Plan discharge plan    Plan Comments I received a call from Rochelle( nikki for [facility] facilities) and [facility] at [facility] in [place] can offer pt a bed and have a HD spot for him pending insurance approval. I spoke with Vero Zuniga with [employer] and pt would need a new prescription for Nuzyra q 7 days-pts cost $100 per prescription.  ID's notes states  oral doxycycline 100 mg twice daily through July 12 (6 weeks) if unable to go on Nuzyra. I spoke with Rochelle(agnesson for [facility]) regarding antibiotic and she states it might be difficult to get a prescritpion every 7 days for Nuzyra and pt would need to take from own supply. I spoke with pt's spouse, Nanda on the phone and she is agreable to bed offer at [facility] at [facility] in [place]. She is aware pt will need insurance approval and states pt will need transportation at discharge. CM will follow up in morning.    Final Discharge Disposition Code 03 - skilled nursing facility (SNF)                   Discharge Codes    No documentation.                 Expected Discharge Date and Time       Expected Discharge Date Expected Discharge Time    Jun 12, 2024               Catrachita Robertson RN

## 2024-06-13 NOTE — CASE MANAGEMENT/SOCIAL WORK
Continued Stay Note  [facility]     Patient Name: Tavo Gudino  MRN: [id number]  Today's Date: 6/13/2024    Admit Date: 5/31/2024    Plan: discharge plan   Discharge Plan       Row Name 06/13/24 1241       Plan    Plan discharge plan    Plan Comments [facility] and [facility] in [place] are unable to offer pt a bed. I spoke with Rochelle(liason for [facility]) and she is checking with [facility] skilled nursing facilities in [place] that can accomodate pt's HD, along with oral antibiotic Nuzyra(per ID's note).  Nuzyra copay  now down to 100.00 for 7 days, then can get samples. Per Rochelle([facility]) pt may need to take his own Nuzyra to the facilitiy. ID cont to follow. CM will cont to follow    Final Discharge Disposition Code 03 - skilled nursing facility (SNF)                   Discharge Codes    No documentation.                 Expected Discharge Date and Time       Expected Discharge Date Expected Discharge Time    Jun 12, 2024               Catrachita Robertson RN

## 2024-06-13 NOTE — PROGRESS NOTES
" LOS: 13 days   Patient Care Team:  Hayden Weinstein MD as PCP - General (Nephrology)    Chief Complaint: ESRD  NSTEMI    Subjective     No complaints, denies chest pain or shortness of breath.     Had dialysis yesterday; plan is to keep him on TTS dialysis schedule.     History taken from: patient    Objective     Vital Sign Min/Max for last 24 hours  Temp  Min: 97.3 °F (36.3 °C)  Max: 98.3 °F (36.8 °C)   BP  Min: 118/56  Max: 195/78   Pulse  Min: 64  Max: 64   Resp  Min: 18  Max: 18   No data recorded   No data recorded   No data recorded     Flowsheet Rows      Flowsheet Row First Filed Value   Admission Height 185.4 cm (73\") Documented at 05/31/2024 1826   Admission Weight 84.8 kg (187 lb) Documented at 05/31/2024 1826            No intake/output data recorded.  I/O last 3 completed shifts:  In: -   Out: 2710 [Urine:1050; Drains:320]    Objective   Gen: Alert, NAD   HENT: NC, AT, EOMI   NECK: Supple, no JVD, Trachea midline   LUNGS: CTA bilaterally, non labored respirtation   CVS: S1/S2 audible, RRR, no murmur   Abd: Soft, NT, ND, BS+   : + Delaney   Ext: Left BKA  CNS: Alert, No focal deficit noted grossly  Psy: Cooperative  Skin: Warm, dry and intact      Results Review:     I reviewed the patient's new clinical results.    WBC No results found for: \"WBC\"     HGB No results found for: \"HGB\"     HCT No results found for: \"HCT\"     Platlets No results found for: \"LABPLAT\"   MCV No results found for: \"MCV\"         Sodium Sodium   Date Value Ref Range Status   06/12/2024 134 (L) 136 - 145 mmol/L Final   06/11/2024 133 (L) 136 - 145 mmol/L Final      Potassium Potassium   Date Value Ref Range Status   06/12/2024 5.0 3.5 - 5.2 mmol/L Final   06/11/2024 5.0 3.5 - 5.2 mmol/L Final      Chloride Chloride   Date Value Ref Range Status   06/12/2024 104 98 - 107 mmol/L Final   06/11/2024 102 98 - 107 mmol/L Final      CO2 CO2   Date Value Ref Range Status   06/12/2024 20.0 (L) 22.0 - 29.0 mmol/L Final   06/11/2024 21.0 (L) " "22.0 - 29.0 mmol/L Final      BUN BUN   Date Value Ref Range Status   06/12/2024 39 (H) 8 - 23 mg/dL Final   06/11/2024 29 (H) 8 - 23 mg/dL Final      Creatinine Creatinine   Date Value Ref Range Status   06/12/2024 3.60 (H) 0.76 - 1.27 mg/dL Final   06/11/2024 2.92 (H) 0.76 - 1.27 mg/dL Final      Calcium Calcium   Date Value Ref Range Status   06/12/2024 9.3 8.6 - 10.5 mg/dL Final   06/11/2024 8.6 8.6 - 10.5 mg/dL Final      PO4 No results found for: \"CAPO4\"   Albumin Albumin   Date Value Ref Range Status   06/12/2024 2.6 (L) 3.5 - 5.2 g/dL Final   06/11/2024 2.5 (L) 3.5 - 5.2 g/dL Final          Magnesium No results found for: \"MG\"       Uric Acid No results found for: \"URICACID\"     Medication Review: Yes    Assessment & Plan       NSTEMI (non-ST elevated myocardial infarction)    C. difficile colitis    History of cholecystitis s/p cholecystostomy tube    ESRD (end stage renal disease) on hemodialysis    Atrial fibrillation    Essential hypertension    Moderate malnutrition    Soft tissue infection    Acute osteomyelitis of left tibia      Assessment & Plan    ESRD: On TTS jonathan. HD through right IJ TDC. Still makes urine. Does have cole cath+ for chronic urinary retention. Patient the family Primary nephrologist (Dr Wan) was monitoring for renal recovery and residual renal function.      Volume status: No significant LE edema noted.      Afib w RVR: RVR during HD treatment. BP controlled. . No chest pain or sob.       Met acidosis: Mild. Management with HD.     Anemia: ACD. Transfuse at hb<7.  ALLISON on HD days     CAD: Transferred from OSH for evaluation of ACD. Cardiology following. EF 55%    Plan:  - No need for dialysis today, plan is to keep on TTS schedule. Still making decent urine output.   - Renal diet (Doesn't want to follow)  - ALLISON with HD   - Metoprolol for RVR during HD on PRN basis.     Arvin Curtis MD  06/13/24  14:08 EDT            "

## 2024-06-14 LAB
GLUCOSE BLDC GLUCOMTR-MCNC: 119 MG/DL (ref 70–130)
HAV IGM SERPL QL IA: NORMAL
HBV CORE IGM SERPL QL IA: NORMAL
HBV SURFACE AG SERPL QL IA: NORMAL
HCV AB SER QL: NORMAL

## 2024-06-14 PROCEDURE — 82948 REAGENT STRIP/BLOOD GLUCOSE: CPT

## 2024-06-14 PROCEDURE — 25010000002 ERAVACYCLINE DIHYDROCHLORIDE 50 MG RECONSTITUTED SOLUTION 1 EACH VIAL: Performed by: INTERNAL MEDICINE

## 2024-06-14 PROCEDURE — 99232 SBSQ HOSP IP/OBS MODERATE 35: CPT | Performed by: INTERNAL MEDICINE

## 2024-06-14 PROCEDURE — 25810000003 SODIUM CHLORIDE 0.9 % SOLUTION 250 ML FLEX CONT: Performed by: INTERNAL MEDICINE

## 2024-06-14 PROCEDURE — 80074 ACUTE HEPATITIS PANEL: CPT | Performed by: INTERNAL MEDICINE

## 2024-06-14 RX ORDER — VANCOMYCIN HYDROCHLORIDE 125 MG/1
125 CAPSULE ORAL DAILY
Qty: 7 CAPSULE | Refills: 0 | Status: DISCONTINUED | OUTPATIENT
Start: 2024-06-22 | End: 2024-06-20 | Stop reason: HOSPADM

## 2024-06-14 RX ORDER — VANCOMYCIN HYDROCHLORIDE 125 MG/1
125 CAPSULE ORAL 2 TIMES DAILY
Status: DISCONTINUED | OUTPATIENT
Start: 2024-06-14 | End: 2024-06-20 | Stop reason: HOSPADM

## 2024-06-14 RX ADMIN — Medication 10 ML: at 22:02

## 2024-06-14 RX ADMIN — VANCOMYCIN HYDROCHLORIDE 125 MG: 125 CAPSULE ORAL at 05:43

## 2024-06-14 RX ADMIN — ERAVACYCLINE 85 MG: 50 INJECTION, POWDER, LYOPHILIZED, FOR SOLUTION INTRAVENOUS at 09:02

## 2024-06-14 RX ADMIN — ERAVACYCLINE 85 MG: 50 INJECTION, POWDER, LYOPHILIZED, FOR SOLUTION INTRAVENOUS at 22:05

## 2024-06-14 RX ADMIN — Medication 10 ML: at 09:40

## 2024-06-14 RX ADMIN — Medication 1 CAPSULE: at 08:59

## 2024-06-14 RX ADMIN — APIXABAN 5 MG: 5 TABLET, FILM COATED ORAL at 08:49

## 2024-06-14 RX ADMIN — LEVOTHYROXINE SODIUM 75 MCG: 0.07 TABLET ORAL at 05:43

## 2024-06-14 RX ADMIN — METOPROLOL SUCCINATE 100 MG: 100 TABLET, EXTENDED RELEASE ORAL at 19:22

## 2024-06-14 RX ADMIN — VANCOMYCIN HYDROCHLORIDE 125 MG: 125 CAPSULE ORAL at 00:40

## 2024-06-14 RX ADMIN — ASPIRIN 81 MG: 81 TABLET, COATED ORAL at 08:50

## 2024-06-14 RX ADMIN — DILTIAZEM HYDROCHLORIDE 240 MG: 240 CAPSULE, COATED, EXTENDED RELEASE ORAL at 08:49

## 2024-06-14 RX ADMIN — SODIUM ZIRCONIUM CYCLOSILICATE 10 G: 10 POWDER, FOR SUSPENSION ORAL at 09:00

## 2024-06-14 RX ADMIN — FOLIC ACID 1 MG: 1 TABLET ORAL at 08:50

## 2024-06-14 RX ADMIN — VANCOMYCIN HYDROCHLORIDE 125 MG: 125 CAPSULE ORAL at 22:01

## 2024-06-14 RX ADMIN — ACETAMINOPHEN 650 MG: 325 TABLET ORAL at 10:57

## 2024-06-14 RX ADMIN — Medication 1 CAPSULE: at 22:01

## 2024-06-14 RX ADMIN — ACETAMINOPHEN 650 MG: 325 TABLET ORAL at 19:22

## 2024-06-14 RX ADMIN — METOPROLOL TARTRATE 5 MG: 5 INJECTION INTRAVENOUS at 22:01

## 2024-06-14 RX ADMIN — ACETAMINOPHEN 650 MG: 325 TABLET ORAL at 05:43

## 2024-06-14 RX ADMIN — FAMOTIDINE 10 MG: 20 TABLET, FILM COATED ORAL at 08:49

## 2024-06-14 RX ADMIN — APIXABAN 5 MG: 5 TABLET, FILM COATED ORAL at 22:01

## 2024-06-14 NOTE — PROGRESS NOTES
[facility] Medicine Services  PROGRESS NOTE    Patient Name: Tavo Gudino  : 1955  MRN: [id number]    Date of Admission: 2024  Primary Care Physician: Hayden Weinstein MD    Subjective   Subjective     CC:  Stump infection    HPI:  Resting in bed in no acute distress and overall comfortable.  Has no specific complaint.  No fever or chills.  No chest pain or palpitation or shortness of breath at rest.  No nausea vomiting or diarrhea.      Objective   Objective     Vital Signs:   Temp:  [97.2 °F (36.2 °C)-97.6 °F (36.4 °C)] 97.6 °F (36.4 °C)  Heart Rate:  [65-73] 66  Resp:  [18] 18  BP: (154-187)/() 187/94     Physical Exam:  Constitutional: No acute distress, chronically ill looking  Respiratory: Clear to auscultation bilaterally, respiratory effort normal   Cardiovascular: RR, no murmur gallop or rub audible.  Gastrointestinal: Positive bowel sounds, soft, nontender, nondistended, cholecystostomy tube in place with dark green drainage  Musculoskeletal: Left AKA in dressings, no clear sign of infection.  Psychiatric: Flat affect, cooperative  Neurologic: Oriented x 3, no focal deficits, speech is clear  Skin: Pale looking      Results Reviewed:  LAB RESULTS:      Lab 06/10/24  0440 24  04524  0433   WBC 7.82 7.76 7.37   HEMOGLOBIN 9.4* 9.1* 9.6*   HEMATOCRIT 30.6* 29.0* 30.3*   PLATELETS 176 172 204   NEUTROS ABS  --  4.36 3.59   IMMATURE GRANS (ABS)  --  0.11* 0.11*   LYMPHS ABS  --  2.32 2.59   MONOS ABS  --  0.70 0.79   EOS ABS  --  0.21 0.22   MCV 97.8* 94.5 95.0         Lab 24  0351 24  0716 06/10/24  0440 24  04524  0433   SODIUM 134* 133* 134* 133* 134*   POTASSIUM 5.0 5.0 6.0* 5.9* 5.1   CHLORIDE 104 102 105 103 101   CO2 20.0* 21.0* 20.0* 20.0* 25.0   ANION GAP 10.0 10.0 9.0 10.0 8.0   BUN 39* 29* 51* 41* 15   CREATININE 3.60* 2.92* 4.41* 3.88* 3.18*   EGFR 17.3* 22.2* 13.6* 15.8* 20.1*   GLUCOSE 71 75 64* 75 71   CALCIUM 9.3  8.6 9.2 9.3 9.7   MAGNESIUM  --   --   --   --  1.8   PHOSPHORUS 5.8* 4.6*  --   --   --          Lab 06/12/24  0351 06/11/24  0716   ALBUMIN 2.6* 2.5*                     Brief Urine Lab Results  (Last result in the past 365 days)        Color   Clarity   Blood   Leuk Est   Nitrite   Protein   CREAT   Urine HCG        06/02/24 1721 Yellow   Clear   Trace   Trace   Negative   100 mg/dL (2+)                   Microbiology Results Abnormal       Procedure Component Value - Date/Time    Blood Culture - Blood, Blood, Central Line [[id number]]  (Normal) Collected: 05/31/24 2237    Lab Status: Final result Specimen: Blood, Central Line Updated: 06/06/24 0801     Blood Culture No growth at 5 days    Blood Culture - Blood, Arm, Right [[id number]]  (Normal) Collected: 05/31/24 2237    Lab Status: Final result Specimen: Blood from Arm, Right Updated: 06/06/24 0745     Blood Culture No growth at 5 days    Urine Culture - Urine, Indwelling Urethral Catheter [[id number]]  (Normal) Collected: 06/02/24 1721    Lab Status: Final result Specimen: Urine from Indwelling Urethral Catheter Updated: 06/04/24 0955     Urine Culture No growth            No radiology results from the last 24 hrs    Results for orders placed during the hospital encounter of 05/31/24    Adult Transthoracic Echo Complete W/ Cont if Necessary Per Protocol    Interpretation Summary    Left ventricular systolic function is normal. Left ventricular ejection fraction appears to be 51 - 55%.    Left ventricular wall thickness is consistent with borderline concentric hypertrophy.    Left atrial volume is moderately increased.    There is mild calcification of the aortic valve.    Mild mitral valve regurgitation is present.    There is a small (<1cm) circumferential pericardial effusion.      Current medications:  Scheduled Meds:apixaban, 5 mg, Oral, BID  aspirin, 81 mg, Oral, Daily  collagenase, 1 Application, Topical, Q24H  dilTIAZem CD, 240 mg, Oral,  Q24H  eravacycline dihydrochloride (XERAVA) 85 mg in sodium chloride 0.9 % 250 mL (0.34 mg/mL) IVPB, 85 mg, Intravenous, Q12H  famotidine, 10 mg, Oral, Daily  folic acid, 1 mg, Oral, Daily  lactobacillus acidophilus, 1 capsule, Oral, BID  levothyroxine, 75 mcg, Oral, Q AM  Lidocaine HCl gel, , Topical, BID  metoprolol succinate XL, 100 mg, Oral, Q24H  pharmacy consult - MTM, , Does not apply, Daily  sodium chloride, 10 mL, Intravenous, Q12H  sodium zirconium cyclosilicate, 10 g, Oral, Daily  vancomycin, 125 mg, Oral, BID  [START ON 6/22/2024] vancomycin, 125 mg, Oral, Daily      Continuous Infusions:     PRN Meds:.  acetaminophen    heparin (porcine)    Lidocaine HCl gel    melatonin    metoprolol tartrate    ondansetron    sodium chloride    sodium chloride    Assessment & Plan   Assessment & Plan     Active Hospital Problems    Diagnosis  POA    **NSTEMI (non-ST elevated myocardial infarction) [I21.4]  Yes    Soft tissue infection [L08.9]  Unknown    Acute osteomyelitis of left tibia [M86.162]  Unknown    Moderate malnutrition [E44.0]  Yes    C. difficile colitis [A04.72]  Yes    History of cholecystitis s/p cholecystostomy tube [K81.9]  Yes    ESRD (end stage renal disease) on hemodialysis [N18.6]  Yes    Atrial fibrillation [I48.91]  Yes    Essential hypertension [I10]  Yes      Resolved Hospital Problems   No resolved problems to display.        Brief Hospital Course to date:  Tavo Gudino is a 69 y.o. male with hx of HTN, PAD, left BKA, ESRD on hemodialysis, Afib on Eliquis, recent acute cholecystitis s/p cholecystostomy tube placement at , chronic urinary retention with indwelling Delaney catheter, and previous hx of C.diff who presented from [facility] due to chest pain, elevated troponin, and Afib with RVR.  Found to have multiple other concerning issues including active C. difficile infection, concern for possible osteomyelitis, exposed tendon on right lower extremity.     Chest pain,  resolved  Elevated troponin, possible NSTEMI  -Transferred to [facility] for LHC with Dr. Amador  -Dr. Amador/Cardiology consulted, stress test with small area of ischemia.  With discussion with patient the decision was made to medically manage at this time.     Afib with RVR  Hx of Afib/Aflutter  -Toprol dosing adjusted per cardiology given episodes of intermittent RVR  -Continue Eliquis     HFrEF  Moderate MR  --Most recent Echo on file I April 2024: EF 40%, akinetic septal, anterior, and apical walls, severely dilated LA, moderate MR  --Follows with [facility], had been evaluated previously for Vanessa clip  --ECHO 6/1: LVEF 51 to 55%, borderline concentric LVH, left atrial volume moderately increased, mild MR, small less than 1 cm circumferential pericardial effusion     Leukocytosis, resolved.     Concern for osteomyelitis   Exposed tendon on RLE  --MRI left tib/fib showed cellulitis, possible osteomyelitis  --MRI right foot with cellulitis/edema  -- Arterial duplex abnormal; Vascular surgery consulted and recommended medical management with no vascular surgery intervention  --Eravacycline per ID  --MRSA screen +  --Follow-up blood and urine cultures  -Dr. Yeager following. Patient likely needs additional amputation of left lower extremity due to concern for poor wound healing, likely needs debridement of right lower extremity around the tendon with wound VAC placement.  Patient refusing further amputation of left lower extremity or any other procedures.   --ID following. Patient is not likely a candidate for outpatient IV antibiotics he will not be able to follow-up adequately and doesn't not have support at home. Plan is just to continue the eravacycline for now until we have some idea of his d/c plan.  Prior auth for Nuzyra but co-pay is still $2000.  Working to see if we can get that lower. If he goes home, may consider doxycycline BID.   -IF discharges to SNF, CVC will be removed and tunneled line will need to  be replaced  -Patient wishes to follow-up with wound care at .       C.diff colitis  -s/p fosphofmycin  -Continue p.o. vancomycin 125 mg 4 times a day for 14 days from admission through June 14 and then taper to 125 mg twice daily for 1 week and then 125 mg once daily for 1 week  -Follow-up with ID     Recent cholecystitis s/p cholecystostomy tube  --Request records from that hospitalization, apparently was placed at   --Consulted General Surgery for management of tube  --Apparently already has follow up at  6/6/24 General Surgery--will need to reschedule this     Chronic urinary retention  --Has Delaney in place, reported hx of urethral injury per OSH note (data deficit, no idea when this was Delaney placed), appears he follows with Urology at  and this is a chronic Delaney  --Replaced Delaney here     PAD  Hx of left BKA  --Continue ASA     Anemia  --Likely chronic due to renal disease  --Folate 4.36 --> supplementation started; vitamin B 12 level 755  --Hb 7.5 here, had been 9 at OSH     HTN  ESRD  --Nephrology consulted for dialysis   --Case management to work with patient and family in order to work on disposition location and finding an outpatient dialysis chair.    Hyperkalemia  -Potassium of 5.9 this morning, will give dose of lokelma. Not sure when next HD session is      Hypothyroidism  --TSH significantly elevated at 91 at OSH, still elevated here to 65, but improved  --Synthroid dose was increased to 75 mcg daily at OSH  --Continue Synthroid at above dose and recheck in 4-6 weeks      Multiple wounds  Sacral Decubitus ulcer, POA, Stage 3  --Wound care consulted  --Ortho as above     Poor IV access  --Dr. Beckman placed central line, will likely need tunneled line of antibiotics if plan is for rehab placement.      Expected Discharge Location and Transportation: Tentatively back to [place]  Expected Discharge   Expected Discharge Date: 6/12/2024; Expected Discharge Time:      DVT  prophylaxis:  Pharmacologic & mechanical VTE prophylaxis orders are present.         AM-PAC 6 Clicks Score (PT): 9 (06/13/24 6189)    CODE STATUS:   Code Status and Medical Interventions:   Ordered at: 05/31/24 1429     Code Status (Patient has no pulse and is not breathing):    CPR (Attempt to Resuscitate)     Medical Interventions (Patient has pulse or is breathing):    Full Support            Terrence Phillips MD  06/14/24

## 2024-06-14 NOTE — SIGNIFICANT NOTE
06/12/24 0745   Assessments (Pre/Post)   Consent Obtained yes   Safety other (see comments)   Hepatitis Status negative   Date Hepatitis Profile Obtained 04/13/24   Cognitive/Neuro/Behavioral WDL   Level of Consciousness Alert   Arousal Level opens eyes spontaneously   Orientation oriented x 4   Speech clear;spontaneous;logical   Mood/Behavior calm;cooperative   Motor Response   General Motor Response purposeful motor response   Safety WDL   Safety Factors wheels locked;upper side rails raised x 2;ID band on;call light in reach;bed in low position   Cardiovascular WDL   Cardiac Rhythm apical pulse regular   ECG   Rhythm atrial rhythm   Atrial Rhythm atrial fibrillation   Edema   Leg, Right Edema 1+ (Trace)   Leg, Left Edema 1+ (Trace)   Respiratory WDL   Rhythm/Pattern, Respiratory unlabored;pattern regular;depth regular;no shortness of breath reported   Expansion/Accessory Muscles/Retractions no use of accessory muscles;no retractions;expansion symmetric   Breath Sounds   All Lung Fields Breath Sounds Anterior:;Lateral:;diminished   Pain   Pain Goal/Acceptable Level 0   (0-10) Pain Rating: Rest 0   (0-10) Pain Rating: Activity 0   Interventions   Safety Promotion/Fall Prevention activity supervised;assistive device/personal items within reach;clutter free environment maintained;fall prevention program maintained;lighting adjusted;room organization consistent;safety round/check completed;toileting scheduled   Enhanced Safety Measures room near unit station   Medication Review/Management medications reviewed;high-risk medications identified   Machine Checks   Machine Number 6   Station Number [id number]   RO Number 6   Machine Temperature 96.8 °F (36 °C)   Conductivity 13.7   pH Level 7.1   Chloramines 0   Alarms Activated yes   Hemodialysis Prescription   Mode of Treatment HD (hemodialysis)   Treatment Duration (hours) 3.5   Blood Flow (BFR) 400   Dialysate (K) 2   Dialysate (Ca) 2.5   Na+ Program 137   Bicarb  "(mEq/L) 35   UF Goal 600   Heparin Concentration not applicable   Heparin Dosage none   Heparin Comment received in heparin drip   Dialysate Flow 600   Dialyzer Revaclear   Special Orders keep systolic BP above (specify);keep MAP above (specify)   Vitals   Initiation air foam detector engaged;all connections secured;parameters set, arterial;parameters set, venous;prime given (specify mL);saline line double clamped   Temp 97.6 °F (36.4 °C)   Temp src Oral   Heart Rate 66   /80   Noninvasive MAP (mmHg) 80   BP Location Right arm   BP Method Automatic   Patient Position Lying   Oxygen Therapy   SpO2 100 %   Device (Oxygen Therapy) room air   Treatment Assessment   Blood Flow Rate (BFR) 400   Venous Pressure () 140   Arterial Pressure (AP) -170   Hemodialysis, Fluids 310   Total Mean Pressure (TMP) 0   Ultrafiltration Rate (UFR) 260   Safety Check WDL WDL   Hemodialysis, Safety Factors access site visible and intact;vital signs stable;no supplemental oxygenation needed   Hemodialysis Cath Double   No Placement date: If unknown, DO NOT use \"Add Comment\" note or Placement time: If unknown, DO NOT use \"Add Comment\" note found.   Present on Admission? Select all that apply: Unknown placement date/time  Orientation: Right  Access Location: Subclavian   Site Assessment Clean;Dry;Intact   #1 Lumen Status Blood return noted;Flushed   #2 Lumen Status Blood return noted;Flushed   Line Care Cap changed;Connections checked and tightened   Dressing Type Antimicrobial dressing/disc   Dressing Status Clean;Dry;Intact   Dressing Change Due 06/16/24   Indication/Daily Review of Necessity CRRT/hemodialysis     "

## 2024-06-14 NOTE — NURSING NOTE
Extensive wound care teaching with wife.    Lots of discharge wound care supplies given.    All wounds are ember and getting smaller.      Imperative to continue with Santyl on the slough in the neck left knee stump.      Imperative to continue with the hydrogel Mepitel Mepilex Ag on the right lateral leg tendon exposure.        Good news is this is closing you can compare photos.  Woc to see if still here on Monday.

## 2024-06-14 NOTE — SIGNIFICANT NOTE
06/10/24 1240   Assessments (Pre/Post)   Consent Obtained yes   Safety other (see comments)   Hepatitis Status negative   Date Hepatitis Profile Obtained 04/13/24   Cognitive/Neuro/Behavioral WDL   Cognitive/Neuro/Behavioral WDL WDL   Level of Consciousness Alert   Arousal Level opens eyes spontaneously   Orientation oriented x 4   Speech clear;spontaneous   Mood/Behavior calm;behavior appropriate to situation   Motor Response   General Motor Response purposeful motor response   Safety WDL   Safety Factors wheels locked;upper side rails raised x 2;ID band on;call light in reach   Cardiovascular WDL   Cardiac Rhythm radial pulse irregular   ECG   Lead Monitored Lead II   Rhythm atrial rhythm   Edema   Scrotum Edema 3+ (Moderate)   Leg, Right Edema 3+ (Moderate)   Leg, Left Edema 3+ (Moderate)   Respiratory WDL   Rhythm/Pattern, Respiratory unlabored;depth regular;no shortness of breath reported;pattern regular   Expansion/Accessory Muscles/Retractions no use of accessory muscles;no retractions;expansion symmetric   Breath Sounds   All Lung Fields Breath Sounds Anterior:;Posterior:;equal bilaterally;diminished   Pain   Pain Goal/Acceptable Level 0   (0-10) Pain Rating: Rest 1   (0-10) Pain Rating: Activity 1   Interventions   Safety Promotion/Fall Prevention activity supervised;assistive device/personal items within reach;clutter free environment maintained;fall prevention program maintained;lighting adjusted;room organization consistent;safety round/check completed;toileting scheduled   Enhanced Safety Measures room near unit station   Medication Review/Management medications reviewed;high-risk medications identified   Machine Checks   Machine Number 5   Station Number [id number]   RO Number 1   Machine Temperature 96.8 °F (36 °C)   Conductivity 13.7   pH Level 7.2   Chloramines 0   Alarms Activated yes   Hemodialysis Prescription   Mode of Treatment HD (hemodialysis)   Treatment Duration (hours) 3   Blood Flow (BFR)  "400   Dialysate (K) 2   Dialysate (Ca) 2.5   Na+ Program 137   Bicarb (mEq/L) 30   UF Goal 500   Heparin Concentration not applicable   Heparin Dosage none   Heparin Comment received in heparin drip   Dialysate Flow 700   Dialyzer Revaclear   Special Orders keep systolic BP above (specify);keep MAP above (specify)   Vitals   Initiation air foam detector engaged;all connections secured;parameters set, arterial;parameters set, venous;prime given (specify mL);saline line double clamped   Temp 97.3 °F (36.3 °C)   Temp src Axillary   Heart Rate 58   Heart Rate Source Monitor   Resp 16   Resp Rate Source Visual   BP (!) 184/76   BP Location Right arm   BP Method Automatic   Patient Position Lying   Oxygen Therapy   SpO2 100 %   Pulse Oximetry Type Continuous   Device (Oxygen Therapy) room air   Treatment Assessment   Blood Flow Rate (BFR) 400   Venous Pressure () 150   Arterial Pressure (AP) -210   Hemodialysis, Fluids 310   Total Mean Pressure (TMP) 0   Ultrafiltration Rate (UFR) 100   Safety Check WDL WDL   Hemodialysis, Safety Factors access site visible and intact;vital signs stable;no supplemental oxygenation needed   Hemodialysis Cath Double   No Placement date: If unknown, DO NOT use \"Add Comment\" note or Placement time: If unknown, DO NOT use \"Add Comment\" note found.   Present on Admission? Select all that apply: Unknown placement date/time  Orientation: Right  Access Location: Subclavian   Site Assessment Clean;Dry;Intact   #1 Lumen Status Blood return noted;Flushed   #2 Lumen Status Blood return noted;Flushed   Line Care Cap changed;Connections checked and tightened   Dressing Type Antimicrobial dressing/disc;Transparent   Dressing Status Clean;Dry;Intact   Dressing Change Due 06/16/24   Indication/Daily Review of Necessity CRRT/hemodialysis     "

## 2024-06-14 NOTE — PROGRESS NOTES
Tavo Gudino  1955  [id number]    Reason for Consultation: recurrent C diff. Osteomyelitis     History of present illness:    Patient is a 69 y.o. male, with PMH chronic cholecystitis( cholecystotomy tube) DM, ESRD on HD, HTN, PVD s/p Left  BKA.  All recent care done at [facility] in [facility].    Presented to OSH with chest pain, found to be in afib with RVR possible NSTEMi, transferred to [facility] for LHC.  Developed diarrhea prior to transfer, C diff toxin negative, positive antigen, started on oral Vancomycin. He had been treated with Kayexalate for hyperkalemia, as well as Colace.  Last positive C diff PCR 4/6/24 from [facility]. Noted exposed tendon RLE wound , and ulcer of right heel, sacral area, and left residual stump. He has been afebrile. Admitting labs with WBC 22, plt 249, ALT 10 AST 10, Scr 3.47, UA with TNTC WBCs, urine culture with gram negative bacilli, blood cultures no growth. MRI Left with edema throughout soft tissue of stump, no abscess,subtle early changes of superimposed osteomyelitis distal osseous stump not excluded.  Right with diffuse soft tissue cellulitis, possible early osteomyelitis lateral malleolus, no definite evidence of osteomyelitis.  Started on Ceftriaxone Daptomycin, Flagyl, and oral Vancomycin and we were consulted for evaluation and treatment. He has had an indwelling cole catheter since April, just recently changed.  He continues to have numerous diarrhea stools and abdominal cramping.  No fever.      6/3/24: Frustrated with prolonged hospitalization but slow overnight.  Awaiting possible cardiac workup.  Legs stable.  Cole catheter in place.  Denies abdominal pain, suprapubic pain at this time.  He said he only had 1 bowel movement yesterday evening but has had 2 loose bowel movement so far today.  Overall he thinks his stool frequency has improved    6/5/2024: Resting comfortably.  Cardiac workup completed.   orthopedic surgery recommended  additional debridement of the amputation site but patient refusing stating he would like to follow-up with his previous wound care providers.  Loose stools slowing down and he has less nausea    6/6/24: Stool frequency slowing down.  Minimal abdominal discomfort.  Still has biliary drain.  Tolerating IV antibiotics. Discussed discharge planning again with patient today: He has numerous needs and will be difficult to manage at home but still reluctant to go back to prior skilled nursing facility.    6/10/24: Stool frequency is overall improved.  Had a bowel movement this morning but cannot tell me if it was liquid or solid.  Only 1 bowel movement charted in past 24 hours.  No abdominal pain.  Biliary drain remains in place.  No worsening swelling or pain in either lower extremity.  Tolerating IV antibiotics through left IJ CVC.  Patient agreeable to skilled nursing facility    6/12/24: Afebrile.  Blood pressure stable.  Seen in dialysis and complains of feeling cold during dialysis but otherwise no chills.  Still refusing  placement of tunneled line to facilitate IV antibiotics after discharge but is agreeable to taking oral antibiotics. Complex discharge planning in process.  Stool frequency improved.  No worsening swelling, pain, drainage from the lower extremity    6/14/24: No new complaints.  Resting comfortably.  Stool frequency improved.  No abdominal pain.  Leg swelling, pain slowly improving    ROS:  Afebrile and hemodynamically stable. No nausea. No rash. See above, otherwise negative.      Allergies   Allergen Reactions    Penicillins Hives     Received ceftriaxone 6/1/24    Levothyroxine Unknown - Low Severity    Hydromorphone Other (See Comments)     Confusion, encephalopathy per wife         Medication:    Current Facility-Administered Medications:     acetaminophen (TYLENOL) tablet 650 mg, 650 mg, Oral, Q4H PRN, Genny Saeed MD, 650 mg at 06/14/24 0543    apixaban (ELIQUIS) tablet 5 mg, 5 mg, Oral,  BID, Otilia Tay MD, 5 mg at 06/14/24 0849    aspirin EC tablet 81 mg, 81 mg, Oral, Daily, Keren Escalona PA-C, 81 mg at 06/14/24 0850    collagenase ointment 1 Application, 1 Application, Topical, Q24H, Genny Saeed MD, 1 Application at 06/13/24 0903    dilTIAZem CD (CARDIZEM CD) 24 hr capsule 240 mg, 240 mg, Oral, Q24H, Fred Amador MD, 240 mg at 06/14/24 0849    eravacycline dihydrochloride (XERAVA) 85 mg in sodium chloride 0.9 % 250 mL (0.34 mg/mL) IVPB, 85 mg, Intravenous, Q12H, José Fuentes MD, Last Rate: 250 mL/hr at 06/14/24 0902, 85 mg at 06/14/24 0902    famotidine (PEPCID) tablet 10 mg, 10 mg, Oral, Daily, Otilia Tay MD, 10 mg at 06/14/24 0849    folic acid (FOLVITE) tablet 1 mg, 1 mg, Oral, Daily, Sonya Banks MD, 1 mg at 06/14/24 0850    heparin (porcine) injection 2,000 Units, 2,000 Units, Intracatheter, PRN, Efe, Jose Hall MD, 2,000 Units at 06/04/24 1101    lactobacillus acidophilus (RISAQUAD) capsule 1 capsule, 1 capsule, Oral, BID, José Fuentes MD, 1 capsule at 06/14/24 0859    levothyroxine (SYNTHROID, LEVOTHROID) tablet 75 mcg, 75 mcg, Oral, Q AM, Genny Saeed MD, 75 mcg at 06/14/24 0543    Lidocaine HCl gel (XYLOCAINE) urethral/mucosal syringe, , Topical, PRN, Genny Saeed MD, Given at 06/01/24 0609    lidocaine HCL topical jelly USP 2% syringe 11 mL, , Topical, BID, Genny Saeed MD, Given at 06/13/24 2210    melatonin tablet 5 mg, 5 mg, Oral, Nightly PRN, Radha Vasques APRN, 5 mg at 06/13/24 2209    metoprolol succinate XL (TOPROL-XL) 24 hr tablet 100 mg, 100 mg, Oral, Q24H, Keren Escalona PA-C, 100 mg at 06/12/24 1738    metoprolol tartrate (LOPRESSOR) injection 5 mg, 5 mg, Intravenous, Q6H PRN, Hayden Weinstein MD, 5 mg at 06/04/24 1020    ondansetron (ZOFRAN) injection 4 mg, 4 mg, Intravenous, Q6H PRN, Sita Santizo PA-C, 4 mg at 06/08/24 0456    Pharmacy Consult - MT, , Does not apply, Daily,  Freddie Sutton, [facility], Given at 24 0853    Pharmacy Consult - Pharmacy to dose, , Does not apply, Continuous PRN, José Fuentes MD    sodium chloride 0.9 % flush 10 mL, 10 mL, Intravenous, Q12H, Genny Saeed MD, 10 mL at 24 0940    sodium chloride 0.9 % flush 10 mL, 10 mL, Intravenous, PRN, Genny Saeed MD    sodium chloride 0.9 % infusion 40 mL, 40 mL, Intravenous, PRN, Genny Saeed MD    sodium zirconium cyclosilicate (LOKELMA) packet 10 g, 10 g, Oral, Daily, Arvin Curtis MD, 10 g at 24 0900    vancomycin (VANCOCIN) capsule 125 mg, 125 mg, Oral, Q6H, José Fuentes MD, 125 mg at 24 0543    Antibiotics:  Anti-Infectives (From admission, onward)      Ordered     Dose/Rate Route Frequency Start Stop    06/10/24 1030  eravacycline dihydrochloride (XERAVA) 85 mg in sodium chloride 0.9 % 250 mL (0.34 mg/mL) IVPB        Note to Pharmacy: !! Ensure final concentration of 0.2 - 0.6 mg/mL !!   Ordering Provider: José Fuentes MD    85 mg  250 mL/hr over 60 Minutes Intravenous Every 12 Hours 06/10/24 1800 06/15/24 2059    06/10/24 1037  Omadacycline Tosylate 150 MG tablet        Ordering Provider: José Fuentes MD    300 mg Oral Daily 06/10/24 0000      24 1247  fosfomycin (MONUROL) packet 3 g        Ordering Provider: José Fuentes MD    3 g Oral Once 24 1400 24 1450    24 1421  eravacycline dihydrochloride (XERAVA) 85 mg in sodium chloride 0.9 % 250 mL (0.34 mg/mL) IVPB        Note to Pharmacy: !! Ensure final concentration of 0.2 - 0.6 mg/mL !!   Ordering Provider: José Fuentes MD    85 mg  250 mL/hr over 60 Minutes Intravenous Every 12 Hours 24 1600 06/10/24 0646    24 1503  vancomycin (VANCOCIN) capsule 125 mg        Ordering Provider: José Fuentes MD    125 mg Oral Every 6 Hours Scheduled 24 1800 24 7196            Physical Exam:   Vital Signs  Temp (24hrs), Av.4 °F (36.3 °C),  Min:97.2 °F (36.2 °C), Max:97.6 °F (36.4 °C)    Temp  Min: 97.2 °F (36.2 °C)  Max: 97.6 °F (36.4 °C)  BP  Min: 125/53  Max: 187/94  Pulse  Min: 55  Max: 73  Resp  Min: 18  Max: 18  No data recorded    GENERAL: Awake and alert, chronically ill-appearing but not acutely toxic  HEENT: Normocephalic, atraumatic.  PERRL. EOMI. No conjunctival injection. No icterus. Edentulous   NECK: Supple   HEART: RRR; No murmur,  .   LUNGS: Clear to auscultation bilaterally without wheezing, rales, rhonchi. Normal respiratory effort. Nonlabored.   ABDOMEN: Soft,  tender  nondistended: Biliary drain in right upper quadrant  EXT: Left BKA site dressed, not directly examined. No new images in chart  Right foot lesions dressed. No new images in chart  :  Delaney catheter with clear urine  MSK: No joint effusions or erythema  SKIN: Warm and dry    NEURO: Oriented to PPT.  Motor 5/5 strength  PSYCHIATRIC: Normal insight and judgment. Cooperative with PE  Dialysis line in the right chest.  Left IJ CVC    Laboratory Data    Results from last 7 days   Lab Units 06/10/24  0440 06/09/24  0451 06/08/24  0433   WBC 10*3/mm3 7.82 7.76 7.37   HEMOGLOBIN g/dL 9.4* 9.1* 9.6*   HEMATOCRIT % 30.6* 29.0* 30.3*   PLATELETS 10*3/mm3 176 172 204     Results from last 7 days   Lab Units 06/12/24  0351   SODIUM mmol/L 134*   POTASSIUM mmol/L 5.0   CHLORIDE mmol/L 104   CO2 mmol/L 20.0*   BUN mg/dL 39*   CREATININE mg/dL 3.60*   GLUCOSE mg/dL 71   CALCIUM mg/dL 9.3                                   Estimated Creatinine Clearance: 22.6 mL/min (A) (by C-G formula based on SCr of 3.6 mg/dL (H)).      Microbiology:  Microbiology Results (last 10 days)       ** No results found for the last 240 hours. **            Radiology:  Imaging Results (Last 72 Hours)       ** No results found for the last 72 hours. **          5/28, 5/31 blood cultures from [place], negative      Impression:   C diff colitis initially diagnosed in April 2024. Was given Lactulose, Stool  softeners in [place] and developed worsening diarrhea, with positive antigen, negative EIA toxin so possible Colonization vs true infection.  Repeat testing at [facility] with positive PCR and positive EIA antigen.  Patient says stool frequency is overall improving, especially after switched to eravacycline. Improving   NSTEMI,  Cardiology evaluated  ESRD on HD: via tunneled HD catheter. still makes urine  Severe peripheral vascular disease: No intervention needed per vascular surgery  S/p left BKA 4/2024, with dehiscence of the amputation site and possible soft tissue infection  Possible left tibial early osteomyelitis:  by MRI.  Dr. Yeager recommended additional I&D surgery which the patient refused. CRP improving   Left lower extremity wound, with exposed tendon-  early osteomyelitis of the lateral malleolus not excluded by MRI per radiology.  Lesions dry on exam  Chronic cholecystitis, s/p percutaneous cholecystotomy tube- [facility]  Pyuria, Enterobacter bacteriuria: Had chronic indwelling Delaney catheter that was in place for almost 2 months prior to being changed on admission 5/31.  Culture from admission with Enterobacter.  Treated with fosfomycin x 1.  Repeat culture 6/2 negative  Sacral pressure ulcers  Social barriers to care: Inadequate transportation for follow-up appointments    PLAN/RECOMMENDATIONS:   Follow CBC, CMP, CRP      Continue IV eravacycline while inpatient    Completing 14-day course of treatment dose oral vancomycin today.  Tomorrow transition to 125 mg twice daily for 1 week, and then 125 mg once daily for 1 week, then follow-up in the office to reassess  - probiotic BID     Patient agreeable to skilled nursing facility placement and case management working on options.  Complex discharge planning due to numerous committees and high level of needs after discharge.    Patient still refusing tunneled line placement to facilitate IV antibiotics after discharge but is agreeable to taking oral  antibiotics.  If the accepting facility can accommodate him on PO Nuzyra through June 28 (4 weeks since admission), that is the preferred antibiotic regimen.  Case management and pharmacy working on prior authorization.  Alternative option if Nuzyra is cost prohibitive is oral doxycycline 100 mg twice daily through July 12 (6 weeks)     Overall prognosis is poor due to numerous comorbidities, refusal of recommended aggressive surgical debridement and IV antibiotics.  High risk for higher level amputation and recurrent sepsis.         Okay to discharge from my perspective once arrangements have been made. Remove IJ CVC prior to discharge. Schedule outpatient follow-up with me 2 weeks after discharge        José Fuentes MD  6/14/2024  09:51 EDT

## 2024-06-14 NOTE — PLAN OF CARE
Goal Outcome Evaluation:   Alert and oriented x4. UOP adequate at 450ml. Gallbladder drain with 275ml of dark green output. Remains in afib. VSS. Turned q2 off wounds.Due to have dialysis today. Awake in bed asking for breakfast. Safety alarms in place and activated.

## 2024-06-14 NOTE — CASE MANAGEMENT/SOCIAL WORK
Discharge Planning Assessment  [facility]     Patient Name: Tavo Gudino  MRN: [id number]  Today's Date: 6/14/2024    Admit Date: 5/31/2024    Plan: Ongoing d/c plan   Discharge Needs Assessment    No documentation.                  Discharge Plan       Row Name 06/14/24 1428       Plan    Plan Ongoing d/c plan    Patient/Family in Agreement with Plan yes    Plan Comments The plan is to transfer to [facility] once on-site hemodialysis is set up through the facility and I faxed hemodialysis flow sheets to [facility] today and asked that [facility] panel to be drawn. Once all that is done then the facility will work on getting insurance approval for inpatient rehab. Patient here thru the [facility] and patient will need stretcher transport. CM working  I spoke with patient and his wife at the  and updated.  Of note, Patient's PO Nuzyra has been pre-auth for two weeks and patient will need to take this med with him to [facility]. So, needs to be meds to beds and can't go without this med in hand.     Final Discharge Disposition Code 03 - skilled nursing facility (SNF)                  Continued Care and Services - Admitted Since 5/31/2024       Destination       Service Provider Request Status Selected Services Address Phone Fax Patient Preferred    [facility] Pending - No Request Sent N/A [address] [facility] [address] [phone] --    [facility] Declined  Cannot meet patient's needs N/A [address] [facility] [address] [phone] --    [facility] Declined N/A [address] [facility] [address] [phone] --              Dialysis/Infusion       Service Provider Request Status Selected Services Address Phone Fax Patient Preferred    [facility] Pending - No Request Sent N/A [address] [place] [address] [phone] --                   Expected Discharge Date and Time       Expected Discharge Date Expected Discharge Time    Jun 12, 2024            Demographic Summary    No documentation.                  Functional Status    No documentation.                  Psychosocial    No documentation.                  Abuse/Neglect    No documentation.                  Legal    No documentation.                  Substance Abuse    No documentation.                  Patient Forms    No documentation.                     Clarissa Jasso RN

## 2024-06-14 NOTE — PROGRESS NOTES
" LOS: 14 days   Patient Care Team:  Hayden Weinstein MD as PCP - General (Nephrology)    Chief Complaint: ESRD  NSTEMI    Subjective     Doing well. Plan for HD in AM.        History taken from: patient    Objective     Vital Sign Min/Max for last 24 hours  Temp  Min: 97.2 °F (36.2 °C)  Max: 97.6 °F (36.4 °C)   BP  Min: 125/53  Max: 187/94   Pulse  Min: 55  Max: 73   Resp  Min: 18  Max: 18   No data recorded   No data recorded   No data recorded     Flowsheet Rows      Flowsheet Row First Filed Value   Admission Height 185.4 cm (73\") Documented at 05/31/2024 1826   Admission Weight 84.8 kg (187 lb) Documented at 05/31/2024 1826            No intake/output data recorded.  I/O last 3 completed shifts:  In: 730 [P.O.:480; IV Piggyback:250]  Out: 1145 [Urine:850; Drains:295]    Objective   Gen: Alert, NAD   HENT: NC, AT, EOMI   NECK: Supple, no JVD, Trachea midline   LUNGS: CTA bilaterally, non labored respirtation   CVS: S1/S2 audible, RRR, no murmur   Abd: Soft, NT, ND, BS+   : + Delaney   Ext: Left BKA  CNS: Alert, No focal deficit noted grossly  Psy: Cooperative  Skin: Warm, dry and intact      Results Review:     I reviewed the patient's new clinical results.    WBC No results found for: \"WBC\"     HGB No results found for: \"HGB\"     HCT No results found for: \"HCT\"     Platlets No results found for: \"LABPLAT\"   MCV No results found for: \"MCV\"         Sodium Sodium   Date Value Ref Range Status   06/12/2024 134 (L) 136 - 145 mmol/L Final      Potassium Potassium   Date Value Ref Range Status   06/12/2024 5.0 3.5 - 5.2 mmol/L Final      Chloride Chloride   Date Value Ref Range Status   06/12/2024 104 98 - 107 mmol/L Final      CO2 CO2   Date Value Ref Range Status   06/12/2024 20.0 (L) 22.0 - 29.0 mmol/L Final      BUN BUN   Date Value Ref Range Status   06/12/2024 39 (H) 8 - 23 mg/dL Final      Creatinine Creatinine   Date Value Ref Range Status   06/12/2024 3.60 (H) 0.76 - 1.27 mg/dL Final      Calcium Calcium   Date " "Value Ref Range Status   06/12/2024 9.3 8.6 - 10.5 mg/dL Final      PO4 No results found for: \"CAPO4\"   Albumin Albumin   Date Value Ref Range Status   06/12/2024 2.6 (L) 3.5 - 5.2 g/dL Final          Magnesium No results found for: \"MG\"       Uric Acid No results found for: \"URICACID\"     Medication Review: Yes    Assessment & Plan       NSTEMI (non-ST elevated myocardial infarction)    C. difficile colitis    History of cholecystitis s/p cholecystostomy tube    ESRD (end stage renal disease) on hemodialysis    Atrial fibrillation    Essential hypertension    Moderate malnutrition    Soft tissue infection    Acute osteomyelitis of left tibia      Assessment & Plan    ESRD: On TTS jonathan. HD through right IJ TDC. Still makes urine. Does have cole cath+ for chronic urinary retention. Patient the family Primary nephrologist (Dr Wan) was monitoring for renal recovery and residual renal function.      Volume status: No significant LE edema noted.      Afib w RVR: RVR during HD treatment. BP controlled. . No chest pain or sob.       Met acidosis: Mild. Management with HD.     Anemia: ACD. Transfuse at hb<7.  ALLISON on HD days     CAD: Transferred from OSH for evaluation of ACD. Cardiology following. EF 55%    Plan:  - Hd per TTS jonathan.  - Renal diet (Doesn't want to follow)  - ALLISON with HD   - Metoprolol for RVR during HD on PRN basis.     Hayden Weinstein MD  06/14/24  12:41 EDT            "

## 2024-06-15 ENCOUNTER — APPOINTMENT (OUTPATIENT)
Dept: NEPHROLOGY | Facility: HOSPITAL | Age: 69
End: 2024-06-15
Payer: MEDICARE

## 2024-06-15 LAB
ANION GAP SERPL CALCULATED.3IONS-SCNC: 13 MMOL/L (ref 5–15)
BASOPHILS # BLD AUTO: 0.06 10*3/MM3 (ref 0–0.2)
BASOPHILS NFR BLD AUTO: 0.7 % (ref 0–1.5)
BUN SERPL-MCNC: 55 MG/DL (ref 8–23)
BUN/CREAT SERPL: 16.6 (ref 7–25)
CALCIUM SPEC-SCNC: 8.3 MG/DL (ref 8.6–10.5)
CHLORIDE SERPL-SCNC: 101 MMOL/L (ref 98–107)
CO2 SERPL-SCNC: 19 MMOL/L (ref 22–29)
CREAT SERPL-MCNC: 3.31 MG/DL (ref 0.76–1.27)
DEPRECATED RDW RBC AUTO: 57.9 FL (ref 37–54)
EGFRCR SERPLBLD CKD-EPI 2021: 19.1 ML/MIN/1.73
EOSINOPHIL # BLD AUTO: 0.13 10*3/MM3 (ref 0–0.4)
EOSINOPHIL NFR BLD AUTO: 1.4 % (ref 0.3–6.2)
ERYTHROCYTE [DISTWIDTH] IN BLOOD BY AUTOMATED COUNT: 16.5 % (ref 12.3–15.4)
GLUCOSE SERPL-MCNC: 82 MG/DL (ref 65–99)
HCT VFR BLD AUTO: 32.2 % (ref 37.5–51)
HGB BLD-MCNC: 10.1 G/DL (ref 13–17.7)
IMM GRANULOCYTES # BLD AUTO: 0.04 10*3/MM3 (ref 0–0.05)
IMM GRANULOCYTES NFR BLD AUTO: 0.4 % (ref 0–0.5)
LYMPHOCYTES # BLD AUTO: 2.62 10*3/MM3 (ref 0.7–3.1)
LYMPHOCYTES NFR BLD AUTO: 28.6 % (ref 19.6–45.3)
MCH RBC QN AUTO: 30.2 PG (ref 26.6–33)
MCHC RBC AUTO-ENTMCNC: 31.4 G/DL (ref 31.5–35.7)
MCV RBC AUTO: 96.4 FL (ref 79–97)
MONOCYTES # BLD AUTO: 0.75 10*3/MM3 (ref 0.1–0.9)
MONOCYTES NFR BLD AUTO: 8.2 % (ref 5–12)
NEUTROPHILS NFR BLD AUTO: 5.55 10*3/MM3 (ref 1.7–7)
NEUTROPHILS NFR BLD AUTO: 60.7 % (ref 42.7–76)
NRBC BLD AUTO-RTO: 0 /100 WBC (ref 0–0.2)
PLATELET # BLD AUTO: 178 10*3/MM3 (ref 140–450)
PMV BLD AUTO: 10.5 FL (ref 6–12)
POTASSIUM SERPL-SCNC: 4.6 MMOL/L (ref 3.5–5.2)
RBC # BLD AUTO: 3.34 10*6/MM3 (ref 4.14–5.8)
SODIUM SERPL-SCNC: 133 MMOL/L (ref 136–145)
WBC NRBC COR # BLD AUTO: 9.15 10*3/MM3 (ref 3.4–10.8)

## 2024-06-15 PROCEDURE — 80048 BASIC METABOLIC PNL TOTAL CA: CPT | Performed by: INTERNAL MEDICINE

## 2024-06-15 PROCEDURE — 25810000003 SODIUM CHLORIDE 0.9 % SOLUTION 250 ML FLEX CONT: Performed by: INTERNAL MEDICINE

## 2024-06-15 PROCEDURE — 99232 SBSQ HOSP IP/OBS MODERATE 35: CPT | Performed by: NURSE PRACTITIONER

## 2024-06-15 PROCEDURE — 85025 COMPLETE CBC W/AUTO DIFF WBC: CPT | Performed by: INTERNAL MEDICINE

## 2024-06-15 PROCEDURE — 25010000002 ERAVACYCLINE DIHYDROCHLORIDE 50 MG RECONSTITUTED SOLUTION 1 EACH VIAL: Performed by: INTERNAL MEDICINE

## 2024-06-15 RX ORDER — ALBUMIN (HUMAN) 12.5 G/50ML
12.5 SOLUTION INTRAVENOUS AS NEEDED
Status: ACTIVE | OUTPATIENT
Start: 2024-06-15 | End: 2024-06-15

## 2024-06-15 RX ORDER — ALBUMIN (HUMAN) 12.5 G/50ML
12.5 SOLUTION INTRAVENOUS AS NEEDED
Status: DISCONTINUED | OUTPATIENT
Start: 2024-06-15 | End: 2024-06-15 | Stop reason: SDUPTHER

## 2024-06-15 RX ADMIN — DILTIAZEM HYDROCHLORIDE 240 MG: 240 CAPSULE, COATED, EXTENDED RELEASE ORAL at 11:10

## 2024-06-15 RX ADMIN — FAMOTIDINE 10 MG: 20 TABLET, FILM COATED ORAL at 11:10

## 2024-06-15 RX ADMIN — Medication 1 CAPSULE: at 11:10

## 2024-06-15 RX ADMIN — METOPROLOL SUCCINATE 100 MG: 100 TABLET, EXTENDED RELEASE ORAL at 16:35

## 2024-06-15 RX ADMIN — LIDOCAINE HYDROCHLORIDE: 20 JELLY TOPICAL at 11:11

## 2024-06-15 RX ADMIN — LEVOTHYROXINE SODIUM 75 MCG: 0.07 TABLET ORAL at 05:16

## 2024-06-15 RX ADMIN — APIXABAN 5 MG: 5 TABLET, FILM COATED ORAL at 11:10

## 2024-06-15 RX ADMIN — ACETAMINOPHEN 650 MG: 325 TABLET ORAL at 11:48

## 2024-06-15 RX ADMIN — SODIUM ZIRCONIUM CYCLOSILICATE 10 G: 10 POWDER, FOR SUSPENSION ORAL at 11:10

## 2024-06-15 RX ADMIN — Medication 10 ML: at 11:10

## 2024-06-15 RX ADMIN — VANCOMYCIN HYDROCHLORIDE 125 MG: 125 CAPSULE ORAL at 11:10

## 2024-06-15 RX ADMIN — ASPIRIN 81 MG: 81 TABLET, COATED ORAL at 11:10

## 2024-06-15 RX ADMIN — Medication 10 ML: at 22:06

## 2024-06-15 RX ADMIN — ERAVACYCLINE 85 MG: 50 INJECTION, POWDER, LYOPHILIZED, FOR SOLUTION INTRAVENOUS at 22:05

## 2024-06-15 RX ADMIN — Medication 5 MG: at 21:56

## 2024-06-15 RX ADMIN — APIXABAN 5 MG: 5 TABLET, FILM COATED ORAL at 21:56

## 2024-06-15 RX ADMIN — LIDOCAINE HYDROCHLORIDE: 20 JELLY TOPICAL at 21:56

## 2024-06-15 RX ADMIN — ACETAMINOPHEN 650 MG: 325 TABLET ORAL at 05:15

## 2024-06-15 RX ADMIN — VANCOMYCIN HYDROCHLORIDE 125 MG: 125 CAPSULE ORAL at 21:56

## 2024-06-15 RX ADMIN — FOLIC ACID 1 MG: 1 TABLET ORAL at 11:10

## 2024-06-15 RX ADMIN — Medication 1 CAPSULE: at 21:56

## 2024-06-15 RX ADMIN — ERAVACYCLINE 85 MG: 50 INJECTION, POWDER, LYOPHILIZED, FOR SOLUTION INTRAVENOUS at 11:23

## 2024-06-15 NOTE — PROGRESS NOTES
" LOS: 15 days   Patient Care Team:  Hayden Weinstein MD as PCP - General (Nephrology)    Chief Complaint: ESRD  NSTEMI    Subjective     Seen on HD tolerating well so far. Goal UF 2 liter as tolerated. Bp stable. Good access pressure. Bicarb bath adjusted.           History taken from: patient    Objective     Vital Sign Min/Max for last 24 hours  Temp  Min: 97.5 °F (36.4 °C)  Max: 97.6 °F (36.4 °C)   BP  Min: 106/60  Max: 198/97   Pulse  Min: 66  Max: 86   Resp  Min: 16  Max: 18   SpO2  Min: 94 %  Max: 99 %   No data recorded   No data recorded     Flowsheet Rows      Flowsheet Row First Filed Value   Admission Height 185.4 cm (73\") Documented at 05/31/2024 1826   Admission Weight 84.8 kg (187 lb) Documented at 05/31/2024 1826            I/O this shift:  In: 240 [P.O.:240]  Out: -   I/O last 3 completed shifts:  In: 490 [P.O.:240; IV Piggyback:250]  Out: 1925 [Urine:1500; Drains:425]    Objective   Gen: Alert, NAD   HENT: NC, AT, EOMI   NECK: Supple, no JVD, Trachea midline   LUNGS: CTA bilaterally, non labored respirtation   CVS: S1/S2 audible, RRR, no murmur   Abd: Soft, NT, ND, BS+   : + Delaney   Ext: Left BKA  CNS: Alert, No focal deficit noted grossly  Psy: Cooperative  Skin: Warm, dry and intact      Results Review:     I reviewed the patient's new clinical results.    WBC WBC   Date Value Ref Range Status   06/15/2024 9.15 3.40 - 10.80 10*3/mm3 Final        HGB Hemoglobin   Date Value Ref Range Status   06/15/2024 10.1 (L) 13.0 - 17.7 g/dL Final        HCT Hematocrit   Date Value Ref Range Status   06/15/2024 32.2 (L) 37.5 - 51.0 % Final        Platlets No results found for: \"LABPLAT\"   MCV MCV   Date Value Ref Range Status   06/15/2024 96.4 79.0 - 97.0 fL Final            Sodium Sodium   Date Value Ref Range Status   06/15/2024 133 (L) 136 - 145 mmol/L Final      Potassium Potassium   Date Value Ref Range Status   06/15/2024 4.6 3.5 - 5.2 mmol/L Final      Chloride Chloride   Date Value Ref Range Status " "  06/15/2024 101 98 - 107 mmol/L Final      CO2 CO2   Date Value Ref Range Status   06/15/2024 19.0 (L) 22.0 - 29.0 mmol/L Final      BUN BUN   Date Value Ref Range Status   06/15/2024 55 (H) 8 - 23 mg/dL Final      Creatinine Creatinine   Date Value Ref Range Status   06/15/2024 3.31 (H) 0.76 - 1.27 mg/dL Final      Calcium Calcium   Date Value Ref Range Status   06/15/2024 8.3 (L) 8.6 - 10.5 mg/dL Final      PO4 No results found for: \"CAPO4\"   Albumin No results found for: \"ALBUMIN\"         Magnesium No results found for: \"MG\"       Uric Acid No results found for: \"URICACID\"     Medication Review: Yes    Assessment & Plan       NSTEMI (non-ST elevated myocardial infarction)    C. difficile colitis    History of cholecystitis s/p cholecystostomy tube    ESRD (end stage renal disease) on hemodialysis    Atrial fibrillation    Essential hypertension    Moderate malnutrition    Soft tissue infection    Acute osteomyelitis of left tibia      Assessment & Plan    ESRD: On TTS jonathan. HD through right IJ TDC. Still makes urine. Does have cole cath+ for chronic urinary retention. Patient the family Primary nephrologist (Dr Wan) was monitoring for renal recovery and residual renal function.      Volume status: No significant LE edema noted.      Afib w RVR: RVR during HD treatment. BP controlled. . No chest pain or sob.       Met acidosis: Mild. Management with HD.     Anemia: ACD. Transfuse at hb<7.  ALLISON on HD days     CAD: Transferred from OSH for evaluation of ACD. Cardiology following. EF 55%    Plan:  - Hd per TTS jonathan.  - Renal diet (Doesn't want to follow)  - ALLISON with HD       Hayden Weinstein MD  06/15/24  09:37 EDT            "

## 2024-06-15 NOTE — PLAN OF CARE
Problem: Device-Related Complication Risk (Hemodialysis)  Goal: Safe, Effective Therapy Delivery  Outcome: Ongoing, Progressing  Intervention: Optimize Device Care and Function  Recent Flowsheet Documentation  Taken 6/15/2024 1011 by Rosalva Lamar, RN  Medication Review/Management: medications reviewed  Taken 6/15/2024 0630 by Rosalva Lamar, RN  Medication Review/Management: medications reviewed     Problem: Hemodynamic Instability (Hemodialysis)  Goal: Effective Tissue Perfusion  Outcome: Ongoing, Progressing     Problem: Infection (Hemodialysis)  Goal: Absence of Infection Signs and Symptoms  Outcome: Ongoing, Progressing   Goal Outcome Evaluation:  Client tolerated scheduled dialysis treatment well with no complications nor complaints noted, Dr. Weinstein on unit rounding gave verbal orders to decrease clients treatment by 15 mins, clients run time was 3.15 hrs today.Dialysis staff nurse removed and changed clients catheter dressing today, no signs of infection noted to catheter site, no redness, swelling, exudate nor bleeding present, next due date to change dressing is June 22,2024.Fluid removal of 2370 ml was noted at completion of treatment, with a 300 ml rinseback, noting a total net fluid removal of 2070 ml for this treatment, prescribed goal met to removal 2 liters. A post treatment report was called and given to clients assigned nurse JESUS Leon of clients treatment and overall status. Client was transported back to assigned room alert/stable and no complaints via bed.

## 2024-06-15 NOTE — PROGRESS NOTES
[facility] Medicine Services  PROGRESS NOTE    Patient Name: Tavo Gudino  : 1955  MRN: [id number]    Date of Admission: 2024  Primary Care Physician: Hayden Weinstein MD    Subjective   Subjective     CC:  Stump infection    HPI:  Pt is seen resting up in bed in NAD.  Awake and alert.  Just back from HD.  States he just feels very tired and worn out.  States HD always makes him feel that way.  No n/v, pain.  Awaiting rehab       Objective   Objective     Vital Signs:   Temp:  [97.5 °F (36.4 °C)-98.2 °F (36.8 °C)] 98.2 °F (36.8 °C)  Heart Rate:  [66-98] 67  Resp:  [16-18] 18  BP: ()/() 167/50     Physical Exam:  Constitutional: No acute distress, chronically ill looking. Resting up in bed/    Respiratory: Clear to auscultation bilaterally but slightly decreased at bases, respiratory effort normal on RA with sats 98%  Cardiovascular: RRR, no murmur gallop or rub audible.  Gastrointestinal: Positive bowel sounds, soft, nontender, nondistended, cholecystostomy tube in place with dark green drainage  Musculoskeletal: Left AKA w dressing in place dry and intact, no clear sign of infection. BAER spont  Psychiatric: Flat affect, cooperative and calm   Neurologic: Oriented x 3, no focal deficits, speech is clear and appropriate. Follows commands   Skin: Pallor. Rt tunneled HD cath.  Lt IJ TLDL      Results Reviewed:  LAB RESULTS:      Lab 06/15/24  0630 06/10/24  0440 24  0451   WBC 9.15 7.82 7.76   HEMOGLOBIN 10.1* 9.4* 9.1*   HEMATOCRIT 32.2* 30.6* 29.0*   PLATELETS 178 176 172   NEUTROS ABS 5.55  --  4.36   IMMATURE GRANS (ABS) 0.04  --  0.11*   LYMPHS ABS 2.62  --  2.32   MONOS ABS 0.75  --  0.70   EOS ABS 0.13  --  0.21   MCV 96.4 97.8* 94.5         Lab 06/15/24  0630 24  0351 24  0716 06/10/24  0440 24  0451   SODIUM 133* 134* 133* 134* 133*   POTASSIUM 4.6 5.0 5.0 6.0* 5.9*   CHLORIDE 101 104 102 105 103   CO2 19.0* 20.0* 21.0* 20.0* 20.0*    ANION GAP 13.0 10.0 10.0 9.0 10.0   BUN 55* 39* 29* 51* 41*   CREATININE 3.31* 3.60* 2.92* 4.41* 3.88*   EGFR 19.1* 17.3* 22.2* 13.6* 15.8*   GLUCOSE 82 71 75 64* 75   CALCIUM 8.3* 9.3 8.6 9.2 9.3   PHOSPHORUS  --  5.8* 4.6*  --   --          Lab 06/12/24  0351 06/11/24  0716   ALBUMIN 2.6* 2.5*                     Brief Urine Lab Results  (Last result in the past 365 days)        Color   Clarity   Blood   Leuk Est   Nitrite   Protein   CREAT   Urine HCG        06/02/24 1721 Yellow   Clear   Trace   Trace   Negative   100 mg/dL (2+)                   Microbiology Results Abnormal       Procedure Component Value - Date/Time    Blood Culture - Blood, Blood, Central Line [[id number]]  (Normal) Collected: 05/31/24 2237    Lab Status: Final result Specimen: Blood, Central Line Updated: 06/06/24 0801     Blood Culture No growth at 5 days    Blood Culture - Blood, Arm, Right [[id number]]  (Normal) Collected: 05/31/24 2237    Lab Status: Final result Specimen: Blood from Arm, Right Updated: 06/06/24 0745     Blood Culture No growth at 5 days    Urine Culture - Urine, Indwelling Urethral Catheter [[id number]]  (Normal) Collected: 06/02/24 1721    Lab Status: Final result Specimen: Urine from Indwelling Urethral Catheter Updated: 06/04/24 0955     Urine Culture No growth            No radiology results from the last 24 hrs    Results for orders placed during the hospital encounter of 05/31/24    Adult Transthoracic Echo Complete W/ Cont if Necessary Per Protocol    Interpretation Summary    Left ventricular systolic function is normal. Left ventricular ejection fraction appears to be 51 - 55%.    Left ventricular wall thickness is consistent with borderline concentric hypertrophy.    Left atrial volume is moderately increased.    There is mild calcification of the aortic valve.    Mild mitral valve regurgitation is present.    There is a small (<1cm) circumferential pericardial effusion.      Current medications:  Scheduled  Meds:apixaban, 5 mg, Oral, BID  aspirin, 81 mg, Oral, Daily  collagenase, 1 Application, Topical, Q24H  dilTIAZem CD, 240 mg, Oral, Q24H  eravacycline dihydrochloride (XERAVA) 85 mg in sodium chloride 0.9 % 250 mL (0.34 mg/mL) IVPB, 85 mg, Intravenous, Q12H  famotidine, 10 mg, Oral, Daily  folic acid, 1 mg, Oral, Daily  lactobacillus acidophilus, 1 capsule, Oral, BID  levothyroxine, 75 mcg, Oral, Q AM  Lidocaine HCl gel, , Topical, BID  metoprolol succinate XL, 100 mg, Oral, Q24H  pharmacy consult - MTM, , Does not apply, Daily  sodium chloride, 10 mL, Intravenous, Q12H  sodium zirconium cyclosilicate, 10 g, Oral, Daily  vancomycin, 125 mg, Oral, BID  [START ON 6/22/2024] vancomycin, 125 mg, Oral, Daily      Continuous Infusions:     PRN Meds:.  acetaminophen    heparin (porcine)    Lidocaine HCl gel    melatonin    metoprolol tartrate    ondansetron    sodium chloride    sodium chloride    Assessment & Plan   Assessment & Plan     Active Hospital Problems    Diagnosis  POA    **NSTEMI (non-ST elevated myocardial infarction) [I21.4]  Yes    Soft tissue infection [L08.9]  Unknown    Acute osteomyelitis of left tibia [M86.162]  Unknown    Moderate malnutrition [E44.0]  Yes    C. difficile colitis [A04.72]  Yes    History of cholecystitis s/p cholecystostomy tube [K81.9]  Yes    ESRD (end stage renal disease) on hemodialysis [N18.6]  Yes    Atrial fibrillation [I48.91]  Yes    Essential hypertension [I10]  Yes      Resolved Hospital Problems   No resolved problems to display.        Brief Hospital Course to date:  Tavo Gudino is a 69 y.o. male with hx of HTN, PAD, left BKA, ESRD on hemodialysis, Afib on Eliquis, recent acute cholecystitis s/p cholecystostomy tube placement at , chronic urinary retention with indwelling Delaney catheter, and previous hx of C.diff who presented from [facility] due to chest pain, elevated troponin, and Afib with RVR.  Found to have multiple other concerning issues including active  C. difficile infection, concern for possible osteomyelitis, exposed tendon on right lower extremity.    This patient's problems and plans were partially entered by my partner and updated as appropriate by me 06/15/24.    Assessment/plan:  Pt is new to me today      Chest pain, resolved  Elevated troponin, possible NSTEMI  -Transferred to [facility] for LHC with Dr. Amador  -Dr. Amador/Cardiology consulted, stress test with small area of ischemia.  --prior discussion with patient of decision to medically manage at this time.  --no current cp complaints.     Afib with RVR  Hx of Afib/Aflutter  -Toprol dosing adjusted per cardiology given episodes of intermittent RVR  -Continue Eliquis     HFrEF  Moderate MR  --Most recent Echo on file I April 2024: EF 40%, akinetic septal, anterior, and apical walls, severely dilated LA, moderate MR  --Follows with  Cardiology, had been evaluated previously for Vanessa clip  --ECHO 6/1: LVEF 51 to 55%, borderline concentric LVH, left atrial volume moderately increased, mild MR, small less than 1 cm circumferential pericardial effusion. F/u as prior scheduled      Leukocytosis, resolved  --wbc 9.15 today, afebrile      Concern for osteomyelitis   Exposed tendon on RLE  --MRI left tib/fib showed cellulitis, possible osteomyelitis  --MRI right foot with cellulitis/edema  -- Arterial duplex abnormal; Vascular surgery consulted and recommended medical management with no vascular surgery intervention  --Eravacycline per ID  --MRSA screen +  --Follow-up blood and urine cultures  -Dr. Yeager following. Patient likely needs additional amputation of left lower extremity due to concern for poor wound healing, likely needs debridement of right lower extremity around the tendon with wound VAC placement.  Patient refusing further amputation of left lower extremity or any other procedures.   --ID following. Patient is not likely a candidate for outpatient IV antibiotics he will not be able to follow-up  adequately and doesn't not have support at home. Plan is just to continue the eravacycline for now until we have some idea of his d/c plan.  Prior auth for Nuzyra but co-pay is still $2000.  Working to see if we can get that lower. If he goes home, may consider doxycycline BID.   -IF discharges to SNF, CVC will be removed and tunneled line will need to be replaced  -Patient wishes to follow-up with wound care at .    C.diff colitis  -s/p fosphofmycin  -Continue p.o. vancomycin 125 mg 4 times a day for 14 days from admission through June 14 and then taper to 125 mg twice daily for 1 week and then 125 mg once daily for 1 week  -Follow-up with ID     Recent cholecystitis s/p cholecystostomy tube  --Request records from that hospitalization, apparently was placed at   --Consulted General Surgery for management of tube  --Apparently already had follow up at  6/6/24 General Surgery--will need to reschedule this at [facility]     Chronic urinary retention  --Has Delaney in place, reported hx of urethral injury per OSH note (data deficit, no idea when this was Delaney placed), appears he follows with Urology at  and this is a chronic Delaney  --Replaced Delaney here     PAD  Hx of left BKA  --Continue ASA  --Lt BKA drsg in place.       Anemia  --Likely chronic due to renal disease  --Folate 4.36 --> supplementation started; vitamin B 12 level 755  --Hb 7.5 here, had been 9 at OSH. Now up to 10.1     HTN  ESRD  --Nephrology consulted for dialysis   --Case management to work with patient and family in order to work on disposition location and finding an outpatient dialysis chair.    Hyperkalemia  -Potassium of 5.9 this morning, will give dose of lokelma.   --s/p HD today      Hypothyroidism  --TSH significantly elevated at 91 at OSH, still elevated here to 65, but improved  --Synthroid dose was increased to 75 mcg daily at OSH  --Continue Synthroid at above dose and recheck in 4-6 weeks. Defer to PCP      Multiple wounds  Sacral  Decubitus ulcer, POA, Stage 3  --Wound care consulted  --Ortho as above     Poor IV access  --Dr. Beckman placed Lt IJ central line, will likely need tunneled line for antibiotics if plan is for rehab placement.      Expected Discharge Location and Transportation: Tentatively back to [place]/ awaiting dispo plans/  CM following  Expected Discharge   Expected Discharge Date: 6/17/2024; Expected Discharge Time:      DVT prophylaxis:  Pharmacologic & mechanical VTE prophylaxis orders are present.         AM-PAC 6 Clicks Score (PT): 9 (06/14/24 2000)    CODE STATUS:   Code Status and Medical Interventions:   Ordered at: 05/31/24 1429     Code Status (Patient has no pulse and is not breathing):    CPR (Attempt to Resuscitate)     Medical Interventions (Patient has pulse or is breathing):    Full Support            Marilee Garrido, APRN  06/15/24

## 2024-06-16 LAB
ALBUMIN SERPL-MCNC: 2.6 G/DL (ref 3.5–5.2)
ANION GAP SERPL CALCULATED.3IONS-SCNC: 10 MMOL/L (ref 5–15)
BUN SERPL-MCNC: 36 MG/DL (ref 8–23)
BUN/CREAT SERPL: 13.7 (ref 7–25)
CALCIUM SPEC-SCNC: 8.5 MG/DL (ref 8.6–10.5)
CHLORIDE SERPL-SCNC: 103 MMOL/L (ref 98–107)
CO2 SERPL-SCNC: 23 MMOL/L (ref 22–29)
CREAT SERPL-MCNC: 2.62 MG/DL (ref 0.76–1.27)
DEPRECATED RDW RBC AUTO: 56.8 FL (ref 37–54)
EGFRCR SERPLBLD CKD-EPI 2021: 25.3 ML/MIN/1.73
ERYTHROCYTE [DISTWIDTH] IN BLOOD BY AUTOMATED COUNT: 16.3 % (ref 12.3–15.4)
GLUCOSE SERPL-MCNC: 70 MG/DL (ref 65–99)
HCT VFR BLD AUTO: 30.9 % (ref 37.5–51)
HGB BLD-MCNC: 9.8 G/DL (ref 13–17.7)
MCH RBC QN AUTO: 30.1 PG (ref 26.6–33)
MCHC RBC AUTO-ENTMCNC: 31.7 G/DL (ref 31.5–35.7)
MCV RBC AUTO: 94.8 FL (ref 79–97)
PHOSPHATE SERPL-MCNC: 5.8 MG/DL (ref 2.5–4.5)
PLATELET # BLD AUTO: 167 10*3/MM3 (ref 140–450)
PMV BLD AUTO: 10.3 FL (ref 6–12)
POTASSIUM SERPL-SCNC: 4.4 MMOL/L (ref 3.5–5.2)
RBC # BLD AUTO: 3.26 10*6/MM3 (ref 4.14–5.8)
SODIUM SERPL-SCNC: 136 MMOL/L (ref 136–145)
WBC NRBC COR # BLD AUTO: 7.16 10*3/MM3 (ref 3.4–10.8)

## 2024-06-16 PROCEDURE — 25810000003 SODIUM CHLORIDE 0.9 % SOLUTION 250 ML FLEX CONT: Performed by: INTERNAL MEDICINE

## 2024-06-16 PROCEDURE — 25010000002 ERAVACYCLINE DIHYDROCHLORIDE 50 MG RECONSTITUTED SOLUTION 1 EACH VIAL: Performed by: INTERNAL MEDICINE

## 2024-06-16 PROCEDURE — 99232 SBSQ HOSP IP/OBS MODERATE 35: CPT | Performed by: NURSE PRACTITIONER

## 2024-06-16 PROCEDURE — 85027 COMPLETE CBC AUTOMATED: CPT | Performed by: NURSE PRACTITIONER

## 2024-06-16 PROCEDURE — 86140 C-REACTIVE PROTEIN: CPT | Performed by: INTERNAL MEDICINE

## 2024-06-16 PROCEDURE — 80069 RENAL FUNCTION PANEL: CPT | Performed by: NURSE PRACTITIONER

## 2024-06-16 RX ADMIN — ACETAMINOPHEN 650 MG: 325 TABLET ORAL at 18:37

## 2024-06-16 RX ADMIN — Medication 10 ML: at 21:17

## 2024-06-16 RX ADMIN — Medication 5 MG: at 21:17

## 2024-06-16 RX ADMIN — VANCOMYCIN HYDROCHLORIDE 125 MG: 125 CAPSULE ORAL at 09:02

## 2024-06-16 RX ADMIN — LIDOCAINE HYDROCHLORIDE: 20 JELLY TOPICAL at 22:24

## 2024-06-16 RX ADMIN — APIXABAN 5 MG: 5 TABLET, FILM COATED ORAL at 09:03

## 2024-06-16 RX ADMIN — METOPROLOL SUCCINATE 100 MG: 100 TABLET, EXTENDED RELEASE ORAL at 18:37

## 2024-06-16 RX ADMIN — DILTIAZEM HYDROCHLORIDE 240 MG: 240 CAPSULE, COATED, EXTENDED RELEASE ORAL at 09:02

## 2024-06-16 RX ADMIN — ASPIRIN 81 MG: 81 TABLET, COATED ORAL at 09:03

## 2024-06-16 RX ADMIN — FOLIC ACID 1 MG: 1 TABLET ORAL at 09:02

## 2024-06-16 RX ADMIN — ERAVACYCLINE 85 MG: 50 INJECTION, POWDER, LYOPHILIZED, FOR SOLUTION INTRAVENOUS at 21:16

## 2024-06-16 RX ADMIN — LEVOTHYROXINE SODIUM 75 MCG: 0.07 TABLET ORAL at 05:15

## 2024-06-16 RX ADMIN — FAMOTIDINE 10 MG: 20 TABLET, FILM COATED ORAL at 09:02

## 2024-06-16 RX ADMIN — SODIUM ZIRCONIUM CYCLOSILICATE 10 G: 10 POWDER, FOR SUSPENSION ORAL at 09:03

## 2024-06-16 RX ADMIN — ACETAMINOPHEN 650 MG: 325 TABLET ORAL at 02:27

## 2024-06-16 RX ADMIN — APIXABAN 5 MG: 5 TABLET, FILM COATED ORAL at 21:17

## 2024-06-16 RX ADMIN — ACETAMINOPHEN 650 MG: 325 TABLET ORAL at 22:23

## 2024-06-16 RX ADMIN — LIDOCAINE HYDROCHLORIDE: 20 JELLY TOPICAL at 09:04

## 2024-06-16 RX ADMIN — Medication 1 CAPSULE: at 21:17

## 2024-06-16 RX ADMIN — Medication 10 ML: at 09:03

## 2024-06-16 RX ADMIN — ERAVACYCLINE 85 MG: 50 INJECTION, POWDER, LYOPHILIZED, FOR SOLUTION INTRAVENOUS at 09:09

## 2024-06-16 RX ADMIN — VANCOMYCIN HYDROCHLORIDE 125 MG: 125 CAPSULE ORAL at 21:17

## 2024-06-16 RX ADMIN — COLLAGENASE SANTYL 1 APPLICATION: 250 OINTMENT TOPICAL at 09:04

## 2024-06-16 RX ADMIN — ACETAMINOPHEN 650 MG: 325 TABLET ORAL at 09:03

## 2024-06-16 RX ADMIN — Medication 1 CAPSULE: at 09:03

## 2024-06-16 NOTE — PROGRESS NOTES
[facility] Medicine Services  PROGRESS NOTE    Patient Name: Tavo Gudino  : 1955  MRN: [id number]    Date of Admission: 2024  Primary Care Physician: Hayden Weinstein MD    Subjective   Subjective     CC:  Stump infection     HPI:  Patient is resting in bed in NAD. He slept ok. Having some pain in LE. Anxious for rehab so he can get home       Objective   Objective     Vital Signs:   Temp:  [97.3 °F (36.3 °C)-98 °F (36.7 °C)] 97.6 °F (36.4 °C)  Heart Rate:  [65-82] 80  Resp:  [18] 18  BP: (133-186)/(62-94) 139/94     Physical Exam:  Constitutional: No acute distress, awake, alert, chronically ill appearing   HENT: NCAT, mucous membranes moist, LIJ intact   Respiratory: Clear to auscultation bilaterally, respiratory effort normal room air   Cardiovascular: RRR, no murmurs, rubs, or gallops  Gastrointestinal: Positive bowel sounds, soft, nontender, nondistended,, cholecystostomy tube in place with dark green drainage   Musculoskeletal: L AKA with dressing , right foot dressing, rogerio LE venous changes   Psychiatric: Appropriate affect, cooperative  Neurologic: Oriented x 3, strength symmetric in all extremities, Cranial Nerves grossly intact to confrontation, speech clear  Skin: No rashes, right chest tunneled cath dressing cdi, pale    cole to BSD       Results Reviewed:  LAB RESULTS:      Lab 24  0621 06/15/24  0630 06/10/24  0440   WBC 7.16 9.15 7.82   HEMOGLOBIN 9.8* 10.1* 9.4*   HEMATOCRIT 30.9* 32.2* 30.6*   PLATELETS 167 178 176   NEUTROS ABS  --  5.55  --    IMMATURE GRANS (ABS)  --  0.04  --    LYMPHS ABS  --  2.62  --    MONOS ABS  --  0.75  --    EOS ABS  --  0.13  --    MCV 94.8 96.4 97.8*         Lab 24  0621 06/15/24  0630 24  0351 24  0716 06/10/24  0440   SODIUM 136 133* 134* 133* 134*   POTASSIUM 4.4 4.6 5.0 5.0 6.0*   CHLORIDE 103 101 104 102 105   CO2 23.0 19.0* 20.0* 21.0* 20.0*   ANION GAP 10.0 13.0 10.0 10.0 9.0   BUN 36* 55* 39* 29*  51*   CREATININE 2.62* 3.31* 3.60* 2.92* 4.41*   EGFR 25.3* 19.1* 17.3* 22.2* 13.6*   GLUCOSE 70 82 71 75 64*   CALCIUM 8.5* 8.3* 9.3 8.6 9.2   PHOSPHORUS 5.8*  --  5.8* 4.6*  --          Lab 06/16/24  0621 06/12/24  0351 06/11/24  0716   ALBUMIN 2.6* 2.6* 2.5*                     Brief Urine Lab Results  (Last result in the past 365 days)        Color   Clarity   Blood   Leuk Est   Nitrite   Protein   CREAT   Urine HCG        06/02/24 1721 Yellow   Clear   Trace   Trace   Negative   100 mg/dL (2+)                   Microbiology Results Abnormal       Procedure Component Value - Date/Time    Blood Culture - Blood, Blood, Central Line [[id number]]  (Normal) Collected: 05/31/24 2237    Lab Status: Final result Specimen: Blood, Central Line Updated: 06/06/24 0801     Blood Culture No growth at 5 days    Blood Culture - Blood, Arm, Right [[id number]]  (Normal) Collected: 05/31/24 2237    Lab Status: Final result Specimen: Blood from Arm, Right Updated: 06/06/24 0745     Blood Culture No growth at 5 days    Urine Culture - Urine, Indwelling Urethral Catheter [[id number]]  (Normal) Collected: 06/02/24 1721    Lab Status: Final result Specimen: Urine from Indwelling Urethral Catheter Updated: 06/04/24 0955     Urine Culture No growth            No radiology results from the last 24 hrs    Results for orders placed during the hospital encounter of 05/31/24    Adult Transthoracic Echo Complete W/ Cont if Necessary Per Protocol    Interpretation Summary    Left ventricular systolic function is normal. Left ventricular ejection fraction appears to be 51 - 55%.    Left ventricular wall thickness is consistent with borderline concentric hypertrophy.    Left atrial volume is moderately increased.    There is mild calcification of the aortic valve.    Mild mitral valve regurgitation is present.    There is a small (<1cm) circumferential pericardial effusion.      Current medications:  Scheduled Meds:apixaban, 5 mg, Oral,  BID  aspirin, 81 mg, Oral, Daily  collagenase, 1 Application, Topical, Q24H  dilTIAZem CD, 240 mg, Oral, Q24H  eravacycline dihydrochloride (XERAVA) 85 mg in sodium chloride 0.9 % 250 mL (0.34 mg/mL) IVPB, 85 mg, Intravenous, Q12H  famotidine, 10 mg, Oral, Daily  folic acid, 1 mg, Oral, Daily  lactobacillus acidophilus, 1 capsule, Oral, BID  levothyroxine, 75 mcg, Oral, Q AM  Lidocaine HCl gel, , Topical, BID  metoprolol succinate XL, 100 mg, Oral, Q24H  pharmacy consult - MTM, , Does not apply, Daily  sodium chloride, 10 mL, Intravenous, Q12H  sodium zirconium cyclosilicate, 10 g, Oral, Daily  vancomycin, 125 mg, Oral, BID  [START ON 6/22/2024] vancomycin, 125 mg, Oral, Daily      Continuous Infusions:   PRN Meds:.  acetaminophen    heparin (porcine)    Lidocaine HCl gel    melatonin    metoprolol tartrate    ondansetron    sodium chloride    sodium chloride    Assessment & Plan   Assessment & Plan     Active Hospital Problems    Diagnosis  POA    **NSTEMI (non-ST elevated myocardial infarction) [I21.4]  Yes    Soft tissue infection [L08.9]  Unknown    Acute osteomyelitis of left tibia [M86.162]  Unknown    Moderate malnutrition [E44.0]  Yes    C. difficile colitis [A04.72]  Yes    History of cholecystitis s/p cholecystostomy tube [K81.9]  Yes    ESRD (end stage renal disease) on hemodialysis [N18.6]  Yes    Atrial fibrillation [I48.91]  Yes    Essential hypertension [I10]  Yes      Resolved Hospital Problems   No resolved problems to display.        Brief Hospital Course to date:  Tavo Gudino is a 69 y.o. male  with hx of HTN, PAD, left BKA, ESRD on hemodialysis, Afib on Eliquis, recent acute cholecystitis s/p cholecystostomy tube placement at , chronic urinary retention with indwelling Delaney catheter, and previous hx of C.diff who presented from [facility] due to chest pain, elevated troponin, and Afib with RVR.  Found to have multiple other concerning issues including active C. difficile infection,  concern for possible osteomyelitis, exposed tendon on right lower extremity.     This patient's problems and plans were partially entered by my partner and updated as appropriate by me 06/16/24.     Assessment/plan:  Pt is new to me today      Chest pain, resolved  Elevated troponin, possible NSTEMI  -Transferred to [facility] for C with Dr. Amador  -Dr. Amador/Cardiology consulted, stress test with small area of ischemia.  --prior discussion with patient of decision to medically manage at this time.  --no current cp complaints.     Afib with RVR  Hx of Afib/Aflutter  -Toprol dosing adjusted per cardiology given episodes of intermittent RVR  -Continue Eliquis     HFrEF  Moderate MR  --Most recent Echo on file I April 2024: EF 40%, akinetic septal, anterior, and apical walls, severely dilated LA, moderate MR  --Follows with  Cardiology, had been evaluated previously for Vanessa clip  --ECHO 6/1: LVEF 51 to 55%, borderline concentric LVH, left atrial volume moderately increased, mild MR, small less than 1 cm circumferential pericardial effusion. F/u as prior scheduled      Leukocytosis, resolved  --wbc 7.16 today, afebrile      Concern for osteomyelitis   Exposed tendon on RLE  --MRI left tib/fib showed cellulitis, possible osteomyelitis  --MRI right foot with cellulitis/edema  -- Arterial duplex abnormal; Vascular surgery consulted and recommended medical management with no vascular surgery intervention  --Eravacycline per ID  --MRSA screen +  --Follow-up blood and urine cultures  -Dr. Yeager following. Patient likely needs additional amputation of left lower extremity due to concern for poor wound healing, likely needs debridement of right lower extremity around the tendon with wound VAC placement.  Patient refusing further amputation of left lower extremity or any other procedures.   --ID following. Patient is not likely a candidate for outpatient IV antibiotics he will not be able to follow-up adequately and doesn't  not have support at home. Plan is just to continue the eravacycline for now until we have some idea of his d/c plan.  Prior auth for Nuzyra but co-pay is still $2000.  Working to see if we can get that lower. If he goes home, may consider doxycycline BID.   -IF discharges to SNF, left IJ will need removed. If going on oral abx   -Patient wishes to follow-up with wound care at .  -- follow up with ID two weeks      C.diff colitis  -s/p fosphofmycin  -Continue p.o. vancomycin 125 mg 4 times a day for 14 days from admission through June 14 and then taper to 125 mg twice daily for 1 week and then 125 mg once daily for 1 week  -Follow-up with ID     Recent cholecystitis s/p cholecystostomy tube  --Request records from that hospitalization, apparently was placed at   --Consulted General Surgery for management of tube  --Apparently already had follow up at  6/6/24 General Surgery--will need to reschedule this at ID     Chronic urinary retention  --Has Delaney in place, reported hx of urethral injury per OSH note (data deficit, no idea when this was Delaney placed), appears he follows with Urology at  and this is a chronic Delaney  --Replaced Delaney here     PAD  Hx of left BKA  --Continue ASA  --Lt BKA drsg in place.       Anemia  --Likely chronic due to renal disease  --Folate 4.36 --> supplementation started; vitamin B 12 level 755  --Hb 7.5 here, had been 9 at OSH. up to 10.1     HTN  ESRD  --Nephrology consulted for dialysis   --Case management to work with patient and family in order to work on disposition location and finding an outpatient dialysis chair.     Hyperkalemia  -Potassium up to 6.0 s/p  dose of lokelma.   --s/p HD TTHS     Hypothyroidism  --TSH significantly elevated at 91 at OSH, still elevated here to 65, but improved  --Synthroid dose was increased to 75 mcg daily at OSH  --Continue Synthroid at above dose and recheck in 4-6 weeks. Defer to PCP      Multiple wounds  Sacral Decubitus ulcer, POA, Stage  3  --Wound care consulted  --Ortho as above     Poor IV access  --Dr. Beckman placed Lt IJ central line, will likely need tunneled line for antibiotics if plan is for rehab placement.   -- Shannona  was preauthfor two weeks. Med would need to be filled her prior to dc. But if cost is high then plan for doxycycline 100 mg bid through July 12th        Expected Discharge Location and Transportation: rehab   Expected Discharge   Expected Discharge Date: 6/17/2024; Expected Discharge Time:      VTE Prophylaxis:  Pharmacologic & mechanical VTE prophylaxis orders are present.         AM-PAC 6 Clicks Score (PT): 12 (06/15/24 1148)    CODE STATUS:   Code Status and Medical Interventions:   Ordered at: 05/31/24 7076     Code Status (Patient has no pulse and is not breathing):    CPR (Attempt to Resuscitate)     Medical Interventions (Patient has pulse or is breathing):    Full Support       Pam Bullock, APRN  06/16/24

## 2024-06-16 NOTE — PROGRESS NOTES
" LOS: 16 days   Patient Care Team:  Hayden Weinstein MD as PCP - General (Nephrology)    Chief Complaint: ESRD  NSTEMI    Subjective     HD yesterday tolerated well. UF2 liter.           History taken from: patient    Objective     Vital Sign Min/Max for last 24 hours  Temp  Min: 97.3 °F (36.3 °C)  Max: 98 °F (36.7 °C)   BP  Min: 133/84  Max: 186/62   Pulse  Min: 65  Max: 82   Resp  Min: 18  Max: 18   SpO2  Min: 97 %  Max: 100 %   No data recorded   No data recorded     Flowsheet Rows      Flowsheet Row First Filed Value   Admission Height 185.4 cm (73\") Documented at 05/31/2024 1826   Admission Weight 84.8 kg (187 lb) Documented at 05/31/2024 1826            I/O this shift:  In: 360 [P.O.:360]  Out: -   I/O last 3 completed shifts:  In: 240 [P.O.:240]  Out: 3390 [Urine:800; Drains:220]    Objective   Gen: Alert, NAD   HENT: NC, AT, EOMI   NECK: Supple, no JVD, Trachea midline   LUNGS: CTA bilaterally, non labored respirtation   CVS: S1/S2 audible, RRR, no murmur   Abd: Soft, NT, ND, BS+   : + Delaney   Ext: Left BKA  CNS: Alert, No focal deficit noted grossly  Psy: Cooperative  Skin: Warm, dry and intact      Results Review:     I reviewed the patient's new clinical results.    WBC WBC   Date Value Ref Range Status   06/16/2024 7.16 3.40 - 10.80 10*3/mm3 Final   06/15/2024 9.15 3.40 - 10.80 10*3/mm3 Final        HGB Hemoglobin   Date Value Ref Range Status   06/16/2024 9.8 (L) 13.0 - 17.7 g/dL Final   06/15/2024 10.1 (L) 13.0 - 17.7 g/dL Final        HCT Hematocrit   Date Value Ref Range Status   06/16/2024 30.9 (L) 37.5 - 51.0 % Final   06/15/2024 32.2 (L) 37.5 - 51.0 % Final        Platlets No results found for: \"LABPLAT\"   MCV MCV   Date Value Ref Range Status   06/16/2024 94.8 79.0 - 97.0 fL Final   06/15/2024 96.4 79.0 - 97.0 fL Final            Sodium Sodium   Date Value Ref Range Status   06/16/2024 136 136 - 145 mmol/L Final   06/15/2024 133 (L) 136 - 145 mmol/L Final      Potassium Potassium   Date Value Ref " "Range Status   06/16/2024 4.4 3.5 - 5.2 mmol/L Final   06/15/2024 4.6 3.5 - 5.2 mmol/L Final      Chloride Chloride   Date Value Ref Range Status   06/16/2024 103 98 - 107 mmol/L Final   06/15/2024 101 98 - 107 mmol/L Final      CO2 CO2   Date Value Ref Range Status   06/16/2024 23.0 22.0 - 29.0 mmol/L Final   06/15/2024 19.0 (L) 22.0 - 29.0 mmol/L Final      BUN BUN   Date Value Ref Range Status   06/16/2024 36 (H) 8 - 23 mg/dL Final   06/15/2024 55 (H) 8 - 23 mg/dL Final      Creatinine Creatinine   Date Value Ref Range Status   06/16/2024 2.62 (H) 0.76 - 1.27 mg/dL Final   06/15/2024 3.31 (H) 0.76 - 1.27 mg/dL Final      Calcium Calcium   Date Value Ref Range Status   06/16/2024 8.5 (L) 8.6 - 10.5 mg/dL Final   06/15/2024 8.3 (L) 8.6 - 10.5 mg/dL Final      PO4 No results found for: \"CAPO4\"   Albumin Albumin   Date Value Ref Range Status   06/16/2024 2.6 (L) 3.5 - 5.2 g/dL Final            Magnesium No results found for: \"MG\"       Uric Acid No results found for: \"URICACID\"     Medication Review: Yes    Assessment & Plan       NSTEMI (non-ST elevated myocardial infarction)    C. difficile colitis    History of cholecystitis s/p cholecystostomy tube    ESRD (end stage renal disease) on hemodialysis    Atrial fibrillation    Essential hypertension    Moderate malnutrition    Soft tissue infection    Acute osteomyelitis of left tibia      Assessment & Plan    ESRD: On TTS jonathan. HD through right IJ TDC. Still makes urine. Does have cole cath+ for chronic urinary retention. Patient the family Primary nephrologist (Dr Wan) was monitoring for renal recovery and residual renal function.      Volume status: No significant LE edema noted.      Afib w RVR: RVR during HD treatment. BP controlled. . No chest pain or sob.       Met acidosis: Mild. Management with HD.     Anemia: ACD. Transfuse at hb<7.  ALLISON on HD days     CAD: Transferred from OSH for evaluation of ACD. Cardiology following. EF 55%    Plan:  - Hd per TTS " jonathan.  - Renal diet (Doesn't want to follow)  - ALLISON with HD       Hayden Weinstein MD  06/16/24  14:48 EDT

## 2024-06-17 LAB — CRP SERPL-MCNC: 0.39 MG/DL (ref 0–0.5)

## 2024-06-17 PROCEDURE — 25810000003 SODIUM CHLORIDE 0.9 % SOLUTION 250 ML FLEX CONT: Performed by: INTERNAL MEDICINE

## 2024-06-17 PROCEDURE — 25010000002 ERAVACYCLINE DIHYDROCHLORIDE 50 MG RECONSTITUTED SOLUTION 1 EACH VIAL: Performed by: INTERNAL MEDICINE

## 2024-06-17 PROCEDURE — 97110 THERAPEUTIC EXERCISES: CPT

## 2024-06-17 PROCEDURE — 99232 SBSQ HOSP IP/OBS MODERATE 35: CPT | Performed by: NURSE PRACTITIONER

## 2024-06-17 PROCEDURE — 97530 THERAPEUTIC ACTIVITIES: CPT

## 2024-06-17 RX ADMIN — ACETAMINOPHEN 650 MG: 325 TABLET ORAL at 13:54

## 2024-06-17 RX ADMIN — LIDOCAINE HYDROCHLORIDE: 20 JELLY TOPICAL at 09:35

## 2024-06-17 RX ADMIN — METOPROLOL SUCCINATE 100 MG: 100 TABLET, EXTENDED RELEASE ORAL at 17:01

## 2024-06-17 RX ADMIN — FAMOTIDINE 10 MG: 20 TABLET, FILM COATED ORAL at 09:34

## 2024-06-17 RX ADMIN — Medication 1 CAPSULE: at 21:04

## 2024-06-17 RX ADMIN — LIDOCAINE HYDROCHLORIDE: 20 JELLY TOPICAL at 21:05

## 2024-06-17 RX ADMIN — LEVOTHYROXINE SODIUM 75 MCG: 0.07 TABLET ORAL at 05:29

## 2024-06-17 RX ADMIN — ACETAMINOPHEN 650 MG: 325 TABLET ORAL at 05:51

## 2024-06-17 RX ADMIN — DILTIAZEM HYDROCHLORIDE 240 MG: 240 CAPSULE, COATED, EXTENDED RELEASE ORAL at 09:34

## 2024-06-17 RX ADMIN — ERAVACYCLINE 85 MG: 50 INJECTION, POWDER, LYOPHILIZED, FOR SOLUTION INTRAVENOUS at 09:34

## 2024-06-17 RX ADMIN — VANCOMYCIN HYDROCHLORIDE 125 MG: 125 CAPSULE ORAL at 21:05

## 2024-06-17 RX ADMIN — ASPIRIN 81 MG: 81 TABLET, COATED ORAL at 09:34

## 2024-06-17 RX ADMIN — ERAVACYCLINE 85 MG: 50 INJECTION, POWDER, LYOPHILIZED, FOR SOLUTION INTRAVENOUS at 21:05

## 2024-06-17 RX ADMIN — ACETAMINOPHEN 650 MG: 325 TABLET ORAL at 21:05

## 2024-06-17 RX ADMIN — APIXABAN 5 MG: 5 TABLET, FILM COATED ORAL at 09:34

## 2024-06-17 RX ADMIN — Medication 10 ML: at 21:05

## 2024-06-17 RX ADMIN — SODIUM ZIRCONIUM CYCLOSILICATE 10 G: 10 POWDER, FOR SUSPENSION ORAL at 09:34

## 2024-06-17 RX ADMIN — Medication 10 ML: at 09:35

## 2024-06-17 RX ADMIN — FOLIC ACID 1 MG: 1 TABLET ORAL at 09:34

## 2024-06-17 RX ADMIN — Medication 1 CAPSULE: at 09:34

## 2024-06-17 RX ADMIN — VANCOMYCIN HYDROCHLORIDE 125 MG: 125 CAPSULE ORAL at 09:34

## 2024-06-17 RX ADMIN — COLLAGENASE SANTYL 1 APPLICATION: 250 OINTMENT TOPICAL at 09:41

## 2024-06-17 RX ADMIN — Medication 5 MG: at 21:04

## 2024-06-17 RX ADMIN — APIXABAN 5 MG: 5 TABLET, FILM COATED ORAL at 21:04

## 2024-06-17 NOTE — PLAN OF CARE
Goal Outcome Evaluation:  Plan of Care Reviewed With: patient        Progress: improving  Outcome Evaluation: Pt. continues with significant generalized weakness, activity tolerance and balance deficit impacting PLOF.  Attempted backing into chair with UE support due to NWB RLE, but due to limited UE strength especially RUE pt. needed max A initially progressing to dependence. Pt. will benefit from SNF prior to home to address home AD needs and family training to progress to retun home.      Anticipated Discharge Disposition (OT): skilled nursing facility

## 2024-06-17 NOTE — DISCHARGE PLACEMENT REQUEST
"Cali Sue (69 y.o. Male)       Date of Birth   1955    Social Security Number       Address   [address] [address]    Home Phone   [phone]    MRN   [id number]       Yazidism   None    Marital Status                               Admission Date   24    Admission Type   Urgent    Admitting Provider   ANNA Malloy DO    Attending Provider   ANNA Malloy DO    Department, Room/Bed   [facility] 6A, N616/1       Discharge Date       Discharge Disposition       Discharge Destination                                 Attending Provider: ANNA Malloy DO    Allergies: Penicillins, Levothyroxine, Hydromorphone    Isolation: Spore   Infection: C.difficile (24), MRSA (24)   Code Status: CPR    Ht: 185.4 cm (72.99\")   Wt: 84.8 kg (186 lb 15.2 oz)    Admission Cmt: None   Principal Problem: NSTEMI (non-ST elevated myocardial infarction) [I21.4]                   Active Insurance as of 2024       Primary Coverage       Payor Plan Insurance Group Employer/Plan Group    HUMANA MEDICARE REPLACEMENT HUMANA MED ADV HMO [id number]       Payor Plan Address Payor Plan Phone Number Payor Plan Fax Number Effective Dates    [address] [address] [phone]  2023 - None Entered    [facility] [address]         Subscriber Name Subscriber Birth Date Member ID       CALI SUE 1955 [id number]                     Emergency Contacts        (Rel.) Home Phone Work Phone Mobile Phone    JANICE SUE (Spouse) [phone] -- [phone]                 History & Physical        Genny Saeed MD at 24 1429              [facility] Medicine Services  HISTORY AND PHYSICAL    Patient Name: Cali Sue  : 1955  MRN: [id number]  Primary Care Physician: Moses Ordaz MD  Date of admission: 2024      Subjective  Subjective     Chief Complaint:  Transfer from OSH due to chest pain, Afib with RVR, " possible NSTEMI    HPI:  Tavo Gudino is a 69 y.o. male with hx of HTN, PAD, left BKA, ESRD on hemodialysis, Afib on Eliquis, recent acute cholecystitis s/p cholecystostomy tube placement at , chronic urinary retention with indwelling Delaney catheter, and previous hx of C.diff who presents from [facility] for Cardiology evaluation due to chest pain, elevated troponin concerning for NSTEMI, and Afib with RVR. He typically gets all of his care at  hospital. Admitted to [facility] on 5/28/24 with complaints of chest pain and palpitations. Found to have elevated troponins (18 -->97-->472) and was in Afib with RVR. CXR negative. Rate was controlled with IV Metoprolol. He was continued on Eliquis. Due to concern for NSTEMI, case discussed with Cardiology/Dr. Amador who accepted patient in transfer for [facility]. However, while awaiting transfer, he developed diarrhea and worsening leukocytosis up to 24K (WBC was normal on day of admission). C.diff toxin was negative but antigen was positive. He was started on PO Vanc.    Very limited records sent from OSH. I talked to one of the providers there today by phone who said patient did not complain of any abdominal pain, but had loose stools/diarrhea after receiving Kayexalate for hyperkalemia as well as Colace. His blood cultures from admission were negative. He last had dialysis on 5/30/24 - provider at OSH reports he gets dialysis daily at his facility? Delaney was not exchanged on admission either.       Personal History     No past medical history on file.        No past surgical history on file.    Family History: family history is not on file.     Social History:    Social History     Social History Narrative    Not on file       Medications:  Available home medication information reviewed.       Not on File    Objective  Objective     Vital Signs:           Physical Exam   Constitutional: Awake, alert  Eyes: PERRLA, sclerae anicteric, no conjunctival  injection  HENT: NCAT, mucous membranes moist  Neck: Supple, no thyromegaly, no lymphadenopathy, trachea midline  Respiratory: Clear to auscultation bilaterally, nonlabored respirations   Cardiovascular: irregularly irregular, no murmurs, rubs, or gallops, palpable pedal pulses bilaterally  Gastrointestinal: Positive bowel sounds, soft, nontender, nondistended, cholecystostomy drain in place  Musculoskeletal: No bilateral ankle edema, no clubbing or cyanosis to extremities; left BKA stump in dressing  Psychiatric: Appropriate affect, cooperative  Neurologic: Oriented x 3, strength symmetric in all extremities, Cranial Nerves grossly intact to confrontation, speech clear  Skin: No rashes      Result Review:  I have personally reviewed the results from the time of this admission to 5/31/2024 14:29 EDT and agree with these findings:  [x]  Laboratory list / accordion  []  Microbiology  []  Radiology  []  EKG/Telemetry   []  Cardiology/Vascular   []  Pathology  [x]  Old records  []  Other:      LAB RESULTS:                              UA          1/10/2024    12:59 1/31/2024    22:59 4/5/2024    17:48   Urinalysis   Squamous Epithelial Cells, UA 0-2     0-2     Unable to estimate due to obscuring WBC's (UNEWBC)       Specific [place], UA 1.013     1.011     1.015       Blood, UA Negative     Moderate     Large       Leukocytes, UA Negative     Small     Large       RBC, UA 3     4-10     Unable to estimate due to obscuring WBC's (UNEWBC)       Bacteria, UA Negative     Present     Unable to estimate due to obscuring WBC's (UNEWBC)          Details          This result is from an external source.               Microbiology Results (last 10 days)       ** No results found for the last 240 hours. **            No radiology results from the last 24 hrs        Assessment & Plan  Assessment & Plan       NSTEMI (non-ST elevated myocardial infarction)    C. difficile colitis    History of cholecystitis s/p cholecystostomy  tube    ESRD (end stage renal disease) on hemodialysis    Atrial fibrillation    Essential hypertension        68 yo M with hx of HTN, PAD, left BKA, ESRD on hemodialysis, Afib on Eliquis, recent acute cholecystitis s/p cholecystostomy tube placement at , chronic urinary retention with indwelling Delaney catheter, and previous hx of C.diff who presents from [facility] due to chest pain, elevated troponin, and Afib with RVR.     Chest pain  Elevated troponin, possible NSTEMI  --Transferred to [facility] for LHC with Dr. Amador  --Troponins at OSH 18 -->97-->472  --Troponin 454 here  --Monitor on telemetry  --Dr. Amador/Cardiology consulted, tentative plan for [facility] next week  --ASA, heparin drip, beta blocker    Afib with RVR  Hx of Afib/Aflutter  --Rate controlled with Metoprolol apparently per OSH notes  --Hold Eliquis for heart cath, on heparin drip as above  --Continue Metoprolol    HFrEF  Moderate MR  --Most recent Echo on file I April 2024: EF 40%, akinetic septal, anterior, and apical walls, severely dilated LA, moderate MR  --Follows with  Cardiology, had been evaluated previously for Vanessa clip  --Repeat Echo ordered here as no Echo results sent from OSH    Leukocytosis  C.diff colitis?  --Apparently WBC increased to 24K with complaints of diarrhea, had negative C.diff toxin but positive C.diff antigen? Was started on PO Vanc at OSH; of note, I talked to the provider there who stated he had loose stools following administration of Kayexalate and Colace  --Pe chart review, he had C.diff documented on 4/6/24 from   --WBC 22K here  --Will repeat C.diff testing here and continue on PO Vanc for now  --Check blood cultures, CT A/P, and UA for further workup     Recent cholecystitis s/p cholecystostomy tube  --Request records from that hospitalization, apparently was placed at  but I cannot find records of this  --Consult General Surgery for management of tube, discussed with Dr. Beckman  --Check CT A/P to  ensure tube is in place  --Apparently already has follow up at  6/6/24 General Surgery    Chronic urinary retention  --Has Delaney in place, reported hx of urethral injury per OSH note (data deficit, no idea when this was Delaney placed), appears he follows with Urology at  and this is a chronic Delaney  --Replace Delaney here, send UA with culture due to leukocytosis    PAD  Hx of left BKA  --Continue ASA    Anemia  --Likely chronic due to renal disease  --Hb 7.5 here, had been 9 at OSH  --Check iron studies, B12, folate  --Monitor closely while on heparin drip    HTN  --Confirm and continue home meds    ESRD  --Nephrology consulted for dialysis  --Last HD 5/30/24     Hypothyroidism  --TSH significantly elevated at 91 at OSH, apparently free T4 was low but do not have that test result available to review  --Synthroid dose was increased to 75 mcg daily at OSH  --Will repeat TSH, free T4 here  --Continue Synthroid at above dose    Multiple wounds  --Wound care consult    Poor IV access  --Dr. Beckman plans to place central line this evening, appreciate his assistance      DVT prophylaxis:  Mechanical DVT prophylaxis orders are present.      Total time spent: >90 minutes  Time spent includes time reviewing chart, face-to-face time, counseling patient/family/caregiver, ordering medications/tests/procedures, communicating with other health care professionals, documenting clinical information in the electronic health record, and coordination of care.     CODE STATUS:    Code Status and Medical Interventions:   Ordered at: 05/31/24 1429     Code Status (Patient has no pulse and is not breathing):    CPR (Attempt to Resuscitate)     Medical Interventions (Patient has pulse or is breathing):    Full Support       Expected Discharge   Expected discharge date/ time has not been documented.     Genny Saeed MD  05/31/24      Electronically signed by Genny Saeed MD at 05/31/24 5788          Physician Progress Notes (most  recent note)        José Fuentes MD at 06/17/24 1034              Tavo Gudino  1955  [id number]    Reason for Consultation: recurrent C diff. Osteomyelitis     History of present illness:    Patient is a 69 y.o. male, with PMH chronic cholecystitis( cholecystotomy tube) DM, ESRD on HD, HTN, PVD s/p Left  BKA.  All recent care done at [facility] in [facility].    Presented to OSH with chest pain, found to be in afib with RVR possible NSTEMi, transferred to [facility] for LHC.  Developed diarrhea prior to transfer, C diff toxin negative, positive antigen, started on oral Vancomycin. He had been treated with Kayexalate for hyperkalemia, as well as Colace.  Last positive C diff PCR 4/6/24 from [facility]. Noted exposed tendon RLE wound , and ulcer of right heel, sacral area, and left residual stump. He has been afebrile. Admitting labs with WBC 22, plt 249, ALT 10 AST 10, Scr 3.47, UA with TNTC WBCs, urine culture with gram negative bacilli, blood cultures no growth. MRI Left with edema throughout soft tissue of stump, no abscess,subtle early changes of superimposed osteomyelitis distal osseous stump not excluded.  Right with diffuse soft tissue cellulitis, possible early osteomyelitis lateral malleolus, no definite evidence of osteomyelitis.  Started on Ceftriaxone Daptomycin, Flagyl, and oral Vancomycin and we were consulted for evaluation and treatment. He has had an indwelling delaney catheter since April, just recently changed.  He continues to have numerous diarrhea stools and abdominal cramping.  No fever.      6/3/24: Frustrated with prolonged hospitalization but slow overnight.  Awaiting possible cardiac workup.  Legs stable.  Delaney catheter in place.  Denies abdominal pain, suprapubic pain at this time.  He said he only had 1 bowel movement yesterday evening but has had 2 loose bowel movement so far today.  Overall he thinks his stool frequency has improved    6/5/2024: Resting  comfortably.  Cardiac workup completed.   orthopedic surgery recommended additional debridement of the amputation site but patient refusing stating he would like to follow-up with his previous wound care providers.  Loose stools slowing down and he has less nausea    6/6/24: Stool frequency slowing down.  Minimal abdominal discomfort.  Still has biliary drain.  Tolerating IV antibiotics. Discussed discharge planning again with patient today: He has numerous needs and will be difficult to manage at home but still reluctant to go back to prior skilled nursing facility.    6/10/24: Stool frequency is overall improved.  Had a bowel movement this morning but cannot tell me if it was liquid or solid.  Only 1 bowel movement charted in past 24 hours.  No abdominal pain.  Biliary drain remains in place.  No worsening swelling or pain in either lower extremity.  Tolerating IV antibiotics through left IJ CVC.  Patient agreeable to skilled nursing facility    6/12/24: Afebrile.  Blood pressure stable.  Seen in dialysis and complains of feeling cold during dialysis but otherwise no chills.  Still refusing  placement of tunneled line to facilitate IV antibiotics after discharge but is agreeable to taking oral antibiotics. Complex discharge planning in process.  Stool frequency improved.  No worsening swelling, pain, drainage from the lower extremity    6/17/24: No new complaints.  Resting comfortably.  Stool frequency stable. Appetite ok..  No abdominal pain.  No worsening leg swelling, pain,     ROS:  Afebrile and hemodynamically stable. No nausea. No rash. See above, otherwise negative.      Allergies   Allergen Reactions    Penicillins Hives     Received ceftriaxone 6/1/24    Levothyroxine Unknown - Low Severity    Hydromorphone Other (See Comments)     Confusion, encephalopathy per wife         Medication:    Current Facility-Administered Medications:     acetaminophen (TYLENOL) tablet 650 mg, 650 mg, Oral, Q4H PRN, Christophe  Genny HALE MD, 650 mg at 06/17/24 0551    apixaban (ELIQUIS) tablet 5 mg, 5 mg, Oral, BID, Otilia Tay MD, 5 mg at 06/17/24 0934    aspirin EC tablet 81 mg, 81 mg, Oral, Daily, Keren Escalona PA-C, 81 mg at 06/17/24 0934    collagenase ointment 1 Application, 1 Application, Topical, Q24H, Genny Saeed MD, 1 Application at 06/17/24 0941    dilTIAZem CD (CARDIZEM CD) 24 hr capsule 240 mg, 240 mg, Oral, Q24H, Fred Amador MD, 240 mg at 06/17/24 0934    eravacycline dihydrochloride (XERAVA) 85 mg in sodium chloride 0.9 % 250 mL (0.34 mg/mL) IVPB, 85 mg, Intravenous, Q12H, José Fuentes MD, Last Rate: 250 mL/hr at 06/17/24 0934, 85 mg at 06/17/24 0934    famotidine (PEPCID) tablet 10 mg, 10 mg, Oral, Daily, Otilia Tay MD, 10 mg at 06/17/24 0934    folic acid (FOLVITE) tablet 1 mg, 1 mg, Oral, Daily, Sonya Banks MD, 1 mg at 06/17/24 0934    heparin (porcine) injection 2,000 Units, 2,000 Units, Intracatheter, PRN, Efe, Jose Hall MD, 2,000 Units at 06/04/24 1101    lactobacillus acidophilus (RISAQUAD) capsule 1 capsule, 1 capsule, Oral, BID, José Fuentes MD, 1 capsule at 06/17/24 0934    levothyroxine (SYNTHROID, LEVOTHROID) tablet 75 mcg, 75 mcg, Oral, Q AM, Genny Saeed MD, 75 mcg at 06/17/24 0529    Lidocaine HCl gel (XYLOCAINE) urethral/mucosal syringe, , Topical, PRN, Genny Saeed MD, Given at 06/01/24 0609    lidocaine HCL topical jelly USP 2% syringe 11 mL, , Topical, BID, Genny Saeed MD, Given at 06/17/24 0935    melatonin tablet 5 mg, 5 mg, Oral, Nightly PRN, Radha Vasques APRN, 5 mg at 06/16/24 2117    metoprolol succinate XL (TOPROL-XL) 24 hr tablet 100 mg, 100 mg, Oral, Q24H, Keren Escalona PA-C, 100 mg at 06/16/24 1837    metoprolol tartrate (LOPRESSOR) injection 5 mg, 5 mg, Intravenous, Q6H PRN, Hayden Weinstein MD, 5 mg at 06/14/24 2201    ondansetron (ZOFRAN) injection 4 mg, 4 mg, Intravenous, Q6H PRN, Sita Santizo  CARMEN LATHAM, 4 mg at 06/08/24 0456    Pharmacy Consult - [place], , Does not apply, Daily, Freddie Sutton, [facility], Given at 06/09/24 0853    Polyvinyl Alcohol-Povidone PF (HYPOTEARS) 1.4-0.6 % ophthalmic solution 1 drop, 1 drop, Both Eyes, Q1H PRN, Pam Bullock, ROSELINE    sodium chloride 0.9 % flush 10 mL, 10 mL, Intravenous, Q12H, Genny Saeed MD, 10 mL at 06/17/24 0935    sodium chloride 0.9 % flush 10 mL, 10 mL, Intravenous, PRN, Genny Saeed MD    sodium chloride 0.9 % infusion 40 mL, 40 mL, Intravenous, PRN, Genny Saeed MD    sodium zirconium cyclosilicate (LOKELMA) packet 10 g, 10 g, Oral, Daily, Arvin Curtis MD, 10 g at 06/17/24 0934    vancomycin (VANCOCIN) capsule 125 mg, 125 mg, Oral, BID, José Fuentes MD, 125 mg at 06/17/24 0934    [START ON 6/22/2024] vancomycin (VANCOCIN) capsule 125 mg, 125 mg, Oral, Daily, José Fuentes MD    Antibiotics:  Anti-Infectives (From admission, onward)      Ordered     Dose/Rate Route Frequency Start Stop    06/14/24 0954  vancomycin (VANCOCIN) capsule 125 mg        Ordering Provider: José Fuentes MD    125 mg Oral Daily 06/22/24 0900 06/29/24 0859    06/14/24 0954  vancomycin (VANCOCIN) capsule 125 mg        Ordering Provider: José Fuentes MD    125 mg Oral 2 Times Daily 06/14/24 2100 06/21/24 2059    06/10/24 1030  eravacycline dihydrochloride (XERAVA) 85 mg in sodium chloride 0.9 % 250 mL (0.34 mg/mL) IVPB        Note to Pharmacy: !! Ensure final concentration of 0.2 - 0.6 mg/mL !!   Ordering Provider: José Fuentes MD    85 mg  250 mL/hr over 60 Minutes Intravenous Every 12 Hours 06/10/24 1800 06/19/24 0951    06/10/24 1037  Omadacycline Tosylate 150 MG tablet        Ordering Provider: José Fuentes MD    300 mg Oral Daily 06/10/24 0000      06/03/24 1247  fosfomycin (MONUROL) packet 3 g        Ordering Provider: José Fuentes MD    3 g Oral Once 06/03/24 1400 06/03/24 1450    06/02/24 1421  eravacycline  dihydrochloride (XERAVA) 85 mg in sodium chloride 0.9 % 250 mL (0.34 mg/mL) IVPB        Note to Pharmacy: !! Ensure final concentration of 0.2 - 0.6 mg/mL !!   Ordering Provider: José Fuentes MD    85 mg  250 mL/hr over 60 Minutes Intravenous Every 12 Hours 24 1600 06/10/24 0646            Physical Exam:   Vital Signs  Temp (24hrs), Av.8 °F (36.6 °C), Min:97.5 °F (36.4 °C), Max:98.1 °F (36.7 °C)    Temp  Min: 97.5 °F (36.4 °C)  Max: 98.1 °F (36.7 °C)  BP  Min: 124/99  Max: 139/94  Pulse  Min: 68  Max: 71  Resp  Min: 16  Max: 18  No data recorded    GENERAL: Awake and alert, chronically ill-appearing but not acutely toxic  HEENT: Normocephalic, atraumatic.  PERRL. EOMI. No conjunctival injection. No icterus. Edentulous   NECK: Supple   HEART: RRR; No murmur,  .   LUNGS: Clear to auscultation bilaterally without wheezing, rales, rhonchi. Normal respiratory effort. Nonlabored.   ABDOMEN: Soft,  tender  nondistended: Biliary drain in right upper quadrant  EXT: Left BKA site dressed, CDI. No new images in chart  Right foot lesions dressed.  To level of soft tissue, tendon based on most recent images  :  Delaney catheter with clear urine  MSK: No joint effusions or erythema  SKIN: Warm and dry    NEURO: Oriented to PPT.  Motor 5/5 strength  PSYCHIATRIC: Normal insight and judgment. Cooperative with PE  Dialysis line in the right chest.  Left IJ CVC    Laboratory Data    Results from last 7 days   Lab Units 24  0621 06/15/24  0630   WBC 10*3/mm3 7.16 9.15   HEMOGLOBIN g/dL 9.8* 10.1*   HEMATOCRIT % 30.9* 32.2*   PLATELETS 10*3/mm3 167 178     Results from last 7 days   Lab Units 24  0621   SODIUM mmol/L 136   POTASSIUM mmol/L 4.4   CHLORIDE mmol/L 103   CO2 mmol/L 23.0   BUN mg/dL 36*   CREATININE mg/dL 2.62*   GLUCOSE mg/dL 70   CALCIUM mg/dL 8.5*                                   Estimated Creatinine Clearance: 31 mL/min (A) (by C-G formula based on SCr of 2.62 mg/dL  (H)).      Microbiology:  Microbiology Results (last 10 days)       ** No results found for the last 240 hours. **            Radiology:  Imaging Results (Last 72 Hours)       ** No results found for the last 72 hours. **          5/28, 5/31 blood cultures from [facility], negative      Impression:   C diff colitis initially diagnosed in April 2024. Was given Lactulose, Stool softeners in [facility] and developed worsening diarrhea, with positive antigen, negative EIA toxin so possible Colonization vs true infection.  Repeat testing at [facility] with positive PCR and positive EIA antigen.  Patient says stool frequency is overall improving, especially after switched to eravacycline. Improving   NSTEMI,  Cardiology evaluated  ESRD on HD: via tunneled HD catheter. still makes urine  Severe peripheral vascular disease: No intervention needed per vascular surgery  S/p left BKA 4/2024, with dehiscence of the amputation site and possible soft tissue infection  Possible left tibial early osteomyelitis:  by MRI.  Dr. Yeager recommended additional I&D surgery which the patient refused. CRP improving   Left lower extremity wound, with exposed tendon-  early osteomyelitis of the lateral malleolus not excluded by MRI per radiology.  Lesions dry on exam  Chronic cholecystitis, s/p percutaneous cholecystotomy tube- [facility]  Pyuria, Enterobacter bacteriuria: Had chronic indwelling Delaney catheter that was in place for almost 2 months prior to being changed on admission 5/31.  Culture from admission with Enterobacter.  Treated with fosfomycin x 1.  Repeat culture 6/2 negative  Sacral pressure ulcers  Social barriers to care: Inadequate transportation for follow-up appointments    PLAN/RECOMMENDATIONS:   Follow CBC, CMP, CRP      Continue IV eravacycline while inpatient    PO  vancomycin tapered to 125 mg twice daily for 1 week, and then 125 mg once daily for 1 week, then follow-up in the office to reassess  - probiotic BID     Patient  agreeable to skilled nursing facility placement and case management working on options.  Complex discharge planning due to numerous committees and high level of needs after discharge.    Patient still refusing tunneled line placement to facilitate IV antibiotics after discharge but is agreeable to taking oral antibiotics.  Per case management, prior authorization for Nuzyra has been approved for 2 weeks.  Treat with Nuzyra 300 mg daily for 2 weeks and then transition to oral doxycycline 100 mg BID for 2 more weeks, to complete 6 weeks of antibiotics total, at least through .     Overall prognosis is poor due to numerous comorbidities, refusal of recommended aggressive surgical debridement and IV antibiotics.  High risk for higher level amputation and recurrent sepsis.         Okay to discharge from my perspective once arrangements have been made. Remove IJ CVC prior to discharge. Schedule outpatient follow-up with me 2 weeks after discharge        José Fuentes MD  2024  10:34 EDT             Electronically signed by José Fuentes MD at 24 1038          Physical Therapy Notes (most recent note)        Serena Bustamante, PT at 24 1020  Version 1 of 1         Patient Name: Tavo Gudino  : 1955    MRN: [id number]                              Today's Date: 2024       Admit Date: 2024    Visit Dx: No diagnosis found.  Patient Active Problem List   Diagnosis    NSTEMI (non-ST elevated myocardial infarction)    C. difficile colitis    History of cholecystitis s/p cholecystostomy tube    ESRD (end stage renal disease) on hemodialysis    Atrial fibrillation    Essential hypertension    Moderate malnutrition    Soft tissue infection    Acute osteomyelitis of left tibia     Past Medical History:   Diagnosis Date    Atrial fibrillation     Chronic kidney disease (CKD), stage V     ESRD on hemodialysis     Hypertension     Metabolic acidosis     Peripheral arterial disease      Pneumothorax     Secondary hyperparathyroidism      Past Surgical History:   Procedure Laterality Date    ARTERIOVENOUS FISTULA Right     BELOW KNEE LEG AMPUTATION Left     CATARACT EXTRACTION Bilateral       General Information       Row Name 06/17/24 1109          Physical Therapy Time and Intention    Document Type progress note/recertification  -ND     Mode of Treatment physical therapy  -ND       Row Name 06/17/24 1109          General Information    Patient Profile Reviewed yes  -ND     Existing Precautions/Restrictions fall;other (see comments)  L BKA, per pt reports RLE NWB, drain, cole, dialysis cath, sacral + LE wounds.  -ND     Barriers to Rehab medically complex;previous functional deficit;physical barrier  -ND       Row Name 06/17/24 1109          Cognition    Orientation Status (Cognition) oriented x 3  -ND       Row Name 06/17/24 1109          Safety Issues, Functional Mobility    Safety Issues Affecting Function (Mobility) awareness of need for assistance;friction/shear risk;insight into deficits/self-awareness;judgment;problem-solving;safety precaution awareness;safety precautions follow-through/compliance;sequencing abilities  -ND     Impairments Affecting Function (Mobility) balance;endurance/activity tolerance;motor planning;pain;postural/trunk control;range of motion (ROM);strength  -ND               User Key  (r) = Recorded By, (t) = Taken By, (c) = Cosigned By      Initials Name Provider Type    ND Serena Bustamante, KATARZYNA Physical Therapist                   Mobility       Row Name 06/17/24 1114          Bed Mobility    Bed Mobility rolling left;rolling right;scooting/bridging  -ND     Rolling Left [place] (Bed Mobility) minimum assist (75% patient effort);1 person assist;verbal cues;nonverbal cues (demo/gesture)  -ND     Rolling Right [place] (Bed Mobility) minimum assist (75% patient effort);1 person assist;verbal cues;nonverbal cues (demo/gesture)  -ND     Scooting/Bridging  [place] (Bed Mobility) maximum assist (25% patient effort);2 person assist;verbal cues;nonverbal cues (demo/gesture);dependent (less than 25% patient effort)  -ND     Assistive Device (Bed Mobility) bed rails;draw sheet  -ND     Comment, (Bed Mobility) Assist at hip to complete rolling L/R. Pt trailed bed > chair transfer via scooting backward onto chair with max-A x2 for ~75% of transfer progressing to dependent x2 for remaining 25%. Pt limited by decreased strength/ROM in RUE.  -ND       Row Name 06/17/24 1114          Transfers    Comment, (Transfers) STS deferred this date due to pt reporting RLE NWB, unable to officially clarify this per chart review.  -ND       Row Name 06/17/24 1114          Bed-Chair Transfer    Bed-Chair [place] (Transfers) maximum assist (25% patient effort);dependent (less than 25% patient effort);2 person assist;verbal cues;nonverbal cues (demo/gesture)  -ND     Comment, (Bed-Chair Transfer) Posterior scooting from bed > chair.  -ND               User Key  (r) = Recorded By, (t) = Taken By, (c) = Cosigned By      Initials Name Provider Type    Serena Nash PT Physical Therapist                   Obj/Interventions       Row Name 06/17/24 1117          Balance    Balance Assessment sitting static balance;sitting dynamic balance  -ND     Static Sitting Balance standby assist  -ND     Dynamic Sitting Balance contact guard  -ND     Position, Sitting Balance unsupported;long sitting  -ND     Balance Interventions sitting  -ND     Comment, Balance Intermittent cues required for upright posture and to lean forward.  -ND               User Key  (r) = Recorded By, (t) = Taken By, (c) = Cosigned By      Initials Name Provider Type    Serena Nash PT Physical Therapist                   Goals/Plan       Row Name 06/17/24 1114          Bed Mobility Goal 1 (PT)    Activity/Assistive Device (Bed Mobility Goal 1, PT) rolling to left;rolling to right;sit to supine;supine to  sit  -ND     [place] Level/Cues Needed (Bed Mobility Goal 1, PT) contact guard required  -ND     Time Frame (Bed Mobility Goal 1, PT) long term goal (LTG);10 days  -ND     Progress/Outcomes (Bed Mobility Goal 1, PT) continuing progress toward goal  -ND       Row Name 06/17/24 1114          Problem Specific Goal 1 (PT)    Problem Specific Goal 1 (PT) Patient will be independent with HEP  -ND     Time Frame (Problem Specific Goal 1, PT) long-term goal (LTG);2 weeks  -ND     Progress/Outcome (Problem Specific Goal 1, PT) continuing progress toward goal  -ND       Row Name 06/17/24 1114          Therapy Assessment/Plan (PT)    Planned Therapy Interventions (PT) balance training;bed mobility training;home exercise program;patient/family education;transfer training;wheelchair management/propulsion training;postural re-education;ROM (range of motion);strengthening  -ND               User Key  (r) = Recorded By, (t) = Taken By, (c) = Cosigned By      Initials Name Provider Type    ND Serena Bustamante, PT Physical Therapist                   Clinical Impression       Row Name 06/17/24 1111          Pain    Pretreatment Pain Rating 6/10  -ND     Posttreatment Pain Rating 6/10  -ND     Pain Location - Side/Orientation Right  -ND     Pain Location generalized  -ND     Pain Location - buttock  -ND     Pain Intervention(s) Repositioned;Nursing Notified;Ambulation/increased activity  -ND       Row Name 06/17/24 1111          Plan of Care Review    Plan of Care Reviewed With patient  -ND     Progress no change  -ND     Outcome Evaluation Pt trialed bed > chair transfer using posterior scooting to avoid WB thru BLE. Pt continues to present with significant generalized weakness, limitations in use of RUE, impaired balance and coordination, impaired sequencing of tasks, and limited activity tolerance warranting need for continued skilled therapy to facilitate increased independence and improved overall functional strength and  mobility. Recommend SNF following d/c for best outcomes.  -ND       Row Name 06/17/24 1111          Therapy Assessment/Plan (PT)    Rehab Potential (PT) fair, will monitor progress closely  -ND     Criteria for Skilled Interventions Met (PT) yes;skilled treatment is necessary  -ND     Therapy Frequency (PT) 3 times/wk  -ND       Row Name 06/17/24 1111          Vital Signs    Pre Systolic BP Rehab 126  -ND     Pre Treatment Diastolic BP 77  -ND     Post Systolic BP Rehab 132  -ND     Post Treatment Diastolic BP 91  -ND     O2 Delivery Pre Treatment room air  -ND     O2 Delivery Intra Treatment room air  -ND     O2 Delivery Post Treatment room air  -ND     Pre Patient Position Supine  -ND     Intra Patient Position Sitting  -ND     Post Patient Position Supine  -ND       Row Name 06/17/24 1111          Positioning and Restraints    Pre-Treatment Position in bed  -ND     Post Treatment Position chair  -ND     In Chair notified nsg;reclined;legs elevated;call light within reach;encouraged to call for assist;exit alarm on;waffle cushion;on mechanical lift sling  -ND               User Key  (r) = Recorded By, (t) = Taken By, (c) = Cosigned By      Initials Name Provider Type    ND Serena Bustamante, PT Physical Therapist                   Outcome Measures       Row Name 06/17/24 1119          How much help from another person do you currently need...    Turning from your back to your side while in flat bed without using bedrails? 3  -ND     Moving from lying on back to sitting on the side of a flat bed without bedrails? 3  -ND     Moving to and from a bed to a chair (including a wheelchair)? 1  -ND     Standing up from a chair using your arms (e.g., wheelchair, bedside chair)? 1  -ND     Climbing 3-5 steps with a railing? 1  -ND     To walk in hospital room? 1  -ND     AM-PAC 6 Clicks Score (PT) 10  -ND     Highest Level of Mobility Goal 4 --> Transfer to chair/commode  -ND       Row Name 06/17/24 1117           Functional Assessment    Outcome Measure Options AM-PAC 6 Clicks Basic Mobility (PT)  -ND               User Key  (r) = Recorded By, (t) = Taken By, (c) = Cosigned By      Initials Name Provider Type    ND Serena Bustamante, PT Physical Therapist                                 Physical Therapy Education       Title: PT OT SLP Therapies (In Progress)       Topic: Physical Therapy (Done)       Point: Mobility training (Done)       Learning Progress Summary             Patient Acceptance, E, VU by ND at 6/17/2024 1120    Acceptance, E, VU by KE at 6/12/2024 1317    Acceptance, E, VU,NR by ML at 6/7/2024 1039    Acceptance, E, VU,NR by ML at 6/3/2024 1610   Family Acceptance, E, VU,NR by ML at 6/3/2024 1610                         Point: Home exercise program (Done)       Learning Progress Summary             Patient Acceptance, E, VU,NR by ML at 6/7/2024 1039                         Point: Body mechanics (Done)       Learning Progress Summary             Patient Acceptance, E, VU by ND at 6/17/2024 1120    Acceptance, E, VU by KE at 6/12/2024 1317                         Point: Precautions (Done)       Learning Progress Summary             Patient Acceptance, E, VU by ND at 6/17/2024 1120    Acceptance, E, VU by KE at 6/12/2024 1317    Acceptance, E, VU,NR by ML at 6/7/2024 1039    Acceptance, E, VU,NR by ML at 6/3/2024 1610   Family Acceptance, E, VU,NR by ML at 6/3/2024 1610                                         User Key       Initials Effective Dates Name Provider Type Discipline     04/22/21 -  Camille Becerra Physical Therapist PT    ND 11/16/23 -  Serena Bustamante, KATARZYNA Physical Therapist PT    MART 11/16/23 -  Sulma Reed, KATARZYNA Physical Therapist PT                  PT Recommendation and Plan  Planned Therapy Interventions (PT): balance training, bed mobility training, home exercise program, patient/family education, transfer training, wheelchair management/propulsion training, postural re-education, ROM  (range of motion), strengthening  Plan of Care Reviewed With: patient  Progress: no change  Outcome Evaluation: Pt trialed bed > chair transfer using posterior scooting to avoid WB thru BLE. Pt continues to present with significant generalized weakness, limitations in use of RUE, impaired balance and coordination, impaired sequencing of tasks, and limited activity tolerance warranting need for continued skilled therapy to facilitate increased independence and improved overall functional strength and mobility. Recommend SNF following d/c for best outcomes.     Time Calculation:         PT Charges       Row Name 24 1106             Time Calculation    Start Time 1020  -ND      PT Received On 24  -ND      PT Goal Re-Cert Due Date 24  -ND         Timed Charges    47704 - PT Therapeutic Activity Minutes 30  -ND         Total Minutes    Timed Charges Total Minutes 30  -ND       Total Minutes 30  -ND                User Key  (r) = Recorded By, (t) = Taken By, (c) = Cosigned By      Initials Name Provider Type    ND Serena Bustamante, PT Physical Therapist                  Therapy Charges for Today       Code Description Service Date Service Provider Modifiers Qty    39255569185 HC PT THERAPEUTIC ACT EA 15 MIN 2024 Serena Bustamante, PT GP 2            PT G-Codes  Outcome Measure Options: AM-PAC 6 Clicks Basic Mobility (PT)  AM-PAC 6 Clicks Score (PT): 10  AM-PAC 6 Clicks Score (OT): 13  PT Discharge Summary  Anticipated Discharge Disposition (PT): skilled nursing facility    Serena Bustamante PT  2024      Electronically signed by Serena Bustamante, PT at 24 1122          Occupational Therapy Notes (most recent note)        Na Pitts, OT at 24 1009          Patient Name: Tavo Gudino  : 1955    MRN: [id number]                              Today's Date: 2024       Admit Date: 2024    Visit Dx: No diagnosis found.  Patient Active Problem List   Diagnosis     NSTEMI (non-ST elevated myocardial infarction)    C. difficile colitis    History of cholecystitis s/p cholecystostomy tube    ESRD (end stage renal disease) on hemodialysis    Atrial fibrillation    Essential hypertension    Moderate malnutrition    Soft tissue infection    Acute osteomyelitis of left tibia     Past Medical History:   Diagnosis Date    Atrial fibrillation     Chronic kidney disease (CKD), stage V     ESRD on hemodialysis     Hypertension     Metabolic acidosis     Peripheral arterial disease     Pneumothorax     Secondary hyperparathyroidism      Past Surgical History:   Procedure Laterality Date    ARTERIOVENOUS FISTULA Right     BELOW KNEE LEG AMPUTATION Left     CATARACT EXTRACTION Bilateral       General Information       Row Name 06/17/24 1105          OT Time and Intention    Document Type progress note/recertification  -GARRY     Mode of Treatment individual therapy;occupational therapy  -GARRY       Row Name 06/17/24 1105          General Information    Patient Profile Reviewed yes  -GARRY     Existing Precautions/Restrictions fall;other (see comments)  sacral and LE wounds, L BKA, drain, cole, dialysis cath; unsure of RLE WB status ( per pt. MD instructed him to be NWB RLE except if he wanted to put light pressure on toes to steady)  -GARRY     Barriers to Rehab medically complex;previous functional deficit;physical barrier  -GARRY       Row Name 06/17/24 1105          Cognition    Orientation Status (Cognition) oriented x 3  -GARRY       Row Name 06/17/24 1105          Safety Issues, Functional Mobility    Safety Issues Affecting Function (Mobility) insight into deficits/self-awareness;safety precaution awareness;safety precautions follow-through/compliance;problem-solving  -GARRY     Impairments Affecting Function (Mobility) balance;endurance/activity tolerance;range of motion (ROM);pain;strength;postural/trunk control  -GARRY               User Key  (r) = Recorded By, (t) = Taken By, (c) = Cosigned By       Initials Name Provider Type    Na Pena, OT Occupational Therapist                     Mobility/ADL's       Row Name 06/17/24 1108          Bed Mobility    Bed Mobility supine-sit  -GARRY     Rolling Left [place] (Bed Mobility) minimum assist (75% patient effort);verbal cues;nonverbal cues (demo/gesture);1 person assist  -GARRY     Rolling Right [place] (Bed Mobility) minimum assist (75% patient effort);1 person assist;verbal cues;nonverbal cues (demo/gesture)  -GARRY     Scooting/Bridging [place] (Bed Mobility) maximum assist (25% patient effort);dependent (less than 25% patient effort);2 person assist;verbal cues;nonverbal cues (demo/gesture)  -GARRY     Supine-Sit [place] (Bed Mobility) minimum assist (75% patient effort);1 person assist;verbal cues  assist with trunk side to sit  -GARRY     Bed Mobility, Safety Issues decreased use of arms for pushing/pulling;decreased use of legs for bridging/pushing  -GARRY     Assistive Device (Bed Mobility) draw sheet;bed rails  -GARRY     Comment, (Bed Mobility) Pt. initially helping scoot max of 2, but as fatigued to dep of 2, draw sheet use.  -       Row Name 06/17/24 1108          Transfers    Transfers bed-chair transfer  -GARRY     Comment, (Transfers) Attempted backing into chair, pt. scooted back across bed max of 2 inititially, but fatigued quickly and unable to even maintain sitting upright once fatiged.  Sheet used to slide pt. into chair.  Pt. lifted with lift to place pillows and waffle under pt.  -GARRY       Row Name 06/17/24 1108          Bed-Chair Transfer    Bed-Chair [place] (Transfers) dependent (less than 25% patient effort);2 person assist  -GARRY     Assistive Device (Bed-Chair Transfers) other (see comments)  -               User Key  (r) = Recorded By, (t) = Taken By, (c) = Cosigned By      Initials Name Provider Type    Na Pena, LUISANA Occupational Therapist                   Obj/Interventions       Row Name 06/17/24 1120           Range of Motion Comprehensive    Comment, General Range of Motion pt. continues to need LUE to assist RUE with shoulder flexion, pt. with no ER R shoulder  -       Row Name 06/17/24 1120          Strength Comprehensive (MMT)    Comment, General Manual Muscle Testing (MMT) Assessment Elbow F/E 3+ to 4/5, shoulder R 0 to 2+/5, L 3+ to 4/5  -       Row Name 06/17/24 1120          Shoulder (Therapeutic Exercise)    Shoulder (Therapeutic Exercise) AROM (active range of motion);AAROM (active assistive range of motion)  -     Shoulder AROM (Therapeutic Exercise) left;flexion;extension;horizontal aBduction/aDduction;bilateral;aBduction;aDduction  -     Shoulder AAROM (Therapeutic Exercise) right assist left;flexion;extension;horizontal aBduction/aDduction  OT assisted to maintain flexion so pt. could complete horz abd/add RUE  -       Row Name 06/17/24 1120          Elbow/Forearm (Therapeutic Exercise)    Elbow/Forearm (Therapeutic Exercise) strengthening exercise  -     Elbow/Forearm Strengthening (Therapeutic Exercise) bilateral;flexion;extension;15 repititions;2 sets  mild manual resistance given  -       Row Name 06/17/24 1120          Motor Skills    Therapeutic Exercise shoulder;elbow/forearm  -       Row Name 06/17/24 1120          Balance    Static Sitting Balance standby assist  -     Dynamic Sitting Balance moderate assist  -     Position, Sitting Balance supported;sitting edge of bed;long sitting  -     Comment, Balance pt. able to long sit initially across bed CGA, but as fatigued progressed to mod A  -GARRY               User Key  (r) = Recorded By, (t) = Taken By, (c) = Cosigned By      Initials Name Provider Type    Na Pena, OT Occupational Therapist                   Goals/Plan       Row Name 06/17/24 1130          Bed Mobility Goal 1 (OT)    Progress/Outcomes (Bed Mobility Goal 1, OT) goal ongoing  -       Row Name 06/17/24 1130          Grooming Goal 1 (OT)     Progress/Outcome (Grooming Goal 1, OT) goal ongoing  -GARRY       Row Name 06/17/24 1130          Self-Feeding Goal 1 (OT)    Progress/Outcomes (Self-Feeding Goal 1, OT) goal ongoing  -GARRY       Row Name 06/17/24 1130          Strength Goal 1 (OT)    Progress/Outcome (Strength Goal 1, OT) good progress toward goal;goal ongoing  -GARRY       Row Name 06/17/24 1130          Problem Specific Goal 1 (OT)    Progress/Outcome (Problem Specific Goal 1, OT) good progress toward goal;goal ongoing  -GARRY               User Key  (r) = Recorded By, (t) = Taken By, (c) = Cosigned By      Initials Name Provider Type    Na Pena, OT Occupational Therapist                   Clinical Impression       Row Name 06/17/24 1124          Pain Assessment    Pretreatment Pain Rating 6/10  -GARRY     Posttreatment Pain Rating 6/10  -GARRY     Pain Location - buttock  -GARRY     Pain Intervention(s) Ambulation/increased activity;Repositioned;Nursing Notified  -       Row Name 06/17/24 1124          Plan of Care Review    Plan of Care Reviewed With patient  -GARRY     Progress improving  -GARRY     Outcome Evaluation Pt. continues with significant generalized weakness, activity tolerance and balance deficit impacting PLOF.  Attempted backing into chair with UE support due to NWB RLE, but due to limited UE strength especially RUE pt. needed max A initially progressing to dependence. Pt. will benefit from SNF prior to home to address home AD needs and family training to progress to retun home.  -       Row Name 06/17/24 1124          Therapy Assessment/Plan (OT)    Patient/Family Therapy Goal Statement (OT) pt. want to continue to gain strength and get AD setup for return home  -GARRY     Rehab Potential (OT) fair, will monitor progress closely  -GARRY     Criteria for Skilled Therapeutic Interventions Met (OT) yes;meets criteria;skilled treatment is necessary  -GARRY     Therapy Frequency (OT) daily  -GARRY     Predicted Duration of Therapy Intervention (OT) 10 days   -GARRY       Row Name 06/17/24 1124          Therapy Plan Review/Discharge Plan (OT)    Anticipated Discharge Disposition (OT) skilled nursing facility  -GARRY       Row Name 06/17/24 1124          Vital Signs    Pre Systolic BP Rehab 126  -GARRY     Pre Treatment Diastolic BP 77  -GARRY     Post Systolic BP Rehab 132  -GARRY     Post Treatment Diastolic BP 91  -GARRY     O2 Delivery Pre Treatment room air  -GARRY     O2 Delivery Intra Treatment room air  -GARRY     O2 Delivery Post Treatment room air  -GARRY     Pre Patient Position Supine  -GARRY     Intra Patient Position Sitting  -GARRY     Post Patient Position Sitting  -GARRY       Row Name 06/17/24 1124          Positioning and Restraints    Pre-Treatment Position in bed  -GARRY     Post Treatment Position chair  -GARRY     In Chair notified nsg;reclined;call light within reach;encouraged to call for assist;exit alarm on;waffle cushion;legs elevated;heels elevated;on mechanical lift sling  -GARRY               User Key  (r) = Recorded By, (t) = Taken By, (c) = Cosigned By      Initials Name Provider Type    Na Pena, OT Occupational Therapist                   Outcome Measures       Row Name 06/17/24 1130          How much help from another is currently needed...    Putting on and taking off regular lower body clothing? 1  -GARRY     Bathing (including washing, rinsing, and drying) 2  -GARRY     Toileting (which includes using toilet bed pan or urinal) 1  -GARRY     Putting on and taking off regular upper body clothing 3  -GARRY     Taking care of personal grooming (such as brushing teeth) 3  -GARRY     Eating meals 3  -GARRY     AM-PAC 6 Clicks Score (OT) 13  -GARRY       Row Name 06/17/24 1119          How much help from another person do you currently need...    Turning from your back to your side while in flat bed without using bedrails? 3  -ND     Moving from lying on back to sitting on the side of a flat bed without bedrails? 3  -ND     Moving to and from a bed to a chair (including a wheelchair)? 1  -ND      Standing up from a chair using your arms (e.g., wheelchair, bedside chair)? 1  -ND     Climbing 3-5 steps with a railing? 1  -ND     To walk in hospital room? 1  -ND     AM-PAC 6 Clicks Score (PT) 10  -ND     Highest Level of Mobility Goal 4 --> Transfer to chair/commode  -ND       Row Name 06/17/24 1130 06/17/24 1119       Functional Assessment    Outcome Measure Options AM-PAC 6 Clicks Daily Activity (OT)  -GARRY AM-PAC 6 Clicks Basic Mobility (PT)  -ND              User Key  (r) = Recorded By, (t) = Taken By, (c) = Cosigned By      Initials Name Provider Type    Na Pena, OT Occupational Therapist    ND Serena Bustamante, PT Physical Therapist                    Occupational Therapy Education       Title: PT OT SLP Therapies (Done)       Topic: Occupational Therapy (Done)       Point: ADL training (Done)       Description:   Instruct learner(s) on proper safety adaptation and remediation techniques during self care or transfers.   Instruct in proper use of assistive devices.                  Learning Progress Summary             Patient Acceptance, E,TB, VU,DU by KF at 6/12/2024 1315    Acceptance, E, VU by GARRY at 6/3/2024 1544    Comment: reason for consult, evaluation results   Family Acceptance, E, VU by GARRY at 6/3/2024 1544    Comment: reason for consult, evaluation results                         Point: Home exercise program (Done)       Description:   Instruct learner(s) on appropriate technique for monitoring, assisting and/or progressing therapeutic exercises/activities.                  Learning Progress Summary             Patient Acceptance, E,D, VU,NR by GARRY at 6/17/2024 1131    Comment: bed mobilty, UE strengthening TE, transfer options, home AD likely needs and concerns for fit, SNF need                         Point: Precautions (Done)       Description:   Instruct learner(s) on prescribed precautions during self-care and functional transfers.                  Learning Progress Summary              Patient Acceptance, E,D, VU,NR by GARRY at 6/17/2024 1131    Comment: bed mobilty, UE strengthening TE, transfer options, home AD likely needs and concerns for fit, SNF need    Acceptance, E,TB, VU,DU by KF at 6/12/2024 1315    Acceptance, E, NR by KL at 6/7/2024 1157                         Point: Body mechanics (Done)       Description:   Instruct learner(s) on proper positioning and spine alignment during self-care, functional mobility activities and/or exercises.                  Learning Progress Summary             Patient Acceptance, E,D, VU,NR by GARRY at 6/17/2024 1131    Comment: bed mobilty, UE strengthening TE, transfer options, home AD likely needs and concerns for fit, SNF need    Acceptance, E,TB, VU,DU by  at 6/12/2024 1315    Acceptance, E, NR by  at 6/7/2024 1157                                         User Key       Initials Effective Dates Name Provider Type Discipline    GARRY 07/11/23 -  Na Pitts, OT Occupational Therapist OT     08/09/23 -  Zenaida Oden OT Occupational Therapist OT     02/05/24 -  Mariah Foss OT Occupational Therapist OT                  OT Recommendation and Plan  Planned Therapy Interventions (OT): activity tolerance training, adaptive equipment training, BADL retraining, occupation/activity based interventions, patient/caregiver education/training, strengthening exercise, transfer/mobility retraining, ROM/therapeutic exercise, functional balance retraining  Therapy Frequency (OT): daily  Plan of Care Review  Plan of Care Reviewed With: patient  Progress: improving  Outcome Evaluation: Pt. continues with significant generalized weakness, activity tolerance and balance deficit impacting PLOF.  Attempted backing into chair with UE support due to NWB RLE, but due to limited UE strength especially RUE pt. needed max A initially progressing to dependence. Pt. will benefit from SNF prior to home to address home AD needs and family training to progress to  retun home.     Time Calculation:   Evaluation Complexity (OT)  Review Occupational Profile/Medical/Therapy History Complexity: expanded/moderate complexity  Assessment, Occupational Performance/Identification of Deficit Complexity: 3-5 performance deficits  Clinical Decision Making Complexity (OT): detailed assessment/moderate complexity  Overall Complexity of Evaluation (OT): moderate complexity     Time Calculation- OT       Row Name 06/17/24 1132             Time Calculation- OT    OT Start Time 1009  -GARRY      OT Received On 06/17/24  -GARRY      OT Goal Re-Cert Due Date 06/27/24  -GARRY         Timed Charges    93562 - OT Therapeutic Exercise Minutes 15  -GARRY      25180 - OT Therapeutic Activity Minutes 10  -GARRY         Total Minutes    Timed Charges Total Minutes 25  -GARRY       Total Minutes 25  -GARRY                User Key  (r) = Recorded By, (t) = Taken By, (c) = Cosigned By      Initials Name Provider Type    Na Pena OT Occupational Therapist                  Therapy Charges for Today       Code Description Service Date Service Provider Modifiers Qty    83678598648 HC OT THER PROC EA 15 MIN 6/17/2024 Na Pitts OT GO 1    [id number]  OT THERAPEUTIC ACT EA 15 MIN 6/17/2024 Na Pitts OT GO 1                 Na Pitts OT  6/17/2024    Electronically signed by Na Pitts OT at 06/17/24 1133

## 2024-06-17 NOTE — PROGRESS NOTES
Tavo Gudino  1955  [id number]    Reason for Consultation: recurrent C diff. Osteomyelitis     History of present illness:    Patient is a 69 y.o. male, with PMH chronic cholecystitis( cholecystotomy tube) DM, ESRD on HD, HTN, PVD s/p Left  BKA.  All recent care done at [facility] in [facility].    Presented to OSH with chest pain, found to be in afib with RVR possible NSTEMi, transferred to [facility] for LHC.  Developed diarrhea prior to transfer, C diff toxin negative, positive antigen, started on oral Vancomycin. He had been treated with Kayexalate for hyperkalemia, as well as Colace.  Last positive C diff PCR 4/6/24 from [facility]. Noted exposed tendon RLE wound , and ulcer of right heel, sacral area, and left residual stump. He has been afebrile. Admitting labs with WBC 22, plt 249, ALT 10 AST 10, Scr 3.47, UA with TNTC WBCs, urine culture with gram negative bacilli, blood cultures no growth. MRI Left with edema throughout soft tissue of stump, no abscess,subtle early changes of superimposed osteomyelitis distal osseous stump not excluded.  Right with diffuse soft tissue cellulitis, possible early osteomyelitis lateral malleolus, no definite evidence of osteomyelitis.  Started on Ceftriaxone Daptomycin, Flagyl, and oral Vancomycin and we were consulted for evaluation and treatment. He has had an indwelling cole catheter since April, just recently changed.  He continues to have numerous diarrhea stools and abdominal cramping.  No fever.      6/3/24: Frustrated with prolonged hospitalization but slow overnight.  Awaiting possible cardiac workup.  Legs stable.  Cole catheter in place.  Denies abdominal pain, suprapubic pain at this time.  He said he only had 1 bowel movement yesterday evening but has had 2 loose bowel movement so far today.  Overall he thinks his stool frequency has improved    6/5/2024: Resting comfortably.  Cardiac workup completed.   orthopedic surgery recommended  additional debridement of the amputation site but patient refusing stating he would like to follow-up with his previous wound care providers.  Loose stools slowing down and he has less nausea    6/6/24: Stool frequency slowing down.  Minimal abdominal discomfort.  Still has biliary drain.  Tolerating IV antibiotics. Discussed discharge planning again with patient today: He has numerous needs and will be difficult to manage at home but still reluctant to go back to prior skilled nursing facility.    6/10/24: Stool frequency is overall improved.  Had a bowel movement this morning but cannot tell me if it was liquid or solid.  Only 1 bowel movement charted in past 24 hours.  No abdominal pain.  Biliary drain remains in place.  No worsening swelling or pain in either lower extremity.  Tolerating IV antibiotics through left IJ CVC.  Patient agreeable to skilled nursing facility    6/12/24: Afebrile.  Blood pressure stable.  Seen in dialysis and complains of feeling cold during dialysis but otherwise no chills.  Still refusing  placement of tunneled line to facilitate IV antibiotics after discharge but is agreeable to taking oral antibiotics. Complex discharge planning in process.  Stool frequency improved.  No worsening swelling, pain, drainage from the lower extremity    6/17/24: No new complaints.  Resting comfortably.  Stool frequency stable. Appetite ok..  No abdominal pain.  No worsening leg swelling, pain,     ROS:  Afebrile and hemodynamically stable. No nausea. No rash. See above, otherwise negative.      Allergies   Allergen Reactions    Penicillins Hives     Received ceftriaxone 6/1/24    Levothyroxine Unknown - Low Severity    Hydromorphone Other (See Comments)     Confusion, encephalopathy per wife         Medication:    Current Facility-Administered Medications:     acetaminophen (TYLENOL) tablet 650 mg, 650 mg, Oral, Q4H PRN, Genny Saeed MD, 650 mg at 06/17/24 0551    apixaban (ELIQUIS) tablet 5 mg, 5  mg, Oral, BID, Otilia Tay MD, 5 mg at 06/17/24 0934    aspirin EC tablet 81 mg, 81 mg, Oral, Daily, Keren Escalona PA-C, 81 mg at 06/17/24 0934    collagenase ointment 1 Application, 1 Application, Topical, Q24H, Genny Saeed MD, 1 Application at 06/17/24 0941    dilTIAZem CD (CARDIZEM CD) 24 hr capsule 240 mg, 240 mg, Oral, Q24H, Fred Amador MD, 240 mg at 06/17/24 0934    eravacycline dihydrochloride (XERAVA) 85 mg in sodium chloride 0.9 % 250 mL (0.34 mg/mL) IVPB, 85 mg, Intravenous, Q12H, José Fuentes MD, Last Rate: 250 mL/hr at 06/17/24 0934, 85 mg at 06/17/24 0934    famotidine (PEPCID) tablet 10 mg, 10 mg, Oral, Daily, Otilia Tay MD, 10 mg at 06/17/24 0934    folic acid (FOLVITE) tablet 1 mg, 1 mg, Oral, Daily, Sonya Banks MD, 1 mg at 06/17/24 0934    heparin (porcine) injection 2,000 Units, 2,000 Units, Intracatheter, PRN, Efe, Jose Hall MD, 2,000 Units at 06/04/24 1101    lactobacillus acidophilus (RISAQUAD) capsule 1 capsule, 1 capsule, Oral, BID, José Fuentes MD, 1 capsule at 06/17/24 0934    levothyroxine (SYNTHROID, LEVOTHROID) tablet 75 mcg, 75 mcg, Oral, Q AM, Genny Saeed MD, 75 mcg at 06/17/24 0529    Lidocaine HCl gel (XYLOCAINE) urethral/mucosal syringe, , Topical, PRN, Genny Saeed MD, Given at 06/01/24 0609    lidocaine HCL topical jelly USP 2% syringe 11 mL, , Topical, BID, Genny Saeed MD, Given at 06/17/24 0935    melatonin tablet 5 mg, 5 mg, Oral, Nightly PRN, Radha Vasques APRN, 5 mg at 06/16/24 2117    metoprolol succinate XL (TOPROL-XL) 24 hr tablet 100 mg, 100 mg, Oral, Q24H, Keren Escalona PA-C, 100 mg at 06/16/24 1837    metoprolol tartrate (LOPRESSOR) injection 5 mg, 5 mg, Intravenous, Q6H PRN, Hayden Weinstein MD, 5 mg at 06/14/24 2201    ondansetron (ZOFRAN) injection 4 mg, 4 mg, Intravenous, Q6H PRN, Sita Santizo PA-C, 4 mg at 06/08/24 0456    Pharmacy Consult - [facility], , Does not  apply, Daily, Freddie Sutton [facility], Given at 06/09/24 0853    Polyvinyl Alcohol-Povidone PF (HYPOTEARS) 1.4-0.6 % ophthalmic solution 1 drop, 1 drop, Both Eyes, Q1H PRN, Pam Bullock, APRARIADNE    sodium chloride 0.9 % flush 10 mL, 10 mL, Intravenous, Q12H, Genny Saeed MD, 10 mL at 06/17/24 0935    sodium chloride 0.9 % flush 10 mL, 10 mL, Intravenous, PRN, Genny Saeed MD    sodium chloride 0.9 % infusion 40 mL, 40 mL, Intravenous, PRN, Genny Saeed MD    sodium zirconium cyclosilicate (LOKELMA) packet 10 g, 10 g, Oral, Daily, Arvin Curtis MD, 10 g at 06/17/24 0934    vancomycin (VANCOCIN) capsule 125 mg, 125 mg, Oral, BID, José Fuentes MD, 125 mg at 06/17/24 0934    [START ON 6/22/2024] vancomycin (VANCOCIN) capsule 125 mg, 125 mg, Oral, Daily, José Fuentes MD    Antibiotics:  Anti-Infectives (From admission, onward)      Ordered     Dose/Rate Route Frequency Start Stop    06/14/24 0954  vancomycin (VANCOCIN) capsule 125 mg        Ordering Provider: José Fuentes MD    125 mg Oral Daily 06/22/24 0900 06/29/24 0859    06/14/24 0954  vancomycin (VANCOCIN) capsule 125 mg        Ordering Provider: José Fuentes MD    125 mg Oral 2 Times Daily 06/14/24 2100 06/21/24 2059    06/10/24 1030  eravacycline dihydrochloride (XERAVA) 85 mg in sodium chloride 0.9 % 250 mL (0.34 mg/mL) IVPB        Note to Pharmacy: !! Ensure final concentration of 0.2 - 0.6 mg/mL !!   Ordering Provider: José Fuentes MD    85 mg  250 mL/hr over 60 Minutes Intravenous Every 12 Hours 06/10/24 1800 06/19/24 0951    06/10/24 1037  Omadacycline Tosylate 150 MG tablet        Ordering Provider: José Fuentes MD    300 mg Oral Daily 06/10/24 0000      06/03/24 1247  fosfomycin (MONUROL) packet 3 g        Ordering Provider: José Fuentes MD    3 g Oral Once 06/03/24 1400 06/03/24 1450    06/02/24 1421  eravacycline dihydrochloride (XERAVA) 85 mg in sodium chloride 0.9 % 250 mL (0.34  mg/mL) IVPB        Note to Pharmacy: !! Ensure final concentration of 0.2 - 0.6 mg/mL !!   Ordering Provider: José Fuentes MD    85 mg  250 mL/hr over 60 Minutes Intravenous Every 12 Hours 24 1600 06/10/24 0646            Physical Exam:   Vital Signs  Temp (24hrs), Av.8 °F (36.6 °C), Min:97.5 °F (36.4 °C), Max:98.1 °F (36.7 °C)    Temp  Min: 97.5 °F (36.4 °C)  Max: 98.1 °F (36.7 °C)  BP  Min: 124/99  Max: 139/94  Pulse  Min: 68  Max: 71  Resp  Min: 16  Max: 18  No data recorded    GENERAL: Awake and alert, chronically ill-appearing but not acutely toxic  HEENT: Normocephalic, atraumatic.  PERRL. EOMI. No conjunctival injection. No icterus. Edentulous   NECK: Supple   HEART: RRR; No murmur,  .   LUNGS: Clear to auscultation bilaterally without wheezing, rales, rhonchi. Normal respiratory effort. Nonlabored.   ABDOMEN: Soft,  tender  nondistended: Biliary drain in right upper quadrant  EXT: Left BKA site dressed, CDI. No new images in chart  Right foot lesions dressed.  To level of soft tissue, tendon based on most recent images  :  Delaney catheter with clear urine  MSK: No joint effusions or erythema  SKIN: Warm and dry    NEURO: Oriented to PPT.  Motor 5/5 strength  PSYCHIATRIC: Normal insight and judgment. Cooperative with PE  Dialysis line in the right chest.  Left IJ CVC    Laboratory Data    Results from last 7 days   Lab Units 24  0621 06/15/24  0630   WBC 10*3/mm3 7.16 9.15   HEMOGLOBIN g/dL 9.8* 10.1*   HEMATOCRIT % 30.9* 32.2*   PLATELETS 10*3/mm3 167 178     Results from last 7 days   Lab Units 24  0621   SODIUM mmol/L 136   POTASSIUM mmol/L 4.4   CHLORIDE mmol/L 103   CO2 mmol/L 23.0   BUN mg/dL 36*   CREATININE mg/dL 2.62*   GLUCOSE mg/dL 70   CALCIUM mg/dL 8.5*                                   Estimated Creatinine Clearance: 31 mL/min (A) (by C-G formula based on SCr of 2.62 mg/dL (H)).      Microbiology:  Microbiology Results (last 10 days)       ** No results found for  the last 240 hours. **            Radiology:  Imaging Results (Last 72 Hours)       ** No results found for the last 72 hours. **          5/28, 5/31 blood cultures from [place], negative      Impression:   C diff colitis initially diagnosed in April 2024. Was given Lactulose, Stool softeners in [place] and developed worsening diarrhea, with positive antigen, negative EIA toxin so possible Colonization vs true infection.  Repeat testing at [facility] with positive PCR and positive EIA antigen.  Patient says stool frequency is overall improving, especially after switched to eravacycline. Improving   NSTEMI,  Cardiology evaluated  ESRD on HD: via tunneled HD catheter. still makes urine  Severe peripheral vascular disease: No intervention needed per vascular surgery  S/p left BKA 4/2024, with dehiscence of the amputation site and possible soft tissue infection  Possible left tibial early osteomyelitis:  by MRI.  Dr. Yeager recommended additional I&D surgery which the patient refused. CRP improving   Left lower extremity wound, with exposed tendon-  early osteomyelitis of the lateral malleolus not excluded by MRI per radiology.  Lesions dry on exam  Chronic cholecystitis, s/p percutaneous cholecystotomy tube- [facility]  Pyuria, Enterobacter bacteriuria: Had chronic indwelling Delaney catheter that was in place for almost 2 months prior to being changed on admission 5/31.  Culture from admission with Enterobacter.  Treated with fosfomycin x 1.  Repeat culture 6/2 negative  Sacral pressure ulcers  Social barriers to care: Inadequate transportation for follow-up appointments    PLAN/RECOMMENDATIONS:   Follow CBC, CMP, CRP      Continue IV eravacycline while inpatient    PO  vancomycin tapered to 125 mg twice daily for 1 week, and then 125 mg once daily for 1 week, then follow-up in the office to reassess  - probiotic BID     Patient agreeable to skilled nursing facility placement and case management working on options.  Complex  discharge planning due to numerous committees and high level of needs after discharge.    Patient still refusing tunneled line placement to facilitate IV antibiotics after discharge but is agreeable to taking oral antibiotics.  Per case management, prior authorization for Nuzyra has been approved for 2 weeks.  Treat with Nuzyra 300 mg daily for 2 weeks and then transition to oral doxycycline 100 mg BID for 2 more weeks, to complete 6 weeks of antibiotics total, at least through July 12.     Overall prognosis is poor due to numerous comorbidities, refusal of recommended aggressive surgical debridement and IV antibiotics.  High risk for higher level amputation and recurrent sepsis.         Okay to discharge from my perspective once arrangements have been made. Remove IJ CVC prior to discharge. Schedule outpatient follow-up with me 2 weeks after discharge        José Fuentes MD  6/17/2024  10:34 EDT

## 2024-06-17 NOTE — DISCHARGE PLACEMENT REQUEST
"Cali Gudino (69 y.o. Male)       Date of Birth   1955    Social Security Number       Address   [address] [address]    Home Phone   [phone]    MRN   [id number]       Denominational   None    Marital Status                               Admission Date   5/31/24    Admission Type   Urgent    Admitting Provider   ANNA Malloy DO    Attending Provider   ANNA Malloy DO    Department, Room/Bed   [facility] 6A, N616/1       Discharge Date       Discharge Disposition       Discharge Destination                                 Attending Provider: ANNA Malloy DO    Allergies: Penicillins, Levothyroxine, Hydromorphone    Isolation: Spore   Infection: C.difficile (06/02/24), MRSA (06/02/24)   Code Status: CPR    Ht: 185.4 cm (72.99\")   Wt: 84.8 kg (186 lb 15.2 oz)    Admission Cmt: None   Principal Problem: NSTEMI (non-ST elevated myocardial infarction) [I21.4]                   Active Insurance as of 5/31/2024       Primary Coverage       Payor Plan Insurance Group Employer/Plan Group    HUMANA MEDICARE REPLACEMENT HUMANA MED ADV HMO [id number]       Payor Plan Address Payor Plan Phone Number Payor Plan Fax Number Effective Dates    [address] [address] [phone]  1/1/2023 - None Entered    [facility] [id number]         Subscriber Name Subscriber Birth Date Member ID       CALI GUDINO 1955 [id number]                     Emergency Contacts        (Rel.) Home Phone Work Phone Mobile Phone    JANICE GUDINO (Spouse) [phone] -- [phone]                 Physician Progress Notes (last 72 hours)        José Fuentes MD at 06/17/24 1034              Cali Gudino  1955  [id number]    Reason for Consultation: recurrent C diff. Osteomyelitis     History of present illness:    Patient is a 69 y.o. male, with PMH chronic cholecystitis( cholecystotomy tube) DM, ESRD on HD, HTN, PVD s/p Left  BKA.  All recent care done at [facility] in [facility].    Presented to OSH with chest pain, found to be in afib with RVR possible NSTEMi, transferred to [facility] for LHC.  Developed diarrhea prior to transfer, C diff toxin negative, positive antigen, started on oral Vancomycin. He had been treated with Kayexalate for hyperkalemia, as well as Colace.  Last positive C diff PCR 4/6/24 from [facility]. Noted exposed tendon RLE wound , and ulcer of right heel, sacral area, and left residual stump. He has been afebrile. Admitting labs with WBC 22, plt 249, ALT 10 AST 10, Scr 3.47, UA with TNTC WBCs, urine culture with gram negative bacilli, blood cultures no growth. MRI Left with edema throughout soft tissue of stump, no abscess,subtle early changes of superimposed osteomyelitis distal osseous stump not excluded.  Right with diffuse soft tissue cellulitis, possible early osteomyelitis lateral malleolus, no definite evidence of osteomyelitis.  Started on Ceftriaxone Daptomycin, Flagyl, and oral Vancomycin and we were consulted for evaluation and treatment. He has had an indwelling cole catheter since April, just recently changed.  He continues to have numerous diarrhea stools and abdominal cramping.  No fever.      6/3/24: Frustrated with prolonged hospitalization but slow overnight.  Awaiting possible cardiac workup.  Legs stable.  Cole catheter in place.  Denies abdominal pain, suprapubic pain at this time.  He said he only had 1 bowel movement yesterday evening but has had 2 loose bowel movement so far today.  Overall he thinks his stool frequency has improved    6/5/2024: Resting comfortably.  Cardiac workup completed.   orthopedic surgery recommended additional debridement of the amputation site but patient refusing stating he would like to follow-up with his previous wound care providers.  Loose stools slowing down and he has less nausea    6/6/24: Stool frequency slowing down.  Minimal abdominal discomfort.  Still has biliary drain.  Tolerating  IV antibiotics. Discussed discharge planning again with patient today: He has numerous needs and will be difficult to manage at home but still reluctant to go back to prior skilled nursing facility.    6/10/24: Stool frequency is overall improved.  Had a bowel movement this morning but cannot tell me if it was liquid or solid.  Only 1 bowel movement charted in past 24 hours.  No abdominal pain.  Biliary drain remains in place.  No worsening swelling or pain in either lower extremity.  Tolerating IV antibiotics through left IJ CVC.  Patient agreeable to skilled nursing facility    6/12/24: Afebrile.  Blood pressure stable.  Seen in dialysis and complains of feeling cold during dialysis but otherwise no chills.  Still refusing  placement of tunneled line to facilitate IV antibiotics after discharge but is agreeable to taking oral antibiotics. Complex discharge planning in process.  Stool frequency improved.  No worsening swelling, pain, drainage from the lower extremity    6/17/24: No new complaints.  Resting comfortably.  Stool frequency stable. Appetite ok..  No abdominal pain.  No worsening leg swelling, pain,     ROS:  Afebrile and hemodynamically stable. No nausea. No rash. See above, otherwise negative.      Allergies   Allergen Reactions    Penicillins Hives     Received ceftriaxone 6/1/24    Levothyroxine Unknown - Low Severity    Hydromorphone Other (See Comments)     Confusion, encephalopathy per wife         Medication:    Current Facility-Administered Medications:     acetaminophen (TYLENOL) tablet 650 mg, 650 mg, Oral, Q4H PRN, Genny Saeed MD, 650 mg at 06/17/24 0551    apixaban (ELIQUIS) tablet 5 mg, 5 mg, Oral, BID, Otilia Tay MD, 5 mg at 06/17/24 0934    aspirin EC tablet 81 mg, 81 mg, Oral, Daily, Keren Escalona PA-C, 81 mg at 06/17/24 0934    collagenase ointment 1 Application, 1 Application, Topical, Q24H, Genny Saeed MD, 1 Application at 06/17/24 0941    dilTIAZem  CD (CARDIZEM CD) 24 hr capsule 240 mg, 240 mg, Oral, Q24H, Fred Amador MD, 240 mg at 06/17/24 0934    eravacycline dihydrochloride (XERAVA) 85 mg in sodium chloride 0.9 % 250 mL (0.34 mg/mL) IVPB, 85 mg, Intravenous, Q12H, José Fuentes MD, Last Rate: 250 mL/hr at 06/17/24 0934, 85 mg at 06/17/24 0934    famotidine (PEPCID) tablet 10 mg, 10 mg, Oral, Daily, Otilia Tay MD, 10 mg at 06/17/24 0934    folic acid (FOLVITE) tablet 1 mg, 1 mg, Oral, Daily, Sonya Banks MD, 1 mg at 06/17/24 0934    heparin (porcine) injection 2,000 Units, 2,000 Units, Intracatheter, PRN, Efe, Jose Hall MD, 2,000 Units at 06/04/24 1101    lactobacillus acidophilus (RISAQUAD) capsule 1 capsule, 1 capsule, Oral, BID, José Fuentes MD, 1 capsule at 06/17/24 0934    levothyroxine (SYNTHROID, LEVOTHROID) tablet 75 mcg, 75 mcg, Oral, Q AM, Genny Saeed MD, 75 mcg at 06/17/24 0529    Lidocaine HCl gel (XYLOCAINE) urethral/mucosal syringe, , Topical, PRN, Genny Saeed MD, Given at 06/01/24 0609    lidocaine HCL topical jelly USP 2% syringe 11 mL, , Topical, BID, Genny Saeed MD, Given at 06/17/24 0935    melatonin tablet 5 mg, 5 mg, Oral, Nightly PRN, Radha Vasques APRN, 5 mg at 06/16/24 2117    metoprolol succinate XL (TOPROL-XL) 24 hr tablet 100 mg, 100 mg, Oral, Q24H, Keren Escalona PA-C, 100 mg at 06/16/24 1837    metoprolol tartrate (LOPRESSOR) injection 5 mg, 5 mg, Intravenous, Q6H PRN, Hayden Weinstein MD, 5 mg at 06/14/24 2201    ondansetron (ZOFRAN) injection 4 mg, 4 mg, Intravenous, Q6H PRN, Sita Santizo PA-C, 4 mg at 06/08/24 0456    Pharmacy Consult - [facility], , Does not apply, Daily, Freddie Sutton, [facility], Given at 06/09/24 0853    Polyvinyl Alcohol-Povidone PF (HYPOTEARS) 1.4-0.6 % ophthalmic solution 1 drop, 1 drop, Both Eyes, Q1H PRN, Pam Bullock, ROSELINE    sodium chloride 0.9 % flush 10 mL, 10 mL, Intravenous, Q12H, Genny Saeed MD, 10 mL at 06/17/24  0935    sodium chloride 0.9 % flush 10 mL, 10 mL, Intravenous, PRN, Genny Saeed MD    sodium chloride 0.9 % infusion 40 mL, 40 mL, Intravenous, PRN, Genny Saeed MD    sodium zirconium cyclosilicate (LOKELMA) packet 10 g, 10 g, Oral, Daily, Arvin Curtis MD, 10 g at 24 0934    vancomycin (VANCOCIN) capsule 125 mg, 125 mg, Oral, BID, José Fuentes MD, 125 mg at 24 0934    [START ON 2024] vancomycin (VANCOCIN) capsule 125 mg, 125 mg, Oral, Daily, José Fuentes MD    Antibiotics:  Anti-Infectives (From admission, onward)      Ordered     Dose/Rate Route Frequency Start Stop    24 0954  vancomycin (VANCOCIN) capsule 125 mg        Ordering Provider: José Fuentes MD    125 mg Oral Daily 24 0900 24 0859    24 0954  vancomycin (VANCOCIN) capsule 125 mg        Ordering Provider: José Fuentes MD    125 mg Oral 2 Times Daily 24 2100 24 2059    06/10/24 1030  eravacycline dihydrochloride (XERAVA) 85 mg in sodium chloride 0.9 % 250 mL (0.34 mg/mL) IVPB        Note to Pharmacy: !! Ensure final concentration of 0.2 - 0.6 mg/mL !!   Ordering Provider: José Fuentes MD    85 mg  250 mL/hr over 60 Minutes Intravenous Every 12 Hours 06/10/24 1800 24 0951    06/10/24 1037  Omadacycline Tosylate 150 MG tablet        Ordering Provider: José Fuentes MD    300 mg Oral Daily 06/10/24 0000      24 1247  fosfomycin (MONUROL) packet 3 g        Ordering Provider: José Fuentes MD    3 g Oral Once 24 1400 24 1450    24 1421  eravacycline dihydrochloride (XERAVA) 85 mg in sodium chloride 0.9 % 250 mL (0.34 mg/mL) IVPB        Note to Pharmacy: !! Ensure final concentration of 0.2 - 0.6 mg/mL !!   Ordering Provider: José Fuentes MD    85 mg  250 mL/hr over 60 Minutes Intravenous Every 12 Hours 24 1600 06/10/24 0646            Physical Exam:   Vital Signs  Temp (24hrs), Av.8 °F (36.6  °C), Min:97.5 °F (36.4 °C), Max:98.1 °F (36.7 °C)    Temp  Min: 97.5 °F (36.4 °C)  Max: 98.1 °F (36.7 °C)  BP  Min: 124/99  Max: 139/94  Pulse  Min: 68  Max: 71  Resp  Min: 16  Max: 18  No data recorded    GENERAL: Awake and alert, chronically ill-appearing but not acutely toxic  HEENT: Normocephalic, atraumatic.  PERRL. EOMI. No conjunctival injection. No icterus. Edentulous   NECK: Supple   HEART: RRR; No murmur,  .   LUNGS: Clear to auscultation bilaterally without wheezing, rales, rhonchi. Normal respiratory effort. Nonlabored.   ABDOMEN: Soft,  tender  nondistended: Biliary drain in right upper quadrant  EXT: Left BKA site dressed, CDI. No new images in chart  Right foot lesions dressed.  To level of soft tissue, tendon based on most recent images  :  Delaney catheter with clear urine  MSK: No joint effusions or erythema  SKIN: Warm and dry    NEURO: Oriented to PPT.  Motor 5/5 strength  PSYCHIATRIC: Normal insight and judgment. Cooperative with PE  Dialysis line in the right chest.  Left IJ CVC    Laboratory Data    Results from last 7 days   Lab Units 06/16/24  0621 06/15/24  0630   WBC 10*3/mm3 7.16 9.15   HEMOGLOBIN g/dL 9.8* 10.1*   HEMATOCRIT % 30.9* 32.2*   PLATELETS 10*3/mm3 167 178     Results from last 7 days   Lab Units 06/16/24  0621   SODIUM mmol/L 136   POTASSIUM mmol/L 4.4   CHLORIDE mmol/L 103   CO2 mmol/L 23.0   BUN mg/dL 36*   CREATININE mg/dL 2.62*   GLUCOSE mg/dL 70   CALCIUM mg/dL 8.5*                                   Estimated Creatinine Clearance: 31 mL/min (A) (by C-G formula based on SCr of 2.62 mg/dL (H)).      Microbiology:  Microbiology Results (last 10 days)       ** No results found for the last 240 hours. **            Radiology:  Imaging Results (Last 72 Hours)       ** No results found for the last 72 hours. **          5/28, 5/31 blood cultures from [place], negative      Impression:   C diff colitis initially diagnosed in April 2024. Was given Lactulose, Stool softeners in  [place] and developed worsening diarrhea, with positive antigen, negative EIA toxin so possible Colonization vs true infection.  Repeat testing at [facility] with positive PCR and positive EIA antigen.  Patient says stool frequency is overall improving, especially after switched to eravacycline. Improving   NSTEMI,  Cardiology evaluated  ESRD on HD: via tunneled HD catheter. still makes urine  Severe peripheral vascular disease: No intervention needed per vascular surgery  S/p left BKA 4/2024, with dehiscence of the amputation site and possible soft tissue infection  Possible left tibial early osteomyelitis:  by MRI.  Dr. Yeager recommended additional I&D surgery which the patient refused. CRP improving   Left lower extremity wound, with exposed tendon-  early osteomyelitis of the lateral malleolus not excluded by MRI per radiology.  Lesions dry on exam  Chronic cholecystitis, s/p percutaneous cholecystotomy tube- [facility]  Pyuria, Enterobacter bacteriuria: Had chronic indwelling Delaney catheter that was in place for almost 2 months prior to being changed on admission 5/31.  Culture from admission with Enterobacter.  Treated with fosfomycin x 1.  Repeat culture 6/2 negative  Sacral pressure ulcers  Social barriers to care: Inadequate transportation for follow-up appointments    PLAN/RECOMMENDATIONS:   Follow CBC, CMP, CRP      Continue IV eravacycline while inpatient    PO  vancomycin tapered to 125 mg twice daily for 1 week, and then 125 mg once daily for 1 week, then follow-up in the office to reassess  - probiotic BID     Patient agreeable to skilled nursing facility placement and case management working on options.  Complex discharge planning due to numerous committees and high level of needs after discharge.    Patient still refusing tunneled line placement to facilitate IV antibiotics after discharge but is agreeable to taking oral antibiotics.  Per case management, prior authorization for Nuzyra has been approved  for 2 weeks.  Treat with Nuzyra 300 mg daily for 2 weeks and then transition to oral doxycycline 100 mg BID for 2 more weeks, to complete 6 weeks of antibiotics total, at least through .     Overall prognosis is poor due to numerous comorbidities, refusal of recommended aggressive surgical debridement and IV antibiotics.  High risk for higher level amputation and recurrent sepsis.         Okay to discharge from my perspective once arrangements have been made. Remove IJ CVC prior to discharge. Schedule outpatient follow-up with me 2 weeks after discharge        José Fuentes MD  2024  10:34 EDT             Electronically signed by José Fuentes MD at 24 1038       Pam Bullock APRN at 24 1030              [facility] Medicine Services  PROGRESS NOTE    Patient Name: Tavo Gudino  : 1955  MRN: [id number]    Date of Admission: 2024  Primary Care Physician: Hayden Weinstein MD    Subjective   Subjective     CC:  Stump infection     HPI:  Patient is resting in bed in NAD. He is tolerating diet. Denies any pain. Plan for rehab       Objective   Objective     Vital Signs:   Temp:  [97.5 °F (36.4 °C)-98.1 °F (36.7 °C)] 98 °F (36.7 °C)  Heart Rate:  [68-71] 68  Resp:  [16-18] 18  BP: (124-131)/(68-99) 126/77     Physical Exam:  Constitutional: No acute distress, awake, alert, chronically ill appearing   HENT: NCAT, mucous membranes moist, LIJ intact   Respiratory: Clear to auscultation bilaterally, respiratory effort normal room air   Cardiovascular: RRR, no murmurs, rubs, or gallops  Gastrointestinal: Positive bowel sounds, soft, nontender, nondistended,, cholecystostomy tube in place with dark green drainage   Musculoskeletal: L AKA with dressing , right foot dressing, rogerio LE venous changes   Psychiatric: Appropriate affect, cooperative  Neurologic: Oriented x 3, strength symmetric in all extremities, Cranial Nerves grossly intact to  confrontation, speech clear  Skin: No rashes, right chest tunneled cath dressing cdi, pale    cole to BSD       Results Reviewed:  LAB RESULTS:      Lab 06/16/24  0621 06/15/24  0630   WBC 7.16 9.15   HEMOGLOBIN 9.8* 10.1*   HEMATOCRIT 30.9* 32.2*   PLATELETS 167 178   NEUTROS ABS  --  5.55   IMMATURE GRANS (ABS)  --  0.04   LYMPHS ABS  --  2.62   MONOS ABS  --  0.75   EOS ABS  --  0.13   MCV 94.8 96.4   CRP 0.39  --          Lab 06/16/24  0621 06/15/24  0630 06/12/24  0351 06/11/24  0716   SODIUM 136 133* 134* 133*   POTASSIUM 4.4 4.6 5.0 5.0   CHLORIDE 103 101 104 102   CO2 23.0 19.0* 20.0* 21.0*   ANION GAP 10.0 13.0 10.0 10.0   BUN 36* 55* 39* 29*   CREATININE 2.62* 3.31* 3.60* 2.92*   EGFR 25.3* 19.1* 17.3* 22.2*   GLUCOSE 70 82 71 75   CALCIUM 8.5* 8.3* 9.3 8.6   PHOSPHORUS 5.8*  --  5.8* 4.6*         Lab 06/16/24  0621 06/12/24  0351 06/11/24  0716   ALBUMIN 2.6* 2.6* 2.5*                     Brief Urine Lab Results  (Last result in the past 365 days)        Color   Clarity   Blood   Leuk Est   Nitrite   Protein   CREAT   Urine HCG        06/02/24 1721 Yellow   Clear   Trace   Trace   Negative   100 mg/dL (2+)                   Microbiology Results Abnormal       Procedure Component Value - Date/Time    Blood Culture - Blood, Blood, Central Line [[id number]]  (Normal) Collected: 05/31/24 2237    Lab Status: Final result Specimen: Blood, Central Line Updated: 06/06/24 0801     Blood Culture No growth at 5 days    Blood Culture - Blood, Arm, Right [[id number]]  (Normal) Collected: 05/31/24 2237    Lab Status: Final result Specimen: Blood from Arm, Right Updated: 06/06/24 0745     Blood Culture No growth at 5 days    Urine Culture - Urine, Indwelling Urethral Catheter [[id number]]  (Normal) Collected: 06/02/24 1721    Lab Status: Final result Specimen: Urine from Indwelling Urethral Catheter Updated: 06/04/24 0955     Urine Culture No growth            No radiology results from the last 24 hrs    Results for  orders placed during the hospital encounter of 05/31/24    Adult Transthoracic Echo Complete W/ Cont if Necessary Per Protocol    Interpretation Summary    Left ventricular systolic function is normal. Left ventricular ejection fraction appears to be 51 - 55%.    Left ventricular wall thickness is consistent with borderline concentric hypertrophy.    Left atrial volume is moderately increased.    There is mild calcification of the aortic valve.    Mild mitral valve regurgitation is present.    There is a small (<1cm) circumferential pericardial effusion.      Current medications:  Scheduled Meds:apixaban, 5 mg, Oral, BID  aspirin, 81 mg, Oral, Daily  collagenase, 1 Application, Topical, Q24H  dilTIAZem CD, 240 mg, Oral, Q24H  eravacycline dihydrochloride (XERAVA) 85 mg in sodium chloride 0.9 % 250 mL (0.34 mg/mL) IVPB, 85 mg, Intravenous, Q12H  famotidine, 10 mg, Oral, Daily  folic acid, 1 mg, Oral, Daily  lactobacillus acidophilus, 1 capsule, Oral, BID  levothyroxine, 75 mcg, Oral, Q AM  Lidocaine HCl gel, , Topical, BID  metoprolol succinate XL, 100 mg, Oral, Q24H  pharmacy consult - [facility], , Does not apply, Daily  sodium chloride, 10 mL, Intravenous, Q12H  sodium zirconium cyclosilicate, 10 g, Oral, Daily  vancomycin, 125 mg, Oral, BID  [START ON 6/22/2024] vancomycin, 125 mg, Oral, Daily      Continuous Infusions:   PRN Meds:.  acetaminophen    heparin (porcine)    Lidocaine HCl gel    melatonin    metoprolol tartrate    ondansetron    Polyvinyl Alcohol-Povidone PF    sodium chloride    sodium chloride    Assessment & Plan   Assessment & Plan     Active Hospital Problems    Diagnosis  POA    **NSTEMI (non-ST elevated myocardial infarction) [I21.4]  Yes    Soft tissue infection [L08.9]  Unknown    Acute osteomyelitis of left tibia [M86.162]  Unknown    Moderate malnutrition [E44.0]  Yes    C. difficile colitis [A04.72]  Yes    History of cholecystitis s/p cholecystostomy tube [K81.9]  Yes    ESRD (end stage renal  disease) on hemodialysis [N18.6]  Yes    Atrial fibrillation [I48.91]  Yes    Essential hypertension [I10]  Yes      Resolved Hospital Problems   No resolved problems to display.        Brief Hospital Course to date:  Tavo Gudino is a 69 y.o. male  with hx of HTN, PAD, left BKA, ESRD on hemodialysis, Afib on Eliquis, recent acute cholecystitis s/p cholecystostomy tube placement at , chronic urinary retention with indwelling Delaney catheter, and previous hx of C.diff who presented from [facility] due to chest pain, elevated troponin, and Afib with RVR.  Found to have multiple other concerning issues including active C. difficile infection, concern for possible osteomyelitis, exposed tendon on right lower extremity.     This patient's problems and plans were partially entered by my partner and updated as appropriate by me 06/17/24.     Assessment/plan:     Chest pain, resolved  Elevated troponin, possible NSTEMI  -Transferred to [facility] for C with Dr. Amador  -Dr. Amador/Cardiology consulted, stress test with small area of ischemia.  --prior discussion with patient of decision to medically manage at this time.  --no current cp complaints.     Afib with RVR  Hx of Afib/Aflutter  -Toprol dosing adjusted per cardiology given episodes of intermittent RVR  -Continue Eliquis     HFrEF  Moderate MR  --Most recent Echo on file I April 2024: EF 40%, akinetic septal, anterior, and apical walls, severely dilated LA, moderate MR  --Follows with  Cardiology, had been evaluated previously for Vanessa clip  --ECHO 6/1: LVEF 51 to 55%, borderline concentric LVH, left atrial volume moderately increased, mild MR, small less than 1 cm circumferential pericardial effusion. F/u as prior scheduled      Leukocytosis, resolved  --wbc 7.16, afebrile      Concern for osteomyelitis   Exposed tendon on RLE  --MRI left tib/fib showed cellulitis, possible osteomyelitis  --MRI right foot with cellulitis/edema  -- Arterial duplex abnormal;  Vascular surgery consulted and recommended medical management with no vascular surgery intervention  --Eravacycline per ID  --MRSA screen +  --Follow-up blood and urine cultures  -Dr. Yeager following. Patient likely needs additional amputation of left lower extremity due to concern for poor wound healing, likely needs debridement of right lower extremity around the tendon with wound VAC placement.  Patient refusing further amputation of left lower extremity or any other procedures.   --ID following. Patient is not likely a candidate for outpatient IV antibiotics he will not be able to follow-up adequately and doesn't not have support at home.   Prior auth for Nuzyra approved for two weeks of therapy, then transition over to oral doxycycline 100 mg bid at least through 7/12   -IF discharges to SNF, left IJ will need removed. If going on oral abx   -Patient wishes to follow-up with wound care at .  -- follow up with ID two weeks      C.diff colitis  -s/p fosphofmycin  -Continue p.o. vancomycin 125 mg 4 times a day for 14 days from admission through June 14 and then taper to 125 mg twice daily for 1 week and then 125 mg once daily for 1 week  -Follow-up with ID  -- probiotic bid      Recent cholecystitis s/p cholecystostomy tube  --Request records from that hospitalization, apparently was placed at   --Consulted General Surgery for management of tube  --Apparently already had follow up at  6/6/24 General Surgery--will need to reschedule this at [place]     Chronic urinary retention  --Has Delaney in place, reported hx of urethral injury per OSH note (data deficit, no idea when this was Delaney placed), appears he follows with Urology at  and this is a chronic Delaney  --Replaced Delaney here     PAD  Hx of left BKA  --Continue ASA  --Lt BKA drsg in place.       Anemia  --Likely chronic due to renal disease  --Folate 4.36 --> supplementation started; vitamin B 12 level 755  --Hb 7.5 here, had been 9 at OSH. up to 10.1      HTN  ESRD  --Nephrology consulted for dialysis   --Case management to work with patient and family in order to work on disposition location and finding an outpatient dialysis chair.     Hyperkalemia  -Potassium up to 6.0 s/p dose of lokelma.   --s/p HD TTHS     Hypothyroidism  --TSH significantly elevated at 91 at OSH, still elevated here to 65, but improved  --Synthroid dose was increased to 75 mcg daily at OSH  --Continue Synthroid at above dose and recheck in 4-6 weeks. Defer to PCP      Multiple wounds  Sacral Decubitus ulcer, POA, Stage 3  --Wound care consulted  --Ortho as above     Poor IV access  --Dr. Beckman placed Lt IJ central line, will need to be removed prior to transfer  -- Nuzyra  was preauthfor two weeks. Med would need to be filled her prior to dc. Treat with Nuzyra for two weeks then transition over to oral  doxycycline 100 mg bid for two more weeks at least through         Expected Discharge Location and Transportation: rehab   Expected Discharge   Expected Discharge Date: 2024; Expected Discharge Time:      VTE Prophylaxis:  Pharmacologic & mechanical VTE prophylaxis orders are present.         AM-PAC 6 Clicks Score (PT): 10 (24 1119)    CODE STATUS:   Code Status and Medical Interventions:   Ordered at: 24 1429     Code Status (Patient has no pulse and is not breathing):    CPR (Attempt to Resuscitate)     Medical Interventions (Patient has pulse or is breathing):    Full Support       ROSELINE Carlos  24        Electronically signed by Pam Bullock APRN at 24 1207         Pam Bullock APRN at 24 0830              [facility] Medicine Services  PROGRESS NOTE    Patient Name: Tavo Gudino  : 1955  MRN: [id number]    Date of Admission: 2024  Primary Care Physician: Hayden Weinstein MD    Subjective   Subjective     CC:  Stump infection     HPI:  Patient is resting in bed in NAD. He slept ok.  Having some pain in LE. Anxious for rehab so he can get home       Objective   Objective     Vital Signs:   Temp:  [97.3 °F (36.3 °C)-98 °F (36.7 °C)] 97.6 °F (36.4 °C)  Heart Rate:  [65-82] 80  Resp:  [18] 18  BP: (133-186)/(62-94) 139/94     Physical Exam:  Constitutional: No acute distress, awake, alert, chronically ill appearing   HENT: NCAT, mucous membranes moist, LIJ intact   Respiratory: Clear to auscultation bilaterally, respiratory effort normal room air   Cardiovascular: RRR, no murmurs, rubs, or gallops  Gastrointestinal: Positive bowel sounds, soft, nontender, nondistended,, cholecystostomy tube in place with dark green drainage   Musculoskeletal: L AKA with dressing , right foot dressing, rogerio LE venous changes   Psychiatric: Appropriate affect, cooperative  Neurologic: Oriented x 3, strength symmetric in all extremities, Cranial Nerves grossly intact to confrontation, speech clear  Skin: No rashes, right chest tunneled cath dressing cdi, pale    cole to BSD       Results Reviewed:  LAB RESULTS:      Lab 06/16/24  0621 06/15/24  0630 06/10/24  0440   WBC 7.16 9.15 7.82   HEMOGLOBIN 9.8* 10.1* 9.4*   HEMATOCRIT 30.9* 32.2* 30.6*   PLATELETS 167 178 176   NEUTROS ABS  --  5.55  --    IMMATURE GRANS (ABS)  --  0.04  --    LYMPHS ABS  --  2.62  --    MONOS ABS  --  0.75  --    EOS ABS  --  0.13  --    MCV 94.8 96.4 97.8*         Lab 06/16/24  0621 06/15/24  0630 06/12/24  0351 06/11/24  0716 06/10/24  0440   SODIUM 136 133* 134* 133* 134*   POTASSIUM 4.4 4.6 5.0 5.0 6.0*   CHLORIDE 103 101 104 102 105   CO2 23.0 19.0* 20.0* 21.0* 20.0*   ANION GAP 10.0 13.0 10.0 10.0 9.0   BUN 36* 55* 39* 29* 51*   CREATININE 2.62* 3.31* 3.60* 2.92* 4.41*   EGFR 25.3* 19.1* 17.3* 22.2* 13.6*   GLUCOSE 70 82 71 75 64*   CALCIUM 8.5* 8.3* 9.3 8.6 9.2   PHOSPHORUS 5.8*  --  5.8* 4.6*  --          Lab 06/16/24  0621 06/12/24  0351 06/11/24  0716   ALBUMIN 2.6* 2.6* 2.5*                     Brief Urine Lab Results  (Last  result in the past 365 days)        Color   Clarity   Blood   Leuk Est   Nitrite   Protein   CREAT   Urine HCG        06/02/24 1721 Yellow   Clear   Trace   Trace   Negative   100 mg/dL (2+)                   Microbiology Results Abnormal       Procedure Component Value - Date/Time    Blood Culture - Blood, Blood, Central Line [[id number]]  (Normal) Collected: 05/31/24 2237    Lab Status: Final result Specimen: Blood, Central Line Updated: 06/06/24 0801     Blood Culture No growth at 5 days    Blood Culture - Blood, Arm, Right [[id number]]  (Normal) Collected: 05/31/24 2237    Lab Status: Final result Specimen: Blood from Arm, Right Updated: 06/06/24 0745     Blood Culture No growth at 5 days    Urine Culture - Urine, Indwelling Urethral Catheter [[id number]]  (Normal) Collected: 06/02/24 1721    Lab Status: Final result Specimen: Urine from Indwelling Urethral Catheter Updated: 06/04/24 0955     Urine Culture No growth            No radiology results from the last 24 hrs    Results for orders placed during the hospital encounter of 05/31/24    Adult Transthoracic Echo Complete W/ Cont if Necessary Per Protocol    Interpretation Summary    Left ventricular systolic function is normal. Left ventricular ejection fraction appears to be 51 - 55%.    Left ventricular wall thickness is consistent with borderline concentric hypertrophy.    Left atrial volume is moderately increased.    There is mild calcification of the aortic valve.    Mild mitral valve regurgitation is present.    There is a small (<1cm) circumferential pericardial effusion.      Current medications:  Scheduled Meds:apixaban, 5 mg, Oral, BID  aspirin, 81 mg, Oral, Daily  collagenase, 1 Application, Topical, Q24H  dilTIAZem CD, 240 mg, Oral, Q24H  eravacycline dihydrochloride (XERAVA) 85 mg in sodium chloride 0.9 % 250 mL (0.34 mg/mL) IVPB, 85 mg, Intravenous, Q12H  famotidine, 10 mg, Oral, Daily  folic acid, 1 mg, Oral, Daily  lactobacillus acidophilus, 1  capsule, Oral, BID  levothyroxine, 75 mcg, Oral, Q AM  Lidocaine HCl gel, , Topical, BID  metoprolol succinate XL, 100 mg, Oral, Q24H  pharmacy consult - MTM, , Does not apply, Daily  sodium chloride, 10 mL, Intravenous, Q12H  sodium zirconium cyclosilicate, 10 g, Oral, Daily  vancomycin, 125 mg, Oral, BID  [START ON 6/22/2024] vancomycin, 125 mg, Oral, Daily      Continuous Infusions:   PRN Meds:.  acetaminophen    heparin (porcine)    Lidocaine HCl gel    melatonin    metoprolol tartrate    ondansetron    sodium chloride    sodium chloride    Assessment & Plan   Assessment & Plan     Active Hospital Problems    Diagnosis  POA    **NSTEMI (non-ST elevated myocardial infarction) [I21.4]  Yes    Soft tissue infection [L08.9]  Unknown    Acute osteomyelitis of left tibia [M86.162]  Unknown    Moderate malnutrition [E44.0]  Yes    C. difficile colitis [A04.72]  Yes    History of cholecystitis s/p cholecystostomy tube [K81.9]  Yes    ESRD (end stage renal disease) on hemodialysis [N18.6]  Yes    Atrial fibrillation [I48.91]  Yes    Essential hypertension [I10]  Yes      Resolved Hospital Problems   No resolved problems to display.        Brief Hospital Course to date:  Tavo Gudino is a 69 y.o. male  with hx of HTN, PAD, left BKA, ESRD on hemodialysis, Afib on Eliquis, recent acute cholecystitis s/p cholecystostomy tube placement at , chronic urinary retention with indwelling Delaney catheter, and previous hx of C.diff who presented from [facility] due to chest pain, elevated troponin, and Afib with RVR.  Found to have multiple other concerning issues including active C. difficile infection, concern for possible osteomyelitis, exposed tendon on right lower extremity.     This patient's problems and plans were partially entered by my partner and updated as appropriate by me 06/16/24.     Assessment/plan:  Pt is new to me today      Chest pain, resolved  Elevated troponin, possible NSTEMI  -Transferred to [facility] for  [facility] with Dr. Amador  -Dr. Amador/Cardiology consulted, stress test with small area of ischemia.  --prior discussion with patient of decision to medically manage at this time.  --no current cp complaints.     Afib with RVR  Hx of Afib/Aflutter  -Toprol dosing adjusted per cardiology given episodes of intermittent RVR  -Continue Eliquis     HFrEF  Moderate MR  --Most recent Echo on file I April 2024: EF 40%, akinetic septal, anterior, and apical walls, severely dilated LA, moderate MR  --Follows with [facility], had been evaluated previously for Vanessa clip  --ECHO 6/1: LVEF 51 to 55%, borderline concentric LVH, left atrial volume moderately increased, mild MR, small less than 1 cm circumferential pericardial effusion. F/u as prior scheduled      Leukocytosis, resolved  --wbc 7.16 today, afebrile      Concern for osteomyelitis   Exposed tendon on RLE  --MRI left tib/fib showed cellulitis, possible osteomyelitis  --MRI right foot with cellulitis/edema  -- Arterial duplex abnormal; Vascular surgery consulted and recommended medical management with no vascular surgery intervention  --Eravacycline per ID  --MRSA screen +  --Follow-up blood and urine cultures  -Dr. Yeager following. Patient likely needs additional amputation of left lower extremity due to concern for poor wound healing, likely needs debridement of right lower extremity around the tendon with wound VAC placement.  Patient refusing further amputation of left lower extremity or any other procedures.   --ID following. Patient is not likely a candidate for outpatient IV antibiotics he will not be able to follow-up adequately and doesn't not have support at home. Plan is just to continue the eravacycline for now until we have some idea of his d/c plan.  Prior auth for Nuzyra but co-pay is still $2000.  Working to see if we can get that lower. If he goes home, may consider doxycycline BID.   -IF discharges to SNF, left IJ will need removed. If going on oral  abx   -Patient wishes to follow-up with wound care at .  -- follow up with ID two weeks      C.diff colitis  -s/p fosphofmycin  -Continue p.o. vancomycin 125 mg 4 times a day for 14 days from admission through June 14 and then taper to 125 mg twice daily for 1 week and then 125 mg once daily for 1 week  -Follow-up with ID     Recent cholecystitis s/p cholecystostomy tube  --Request records from that hospitalization, apparently was placed at   --Consulted General Surgery for management of tube  --Apparently already had follow up at  6/6/24 General Surgery--will need to reschedule this at [place]     Chronic urinary retention  --Has Delaney in place, reported hx of urethral injury per OSH note (data deficit, no idea when this was Delaney placed), appears he follows with Urology at  and this is a chronic Delaney  --Replaced Delaney here     PAD  Hx of left BKA  --Continue ASA  --Lt BKA drsg in place.       Anemia  --Likely chronic due to renal disease  --Folate 4.36 --> supplementation started; vitamin B 12 level 755  --Hb 7.5 here, had been 9 at OSH. up to 10.1     HTN  ESRD  --Nephrology consulted for dialysis   --Case management to work with patient and family in order to work on disposition location and finding an outpatient dialysis chair.     Hyperkalemia  -Potassium up to 6.0 s/p  dose of lokelma.   --s/p HD TTHS     Hypothyroidism  --TSH significantly elevated at 91 at OSH, still elevated here to 65, but improved  --Synthroid dose was increased to 75 mcg daily at OSH  --Continue Synthroid at above dose and recheck in 4-6 weeks. Defer to PCP      Multiple wounds  Sacral Decubitus ulcer, POA, Stage 3  --Wound care consulted  --Ortho as above     Poor IV access  --Dr. Beckman placed Lt IJ central line, will likely need tunneled line for antibiotics if plan is for rehab placement.   -- Nuzyra  was preauthfor two weeks. Med would need to be filled her prior to dc. But if cost is high then plan for doxycycline 100 mg bid  through         Expected Discharge Location and Transportation: rehab   Expected Discharge   Expected Discharge Date: 2024; Expected Discharge Time:      VTE Prophylaxis:  Pharmacologic & mechanical VTE prophylaxis orders are present.         AM-PAC 6 Clicks Score (PT): 12 (06/15/24 1148)    CODE STATUS:   Code Status and Medical Interventions:   Ordered at: 24 4109     Code Status (Patient has no pulse and is not breathing):    CPR (Attempt to Resuscitate)     Medical Interventions (Patient has pulse or is breathing):    Full Support       ROSELINE Carlos  24        Electronically signed by Pam Bullock APRN at 24 1255       Marilee Garrido APRN at 06/15/24 1155              [facility] Medicine Services  PROGRESS NOTE    Patient Name: Tavo Gudino  : 1955  MRN: [id number]    Date of Admission: 2024  Primary Care Physician: Hayden Weinstein MD    Subjective   Subjective     CC:  Stump infection    HPI:  Pt is seen resting up in bed in NAD.  Awake and alert.  Just back from HD.  States he just feels very tired and worn out.  States HD always makes him feel that way.  No n/v, pain.  Awaiting rehab       Objective   Objective     Vital Signs:   Temp:  [97.5 °F (36.4 °C)-98.2 °F (36.8 °C)] 98.2 °F (36.8 °C)  Heart Rate:  [66-98] 67  Resp:  [16-18] 18  BP: ()/() 167/50     Physical Exam:  Constitutional: No acute distress, chronically ill looking. Resting up in bed/    Respiratory: Clear to auscultation bilaterally but slightly decreased at bases, respiratory effort normal on RA with sats 98%  Cardiovascular: RRR, no murmur gallop or rub audible.  Gastrointestinal: Positive bowel sounds, soft, nontender, nondistended, cholecystostomy tube in place with dark green drainage  Musculoskeletal: Left AKA w dressing in place dry and intact, no clear sign of infection. BAER spont  Psychiatric: Flat affect, cooperative and calm    Neurologic: Oriented x 3, no focal deficits, speech is clear and appropriate. Follows commands   Skin: Pallor. Rt tunneled HD cath.  Lt IJ TLDL      Results Reviewed:  LAB RESULTS:      Lab 06/15/24  0630 06/10/24  0440 06/09/24  0451   WBC 9.15 7.82 7.76   HEMOGLOBIN 10.1* 9.4* 9.1*   HEMATOCRIT 32.2* 30.6* 29.0*   PLATELETS 178 176 172   NEUTROS ABS 5.55  --  4.36   IMMATURE GRANS (ABS) 0.04  --  0.11*   LYMPHS ABS 2.62  --  2.32   MONOS ABS 0.75  --  0.70   EOS ABS 0.13  --  0.21   MCV 96.4 97.8* 94.5         Lab 06/15/24  0630 06/12/24  0351 06/11/24  0716 06/10/24  0440 06/09/24  0451   SODIUM 133* 134* 133* 134* 133*   POTASSIUM 4.6 5.0 5.0 6.0* 5.9*   CHLORIDE 101 104 102 105 103   CO2 19.0* 20.0* 21.0* 20.0* 20.0*   ANION GAP 13.0 10.0 10.0 9.0 10.0   BUN 55* 39* 29* 51* 41*   CREATININE 3.31* 3.60* 2.92* 4.41* 3.88*   EGFR 19.1* 17.3* 22.2* 13.6* 15.8*   GLUCOSE 82 71 75 64* 75   CALCIUM 8.3* 9.3 8.6 9.2 9.3   PHOSPHORUS  --  5.8* 4.6*  --   --          Lab 06/12/24  0351 06/11/24  0716   ALBUMIN 2.6* 2.5*                     Brief Urine Lab Results  (Last result in the past 365 days)        Color   Clarity   Blood   Leuk Est   Nitrite   Protein   CREAT   Urine HCG        06/02/24 1721 Yellow   Clear   Trace   Trace   Negative   100 mg/dL (2+)                   Microbiology Results Abnormal       Procedure Component Value - Date/Time    Blood Culture - Blood, Blood, Central Line [[id number]]  (Normal) Collected: 05/31/24 2237    Lab Status: Final result Specimen: Blood, Central Line Updated: 06/06/24 0801     Blood Culture No growth at 5 days    Blood Culture - Blood, Arm, Right [[id number]]  (Normal) Collected: 05/31/24 2237    Lab Status: Final result Specimen: Blood from Arm, Right Updated: 06/06/24 0745     Blood Culture No growth at 5 days    Urine Culture - Urine, Indwelling Urethral Catheter [[id number]]  (Normal) Collected: 06/02/24 1721    Lab Status: Final result Specimen: Urine from Indwelling  Urethral Catheter Updated: 06/04/24 0955     Urine Culture No growth            No radiology results from the last 24 hrs    Results for orders placed during the hospital encounter of 05/31/24    Adult Transthoracic Echo Complete W/ Cont if Necessary Per Protocol    Interpretation Summary    Left ventricular systolic function is normal. Left ventricular ejection fraction appears to be 51 - 55%.    Left ventricular wall thickness is consistent with borderline concentric hypertrophy.    Left atrial volume is moderately increased.    There is mild calcification of the aortic valve.    Mild mitral valve regurgitation is present.    There is a small (<1cm) circumferential pericardial effusion.      Current medications:  Scheduled Meds:apixaban, 5 mg, Oral, BID  aspirin, 81 mg, Oral, Daily  collagenase, 1 Application, Topical, Q24H  dilTIAZem CD, 240 mg, Oral, Q24H  eravacycline dihydrochloride (XERAVA) 85 mg in sodium chloride 0.9 % 250 mL (0.34 mg/mL) IVPB, 85 mg, Intravenous, Q12H  famotidine, 10 mg, Oral, Daily  folic acid, 1 mg, Oral, Daily  lactobacillus acidophilus, 1 capsule, Oral, BID  levothyroxine, 75 mcg, Oral, Q AM  Lidocaine HCl gel, , Topical, BID  metoprolol succinate XL, 100 mg, Oral, Q24H  pharmacy consult - MTM, , Does not apply, Daily  sodium chloride, 10 mL, Intravenous, Q12H  sodium zirconium cyclosilicate, 10 g, Oral, Daily  vancomycin, 125 mg, Oral, BID  [START ON 6/22/2024] vancomycin, 125 mg, Oral, Daily      Continuous Infusions:     PRN Meds:.  acetaminophen    heparin (porcine)    Lidocaine HCl gel    melatonin    metoprolol tartrate    ondansetron    sodium chloride    sodium chloride    Assessment & Plan   Assessment & Plan     Active Hospital Problems    Diagnosis  POA    **NSTEMI (non-ST elevated myocardial infarction) [I21.4]  Yes    Soft tissue infection [L08.9]  Unknown    Acute osteomyelitis of left tibia [M86.162]  Unknown    Moderate malnutrition [E44.0]  Yes    C. difficile colitis  [A04.72]  Yes    History of cholecystitis s/p cholecystostomy tube [K81.9]  Yes    ESRD (end stage renal disease) on hemodialysis [N18.6]  Yes    Atrial fibrillation [I48.91]  Yes    Essential hypertension [I10]  Yes      Resolved Hospital Problems   No resolved problems to display.        Brief Hospital Course to date:  Tavo Gudino is a 69 y.o. male with hx of HTN, PAD, left BKA, ESRD on hemodialysis, Afib on Eliquis, recent acute cholecystitis s/p cholecystostomy tube placement at , chronic urinary retention with indwelling Delaney catheter, and previous hx of C.diff who presented from [facility] due to chest pain, elevated troponin, and Afib with RVR.  Found to have multiple other concerning issues including active C. difficile infection, concern for possible osteomyelitis, exposed tendon on right lower extremity.    This patient's problems and plans were partially entered by my partner and updated as appropriate by me 06/15/24.    Assessment/plan:  Pt is new to me today      Chest pain, resolved  Elevated troponin, possible NSTEMI  -Transferred to [facility] for [facility] with Dr. Amador  -Dr. Amador/Cardiology consulted, stress test with small area of ischemia.  --prior discussion with patient of decision to medically manage at this time.  --no current cp complaints.     Afib with RVR  Hx of Afib/Aflutter  -Toprol dosing adjusted per cardiology given episodes of intermittent RVR  -Continue Eliquis     HFrEF  Moderate MR  --Most recent Echo on file I April 2024: EF 40%, akinetic septal, anterior, and apical walls, severely dilated LA, moderate MR  --Follows with  Cardiology, had been evaluated previously for Vanessa clip  --ECHO 6/1: LVEF 51 to 55%, borderline concentric LVH, left atrial volume moderately increased, mild MR, small less than 1 cm circumferential pericardial effusion. F/u as prior scheduled      Leukocytosis, resolved  --wbc 9.15 today, afebrile      Concern for osteomyelitis   Exposed tendon on  RLE  --MRI left tib/fib showed cellulitis, possible osteomyelitis  --MRI right foot with cellulitis/edema  -- Arterial duplex abnormal; Vascular surgery consulted and recommended medical management with no vascular surgery intervention  --Eravacycline per ID  --MRSA screen +  --Follow-up blood and urine cultures  -Dr. Yaeger following. Patient likely needs additional amputation of left lower extremity due to concern for poor wound healing, likely needs debridement of right lower extremity around the tendon with wound VAC placement.  Patient refusing further amputation of left lower extremity or any other procedures.   --ID following. Patient is not likely a candidate for outpatient IV antibiotics he will not be able to follow-up adequately and doesn't not have support at home. Plan is just to continue the eravacycline for now until we have some idea of his d/c plan.  Prior auth for Nuzyra but co-pay is still $2000.  Working to see if we can get that lower. If he goes home, may consider doxycycline BID.   -IF discharges to SNF, CVC will be removed and tunneled line will need to be replaced  -Patient wishes to follow-up with wound care at .    C.diff colitis  -s/p fosphofmycin  -Continue p.o. vancomycin 125 mg 4 times a day for 14 days from admission through June 14 and then taper to 125 mg twice daily for 1 week and then 125 mg once daily for 1 week  -Follow-up with ID     Recent cholecystitis s/p cholecystostomy tube  --Request records from that hospitalization, apparently was placed at   --Consulted General Surgery for management of tube  --Apparently already had follow up at  6/6/24 General Surgery--will need to reschedule this at [place]     Chronic urinary retention  --Has Delaney in place, reported hx of urethral injury per OSH note (data deficit, no idea when this was Delaney placed), appears he follows with Urology at  and this is a chronic Delaney  --Replaced Delaney here     PAD  Hx of left BKA  --Continue  ASA  --Lt BKA drsg in place.       Anemia  --Likely chronic due to renal disease  --Folate 4.36 --> supplementation started; vitamin B 12 level 755  --Hb 7.5 here, had been 9 at OSH. Now up to 10.1     HTN  ESRD  --Nephrology consulted for dialysis   --Case management to work with patient and family in order to work on disposition location and finding an outpatient dialysis chair.    Hyperkalemia  -Potassium of 5.9 this morning, will give dose of lokelma.   --s/p HD today      Hypothyroidism  --TSH significantly elevated at 91 at OSH, still elevated here to 65, but improved  --Synthroid dose was increased to 75 mcg daily at OSH  --Continue Synthroid at above dose and recheck in 4-6 weeks. Defer to PCP      Multiple wounds  Sacral Decubitus ulcer, POA, Stage 3  --Wound care consulted  --Ortho as above     Poor IV access  --Dr. Beckman placed Lt IJ central line, will likely need tunneled line for antibiotics if plan is for rehab placement.      Expected Discharge Location and Transportation: Tentatively back to [place]/ awaiting dispo plans/  CM following  Expected Discharge   Expected Discharge Date: 2024; Expected Discharge Time:      DVT prophylaxis:  Pharmacologic & mechanical VTE prophylaxis orders are present.         AM-PAC 6 Clicks Score (PT): 9 (24)    CODE STATUS:   Code Status and Medical Interventions:   Ordered at: 24 1429     Code Status (Patient has no pulse and is not breathing):    CPR (Attempt to Resuscitate)     Medical Interventions (Patient has pulse or is breathing):    Full Support            ROSELINE Paris  06/15/24        Electronically signed by Marilee Garrido APRN at 06/15/24 1307         Terrence Phillips MD at 24 7512              [facility] Medicine Services  PROGRESS NOTE    Patient Name: Tavo Gudino  : 1955  MRN: [id number]    Date of Admission: 2024  Primary Care Physician: Hayden Weinstein  MD    Subjective   Subjective     CC:  Stump infection    HPI:  Resting in bed in no acute distress and overall comfortable.  Has no specific complaint.  No fever or chills.  No chest pain or palpitation or shortness of breath at rest.  No nausea vomiting or diarrhea.      Objective   Objective     Vital Signs:   Temp:  [97.2 °F (36.2 °C)-97.6 °F (36.4 °C)] 97.6 °F (36.4 °C)  Heart Rate:  [65-73] 66  Resp:  [18] 18  BP: (154-187)/() 187/94     Physical Exam:  Constitutional: No acute distress, chronically ill looking  Respiratory: Clear to auscultation bilaterally, respiratory effort normal   Cardiovascular: RR, no murmur gallop or rub audible.  Gastrointestinal: Positive bowel sounds, soft, nontender, nondistended, cholecystostomy tube in place with dark green drainage  Musculoskeletal: Left AKA in dressings, no clear sign of infection.  Psychiatric: Flat affect, cooperative  Neurologic: Oriented x 3, no focal deficits, speech is clear  Skin: Pale looking      Results Reviewed:  LAB RESULTS:      Lab 06/10/24  0440 06/09/24  0451 06/08/24  0433   WBC 7.82 7.76 7.37   HEMOGLOBIN 9.4* 9.1* 9.6*   HEMATOCRIT 30.6* 29.0* 30.3*   PLATELETS 176 172 204   NEUTROS ABS  --  4.36 3.59   IMMATURE GRANS (ABS)  --  0.11* 0.11*   LYMPHS ABS  --  2.32 2.59   MONOS ABS  --  0.70 0.79   EOS ABS  --  0.21 0.22   MCV 97.8* 94.5 95.0         Lab 06/12/24  0351 06/11/24  0716 06/10/24  0440 06/09/24  0451 06/08/24  0433   SODIUM 134* 133* 134* 133* 134*   POTASSIUM 5.0 5.0 6.0* 5.9* 5.1   CHLORIDE 104 102 105 103 101   CO2 20.0* 21.0* 20.0* 20.0* 25.0   ANION GAP 10.0 10.0 9.0 10.0 8.0   BUN 39* 29* 51* 41* 15   CREATININE 3.60* 2.92* 4.41* 3.88* 3.18*   EGFR 17.3* 22.2* 13.6* 15.8* 20.1*   GLUCOSE 71 75 64* 75 71   CALCIUM 9.3 8.6 9.2 9.3 9.7   MAGNESIUM  --   --   --   --  1.8   PHOSPHORUS 5.8* 4.6*  --   --   --          Lab 06/12/24  0351 06/11/24  0716   ALBUMIN 2.6* 2.5*                     Brief Urine Lab Results  (Last  result in the past 365 days)        Color   Clarity   Blood   Leuk Est   Nitrite   Protein   CREAT   Urine HCG        06/02/24 1721 Yellow   Clear   Trace   Trace   Negative   100 mg/dL (2+)                   Microbiology Results Abnormal       Procedure Component Value - Date/Time    Blood Culture - Blood, Blood, Central Line [[id number]]  (Normal) Collected: 05/31/24 2237    Lab Status: Final result Specimen: Blood, Central Line Updated: 06/06/24 0801     Blood Culture No growth at 5 days    Blood Culture - Blood, Arm, Right [[id number]]  (Normal) Collected: 05/31/24 2237    Lab Status: Final result Specimen: Blood from Arm, Right Updated: 06/06/24 0745     Blood Culture No growth at 5 days    Urine Culture - Urine, Indwelling Urethral Catheter [[id number]]  (Normal) Collected: 06/02/24 1721    Lab Status: Final result Specimen: Urine from Indwelling Urethral Catheter Updated: 06/04/24 0955     Urine Culture No growth            No radiology results from the last 24 hrs    Results for orders placed during the hospital encounter of 05/31/24    Adult Transthoracic Echo Complete W/ Cont if Necessary Per Protocol    Interpretation Summary    Left ventricular systolic function is normal. Left ventricular ejection fraction appears to be 51 - 55%.    Left ventricular wall thickness is consistent with borderline concentric hypertrophy.    Left atrial volume is moderately increased.    There is mild calcification of the aortic valve.    Mild mitral valve regurgitation is present.    There is a small (<1cm) circumferential pericardial effusion.      Current medications:  Scheduled Meds:apixaban, 5 mg, Oral, BID  aspirin, 81 mg, Oral, Daily  collagenase, 1 Application, Topical, Q24H  dilTIAZem CD, 240 mg, Oral, Q24H  eravacycline dihydrochloride (XERAVA) 85 mg in sodium chloride 0.9 % 250 mL (0.34 mg/mL) IVPB, 85 mg, Intravenous, Q12H  famotidine, 10 mg, Oral, Daily  folic acid, 1 mg, Oral, Daily  lactobacillus acidophilus, 1  capsule, Oral, BID  levothyroxine, 75 mcg, Oral, Q AM  Lidocaine HCl gel, , Topical, BID  metoprolol succinate XL, 100 mg, Oral, Q24H  pharmacy consult - MTM, , Does not apply, Daily  sodium chloride, 10 mL, Intravenous, Q12H  sodium zirconium cyclosilicate, 10 g, Oral, Daily  vancomycin, 125 mg, Oral, BID  [START ON 6/22/2024] vancomycin, 125 mg, Oral, Daily      Continuous Infusions:     PRN Meds:.  acetaminophen    heparin (porcine)    Lidocaine HCl gel    melatonin    metoprolol tartrate    ondansetron    sodium chloride    sodium chloride    Assessment & Plan   Assessment & Plan     Active Hospital Problems    Diagnosis  POA    **NSTEMI (non-ST elevated myocardial infarction) [I21.4]  Yes    Soft tissue infection [L08.9]  Unknown    Acute osteomyelitis of left tibia [M86.162]  Unknown    Moderate malnutrition [E44.0]  Yes    C. difficile colitis [A04.72]  Yes    History of cholecystitis s/p cholecystostomy tube [K81.9]  Yes    ESRD (end stage renal disease) on hemodialysis [N18.6]  Yes    Atrial fibrillation [I48.91]  Yes    Essential hypertension [I10]  Yes      Resolved Hospital Problems   No resolved problems to display.        Brief Hospital Course to date:  Tavo Gudino is a 69 y.o. male with hx of HTN, PAD, left BKA, ESRD on hemodialysis, Afib on Eliquis, recent acute cholecystitis s/p cholecystostomy tube placement at , chronic urinary retention with indwelling Delaney catheter, and previous hx of C.diff who presented from [facility] due to chest pain, elevated troponin, and Afib with RVR.  Found to have multiple other concerning issues including active C. difficile infection, concern for possible osteomyelitis, exposed tendon on right lower extremity.     Chest pain, resolved  Elevated troponin, possible NSTEMI  -Transferred to [facility] for C with Dr. Amador  -Dr. Amador/Cardiology consulted, stress test with small area of ischemia.  With discussion with patient the decision was made to medically  manage at this time.     Afib with RVR  Hx of Afib/Aflutter  -Toprol dosing adjusted per cardiology given episodes of intermittent RVR  -Continue Eliquis     HFrEF  Moderate MR  --Most recent Echo on file I April 2024: EF 40%, akinetic septal, anterior, and apical walls, severely dilated LA, moderate MR  --Follows with [facility], had been evaluated previously for Vanessa clip  --ECHO 6/1: LVEF 51 to 55%, borderline concentric LVH, left atrial volume moderately increased, mild MR, small less than 1 cm circumferential pericardial effusion     Leukocytosis, resolved.     Concern for osteomyelitis   Exposed tendon on RLE  --MRI left tib/fib showed cellulitis, possible osteomyelitis  --MRI right foot with cellulitis/edema  -- Arterial duplex abnormal; Vascular surgery consulted and recommended medical management with no vascular surgery intervention  --Eravacycline per ID  --MRSA screen +  --Follow-up blood and urine cultures  -Dr. Yeager following. Patient likely needs additional amputation of left lower extremity due to concern for poor wound healing, likely needs debridement of right lower extremity around the tendon with wound VAC placement.  Patient refusing further amputation of left lower extremity or any other procedures.   --ID following. Patient is not likely a candidate for outpatient IV antibiotics he will not be able to follow-up adequately and doesn't not have support at home. Plan is just to continue the eravacycline for now until we have some idea of his d/c plan.  Prior auth for Nuzyra but co-pay is still $2000.  Working to see if we can get that lower. If he goes home, may consider doxycycline BID.   -IF discharges to SNF, CVC will be removed and tunneled line will need to be replaced  -Patient wishes to follow-up with wound care at .       C.diff colitis  -s/p fosphofmycin  -Continue p.o. vancomycin 125 mg 4 times a day for 14 days from admission through June 14 and then taper to 125 mg twice  daily for 1 week and then 125 mg once daily for 1 week  -Follow-up with ID     Recent cholecystitis s/p cholecystostomy tube  --Request records from that hospitalization, apparently was placed at   --Consulted General Surgery for management of tube  --Apparently already has follow up at  6/6/24 General Surgery--will need to reschedule this     Chronic urinary retention  --Has Delaney in place, reported hx of urethral injury per OSH note (data deficit, no idea when this was Delaney placed), appears he follows with Urology at  and this is a chronic Delaney  --Replaced Delaney here     PAD  Hx of left BKA  --Continue ASA     Anemia  --Likely chronic due to renal disease  --Folate 4.36 --> supplementation started; vitamin B 12 level 755  --Hb 7.5 here, had been 9 at OSH     HTN  ESRD  --Nephrology consulted for dialysis   --Case management to work with patient and family in order to work on disposition location and finding an outpatient dialysis chair.    Hyperkalemia  -Potassium of 5.9 this morning, will give dose of lokelma. Not sure when next HD session is      Hypothyroidism  --TSH significantly elevated at 91 at OSH, still elevated here to 65, but improved  --Synthroid dose was increased to 75 mcg daily at OSH  --Continue Synthroid at above dose and recheck in 4-6 weeks      Multiple wounds  Sacral Decubitus ulcer, POA, Stage 3  --Wound care consulted  --Ortho as above     Poor IV access  --Dr. Beckman placed central line, will likely need tunneled line of antibiotics if plan is for rehab placement.      Expected Discharge Location and Transportation: Tentatively back to [place]  Expected Discharge   Expected Discharge Date: 6/12/2024; Expected Discharge Time:      DVT prophylaxis:  Pharmacologic & mechanical VTE prophylaxis orders are present.         AM-PAC 6 Clicks Score (PT): 9 (06/13/24 9617)    CODE STATUS:   Code Status and Medical Interventions:   Ordered at: 05/31/24 2733     Code Status (Patient has no  "pulse and is not breathing):    CPR (Attempt to Resuscitate)     Medical Interventions (Patient has pulse or is breathing):    Full Support            Terrence Phillips MD  06/14/24        Electronically signed by Terrence Phillips MD at 06/14/24 5216        Jessy Schmidt PA-C   Physician Assistant  Cardiology     Progress Notes      Attested     Date of Service: 06/10/24 1211  Creation Time: 06/10/24 1211     Attested           Attestation signed by Fred Amador MD at 06/10/24 0768     I have reviewed this documentation and agree.               Expand All Collapse All       [facility]  PROGRESS NOTE     Date of Admission: 5/31/2024  Date of Service: 06/10/24     Primary Care Physician: Hayden Weinstein MD     Chief Complaint: f/u elevated troponin, Afib with RVR   Problem List:   NSTEMI (non-ST elevated myocardial infarction)    C. difficile colitis    History of cholecystitis s/p cholecystostomy tube    ESRD (end stage renal disease) on hemodialysis    Atrial fibrillation    Essential hypertension    Moderate malnutrition    Soft tissue infection    Acute osteomyelitis of left tibia        Subjective                                              Patient resting in bed.  He continues to be frustrated and does not want to go back to the nursing home from which he came from.  He thinks that he will get worse again there.  Patient denies any chest pain or shortness of breath this morning.        Objective   Vitals: /69 (BP Location: Right arm, Patient Position: Lying)   Pulse 57   Temp 97.8 °F (36.6 °C) (Oral)   Resp 18   Ht 185.4 cm (72.99\")   Wt 84.8 kg (186 lb 15.2 oz)   SpO2 99%   BMI 24.67 kg/m²      Physical Exam:  GENERAL: Alert, cooperative, in no acute distress.   HEENT: Normocephalic, no jugular venous distention  HEART: Irregular rhythm, normal rate, and no murmurs, gallops, or rubs.   LUNGS:  No wheezing, rales or rhonchi.  NEUROLOGIC: No focal " abnormalities involving strength or sensation are noted.   EXTREMITIES: LLE BKA, RLE with discoloration present and nonhealing ulcers with dressing in place.         Results:         Results from last 7 days   Lab Units 06/10/24  0440 06/09/24  0451 06/08/24  0433   WBC 10*3/mm3 7.82 7.76 7.37   HEMOGLOBIN g/dL 9.4* 9.1* 9.6*   HEMATOCRIT % 30.6* 29.0* 30.3*   PLATELETS 10*3/mm3 176 172 204             Results from last 7 days   Lab Units 06/10/24  0440 06/09/24  0451 06/08/24  0433   SODIUM mmol/L 134* 133* 134*   POTASSIUM mmol/L 6.0* 5.9* 5.1   CHLORIDE mmol/L 105 103 101   CO2 mmol/L 20.0* 20.0* 25.0   BUN mg/dL 51* 41* 15   CREATININE mg/dL 4.41* 3.88* 3.18*   GLUCOSE mg/dL 64* 75 71            Lab Results   Component Value Date     AST 7 06/02/2024     ALT 7 06/02/2024                                Results from last 7 days   Lab Units 06/05/24  0753 06/05/24  0001 06/04/24  1319   APTT seconds 70.3 51.6* 58.9*                       Intake/Output Summary (Last 24 hours) at 6/10/2024 1211  Last data filed at 6/10/2024 0549      Gross per 24 hour   Intake 150 ml   Output 1300 ml   Net -1150 ml         I personally reviewed the patient's EKG/Telemetry data     Radiology Data:   No radiology results for the last day        Current Medications:    Scheduled Medication   apixaban, 5 mg, Oral, BID  aspirin, 81 mg, Oral, Daily  collagenase, 1 Application, Topical, Q24H  dilTIAZem CD, 240 mg, Oral, Q24H  eravacycline dihydrochloride (XERAVA) 85 mg in sodium chloride 0.9 % 250 mL (0.34 mg/mL) IVPB, 85 mg, Intravenous, Q12H  famotidine, 10 mg, Oral, Daily  folic acid, 1 mg, Oral, Daily  lactobacillus acidophilus, 1 capsule, Oral, BID  levothyroxine, 75 mcg, Oral, Q AM  Lidocaine HCl gel, , Topical, BID  metoprolol succinate XL, 100 mg, Oral, Q24H  pharmacy consult - [place], , Does not apply, Daily  sodium chloride, 10 mL, Intravenous, Q12H  vancomycin, 125 mg, Oral, Q6H           Infusion Medications   Pharmacy Consult -  Pharmacy to dose,             Assessment and Plan:   1. NSTEMI  - elevated HS troponin at OSH with Afib with RVR and symptoms of chest pain  - HS troponin here 454 -- 386, EKGs with Afib/RVR no acute ischemic changes   - continue ASA, BB   - echo with normal LVEF, mild MR  -Stress test shows mild small area of ischemia in the mid anterior lateral zone, long discussion regarding stress test results, at this time decision was made by patient and Dr. Amador to continue medical therapy,and defer invasive angiography.  Rate control and antianginals for chest pain.  - he denies recurrent chest pain since admission      2. Afib with RVR  - rate control and anticoagulation   - continue BB, CCB, and Eliquis  - Metoprolol increased to 100mg and Diltiazem added this admission, rates are controlled at this time      3. ESRD on dialysis  - NAL following, still making some urine      4. PAD, s/p left BKA and nonhealing ulcers of RLE  - Ortho and ID following  - Ortho has signed off as patient declined further surgical intervention of his wounds/ LE    5. C-diff  - per ID      6. Anemia  - acute/chronic  - per primary service      7. Recent cholecystitis with GB drain in place  - Dr. Beckman has seen, recommend OP follow up with       8. Hypothyroidism  - Per hospitalists         Heart rate is well-controlled.  Patient is awaiting placement at this time.  Will continue to follow as needed while inpatient.     Electronically signed by Jessy Schmidt PA-C, 06/10/24, 12:17 PM EDT.                Cosigned by: Fred Amador MD at 06/10/24 0624     Revision History

## 2024-06-17 NOTE — NURSING NOTE
Woc will back today to follow-up on the groin wound check on Friday    There is slightly larger than the last time however less slough and less deep.  0 full-thickness though.  Very sensitive to patient, granulating tissue present.      I cleansed with Vashe gauze and added Hope Ag this time instead of the honey the honey I removed to let you clean this wound however healing is not happening.    We will try Hope for 1 week to start the granulation process.    New orders for right groin wound is cleansed with Vashe, apply Hope Ag and topped with silicone foam changing every other day.      Left knee was changed again.  Compared to when patient got here most of slough that was encapsulating wound bed is falling off.  Woc was able to delineate line between nonviable tissue and viable tissue.  I was able to use tweezers and scissors and clip off about half of the dangling slough.  Pictures were taken before debridement.  No bleeding occurred.  Epithelialization is present when compared under close scrutiny to previous pictures from admission.  Here is the knee from many different angles:

## 2024-06-17 NOTE — PLAN OF CARE
Goal Outcome Evaluation:  Plan of Care Reviewed With: patient        Progress: no change  Outcome Evaluation: Pt trialed bed > chair transfer using posterior scooting to avoid WB thru BLE. Pt continues to present with significant generalized weakness, limitations in use of RUE, impaired balance and coordination, impaired sequencing of tasks, and limited activity tolerance warranting need for continued skilled therapy to facilitate increased independence and improved overall functional strength and mobility. Recommend SNF following d/c for best outcomes.      Anticipated Discharge Disposition (PT): skilled nursing facility

## 2024-06-17 NOTE — PROGRESS NOTES
" LOS: 17 days   Patient Care Team:  Hayden Weinstein MD as PCP - General (Nephrology)    Chief Complaint: ESRD  NSTEMI    Subjective     No acute events overnight. No new complaints     History taken from: patient    Objective     Vital Sign Min/Max for last 24 hours  Temp  Min: 97.5 °F (36.4 °C)  Max: 98.1 °F (36.7 °C)   BP  Min: 124/99  Max: 139/94   Pulse  Min: 68  Max: 71   Resp  Min: 16  Max: 18   No data recorded   No data recorded   No data recorded     Flowsheet Rows      Flowsheet Row First Filed Value   Admission Height 185.4 cm (73\") Documented at 05/31/2024 1826   Admission Weight 84.8 kg (187 lb) Documented at 05/31/2024 1826            I/O this shift:  In: 360 [P.O.:360]  Out: -   I/O last 3 completed shifts:  In: 1080 [P.O.:1080]  Out: 1425 [Urine:1225; Drains:200]    Objective   Gen: Alert, NAD   HENT: NC, AT, EOMI   NECK: Supple, no JVD, Trachea midline   LUNGS: CTA bilaterally, non labored respirtation   CVS: S1/S2 audible, RRR, no murmur   Abd: Soft, NT, ND, BS+   : + Delaney   Ext: Left BKA  CNS: Alert, No focal deficit noted grossly  Psy: Cooperative  Skin: Warm, dry and intact      Results Review:     I reviewed the patient's new clinical results.    WBC WBC   Date Value Ref Range Status   06/16/2024 7.16 3.40 - 10.80 10*3/mm3 Final   06/15/2024 9.15 3.40 - 10.80 10*3/mm3 Final        HGB Hemoglobin   Date Value Ref Range Status   06/16/2024 9.8 (L) 13.0 - 17.7 g/dL Final   06/15/2024 10.1 (L) 13.0 - 17.7 g/dL Final        HCT Hematocrit   Date Value Ref Range Status   06/16/2024 30.9 (L) 37.5 - 51.0 % Final   06/15/2024 32.2 (L) 37.5 - 51.0 % Final        Platlets No results found for: \"LABPLAT\"   MCV MCV   Date Value Ref Range Status   06/16/2024 94.8 79.0 - 97.0 fL Final   06/15/2024 96.4 79.0 - 97.0 fL Final            Sodium Sodium   Date Value Ref Range Status   06/16/2024 136 136 - 145 mmol/L Final   06/15/2024 133 (L) 136 - 145 mmol/L Final      Potassium Potassium   Date Value Ref Range " "Status   06/16/2024 4.4 3.5 - 5.2 mmol/L Final   06/15/2024 4.6 3.5 - 5.2 mmol/L Final      Chloride Chloride   Date Value Ref Range Status   06/16/2024 103 98 - 107 mmol/L Final   06/15/2024 101 98 - 107 mmol/L Final      CO2 CO2   Date Value Ref Range Status   06/16/2024 23.0 22.0 - 29.0 mmol/L Final   06/15/2024 19.0 (L) 22.0 - 29.0 mmol/L Final      BUN BUN   Date Value Ref Range Status   06/16/2024 36 (H) 8 - 23 mg/dL Final   06/15/2024 55 (H) 8 - 23 mg/dL Final      Creatinine Creatinine   Date Value Ref Range Status   06/16/2024 2.62 (H) 0.76 - 1.27 mg/dL Final   06/15/2024 3.31 (H) 0.76 - 1.27 mg/dL Final      Calcium Calcium   Date Value Ref Range Status   06/16/2024 8.5 (L) 8.6 - 10.5 mg/dL Final   06/15/2024 8.3 (L) 8.6 - 10.5 mg/dL Final      PO4 No results found for: \"CAPO4\"   Albumin Albumin   Date Value Ref Range Status   06/16/2024 2.6 (L) 3.5 - 5.2 g/dL Final            Magnesium No results found for: \"MG\"       Uric Acid No results found for: \"URICACID\"     Medication Review: Yes    Assessment & Plan       NSTEMI (non-ST elevated myocardial infarction)    C. difficile colitis    History of cholecystitis s/p cholecystostomy tube    ESRD (end stage renal disease) on hemodialysis    Atrial fibrillation    Essential hypertension    Moderate malnutrition    Soft tissue infection    Acute osteomyelitis of left tibia      Assessment & Plan    ESRD: On TTS jonathan. HD through right IJ TDC. Still makes urine. Does have cole cath+ for chronic urinary retention. Patient the family Primary nephrologist (Dr Wan) was monitoring for renal recovery and residual renal function.      Volume status: No significant LE edema noted.      Afib w RVR: RVR during HD treatment. BP controlled. . No chest pain or sob.       Met acidosis: Mild. Management with HD.     Anemia: ACD. Transfuse at hb<7.  ALLISON on HD days     CAD: Transferred from OSH for evaluation of ACD. Cardiology following. EF 55%    Plan:  - Hd per TTS jonathan.  - " Renal diet (Doesn't want to follow)  - ALLISON with HD       Jose Wilks MD  06/17/24  10:26 EDT

## 2024-06-17 NOTE — THERAPY PROGRESS REPORT/RE-CERT
Patient Name: Tavo Gudino  : 1955    MRN: [id number]                              Today's Date: 2024       Admit Date: 2024    Visit Dx: No diagnosis found.  Patient Active Problem List   Diagnosis    NSTEMI (non-ST elevated myocardial infarction)    C. difficile colitis    History of cholecystitis s/p cholecystostomy tube    ESRD (end stage renal disease) on hemodialysis    Atrial fibrillation    Essential hypertension    Moderate malnutrition    Soft tissue infection    Acute osteomyelitis of left tibia     Past Medical History:   Diagnosis Date    Atrial fibrillation     Chronic kidney disease (CKD), stage V     ESRD on hemodialysis     Hypertension     Metabolic acidosis     Peripheral arterial disease     Pneumothorax     Secondary hyperparathyroidism      Past Surgical History:   Procedure Laterality Date    ARTERIOVENOUS FISTULA Right     BELOW KNEE LEG AMPUTATION Left     CATARACT EXTRACTION Bilateral       General Information       Row Name 24 1109          Physical Therapy Time and Intention    Document Type progress note/recertification  -ND     Mode of Treatment physical therapy  -ND       Row Name 24 1109          General Information    Patient Profile Reviewed yes  -ND     Existing Precautions/Restrictions fall;other (see comments)  L BKA, per pt reports RLE NWB, drain, cole, dialysis cath, sacral + LE wounds.  -ND     Barriers to Rehab medically complex;previous functional deficit;physical barrier  -ND       Row Name 24 1109          Cognition    Orientation Status (Cognition) oriented x 3  -ND       Row Name 24 1109          Safety Issues, Functional Mobility    Safety Issues Affecting Function (Mobility) awareness of need for assistance;friction/shear risk;insight into deficits/self-awareness;judgment;problem-solving;safety precaution awareness;safety precautions follow-through/compliance;sequencing abilities  -ND     Impairments Affecting Function  (Mobility) balance;endurance/activity tolerance;motor planning;pain;postural/trunk control;range of motion (ROM);strength  -ND               User Key  (r) = Recorded By, (t) = Taken By, (c) = Cosigned By      Initials Name Provider Type    Serena Nash PT Physical Therapist                   Mobility       Row Name 06/17/24 1114          Bed Mobility    Bed Mobility rolling left;rolling right;scooting/bridging  -ND     Rolling Left [place] (Bed Mobility) minimum assist (75% patient effort);1 person assist;verbal cues;nonverbal cues (demo/gesture)  -ND     Rolling Right [place] (Bed Mobility) minimum assist (75% patient effort);1 person assist;verbal cues;nonverbal cues (demo/gesture)  -ND     Scooting/Bridging [place] (Bed Mobility) maximum assist (25% patient effort);2 person assist;verbal cues;nonverbal cues (demo/gesture);dependent (less than 25% patient effort)  -ND     Assistive Device (Bed Mobility) bed rails;draw sheet  -ND     Comment, (Bed Mobility) Assist at hip to complete rolling L/R. Pt trailed bed > chair transfer via scooting backward onto chair with max-A x2 for ~75% of transfer progressing to dependent x2 for remaining 25%. Pt limited by decreased strength/ROM in RUE.  -ND       Row Name 06/17/24 1114          Transfers    Comment, (Transfers) STS deferred this date due to pt reporting RLE NWB, unable to officially clarify this per chart review.  -ND       Row Name 06/17/24 1114          Bed-Chair Transfer    Bed-Chair [place] (Transfers) maximum assist (25% patient effort);dependent (less than 25% patient effort);2 person assist;verbal cues;nonverbal cues (demo/gesture)  -ND     Comment, (Bed-Chair Transfer) Posterior scooting from bed > chair.  -ND               User Key  (r) = Recorded By, (t) = Taken By, (c) = Cosigned By      Initials Name Provider Type    Serena Nash PT Physical Therapist                   Obj/Interventions       Row Name 06/17/24 1117           Balance    Balance Assessment sitting static balance;sitting dynamic balance  -ND     Static Sitting Balance standby assist  -ND     Dynamic Sitting Balance contact guard  -ND     Position, Sitting Balance unsupported;long sitting  -ND     Balance Interventions sitting  -ND     Comment, Balance Intermittent cues required for upright posture and to lean forward.  -ND               User Key  (r) = Recorded By, (t) = Taken By, (c) = Cosigned By      Initials Name Provider Type    Serena Nash PT Physical Therapist                   Goals/Plan       Row Name 06/17/24 1114          Bed Mobility Goal 1 (PT)    Activity/Assistive Device (Bed Mobility Goal 1, PT) rolling to left;rolling to right;sit to supine;supine to sit  -ND     [place] Level/Cues Needed (Bed Mobility Goal 1, PT) contact guard required  -ND     Time Frame (Bed Mobility Goal 1, PT) long term goal (LTG);10 days  -ND     Progress/Outcomes (Bed Mobility Goal 1, PT) continuing progress toward goal  -ND       Row Name 06/17/24 1114          Problem Specific Goal 1 (PT)    Problem Specific Goal 1 (PT) Patient will be independent with HEP  -ND     Time Frame (Problem Specific Goal 1, PT) long-term goal (LTG);2 weeks  -ND     Progress/Outcome (Problem Specific Goal 1, PT) continuing progress toward goal  -ND       Row Name 06/17/24 1114          Therapy Assessment/Plan (PT)    Planned Therapy Interventions (PT) balance training;bed mobility training;home exercise program;patient/family education;transfer training;wheelchair management/propulsion training;postural re-education;ROM (range of motion);strengthening  -ND               User Key  (r) = Recorded By, (t) = Taken By, (c) = Cosigned By      Initials Name Provider Type    Serena Nash PT Physical Therapist                   Clinical Impression       Row Name 06/17/24 1111          Pain    Pretreatment Pain Rating 6/10  -ND     Posttreatment Pain Rating 6/10  -ND     Pain  Location - Side/Orientation Right  -ND     Pain Location generalized  -ND     Pain Location - buttock  -ND     Pain Intervention(s) Repositioned;Nursing Notified;Ambulation/increased activity  -ND       Row Name 06/17/24 1111          Plan of Care Review    Plan of Care Reviewed With patient  -ND     Progress no change  -ND     Outcome Evaluation Pt trialed bed > chair transfer using posterior scooting to avoid WB thru BLE. Pt continues to present with significant generalized weakness, limitations in use of RUE, impaired balance and coordination, impaired sequencing of tasks, and limited activity tolerance warranting need for continued skilled therapy to facilitate increased independence and improved overall functional strength and mobility. Recommend SNF following d/c for best outcomes.  -ND       Row Name 06/17/24 1111          Therapy Assessment/Plan (PT)    Rehab Potential (PT) fair, will monitor progress closely  -ND     Criteria for Skilled Interventions Met (PT) yes;skilled treatment is necessary  -ND     Therapy Frequency (PT) 3 times/wk  -ND       Row Name 06/17/24 1111          Vital Signs    Pre Systolic BP Rehab 126  -ND     Pre Treatment Diastolic BP 77  -ND     Post Systolic BP Rehab 132  -ND     Post Treatment Diastolic BP 91  -ND     O2 Delivery Pre Treatment room air  -ND     O2 Delivery Intra Treatment room air  -ND     O2 Delivery Post Treatment room air  -ND     Pre Patient Position Supine  -ND     Intra Patient Position Sitting  -ND     Post Patient Position Supine  -ND       Row Name 06/17/24 1111          Positioning and Restraints    Pre-Treatment Position in bed  -ND     Post Treatment Position chair  -ND     In Chair notified nsg;reclined;legs elevated;call light within reach;encouraged to call for assist;exit alarm on;waffle cushion;on mechanical lift sling  -ND               User Key  (r) = Recorded By, (t) = Taken By, (c) = Cosigned By      Initials Name Provider Type    JAIME Bustamante  Serena, PT Physical Therapist                   Outcome Measures       Row Name 06/17/24 1119          How much help from another person do you currently need...    Turning from your back to your side while in flat bed without using bedrails? 3  -ND     Moving from lying on back to sitting on the side of a flat bed without bedrails? 3  -ND     Moving to and from a bed to a chair (including a wheelchair)? 1  -ND     Standing up from a chair using your arms (e.g., wheelchair, bedside chair)? 1  -ND     Climbing 3-5 steps with a railing? 1  -ND     To walk in hospital room? 1  -ND     AM-PAC 6 Clicks Score (PT) 10  -ND     Highest Level of Mobility Goal 4 --> Transfer to chair/commode  -ND       Row Name 06/17/24 1119          Functional Assessment    Outcome Measure Options AM-PAC 6 Clicks Basic Mobility (PT)  -ND               User Key  (r) = Recorded By, (t) = Taken By, (c) = Cosigned By      Initials Name Provider Type    Serena Nash, PT Physical Therapist                                 Physical Therapy Education       Title: PT OT SLP Therapies (In Progress)       Topic: Physical Therapy (Done)       Point: Mobility training (Done)       Learning Progress Summary             Patient Acceptance, E, VU by ND at 6/17/2024 1120    Acceptance, E, VU by KE at 6/12/2024 1317    Acceptance, E, VU,NR by ML at 6/7/2024 1039    Acceptance, E, VU,NR by ML at 6/3/2024 1610   Family Acceptance, E, VU,NR by ML at 6/3/2024 1610                         Point: Home exercise program (Done)       Learning Progress Summary             Patient Acceptance, E, VU,NR by ML at 6/7/2024 1039                         Point: Body mechanics (Done)       Learning Progress Summary             Patient Acceptance, E, VU by ND at 6/17/2024 1120    Acceptance, E, VU by KE at 6/12/2024 1317                         Point: Precautions (Done)       Learning Progress Summary             Patient Acceptance, E, VU by ND at 6/17/2024 1120     Acceptance, E, VU by KE at 6/12/2024 1317    Acceptance, E, VU,NR by  at 6/7/2024 1039    Acceptance, E, VU,NR by  at 6/3/2024 1610   Family Acceptance, E, VU,NR by ML at 6/3/2024 1610                                         User Key       Initials Effective Dates Name Provider Type Discipline    ML 04/22/21 -  Camille Becerra Physical Therapist PT    ND 11/16/23 -  Serena Bustamante, KATARZYNA Physical Therapist PT    MART 11/16/23 -  Sulma Reed, KATARZYNA Physical Therapist PT                  PT Recommendation and Plan  Planned Therapy Interventions (PT): balance training, bed mobility training, home exercise program, patient/family education, transfer training, wheelchair management/propulsion training, postural re-education, ROM (range of motion), strengthening  Plan of Care Reviewed With: patient  Progress: no change  Outcome Evaluation: Pt trialed bed > chair transfer using posterior scooting to avoid WB thru BLE. Pt continues to present with significant generalized weakness, limitations in use of RUE, impaired balance and coordination, impaired sequencing of tasks, and limited activity tolerance warranting need for continued skilled therapy to facilitate increased independence and improved overall functional strength and mobility. Recommend SNF following d/c for best outcomes.     Time Calculation:         PT Charges       Row Name 06/17/24 1106             Time Calculation    Start Time 1020  -ND      PT Received On 06/17/24  -ND      PT Goal Re-Cert Due Date 06/27/24  -ND         Timed Charges    47567 - PT Therapeutic Activity Minutes 30  -ND         Total Minutes    Timed Charges Total Minutes 30  -ND       Total Minutes 30  -ND                User Key  (r) = Recorded By, (t) = Taken By, (c) = Cosigned By      Initials Name Provider Type    ND Serena Bustamante, PT Physical Therapist                  Therapy Charges for Today       Code Description Service Date Service Provider Modifiers Qty    [id number]   PT THERAPEUTIC ACT EA 15 MIN 6/17/2024 Serena Bustamante, PT GP 2            PT G-Codes  Outcome Measure Options: AM-PAC 6 Clicks Basic Mobility (PT)  AM-PAC 6 Clicks Score (PT): 10  AM-PAC 6 Clicks Score (OT): 13  PT Discharge Summary  Anticipated Discharge Disposition (PT): skilled nursing facility    Serena Bustamante, KATARZYNA  6/17/2024

## 2024-06-17 NOTE — PROGRESS NOTES
[facility] Medicine Services  PROGRESS NOTE    Patient Name: Tavo Gudino  : 1955  MRN: [id number]    Date of Admission: 2024  Primary Care Physician: Hayden Weinstein MD    Subjective   Subjective     CC:  Stump infection     HPI:  Patient is resting in bed in NAD. He is tolerating diet. Denies any pain. Plan for rehab       Objective   Objective     Vital Signs:   Temp:  [97.5 °F (36.4 °C)-98.1 °F (36.7 °C)] 98 °F (36.7 °C)  Heart Rate:  [68-71] 68  Resp:  [16-18] 18  BP: (124-131)/(68-99) 126/77     Physical Exam:  Constitutional: No acute distress, awake, alert, chronically ill appearing   HENT: NCAT, mucous membranes moist, LIJ intact   Respiratory: Clear to auscultation bilaterally, respiratory effort normal room air   Cardiovascular: RRR, no murmurs, rubs, or gallops  Gastrointestinal: Positive bowel sounds, soft, nontender, nondistended,, cholecystostomy tube in place with dark green drainage   Musculoskeletal: L AKA with dressing , right foot dressing, rogerio LE venous changes   Psychiatric: Appropriate affect, cooperative  Neurologic: Oriented x 3, strength symmetric in all extremities, Cranial Nerves grossly intact to confrontation, speech clear  Skin: No rashes, right chest tunneled cath dressing cdi, pale    cole to BSD       Results Reviewed:  LAB RESULTS:      Lab 24  0621 06/15/24  0630   WBC 7.16 9.15   HEMOGLOBIN 9.8* 10.1*   HEMATOCRIT 30.9* 32.2*   PLATELETS 167 178   NEUTROS ABS  --  5.55   IMMATURE GRANS (ABS)  --  0.04   LYMPHS ABS  --  2.62   MONOS ABS  --  0.75   EOS ABS  --  0.13   MCV 94.8 96.4   CRP 0.39  --          Lab 24  0621 06/15/24  0630 24  0351 24  0716   SODIUM 136 133* 134* 133*   POTASSIUM 4.4 4.6 5.0 5.0   CHLORIDE 103 101 104 102   CO2 23.0 19.0* 20.0* 21.0*   ANION GAP 10.0 13.0 10.0 10.0   BUN 36* 55* 39* 29*   CREATININE 2.62* 3.31* 3.60* 2.92*   EGFR 25.3* 19.1* 17.3* 22.2*   GLUCOSE 70 82 71 75   CALCIUM 8.5*  8.3* 9.3 8.6   PHOSPHORUS 5.8*  --  5.8* 4.6*         Lab 06/16/24  0621 06/12/24  0351 06/11/24  0716   ALBUMIN 2.6* 2.6* 2.5*                     Brief Urine Lab Results  (Last result in the past 365 days)        Color   Clarity   Blood   Leuk Est   Nitrite   Protein   CREAT   Urine HCG        06/02/24 1721 Yellow   Clear   Trace   Trace   Negative   100 mg/dL (2+)                   Microbiology Results Abnormal       Procedure Component Value - Date/Time    Blood Culture - Blood, Blood, Central Line [[id number]]  (Normal) Collected: 05/31/24 2237    Lab Status: Final result Specimen: Blood, Central Line Updated: 06/06/24 0801     Blood Culture No growth at 5 days    Blood Culture - Blood, Arm, Right [[id number]]  (Normal) Collected: 05/31/24 2237    Lab Status: Final result Specimen: Blood from Arm, Right Updated: 06/06/24 0745     Blood Culture No growth at 5 days    Urine Culture - Urine, Indwelling Urethral Catheter [[id number]]  (Normal) Collected: 06/02/24 1721    Lab Status: Final result Specimen: Urine from Indwelling Urethral Catheter Updated: 06/04/24 0955     Urine Culture No growth            No radiology results from the last 24 hrs    Results for orders placed during the hospital encounter of 05/31/24    Adult Transthoracic Echo Complete W/ Cont if Necessary Per Protocol    Interpretation Summary    Left ventricular systolic function is normal. Left ventricular ejection fraction appears to be 51 - 55%.    Left ventricular wall thickness is consistent with borderline concentric hypertrophy.    Left atrial volume is moderately increased.    There is mild calcification of the aortic valve.    Mild mitral valve regurgitation is present.    There is a small (<1cm) circumferential pericardial effusion.      Current medications:  Scheduled Meds:apixaban, 5 mg, Oral, BID  aspirin, 81 mg, Oral, Daily  collagenase, 1 Application, Topical, Q24H  dilTIAZem CD, 240 mg, Oral, Q24H  eravacycline dihydrochloride  (XERAVA) 85 mg in sodium chloride 0.9 % 250 mL (0.34 mg/mL) IVPB, 85 mg, Intravenous, Q12H  famotidine, 10 mg, Oral, Daily  folic acid, 1 mg, Oral, Daily  lactobacillus acidophilus, 1 capsule, Oral, BID  levothyroxine, 75 mcg, Oral, Q AM  Lidocaine HCl gel, , Topical, BID  metoprolol succinate XL, 100 mg, Oral, Q24H  pharmacy consult - MTM, , Does not apply, Daily  sodium chloride, 10 mL, Intravenous, Q12H  sodium zirconium cyclosilicate, 10 g, Oral, Daily  vancomycin, 125 mg, Oral, BID  [START ON 6/22/2024] vancomycin, 125 mg, Oral, Daily      Continuous Infusions:   PRN Meds:.  acetaminophen    heparin (porcine)    Lidocaine HCl gel    melatonin    metoprolol tartrate    ondansetron    Polyvinyl Alcohol-Povidone PF    sodium chloride    sodium chloride    Assessment & Plan   Assessment & Plan     Active Hospital Problems    Diagnosis  POA    **NSTEMI (non-ST elevated myocardial infarction) [I21.4]  Yes    Soft tissue infection [L08.9]  Unknown    Acute osteomyelitis of left tibia [M86.162]  Unknown    Moderate malnutrition [E44.0]  Yes    C. difficile colitis [A04.72]  Yes    History of cholecystitis s/p cholecystostomy tube [K81.9]  Yes    ESRD (end stage renal disease) on hemodialysis [N18.6]  Yes    Atrial fibrillation [I48.91]  Yes    Essential hypertension [I10]  Yes      Resolved Hospital Problems   No resolved problems to display.        Brief Hospital Course to date:  Tavo Gudino is a 69 y.o. male  with hx of HTN, PAD, left BKA, ESRD on hemodialysis, Afib on Eliquis, recent acute cholecystitis s/p cholecystostomy tube placement at , chronic urinary retention with indwelling Delaney catheter, and previous hx of C.diff who presented from [facility] due to chest pain, elevated troponin, and Afib with RVR.  Found to have multiple other concerning issues including active C. difficile infection, concern for possible osteomyelitis, exposed tendon on right lower extremity.     This patient's problems and  plans were partially entered by my partner and updated as appropriate by me 06/17/24.     Assessment/plan:     Chest pain, resolved  Elevated troponin, possible NSTEMI  -Transferred to [facility] for C with Dr. Amador  -Dr. Amador/Cardiology consulted, stress test with small area of ischemia.  --prior discussion with patient of decision to medically manage at this time.  --no current cp complaints.     Afib with RVR  Hx of Afib/Aflutter  -Toprol dosing adjusted per cardiology given episodes of intermittent RVR  -Continue Eliquis     HFrEF  Moderate MR  --Most recent Echo on file I April 2024: EF 40%, akinetic septal, anterior, and apical walls, severely dilated LA, moderate MR  --Follows with [facility], had been evaluated previously for Vanessa clip  --ECHO 6/1: LVEF 51 to 55%, borderline concentric LVH, left atrial volume moderately increased, mild MR, small less than 1 cm circumferential pericardial effusion. F/u as prior scheduled      Leukocytosis, resolved  --wbc 7.16, afebrile      Concern for osteomyelitis   Exposed tendon on RLE  --MRI left tib/fib showed cellulitis, possible osteomyelitis  --MRI right foot with cellulitis/edema  -- Arterial duplex abnormal; Vascular surgery consulted and recommended medical management with no vascular surgery intervention  --Eravacycline per ID  --MRSA screen +  --Follow-up blood and urine cultures  -Dr. Yeager following. Patient likely needs additional amputation of left lower extremity due to concern for poor wound healing, likely needs debridement of right lower extremity around the tendon with wound VAC placement.  Patient refusing further amputation of left lower extremity or any other procedures.   --ID following. Patient is not likely a candidate for outpatient IV antibiotics he will not be able to follow-up adequately and doesn't not have support at home.   Prior auth for Nuzyra approved for two weeks of therapy, then transition over to oral doxycycline 100 mg bid at  least through 7/12   -IF discharges to SNF, left IJ will need removed. If going on oral abx   -Patient wishes to follow-up with wound care at .  -- follow up with ID two weeks      C.diff colitis  -s/p fosphofmycin  -Continue p.o. vancomycin 125 mg 4 times a day for 14 days from admission through June 14 and then taper to 125 mg twice daily for 1 week and then 125 mg once daily for 1 week  -Follow-up with ID  -- probiotic bid      Recent cholecystitis s/p cholecystostomy tube  --Request records from that hospitalization, apparently was placed at   --Consulted General Surgery for management of tube  --Apparently already had follow up at  6/6/24 General Surgery--will need to reschedule this at [place]     Chronic urinary retention  --Has Delaney in place, reported hx of urethral injury per OSH note (data deficit, no idea when this was Delaney placed), appears he follows with Urology at  and this is a chronic Delaney  --Replaced Delaney here     PAD  Hx of left BKA  --Continue ASA  --Lt BKA drsg in place.       Anemia  --Likely chronic due to renal disease  --Folate 4.36 --> supplementation started; vitamin B 12 level 755  --Hb 7.5 here, had been 9 at OSH. up to 10.1     HTN  ESRD  --Nephrology consulted for dialysis   --Case management to work with patient and family in order to work on disposition location and finding an outpatient dialysis chair.     Hyperkalemia  -Potassium up to 6.0 s/p dose of lokelma.   --s/p HD TTHS     Hypothyroidism  --TSH significantly elevated at 91 at OSH, still elevated here to 65, but improved  --Synthroid dose was increased to 75 mcg daily at OSH  --Continue Synthroid at above dose and recheck in 4-6 weeks. Defer to PCP      Multiple wounds  Sacral Decubitus ulcer, POA, Stage 3  --Wound care consulted  --Ortho as above     Poor IV access  --Dr. Beckman placed Lt IJ central line, will need to be removed prior to transfer  -- Nuzyra  was preauthfor two weeks. Med would need to be filled her  prior to dc. Treat with Nuzyra for two weeks then transition over to oral  doxycycline 100 mg bid for two more weeks at least through July 12th        Expected Discharge Location and Transportation: rehab   Expected Discharge   Expected Discharge Date: 6/17/2024; Expected Discharge Time:      VTE Prophylaxis:  Pharmacologic & mechanical VTE prophylaxis orders are present.         AM-PAC 6 Clicks Score (PT): 10 (06/17/24 1119)    CODE STATUS:   Code Status and Medical Interventions:   Ordered at: 05/31/24 1420     Code Status (Patient has no pulse and is not breathing):    CPR (Attempt to Resuscitate)     Medical Interventions (Patient has pulse or is breathing):    Full Support       Pam Bullock, ROSELINE  06/17/24

## 2024-06-17 NOTE — CASE MANAGEMENT/SOCIAL WORK
Continued Stay Note  [facility]     Patient Name: Tavo Gudino  MRN: [id number]  Today's Date: 6/17/2024    Admit Date: 5/31/2024    Plan: [facility]   Discharge Plan       Row Name 06/17/24 1553       Plan    Plan [facility]    Plan Comments I spoke with Rochelle at [facility] today. All hemodialysis arrangements have been set up at the facility. I have requested that insurance authorization be submitted, which it has. CM will update when more information is available. CM following.    Final Discharge Disposition Code 03 - skilled nursing facility (SNF)                   Discharge Codes    No documentation.                 Expected Discharge Date and Time       Expected Discharge Date Expected Discharge Time    Jun 17, 2024               Serina Sinclair RN

## 2024-06-17 NOTE — THERAPY TREATMENT NOTE
Patient Name: Tavo Gudino  : 1955    MRN: [id number]                              Today's Date: 2024       Admit Date: 2024    Visit Dx: No diagnosis found.  Patient Active Problem List   Diagnosis    NSTEMI (non-ST elevated myocardial infarction)    C. difficile colitis    History of cholecystitis s/p cholecystostomy tube    ESRD (end stage renal disease) on hemodialysis    Atrial fibrillation    Essential hypertension    Moderate malnutrition    Soft tissue infection    Acute osteomyelitis of left tibia     Past Medical History:   Diagnosis Date    Atrial fibrillation     Chronic kidney disease (CKD), stage V     ESRD on hemodialysis     Hypertension     Metabolic acidosis     Peripheral arterial disease     Pneumothorax     Secondary hyperparathyroidism      Past Surgical History:   Procedure Laterality Date    ARTERIOVENOUS FISTULA Right     BELOW KNEE LEG AMPUTATION Left     CATARACT EXTRACTION Bilateral       General Information       Row Name 24 1105          OT Time and Intention    Document Type progress note/recertification  -GARRY     Mode of Treatment individual therapy;occupational therapy  -GARRY       Row Name 24 1108          General Information    Patient Profile Reviewed yes  -GARRY     Existing Precautions/Restrictions fall;other (see comments)  sacral and LE wounds, L BKA, drain, cole, dialysis cath; unsure of RLE WB status ( per pt. MD instructed him to be NWB RLE except if he wanted to put light pressure on toes to steady)  -GARRY     Barriers to Rehab medically complex;previous functional deficit;physical barrier  -GARRY       Row Name 24 1109          Cognition    Orientation Status (Cognition) oriented x 3  -GARRY       Row Name 24 1100          Safety Issues, Functional Mobility    Safety Issues Affecting Function (Mobility) insight into deficits/self-awareness;safety precaution awareness;safety precautions follow-through/compliance;problem-solving  -GARRY      Impairments Affecting Function (Mobility) balance;endurance/activity tolerance;range of motion (ROM);pain;strength;postural/trunk control  -               User Key  (r) = Recorded By, (t) = Taken By, (c) = Cosigned By      Initials Name Provider Type    Na Pena OT Occupational Therapist                     Mobility/ADL's       Row Name 06/17/24 1108          Bed Mobility    Bed Mobility supine-sit  -GARRY     Rolling Left [place] (Bed Mobility) minimum assist (75% patient effort);verbal cues;nonverbal cues (demo/gesture);1 person assist  -GARRY     Rolling Right [place] (Bed Mobility) minimum assist (75% patient effort);1 person assist;verbal cues;nonverbal cues (demo/gesture)  -GARRY     Scooting/Bridging [place] (Bed Mobility) maximum assist (25% patient effort);dependent (less than 25% patient effort);2 person assist;verbal cues;nonverbal cues (demo/gesture)  -GARRY     Supine-Sit [place] (Bed Mobility) minimum assist (75% patient effort);1 person assist;verbal cues  assist with trunk side to sit  -     Bed Mobility, Safety Issues decreased use of arms for pushing/pulling;decreased use of legs for bridging/pushing  -     Assistive Device (Bed Mobility) draw sheet;bed rails  -     Comment, (Bed Mobility) Pt. initially helping scoot max of 2, but as fatigued to dep of 2, draw sheet use.  -       Row Name 06/17/24 1108          Transfers    Transfers bed-chair transfer  -     Comment, (Transfers) Attempted backing into chair, pt. scooted back across bed max of 2 inititially, but fatigued quickly and unable to even maintain sitting upright once fatiged.  Sheet used to slide pt. into chair.  Pt. lifted with lift to place pillows and waffle under pt.  -       Row Name 06/17/24 1108          Bed-Chair Transfer    Bed-Chair [place] (Transfers) dependent (less than 25% patient effort);2 person assist  -     Assistive Device (Bed-Chair Transfers) other (see comments)  -                User Key  (r) = Recorded By, (t) = Taken By, (c) = Cosigned By      Initials Name Provider Type    Na Pena, OT Occupational Therapist                   Obj/Interventions       Row Name 06/17/24 1120          Range of Motion Comprehensive    Comment, General Range of Motion pt. continues to need LUE to assist RUE with shoulder flexion, pt. with no ER R shoulder  -[facility] Name 06/17/24 1120          Strength Comprehensive (MMT)    Comment, General Manual Muscle Testing (MMT) Assessment Elbow F/E 3+ to 4/5, shoulder R 0 to 2+/5, L 3+ to 4/5  -       Row Name 06/17/24 1120          Shoulder (Therapeutic Exercise)    Shoulder (Therapeutic Exercise) AROM (active range of motion);AAROM (active assistive range of motion)  -     Shoulder AROM (Therapeutic Exercise) left;flexion;extension;horizontal aBduction/aDduction;bilateral;aBduction;aDduction  -     Shoulder AAROM (Therapeutic Exercise) right assist left;flexion;extension;horizontal aBduction/aDduction  OT assisted to maintain flexion so pt. could complete horz abd/add RUE  -[facility] Name 06/17/24 1120          Elbow/Forearm (Therapeutic Exercise)    Elbow/Forearm (Therapeutic Exercise) strengthening exercise  -     Elbow/Forearm Strengthening (Therapeutic Exercise) bilateral;flexion;extension;15 repititions;2 sets  mild manual resistance given  -[facility] Name 06/17/24 1120          Motor Skills    Therapeutic Exercise shoulder;elbow/forearm  -[facility] Name 06/17/24 1120          Balance    Static Sitting Balance standby assist  -     Dynamic Sitting Balance moderate assist  -     Position, Sitting Balance supported;sitting edge of bed;long sitting  -     Comment, Balance pt. able to long sit initially across bed CGA, but as fatigued progressed to mod A  -GARRY               User Key  (r) = Recorded By, (t) = Taken By, (c) = Cosigned By      Initials Name Provider Type    Na Pena, OT Occupational Therapist                    Goals/Plan       [facility] Name 06/17/24 1130          Bed Mobility Goal 1 (OT)    Progress/Outcomes (Bed Mobility Goal 1, OT) goal ongoing  -[facility] Name 06/17/24 1130          Grooming Goal 1 (OT)    Progress/Outcome (Grooming Goal 1, OT) goal ongoing  -[facility] Name 06/17/24 1130          Self-Feeding Goal 1 (OT)    Progress/Outcomes (Self-Feeding Goal 1, OT) goal ongoing  -[facility] Name 06/17/24 1130          Strength Goal 1 (OT)    Progress/Outcome (Strength Goal 1, OT) good progress toward goal;goal ongoing  -[facility] Name 06/17/24 1130          Problem Specific Goal 1 (OT)    Progress/Outcome (Problem Specific Goal 1, OT) good progress toward goal;goal ongoing  -GARRY               User Key  (r) = Recorded By, (t) = Taken By, (c) = Cosigned By      Initials Name Provider Type    Na Pena, OT Occupational Therapist                   Clinical Impression       [facility] Name 06/17/24 1124          Pain Assessment    Pretreatment Pain Rating 6/10  -GARRY     Posttreatment Pain Rating 6/10  -GARRY     Pain Location - buttock  -GARRY     Pain Intervention(s) Ambulation/increased activity;Repositioned;Nursing Notified  -[facility] Name 06/17/24 1124          Plan of Care Review    Plan of Care Reviewed With patient  -GARRY     Progress improving  -GARRY     Outcome Evaluation Pt. continues with significant generalized weakness, activity tolerance and balance deficit impacting PLOF.  Attempted backing into chair with UE support due to NWB RLE, but due to limited UE strength especially RUE pt. needed max A initially progressing to dependence. Pt. will benefit from SNF prior to home to address home AD needs and family training to progress to retun home.  -GARRY       Row Name 06/17/24 1124          Therapy Assessment/Plan (OT)    Patient/Family Therapy Goal Statement (OT) pt. want to continue to gain strength and get AD setup for return home  -GARRY     Rehab Potential (OT) fair, will monitor progress closely  -GARRY      Criteria for Skilled Therapeutic Interventions Met (OT) yes;meets criteria;skilled treatment is necessary  -GARRY     Therapy Frequency (OT) daily  -GARRY     Predicted Duration of Therapy Intervention (OT) 10 days  -GARRY       Row Name 06/17/24 1124          Therapy Plan Review/Discharge Plan (OT)    Anticipated Discharge Disposition (OT) skilled nursing facility  -       Row Name 06/17/24 1124          Vital Signs    Pre Systolic BP Rehab 126  -GARRY     Pre Treatment Diastolic BP 77  -GARRY     Post Systolic BP Rehab 132  -GARRY     Post Treatment Diastolic BP 91  -GARRY     O2 Delivery Pre Treatment room air  -GARRY     O2 Delivery Intra Treatment room air  -GARRY     O2 Delivery Post Treatment room air  -GARRY     Pre Patient Position Supine  -GARRY     Intra Patient Position Sitting  -GARRY     Post Patient Position Sitting  -GARRY       Row Name 06/17/24 1124          Positioning and Restraints    Pre-Treatment Position in bed  -GARRY     Post Treatment Position chair  -GARRY     In Chair notified nsg;reclined;call light within reach;encouraged to call for assist;exit alarm on;waffle cushion;legs elevated;heels elevated;on mechanical lift sling  -GARRY               User Key  (r) = Recorded By, (t) = Taken By, (c) = Cosigned By      Initials Name Provider Type    Na Pena, OT Occupational Therapist                   Outcome Measures       Row Name 06/17/24 1130          How much help from another is currently needed...    Putting on and taking off regular lower body clothing? 1  -GARRY     Bathing (including washing, rinsing, and drying) 2  -GARRY     Toileting (which includes using toilet bed pan or urinal) 1  -GARRY     Putting on and taking off regular upper body clothing 3  -GARRY     Taking care of personal grooming (such as brushing teeth) 3  -GARRY     Eating meals 3  -GARRY     AM-PAC 6 Clicks Score (OT) 13  -GARRY       Row Name 06/17/24 1119          How much help from another person do you currently need...    Turning from your back to your side  while in flat bed without using bedrails? 3  -ND     Moving from lying on back to sitting on the side of a flat bed without bedrails? 3  -ND     Moving to and from a bed to a chair (including a wheelchair)? 1  -ND     Standing up from a chair using your arms (e.g., wheelchair, bedside chair)? 1  -ND     Climbing 3-5 steps with a railing? 1  -ND     To walk in hospital room? 1  -ND     AM-PAC 6 Clicks Score (PT) 10  -ND     Highest Level of Mobility Goal 4 --> Transfer to chair/commode  -ND       Row Name 06/17/24 1130 06/17/24 1119       Functional Assessment    Outcome Measure Options AM-PAC 6 Clicks Daily Activity (OT)  -GARRY AM-PAC 6 Clicks Basic Mobility (PT)  -ND              User Key  (r) = Recorded By, (t) = Taken By, (c) = Cosigned By      Initials Name Provider Type    Na Pena, OT Occupational Therapist    ND Serena Bustamante, PT Physical Therapist                    Occupational Therapy Education       Title: PT OT SLP Therapies (Done)       Topic: Occupational Therapy (Done)       Point: ADL training (Done)       Description:   Instruct learner(s) on proper safety adaptation and remediation techniques during self care or transfers.   Instruct in proper use of assistive devices.                  Learning Progress Summary             Patient Acceptance, E,TB, VU,DU by  at 6/12/2024 1315    Acceptance, E, VU by GARRY at 6/3/2024 1544    Comment: reason for consult, evaluation results   Family Acceptance, E, VU by GARRY at 6/3/2024 1544    Comment: reason for consult, evaluation results                         Point: Home exercise program (Done)       Description:   Instruct learner(s) on appropriate technique for monitoring, assisting and/or progressing therapeutic exercises/activities.                  Learning Progress Summary             Patient Acceptance, E,D, VU,NR by GARRY at 6/17/2024 1131    Comment: bed mobilty, UE strengthening TE, transfer options, home AD likely needs and concerns for fit,  SNF need                         Point: Precautions (Done)       Description:   Instruct learner(s) on prescribed precautions during self-care and functional transfers.                  Learning Progress Summary             Patient Acceptance, E,D, VU,NR by GARRY at 6/17/2024 1131    Comment: bed mobilty, UE strengthening TE, transfer options, home AD likely needs and concerns for fit, SNF need    Acceptance, E,TB, VU,DU by KF at 6/12/2024 1315    Acceptance, E, NR by KL at 6/7/2024 1157                         Point: Body mechanics (Done)       Description:   Instruct learner(s) on proper positioning and spine alignment during self-care, functional mobility activities and/or exercises.                  Learning Progress Summary             Patient Acceptance, E,D, VU,NR by GARRY at 6/17/2024 1131    Comment: bed mobilty, UE strengthening TE, transfer options, home AD likely needs and concerns for fit, SNF need    Acceptance, E,TB, VU,DU by KF at 6/12/2024 1315    Acceptance, E, NR by KL at 6/7/2024 1157                                         User Key       Initials Effective Dates Name Provider Type Discipline    GARRY 07/11/23 -  Na Pitts, OT Occupational Therapist OT    KF 08/09/23 -  Zenaida Oden OT Occupational Therapist OT    ROCÍO 02/05/24 -  Mariah Foss OT Occupational Therapist OT                  OT Recommendation and Plan  Planned Therapy Interventions (OT): activity tolerance training, adaptive equipment training, BADL retraining, occupation/activity based interventions, patient/caregiver education/training, strengthening exercise, transfer/mobility retraining, ROM/therapeutic exercise, functional balance retraining  Therapy Frequency (OT): daily  Plan of Care Review  Plan of Care Reviewed With: patient  Progress: improving  Outcome Evaluation: Pt. continues with significant generalized weakness, activity tolerance and balance deficit impacting PLOF.  Attempted backing into chair with UE support due to  NWB RLE, but due to limited UE strength especially RUE pt. needed max A initially progressing to dependence. Pt. will benefit from SNF prior to home to address home AD needs and family training to progress to retun home.     Time Calculation:   Evaluation Complexity (OT)  Review Occupational Profile/Medical/Therapy History Complexity: expanded/moderate complexity  Assessment, Occupational Performance/Identification of Deficit Complexity: 3-5 performance deficits  Clinical Decision Making Complexity (OT): detailed assessment/moderate complexity  Overall Complexity of Evaluation (OT): moderate complexity     Time Calculation- OT       Row Name 06/17/24 1132             Time Calculation- OT    OT Start Time 1009  -GARRY      OT Received On 06/17/24  -GARRY      OT Goal Re-Cert Due Date 06/27/24  -GARRY         Timed Charges    79002 - OT Therapeutic Exercise Minutes 15  -GARRY      73588 - OT Therapeutic Activity Minutes 10  -GARRY         Total Minutes    Timed Charges Total Minutes 25  -GARRY       Total Minutes 25  -GARRY                User Key  (r) = Recorded By, (t) = Taken By, (c) = Cosigned By      Initials Name Provider Type    Na Pena OT Occupational Therapist                  Therapy Charges for Today       Code Description Service Date Service Provider Modifiers Qty    94373234982 HC OT THER PROC EA 15 MIN 6/17/2024 Na Pitts OT GO 1    86839308180 HC OT THERAPEUTIC ACT EA 15 MIN 6/17/2024 Na Pitts OT GO 1                 Na Pitts OT  6/17/2024

## 2024-06-18 ENCOUNTER — APPOINTMENT (OUTPATIENT)
Dept: NEPHROLOGY | Facility: HOSPITAL | Age: 69
End: 2024-06-18
Payer: MEDICARE

## 2024-06-18 PROCEDURE — 25810000003 SODIUM CHLORIDE 0.9 % SOLUTION 250 ML FLEX CONT: Performed by: INTERNAL MEDICINE

## 2024-06-18 PROCEDURE — 99232 SBSQ HOSP IP/OBS MODERATE 35: CPT | Performed by: FAMILY MEDICINE

## 2024-06-18 PROCEDURE — 25010000002 ERAVACYCLINE DIHYDROCHLORIDE 50 MG RECONSTITUTED SOLUTION 1 EACH VIAL: Performed by: INTERNAL MEDICINE

## 2024-06-18 PROCEDURE — 25010000002 HEPARIN (PORCINE) PER 1000 UNITS: Performed by: INTERNAL MEDICINE

## 2024-06-18 RX ADMIN — FAMOTIDINE 10 MG: 20 TABLET, FILM COATED ORAL at 11:59

## 2024-06-18 RX ADMIN — FOLIC ACID 1 MG: 1 TABLET ORAL at 12:00

## 2024-06-18 RX ADMIN — Medication 1 CAPSULE: at 12:00

## 2024-06-18 RX ADMIN — Medication 5 MG: at 22:32

## 2024-06-18 RX ADMIN — ERAVACYCLINE 85 MG: 50 INJECTION, POWDER, LYOPHILIZED, FOR SOLUTION INTRAVENOUS at 12:16

## 2024-06-18 RX ADMIN — ERAVACYCLINE 85 MG: 50 INJECTION, POWDER, LYOPHILIZED, FOR SOLUTION INTRAVENOUS at 22:32

## 2024-06-18 RX ADMIN — APIXABAN 5 MG: 5 TABLET, FILM COATED ORAL at 12:00

## 2024-06-18 RX ADMIN — VANCOMYCIN HYDROCHLORIDE 125 MG: 125 CAPSULE ORAL at 11:59

## 2024-06-18 RX ADMIN — APIXABAN 5 MG: 5 TABLET, FILM COATED ORAL at 22:32

## 2024-06-18 RX ADMIN — LEVOTHYROXINE SODIUM 75 MCG: 0.07 TABLET ORAL at 05:10

## 2024-06-18 RX ADMIN — Medication 1 CAPSULE: at 22:32

## 2024-06-18 RX ADMIN — Medication 10 ML: at 12:03

## 2024-06-18 RX ADMIN — DILTIAZEM HYDROCHLORIDE 240 MG: 240 CAPSULE, COATED, EXTENDED RELEASE ORAL at 12:00

## 2024-06-18 RX ADMIN — LIDOCAINE HYDROCHLORIDE: 20 JELLY TOPICAL at 22:33

## 2024-06-18 RX ADMIN — ASPIRIN 81 MG: 81 TABLET, COATED ORAL at 11:59

## 2024-06-18 RX ADMIN — HEPARIN SODIUM 2000 UNITS: 1000 INJECTION INTRAVENOUS; SUBCUTANEOUS at 10:42

## 2024-06-18 RX ADMIN — Medication 10 ML: at 22:33

## 2024-06-18 RX ADMIN — METOPROLOL SUCCINATE 100 MG: 100 TABLET, EXTENDED RELEASE ORAL at 16:28

## 2024-06-18 RX ADMIN — ACETAMINOPHEN 650 MG: 325 TABLET ORAL at 16:28

## 2024-06-18 RX ADMIN — VANCOMYCIN HYDROCHLORIDE 125 MG: 125 CAPSULE ORAL at 22:32

## 2024-06-18 RX ADMIN — LIDOCAINE HYDROCHLORIDE: 20 JELLY TOPICAL at 12:02

## 2024-06-18 RX ADMIN — ACETAMINOPHEN 650 MG: 325 TABLET ORAL at 05:12

## 2024-06-18 RX ADMIN — ACETAMINOPHEN 650 MG: 325 TABLET ORAL at 12:00

## 2024-06-18 RX ADMIN — POLYVINYL ALCOHOL, POVIDONE 1 DROP: 14; 6 SOLUTION/ DROPS OPHTHALMIC at 12:03

## 2024-06-18 NOTE — PLAN OF CARE
Goal Outcome Evaluation:   HD completed. Tolerated well. Access functioned well. UF goal reached of 3000 mL. Blood returned. Report given to richar Summers RN. Pharmacy called to get BP  meds, still waiting medication. ZINA GLASS, communicated with RN that BP meds not given d/t medication not available.     Problem: Device-Related Complication Risk (Hemodialysis)  Goal: Safe, Effective Therapy Delivery  Outcome: Ongoing, Progressing  Intervention: Optimize Device Care and Function  Recent Flowsheet Documentation  Taken 6/18/2024 1115 by Fartun John, RN  Medication Review/Management: medications reviewed  Taken 6/18/2024 0730 by Fartun John, RN  Medication Review/Management: medications reviewed     Problem: Hemodynamic Instability (Hemodialysis)  Goal: Effective Tissue Perfusion  Outcome: Ongoing, Progressing     Problem: Infection (Hemodialysis)  Goal: Absence of Infection Signs and Symptoms  Outcome: Ongoing, Progressing

## 2024-06-18 NOTE — CASE MANAGEMENT/SOCIAL WORK
Discharge Planning Assessment  [facility]     Patient Name: Tavo Gudino  MRN: [id number]  Today's Date: 6/18/2024    Admit Date: 5/31/2024    Plan: Insurance denial   Discharge Needs Assessment    No documentation.                  Discharge Plan       Row Name 06/18/24 5457       Plan    Plan Insurance denial    Patient/Family in Agreement with Plan yes    Plan Comments Notified that patient's insurance denied skilled rehab at [facility] in [place]. I contacted Dr.Karla Frazier/PA  and gave her the information to call about this denial at [phone] option 5 by 0129 today.    Final Discharge Disposition Code 03 - skilled nursing facility (SNF)      Row Name 06/18/24 1112       Plan    Plan [facility]    Patient/Family in Agreement with Plan yes    Plan Comments I spoke with the patient's wife, Nanda, by phone. I updated her regarding the patient's insurance today. Per Marilynn JACOBS, the insurance pre cert is still pending. She will update CM as soon a decision is available. CM attempted to update the patient as well but he was out of the room in dialysis today. CM following.    Final Discharge Disposition Code 03 - skilled nursing facility (SNF)                  Continued Care and Services - Admitted Since 5/31/2024       Destination       Service Provider Request Status Selected Services Address Phone Fax Patient Preferred    [facility] Pending - No Request Sent N/A [address] [facility] [facility] [id number] [phone] --    [facility] Declined  Cannot meet patient's needs N/A [address] [facility] [address] [phone] --    [facility] Declined N/A [address] [facility] [id number] [phone] --              Dialysis/Infusion       Service Provider Request Status Selected Services Address Phone Fax Patient Preferred    [facility] Pending - No  Request Sent N/A [address] [facility] [place] [address] [phone] --                  Expected Discharge Date and Time       Expected Discharge Date Expected Discharge Time    Jun 20, 2024            Demographic Summary    No documentation.                  Functional Status    No documentation.                  Psychosocial    No documentation.                  Abuse/Neglect    No documentation.                  Legal    No documentation.                  Substance Abuse    No documentation.                  Patient Forms    No documentation.                     Clarissa Jasso, RN

## 2024-06-18 NOTE — DISCHARGE PLACEMENT REQUEST
"Cali Sue (69 y.o. Male)       Date of Birth   1955    Social Security Number       Address   [address] [address]    Home Phone   [phone]    MRN   [id number]       Evangelical   None    Marital Status                               Admission Date   24    Admission Type   Urgent    Admitting Provider   ANNA Malloy DO    Attending Provider   NANA Malloy DO    Department, Room/Bed   [facility] 6A, N616/1       Discharge Date       Discharge Disposition       Discharge Destination                                 Attending Provider: ANNA Malloy DO    Allergies: Penicillins, Levothyroxine, Hydromorphone    Isolation: Spore   Infection: C.difficile (24), MRSA (24)   Code Status: CPR    Ht: 185.4 cm (72.99\")   Wt: 84.8 kg (186 lb 15.2 oz)    Admission Cmt: None   Principal Problem: NSTEMI (non-ST elevated myocardial infarction) [I21.4]                   Active Insurance as of 2024       Primary Coverage       Payor Plan Insurance Group Employer/Plan Group    HUMANA MEDICARE REPLACEMENT HUMANA MED ADV HMO [id number]       Payor Plan Address Payor Plan Phone Number Payor Plan Fax Number Effective Dates    [address] [address] [phone]  2023 - None Entered    [facility] [id number]         Subscriber Name Subscriber Birth Date Member ID       CALI SUE 1955 [id number]                     Emergency Contacts        (Rel.) Home Phone Work Phone Mobile Phone    JANICE SUE (Spouse) [phone] -- [phone]                 History & Physical        Genny Saeed MD at 24 1429              [facility] Medicine Services  HISTORY AND PHYSICAL    Patient Name: Cali Sue  : 1955  MRN: [id number]  Primary Care Physician: Moses Ordaz MD  Date of admission: 2024      Subjective  Subjective     Chief Complaint:  Transfer from OSH due to chest pain, Afib with RVR, " possible NSTEMI    HPI:  Tavo Gudino is a 69 y.o. male with hx of HTN, PAD, left BKA, ESRD on hemodialysis, Afib on Eliquis, recent acute cholecystitis s/p cholecystostomy tube placement at , chronic urinary retention with indwelling Delaney catheter, and previous hx of C.diff who presents from [facility] for Cardiology evaluation due to chest pain, elevated troponin concerning for NSTEMI, and Afib with RVR. He typically gets all of his care at  hospital. Admitted to [facility] on 5/28/24 with complaints of chest pain and palpitations. Found to have elevated troponins (18 -->97-->472) and was in Afib with RVR. CXR negative. Rate was controlled with IV Metoprolol. He was continued on Eliquis. Due to concern for NSTEMI, case discussed with Cardiology/Dr. Amador who accepted patient in transfer for [facility]. However, while awaiting transfer, he developed diarrhea and worsening leukocytosis up to 24K (WBC was normal on day of admission). C.diff toxin was negative but antigen was positive. He was started on PO Vanc.    Very limited records sent from OSH. I talked to one of the providers there today by phone who said patient did not complain of any abdominal pain, but had loose stools/diarrhea after receiving Kayexalate for hyperkalemia as well as Colace. His blood cultures from admission were negative. He last had dialysis on 5/30/24 - provider at OSH reports he gets dialysis daily at his facility? Delaney was not exchanged on admission either.       Personal History     No past medical history on file.        No past surgical history on file.    Family History: family history is not on file.     Social History:    Social History     Social History Narrative    Not on file       Medications:  Available home medication information reviewed.       Not on File    Objective  Objective     Vital Signs:           Physical Exam   Constitutional: Awake, alert  Eyes: PERRLA, sclerae anicteric, no conjunctival  injection  HENT: NCAT, mucous membranes moist  Neck: Supple, no thyromegaly, no lymphadenopathy, trachea midline  Respiratory: Clear to auscultation bilaterally, nonlabored respirations   Cardiovascular: irregularly irregular, no murmurs, rubs, or gallops, palpable pedal pulses bilaterally  Gastrointestinal: Positive bowel sounds, soft, nontender, nondistended, cholecystostomy drain in place  Musculoskeletal: No bilateral ankle edema, no clubbing or cyanosis to extremities; left BKA stump in dressing  Psychiatric: Appropriate affect, cooperative  Neurologic: Oriented x 3, strength symmetric in all extremities, Cranial Nerves grossly intact to confrontation, speech clear  Skin: No rashes      Result Review:  I have personally reviewed the results from the time of this admission to 5/31/2024 14:29 EDT and agree with these findings:  [x]  Laboratory list / accordion  []  Microbiology  []  Radiology  []  EKG/Telemetry   []  Cardiology/Vascular   []  Pathology  [x]  Old records  []  Other:      LAB RESULTS:                              UA          1/10/2024    12:59 1/31/2024    22:59 4/5/2024    17:48   Urinalysis   Squamous Epithelial Cells, UA 0-2     0-2     Unable to estimate due to obscuring WBC's (UNEWBC)       Specific Torrey, UA 1.013     1.011     1.015       Blood, UA Negative     Moderate     Large       Leukocytes, UA Negative     Small     Large       RBC, UA 3     4-10     Unable to estimate due to obscuring WBC's (UNEWBC)       Bacteria, UA Negative     Present     Unable to estimate due to obscuring WBC's (UNEWBC)          Details          This result is from an external source.               Microbiology Results (last 10 days)       ** No results found for the last 240 hours. **            No radiology results from the last 24 hrs        Assessment & Plan  Assessment & Plan       NSTEMI (non-ST elevated myocardial infarction)    C. difficile colitis    History of cholecystitis s/p cholecystostomy  tube    ESRD (end stage renal disease) on hemodialysis    Atrial fibrillation    Essential hypertension        70 yo M with hx of HTN, PAD, left BKA, ESRD on hemodialysis, Afib on Eliquis, recent acute cholecystitis s/p cholecystostomy tube placement at , chronic urinary retention with indwelling Delaney catheter, and previous hx of C.diff who presents from [facility] due to chest pain, elevated troponin, and Afib with RVR.     Chest pain  Elevated troponin, possible NSTEMI  --Transferred to [facility] for LHC with Dr. Amador  --Troponins at OSH 18 -->97-->472  --Troponin 454 here  --Monitor on telemetry  --Dr. Amador/Cardiology consulted, tentative plan for [facility] next week  --ASA, heparin drip, beta blocker    Afib with RVR  Hx of Afib/Aflutter  --Rate controlled with Metoprolol apparently per OSH notes  --Hold Eliquis for heart cath, on heparin drip as above  --Continue Metoprolol    HFrEF  Moderate MR  --Most recent Echo on file I April 2024: EF 40%, akinetic septal, anterior, and apical walls, severely dilated LA, moderate MR  --Follows with  Cardiology, had been evaluated previously for Vanessa clip  --Repeat Echo ordered here as no Echo results sent from OSH    Leukocytosis  C.diff colitis?  --Apparently WBC increased to 24K with complaints of diarrhea, had negative C.diff toxin but positive C.diff antigen? Was started on PO Vanc at OSH; of note, I talked to the provider there who stated he had loose stools following administration of Kayexalate and Colace  --Pe chart review, he had C.diff documented on 4/6/24 from   --WBC 22K here  --Will repeat C.diff testing here and continue on PO Vanc for now  --Check blood cultures, CT A/P, and UA for further workup     Recent cholecystitis s/p cholecystostomy tube  --Request records from that hospitalization, apparently was placed at  but I cannot find records of this  --Consult General Surgery for management of tube, discussed with Dr. Beckman  --Check CT A/P to  ensure tube is in place  --Apparently already has follow up at  6/6/24 General Surgery    Chronic urinary retention  --Has Delaney in place, reported hx of urethral injury per OSH note (data deficit, no idea when this was Delaney placed), appears he follows with Urology at  and this is a chronic Delaney  --Replace Delaney here, send UA with culture due to leukocytosis    PAD  Hx of left BKA  --Continue ASA    Anemia  --Likely chronic due to renal disease  --Hb 7.5 here, had been 9 at OSH  --Check iron studies, B12, folate  --Monitor closely while on heparin drip    HTN  --Confirm and continue home meds    ESRD  --Nephrology consulted for dialysis  --Last HD 5/30/24     Hypothyroidism  --TSH significantly elevated at 91 at OSH, apparently free T4 was low but do not have that test result available to review  --Synthroid dose was increased to 75 mcg daily at OSH  --Will repeat TSH, free T4 here  --Continue Synthroid at above dose    Multiple wounds  --Wound care consult    Poor IV access  --Dr. Beckman plans to place central line this evening, appreciate his assistance      DVT prophylaxis:  Mechanical DVT prophylaxis orders are present.      Total time spent: >90 minutes  Time spent includes time reviewing chart, face-to-face time, counseling patient/family/caregiver, ordering medications/tests/procedures, communicating with other health care professionals, documenting clinical information in the electronic health record, and coordination of care.     CODE STATUS:    Code Status and Medical Interventions:   Ordered at: 05/31/24 1429     Code Status (Patient has no pulse and is not breathing):    CPR (Attempt to Resuscitate)     Medical Interventions (Patient has pulse or is breathing):    Full Support       Expected Discharge   Expected discharge date/ time has not been documented.     Genny Saeed MD  05/31/24      Electronically signed by Genny Saeed MD at 05/31/24 7398       Physician Progress Notes (last 24  hours)  Notes from 06/17/24 1045 through 06/18/24 1045   No notes of this type exist for this encounter.       Consult Notes (last 24 hours)  Notes from 06/17/24 1045 through 06/18/24 1045   No notes of this type exist for this encounter.

## 2024-06-18 NOTE — PROGRESS NOTES
[facility] Medicine Services  PROGRESS NOTE    Patient Name: Tavo Gudino  : 1955  MRN: [id number]    Date of Admission: 2024  Primary Care Physician: Hayden Weinstein MD    Subjective   Subjective     CC:  Stump infection     HPI:  Patient is a 70 yo M seen and examined by me this afternoon following return from HD, he is resting in bed, he denies any new acute complaints or problems at this time. He is pending hopeful SNF placement at this time for rehab, continue to follow with CM.       Objective   Objective     Vital Signs:   Temp:  [97.5 °F (36.4 °C)-97.7 °F (36.5 °C)] 97.6 °F (36.4 °C)  Heart Rate:  [71-96] 71  Resp:  [16-18] 17  BP: (110-213)/() 131/77     Physical Exam:  Constitutional: No acute distress, awake, alert, chronically ill appearing   HENT: NCAT, mucous membranes moist, LIJ intact, right sided HD cath    Respiratory: Clear to auscultation bilaterally, no rhonchi or wheezing, respiratory effort normal room air   Cardiovascular: RRR, no murmurs, rubs, or gallops  Gastrointestinal: Positive bowel sounds, soft, nontender, nondistended,, cholecystostomy tube in place with dark green drainage   Musculoskeletal: L AKA with dressing , right foot dressing, rogerio LE venous changes   Psychiatric: Appropriate affect, cooperative  Neurologic: Oriented x 3, strength symmetric in all extremities, Cranial Nerves grossly intact to confrontation, speech clear  Skin: No rashes, right chest tunneled cath dressing cdi, pale    cole to BSD       Results Reviewed:  LAB RESULTS:      Lab 06/16/24  0621 06/15/24  06   WBC 7.16 9.15   HEMOGLOBIN 9.8* 10.1*   HEMATOCRIT 30.9* 32.2*   PLATELETS 167 178   NEUTROS ABS  --  5.55   IMMATURE GRANS (ABS)  --  0.04   LYMPHS ABS  --  2.62   MONOS ABS  --  0.75   EOS ABS  --  0.13   MCV 94.8 96.4   CRP 0.39  --          Lab 24  0621 06/15/24  0630 24  0351   SODIUM 136 133* 134*   POTASSIUM 4.4 4.6 5.0   CHLORIDE 103 101 104    CO2 23.0 19.0* 20.0*   ANION GAP 10.0 13.0 10.0   BUN 36* 55* 39*   CREATININE 2.62* 3.31* 3.60*   EGFR 25.3* 19.1* 17.3*   GLUCOSE 70 82 71   CALCIUM 8.5* 8.3* 9.3   PHOSPHORUS 5.8*  --  5.8*         Lab 06/16/24  0621 06/12/24  0351   ALBUMIN 2.6* 2.6*                     Brief Urine Lab Results  (Last result in the past 365 days)        Color   Clarity   Blood   Leuk Est   Nitrite   Protein   CREAT   Urine HCG        06/02/24 1721 Yellow   Clear   Trace   Trace   Negative   100 mg/dL (2+)                   Microbiology Results Abnormal       Procedure Component Value - Date/Time    Blood Culture - Blood, Blood, Central Line [[id number]]  (Normal) Collected: 05/31/24 2237    Lab Status: Final result Specimen: Blood, Central Line Updated: 06/06/24 0801     Blood Culture No growth at 5 days    Blood Culture - Blood, Arm, Right [[id number]]  (Normal) Collected: 05/31/24 2237    Lab Status: Final result Specimen: Blood from Arm, Right Updated: 06/06/24 0745     Blood Culture No growth at 5 days    Urine Culture - Urine, Indwelling Urethral Catheter [[id number]]  (Normal) Collected: 06/02/24 1721    Lab Status: Final result Specimen: Urine from Indwelling Urethral Catheter Updated: 06/04/24 0955     Urine Culture No growth            No radiology results from the last 24 hrs    Results for orders placed during the hospital encounter of 05/31/24    Adult Transthoracic Echo Complete W/ Cont if Necessary Per Protocol    Interpretation Summary    Left ventricular systolic function is normal. Left ventricular ejection fraction appears to be 51 - 55%.    Left ventricular wall thickness is consistent with borderline concentric hypertrophy.    Left atrial volume is moderately increased.    There is mild calcification of the aortic valve.    Mild mitral valve regurgitation is present.    There is a small (<1cm) circumferential pericardial effusion.      Current medications:  Scheduled Meds:apixaban, 5 mg, Oral, BID  aspirin, 81  mg, Oral, Daily  collagenase, 1 Application, Topical, Q24H  dilTIAZem CD, 240 mg, Oral, Q24H  eravacycline dihydrochloride (XERAVA) 85 mg in sodium chloride 0.9 % 250 mL (0.34 mg/mL) IVPB, 85 mg, Intravenous, Q12H  famotidine, 10 mg, Oral, Daily  folic acid, 1 mg, Oral, Daily  lactobacillus acidophilus, 1 capsule, Oral, BID  levothyroxine, 75 mcg, Oral, Q AM  Lidocaine HCl gel, , Topical, BID  metoprolol succinate XL, 100 mg, Oral, Q24H  pharmacy consult - MTM, , Does not apply, Daily  sodium chloride, 10 mL, Intravenous, Q12H  sodium zirconium cyclosilicate, 10 g, Oral, Daily  vancomycin, 125 mg, Oral, BID  [START ON 6/22/2024] vancomycin, 125 mg, Oral, Daily      Continuous Infusions:   PRN Meds:.  acetaminophen    heparin (porcine)    Lidocaine HCl gel    melatonin    metoprolol tartrate    ondansetron    Polyvinyl Alcohol-Povidone PF    sodium chloride    sodium chloride    Assessment & Plan   Assessment & Plan     Active Hospital Problems    Diagnosis  POA    **NSTEMI (non-ST elevated myocardial infarction) [I21.4]  Yes    Soft tissue infection [L08.9]  Unknown    Acute osteomyelitis of left tibia [M86.162]  Unknown    Moderate malnutrition [E44.0]  Yes    C. difficile colitis [A04.72]  Yes    History of cholecystitis s/p cholecystostomy tube [K81.9]  Yes    ESRD (end stage renal disease) on hemodialysis [N18.6]  Yes    Atrial fibrillation [I48.91]  Yes    Essential hypertension [I10]  Yes      Resolved Hospital Problems   No resolved problems to display.        Brief Hospital Course to date:  Tavo Gudino is a 69 y.o. male  with hx of HTN, PAD, left BKA, ESRD on hemodialysis, Afib on Eliquis, recent acute cholecystitis s/p cholecystostomy tube placement at , chronic urinary retention with indwelling Delaney catheter, and previous hx of C.diff who presented from [facility] due to chest pain, elevated troponin, and Afib with RVR.  Found to have multiple other concerning issues including active C.  difficile infection, concern for possible osteomyelitis, exposed tendon on right lower extremity. Patient is being evaluated for possible SNF placement at [facility] in [place] at this time, continue to follow as precert pending at this time.      This patient's problems and plans were partially entered by my partner and updated as appropriate by me 06/18/24.     Assessment/plan:     Chest pain, resolved  Elevated troponin, possible NSTEMI  -Transferred to [facility] for C with Dr. Amador  -Dr. Amador/Cardiology consulted, stress test with small area of ischemia.    --prior discussion with patient of decision to medically manage at this time.  --no current cp complaints.     Afib with RVR  Hx of Afib/Aflutter  -Toprol dosing adjusted per cardiology given episodes of intermittent RVR  -Continue Eliquis     HFrEF  Moderate MR  --Most recent Echo on file I April 2024: EF 40%, akinetic septal, anterior, and apical walls, severely dilated LA, moderate MR  --Follows with  Cardiology, had been evaluated previously for Vanessa clip  --ECHO 6/1: LVEF 51 to 55%, borderline concentric LVH, left atrial volume moderately increased, mild MR, small less than 1 cm circumferential pericardial effusion. F/u as prior scheduled      Leukocytosis, resolved  --wbc 7.16, afebrile      Concern for osteomyelitis   Exposed tendon on RLE  --MRI left tib/fib showed cellulitis, possible osteomyelitis  --MRI right foot with cellulitis/edema  -- Arterial duplex abnormal; Vascular surgery consulted and recommended medical management with no vascular surgery intervention  --Eravacycline per ID  --MRSA screen +  --Follow-up blood and urine cultures  -Dr. Yeager following. Patient likely needs additional amputation of left lower extremity due to concern for poor wound healing, likely needs debridement of right lower extremity around the tendon with wound VAC placement.  Patient refusing further amputation of left lower extremity or any other  procedures.   --ID following. Patient is not likely a candidate for outpatient IV antibiotics he will not be able to follow-up adequately and doesn't not have support at home.   Prior auth for Nuzyra approved for two weeks of therapy, then transition over to oral doxycycline 100 mg bid at least through 7/12   -IF discharges to SNF, left IJ will need removed. If going on oral abx   -Patient wishes to follow-up with wound care at .  -- follow up with ID two weeks following discharge      C.diff colitis  -s/p fosphofmycin  -Continue p.o. vancomycin 125 mg 4 times a day for 14 days from admission through June 14 and then taper to 125 mg twice daily for 1 week and then 125 mg once daily for 1 week  -Follow-up with ID  -- probiotic bid      Recent cholecystitis s/p cholecystostomy tube  --Request records from that hospitalization, apparently was placed at   --Consulted General Surgery for management of tube  --Apparently already had follow up at  6/6/24 General Surgery--will need to reschedule this at [place]     Chronic urinary retention  --Has Delaney in place, reported hx of urethral injury per OSH note (data deficit, no idea when this was Delaney placed), appears he follows with Urology at  and this is a chronic Delaney  --Replaced Delaney here     PAD  Hx of left BKA  --Continue ASA  --Lt BKA drsg in place.       Anemia  --Likely chronic due to renal disease  --Folate 4.36 --> supplementation started; vitamin B 12 level 755  --Hb 7.5 here, had been 9 at OSH. up to 10.1     HTN  ESRD  --Nephrology consulted for dialysis   --Case management to work with patient and family in order to work on disposition location and finding an outpatient dialysis chair.     Hyperkalemia  -Potassium up to 6.0 s/p dose of lokelma.   --s/p HD TTHS     Hypothyroidism  --TSH significantly elevated at 91 at OSH, still elevated here to 65, but improved  --Synthroid dose was increased to 75 mcg daily at OSH  --Continue Synthroid at above dose and  recheck in 4-6 weeks. Defer to PCP      Multiple wounds  Sacral Decubitus ulcer, POA, Stage 3  --Wound care consulted  --Ortho as above     Poor IV access  --Dr. Beckman placed Lt IJ central line, will need to be removed prior to transfer and currently plans are for removal once accepted at facility   -- Nuzyra  was preauthfor two weeks. Med would need to be filled her prior to dc. Treat with Nuzyra for two weeks then transition over to oral  doxycycline 100 mg bid for two more weeks at least through July 12th        Expected Discharge Location and Transportation: rehab   Expected Discharge   Expected Discharge Date: 6/20/2024; Expected Discharge Time:      VTE Prophylaxis:  Pharmacologic & mechanical VTE prophylaxis orders are present.         AM-PAC 6 Clicks Score (PT): 10 (06/17/24 3063)    CODE STATUS:   Code Status and Medical Interventions:   Ordered at: 05/31/24 1427     Code Status (Patient has no pulse and is not breathing):    CPR (Attempt to Resuscitate)     Medical Interventions (Patient has pulse or is breathing):    Full Support       SILAS Malloy,   06/18/24

## 2024-06-18 NOTE — CASE MANAGEMENT/SOCIAL WORK
Continued Stay Note  [facility]     Patient Name: Tavo Gudino  MRN: [id number]  Today's Date: 6/18/2024    Admit Date: 5/31/2024    Plan: [facility]   Discharge Plan       Row Name 06/18/24 1112       Plan    Plan [facility]    Patient/Family in Agreement with Plan yes    Plan Comments I spoke with the patient's wife, Nanda, by phone. I updated her regarding the patient's insurance today. Per Marilynn JACOBS, the insurance pre cert is still pending. She will update CM as soon a decision is available. CM attempted to update the patient as well but he was out of the room in dialysis today. CM following.    Final Discharge Disposition Code 03 - skilled nursing facility (SNF)                   Discharge Codes    No documentation.                 Expected Discharge Date and Time       Expected Discharge Date Expected Discharge Time    Jun 18, 2024               Serina Sinclair RN

## 2024-06-18 NOTE — PROGRESS NOTES
" LOS: 18 days   Patient Care Team:  Hayden Weinstein MD as PCP - General (Nephrology)    Chief Complaint: ESRD  NSTEMI    Subjective     Seen on HD tolerating well so far. Goal UF 2.5 liter as tolerated. Bp stable. Good access pressure.           History taken from: patient    Objective     Vital Sign Min/Max for last 24 hours  Temp  Min: 97.5 °F (36.4 °C)  Max: 97.7 °F (36.5 °C)   BP  Min: 110/81  Max: 213/119   Pulse  Min: 71  Max: 96   Resp  Min: 16  Max: 18   SpO2  Min: 83 %  Max: 99 %   No data recorded   No data recorded     Flowsheet Rows      Flowsheet Row First Filed Value   Admission Height 185.4 cm (73\") Documented at 05/31/2024 1826   Admission Weight 84.8 kg (187 lb) Documented at 05/31/2024 1826            I/O this shift:  In: 310 [I.V.:310]  Out: 4000 [Urine:400; Drains:250]  I/O last 3 completed shifts:  In: 360 [P.O.:360]  Out: 1650 [Urine:1375; Drains:275]    Objective   Gen: Alert, NAD   HENT: NC, AT, EOMI   NECK: Supple, no JVD, Trachea midline   LUNGS: CTA bilaterally, non labored respirtation   CVS: S1/S2 audible, RRR, no murmur   Abd: Soft, NT, ND, BS+   : + Delaney   Ext: Left BKA  CNS: Alert, No focal deficit noted grossly  Psy: Cooperative  Skin: Warm, dry and intact      Results Review:     I reviewed the patient's new clinical results.    WBC WBC   Date Value Ref Range Status   06/16/2024 7.16 3.40 - 10.80 10*3/mm3 Final        HGB Hemoglobin   Date Value Ref Range Status   06/16/2024 9.8 (L) 13.0 - 17.7 g/dL Final        HCT Hematocrit   Date Value Ref Range Status   06/16/2024 30.9 (L) 37.5 - 51.0 % Final        Platlets No results found for: \"LABPLAT\"   MCV MCV   Date Value Ref Range Status   06/16/2024 94.8 79.0 - 97.0 fL Final            Sodium Sodium   Date Value Ref Range Status   06/16/2024 136 136 - 145 mmol/L Final      Potassium Potassium   Date Value Ref Range Status   06/16/2024 4.4 3.5 - 5.2 mmol/L Final      Chloride Chloride   Date Value Ref Range Status   06/16/2024 103 98 " "- 107 mmol/L Final      CO2 CO2   Date Value Ref Range Status   06/16/2024 23.0 22.0 - 29.0 mmol/L Final      BUN BUN   Date Value Ref Range Status   06/16/2024 36 (H) 8 - 23 mg/dL Final      Creatinine Creatinine   Date Value Ref Range Status   06/16/2024 2.62 (H) 0.76 - 1.27 mg/dL Final      Calcium Calcium   Date Value Ref Range Status   06/16/2024 8.5 (L) 8.6 - 10.5 mg/dL Final      PO4 No results found for: \"CAPO4\"   Albumin Albumin   Date Value Ref Range Status   06/16/2024 2.6 (L) 3.5 - 5.2 g/dL Final            Magnesium No results found for: \"MG\"       Uric Acid No results found for: \"URICACID\"     Medication Review: Yes    Assessment & Plan       NSTEMI (non-ST elevated myocardial infarction)    C. difficile colitis    History of cholecystitis s/p cholecystostomy tube    ESRD (end stage renal disease) on hemodialysis    Atrial fibrillation    Essential hypertension    Moderate malnutrition    Soft tissue infection    Acute osteomyelitis of left tibia      Assessment & Plan    ESRD: On TTS jonathan. HD through right IJ TDC. Still makes urine. Does have cole cath+ for chronic urinary retention. Patient the family Primary nephrologist (Dr Wan) was monitoring for renal recovery and residual renal function.      Volume status: No significant LE edema noted.      Afib w RVR: RVR during HD treatment. BP controlled. . No chest pain or sob.       Met acidosis: Mild. Management with HD.     Anemia: ACD. Transfuse at hb<7.  ALLISON on HD days     CAD: Transferred from OSH for evaluation of ACD. Cardiology following. EF 55%    Plan:  - Hd per TTS jonathan.  - Renal diet (Doesn't want to follow)  - ALLISON with HD       Hayden Weinstein MD  06/18/24  16:28 EDT            "

## 2024-06-19 LAB
ALBUMIN SERPL-MCNC: 2.7 G/DL (ref 3.5–5.2)
ALBUMIN/GLOB SERPL: 1 G/DL
ALP SERPL-CCNC: 492 U/L (ref 39–117)
ALT SERPL W P-5'-P-CCNC: 47 U/L (ref 1–41)
ANION GAP SERPL CALCULATED.3IONS-SCNC: 13 MMOL/L (ref 5–15)
AST SERPL-CCNC: 43 U/L (ref 1–40)
BASOPHILS # BLD AUTO: 0.08 10*3/MM3 (ref 0–0.2)
BASOPHILS NFR BLD AUTO: 0.8 % (ref 0–1.5)
BILIRUB SERPL-MCNC: 0.3 MG/DL (ref 0–1.2)
BUN SERPL-MCNC: 40 MG/DL (ref 8–23)
BUN/CREAT SERPL: 15.2 (ref 7–25)
CALCIUM SPEC-SCNC: 8.6 MG/DL (ref 8.6–10.5)
CHLORIDE SERPL-SCNC: 102 MMOL/L (ref 98–107)
CO2 SERPL-SCNC: 19 MMOL/L (ref 22–29)
CREAT SERPL-MCNC: 2.63 MG/DL (ref 0.76–1.27)
DEPRECATED RDW RBC AUTO: 56.1 FL (ref 37–54)
EGFRCR SERPLBLD CKD-EPI 2021: 25.2 ML/MIN/1.73
EOSINOPHIL # BLD AUTO: 0.14 10*3/MM3 (ref 0–0.4)
EOSINOPHIL NFR BLD AUTO: 1.4 % (ref 0.3–6.2)
ERYTHROCYTE [DISTWIDTH] IN BLOOD BY AUTOMATED COUNT: 16 % (ref 12.3–15.4)
GLOBULIN UR ELPH-MCNC: 2.7 GM/DL
GLUCOSE SERPL-MCNC: 78 MG/DL (ref 65–99)
HCT VFR BLD AUTO: 32.4 % (ref 37.5–51)
HGB BLD-MCNC: 10.4 G/DL (ref 13–17.7)
IMM GRANULOCYTES # BLD AUTO: 0.04 10*3/MM3 (ref 0–0.05)
IMM GRANULOCYTES NFR BLD AUTO: 0.4 % (ref 0–0.5)
LYMPHOCYTES # BLD AUTO: 2.7 10*3/MM3 (ref 0.7–3.1)
LYMPHOCYTES NFR BLD AUTO: 27.9 % (ref 19.6–45.3)
MAGNESIUM SERPL-MCNC: 1.9 MG/DL (ref 1.6–2.4)
MCH RBC QN AUTO: 30.5 PG (ref 26.6–33)
MCHC RBC AUTO-ENTMCNC: 32.1 G/DL (ref 31.5–35.7)
MCV RBC AUTO: 95 FL (ref 79–97)
MONOCYTES # BLD AUTO: 0.97 10*3/MM3 (ref 0.1–0.9)
MONOCYTES NFR BLD AUTO: 10 % (ref 5–12)
NEUTROPHILS NFR BLD AUTO: 5.75 10*3/MM3 (ref 1.7–7)
NEUTROPHILS NFR BLD AUTO: 59.5 % (ref 42.7–76)
NRBC BLD AUTO-RTO: 0 /100 WBC (ref 0–0.2)
PLATELET # BLD AUTO: 200 10*3/MM3 (ref 140–450)
PMV BLD AUTO: 10.3 FL (ref 6–12)
POTASSIUM SERPL-SCNC: 4.7 MMOL/L (ref 3.5–5.2)
PROT SERPL-MCNC: 5.4 G/DL (ref 6–8.5)
RBC # BLD AUTO: 3.41 10*6/MM3 (ref 4.14–5.8)
SODIUM SERPL-SCNC: 134 MMOL/L (ref 136–145)
WBC NRBC COR # BLD AUTO: 9.68 10*3/MM3 (ref 3.4–10.8)

## 2024-06-19 PROCEDURE — 83735 ASSAY OF MAGNESIUM: CPT | Performed by: FAMILY MEDICINE

## 2024-06-19 PROCEDURE — 25010000002 ERAVACYCLINE DIHYDROCHLORIDE 50 MG RECONSTITUTED SOLUTION 1 EACH VIAL: Performed by: INTERNAL MEDICINE

## 2024-06-19 PROCEDURE — 99232 SBSQ HOSP IP/OBS MODERATE 35: CPT | Performed by: FAMILY MEDICINE

## 2024-06-19 PROCEDURE — 80053 COMPREHEN METABOLIC PANEL: CPT | Performed by: FAMILY MEDICINE

## 2024-06-19 PROCEDURE — 25810000003 SODIUM CHLORIDE 0.9 % SOLUTION 250 ML FLEX CONT: Performed by: INTERNAL MEDICINE

## 2024-06-19 PROCEDURE — 85025 COMPLETE CBC W/AUTO DIFF WBC: CPT | Performed by: FAMILY MEDICINE

## 2024-06-19 RX ADMIN — ACETAMINOPHEN 650 MG: 325 TABLET ORAL at 17:42

## 2024-06-19 RX ADMIN — VANCOMYCIN HYDROCHLORIDE 125 MG: 125 CAPSULE ORAL at 09:04

## 2024-06-19 RX ADMIN — Medication 1 CAPSULE: at 09:03

## 2024-06-19 RX ADMIN — LIDOCAINE HYDROCHLORIDE: 20 JELLY TOPICAL at 15:32

## 2024-06-19 RX ADMIN — ERAVACYCLINE 85 MG: 50 INJECTION, POWDER, LYOPHILIZED, FOR SOLUTION INTRAVENOUS at 09:48

## 2024-06-19 RX ADMIN — FAMOTIDINE 10 MG: 20 TABLET, FILM COATED ORAL at 09:04

## 2024-06-19 RX ADMIN — COLLAGENASE SANTYL 1 APPLICATION: 250 OINTMENT TOPICAL at 15:32

## 2024-06-19 RX ADMIN — Medication 10 ML: at 09:08

## 2024-06-19 RX ADMIN — APIXABAN 5 MG: 5 TABLET, FILM COATED ORAL at 09:04

## 2024-06-19 RX ADMIN — LIDOCAINE HYDROCHLORIDE: 20 JELLY TOPICAL at 09:07

## 2024-06-19 RX ADMIN — ASPIRIN 81 MG: 81 TABLET, COATED ORAL at 09:04

## 2024-06-19 RX ADMIN — ACETAMINOPHEN 650 MG: 325 TABLET ORAL at 09:04

## 2024-06-19 RX ADMIN — DILTIAZEM HYDROCHLORIDE 240 MG: 240 CAPSULE, COATED, EXTENDED RELEASE ORAL at 09:03

## 2024-06-19 RX ADMIN — FOLIC ACID 1 MG: 1 TABLET ORAL at 09:04

## 2024-06-19 RX ADMIN — LEVOTHYROXINE SODIUM 75 MCG: 0.07 TABLET ORAL at 05:26

## 2024-06-19 NOTE — PLAN OF CARE
Goal Outcome Evaluation:  Plan of Care Reviewed With: patient        Progress: improving  Outcome Evaluation: Pt AO x4/VSS/95% on RA/non-Tele. Patient pain addressed with PRN meds. Lidocaine and zinc oxide used on bottom after dressing change. Pt. turns well from side to side to relieve bottom pressure/pain. Rob Alvarez met with patient and D/C is planned for tomorrow after dialysis. All dressings were changed 6/18, L BKA dressing changed today, 6/19, along with R Glute, right anticubital and coccyx dressings.

## 2024-06-19 NOTE — PROGRESS NOTES
" LOS: 19 days   Patient Care Team:  Hayden Weinstein MD as PCP - General (Nephrology)    Chief Complaint: ESRD  NSTEMI    Subjective     Plan for HD in AM, HD yesterday UF 2.5 liter. .           History taken from: patient    Objective     Vital Sign Min/Max for last 24 hours  Temp  Min: 97.4 °F (36.3 °C)  Max: 97.7 °F (36.5 °C)   BP  Min: 120/81  Max: 168/62   Pulse  Min: 57  Max: 111   Resp  Min: 17  Max: 20   SpO2  Min: 97 %  Max: 98 %   No data recorded   No data recorded     Flowsheet Rows      Flowsheet Row First Filed Value   Admission Height 185.4 cm (73\") Documented at 05/31/2024 1826   Admission Weight 84.8 kg (187 lb) Documented at 05/31/2024 1826            No intake/output data recorded.  I/O last 3 completed shifts:  In: 550 [P.O.:240; I.V.:310]  Out: 4950 [Urine:1175; Drains:425]    Objective   Gen: Alert, NAD   HENT: NC, AT, EOMI   NECK: Supple, no JVD, Trachea midline   LUNGS: CTA bilaterally, non labored respirtation   CVS: S1/S2 audible, RRR, no murmur   Abd: Soft, NT, ND, BS+   : + Delaney   Ext: Left BKA  CNS: Alert, No focal deficit noted grossly  Psy: Cooperative  Skin: Warm, dry and intact      Results Review:     I reviewed the patient's new clinical results.    WBC WBC   Date Value Ref Range Status   06/19/2024 9.68 3.40 - 10.80 10*3/mm3 Final        HGB Hemoglobin   Date Value Ref Range Status   06/19/2024 10.4 (L) 13.0 - 17.7 g/dL Final        HCT Hematocrit   Date Value Ref Range Status   06/19/2024 32.4 (L) 37.5 - 51.0 % Final        Platlets No results found for: \"LABPLAT\"   MCV MCV   Date Value Ref Range Status   06/19/2024 95.0 79.0 - 97.0 fL Final            Sodium Sodium   Date Value Ref Range Status   06/19/2024 134 (L) 136 - 145 mmol/L Final      Potassium Potassium   Date Value Ref Range Status   06/19/2024 4.7 3.5 - 5.2 mmol/L Final      Chloride Chloride   Date Value Ref Range Status   06/19/2024 102 98 - 107 mmol/L Final      CO2 CO2   Date Value Ref Range Status   06/19/2024 " "19.0 (L) 22.0 - 29.0 mmol/L Final      BUN BUN   Date Value Ref Range Status   06/19/2024 40 (H) 8 - 23 mg/dL Final      Creatinine Creatinine   Date Value Ref Range Status   06/19/2024 2.63 (H) 0.76 - 1.27 mg/dL Final      Calcium Calcium   Date Value Ref Range Status   06/19/2024 8.6 8.6 - 10.5 mg/dL Final      PO4 No results found for: \"CAPO4\"   Albumin Albumin   Date Value Ref Range Status   06/19/2024 2.7 (L) 3.5 - 5.2 g/dL Final            Magnesium Magnesium   Date Value Ref Range Status   06/19/2024 1.9 1.6 - 2.4 mg/dL Final          Uric Acid No results found for: \"URICACID\"     Medication Review: Yes    Assessment & Plan       NSTEMI (non-ST elevated myocardial infarction)    C. difficile colitis    History of cholecystitis s/p cholecystostomy tube    ESRD (end stage renal disease) on hemodialysis    Atrial fibrillation    Essential hypertension    Moderate malnutrition    Soft tissue infection    Acute osteomyelitis of left tibia      Assessment & Plan    ESRD: On TTS jonathan. HD through right IJ TDC. Still makes urine. Does have cole cath+ for chronic urinary retention. Patient the family Primary nephrologist (Dr Wan) was monitoring for renal recovery and residual renal function.      Volume status: No significant LE edema noted.      Afib w RVR: RVR during HD treatment. BP controlled. . No chest pain or sob.       Met acidosis: Mild. Management with HD.     Anemia: ACD. Transfuse at hb<7.  ALLISON on HD days     CAD: Transferred from OSH for evaluation of ACD. Cardiology following. EF 55%    Plan:  - Hd per TTS jonathan.  - Renal diet (Doesn't want to follow)  - ALLISON with HD       Hayden Weinstein MD  06/19/24  11:50 EDT            "

## 2024-06-19 NOTE — PROGRESS NOTES
[facility] Medicine Services  PROGRESS NOTE    Patient Name: Tavo Gudino  : 1955  MRN: [id number]    Date of Admission: 2024  Primary Care Physician: Hayden Weinstein MD    Subjective   Subjective     CC:  Stump infection     HPI:  Patient is a 69-year-old male seen and examined by me this a.m., he is resting comfortably in bed and has been approved for SNF, plans are for discharge to there tomorrow following his regular hemodialysis.  No other new acute complaints or problems.      Objective   Objective     Vital Signs:   Temp:  [97.4 °F (36.3 °C)-98 °F (36.7 °C)] 97.9 °F (36.6 °C)  Heart Rate:  [111] 111  Resp:  [18-20] 18  BP: (114-168)/(45-97) 114/84     Physical Exam:  Constitutional: No acute distress, awake, alert, chronically ill appearing   HENT: NCAT, mucous membranes moist, LIJ intact, right sided HD cath    Respiratory: Clear to auscultation bilaterally, no rhonchi or wheezing, respiratory effort normal room air   Cardiovascular: RRR, no murmurs, rubs, or gallops  Gastrointestinal: Positive bowel sounds, soft, nontender, nondistended,, cholecystostomy tube in place with dark green drainage   Musculoskeletal: L AKA with dressing , right foot dressing, rogerio LE venous changes   Psychiatric: Appropriate affect, cooperative  Neurologic: Oriented x 3, strength symmetric in all extremities, Cranial Nerves grossly intact to confrontation, speech clear  Skin: No rashes, right chest tunneled cath dressing cdi, pale    cole to BSD       Results Reviewed:  LAB RESULTS:      Lab 24  0343 24  0621 06/15/24  0630   WBC 9.68 7.16 9.15   HEMOGLOBIN 10.4* 9.8* 10.1*   HEMATOCRIT 32.4* 30.9* 32.2*   PLATELETS 200 167 178   NEUTROS ABS 5.75  --  5.55   IMMATURE GRANS (ABS) 0.04  --  0.04   LYMPHS ABS 2.70  --  2.62   MONOS ABS 0.97*  --  0.75   EOS ABS 0.14  --  0.13   MCV 95.0 94.8 96.4   CRP  --  0.39  --          Lab 24  0343 24  0621 06/15/24  0630   SODIUM  134* 136 133*   POTASSIUM 4.7 4.4 4.6   CHLORIDE 102 103 101   CO2 19.0* 23.0 19.0*   ANION GAP 13.0 10.0 13.0   BUN 40* 36* 55*   CREATININE 2.63* 2.62* 3.31*   EGFR 25.2* 25.3* 19.1*   GLUCOSE 78 70 82   CALCIUM 8.6 8.5* 8.3*   MAGNESIUM 1.9  --   --    PHOSPHORUS  --  5.8*  --          Lab 06/19/24  0343 06/16/24  0621   TOTAL PROTEIN 5.4*  --    ALBUMIN 2.7* 2.6*   GLOBULIN 2.7  --    ALT (SGPT) 47*  --    AST (SGOT) 43*  --    BILIRUBIN 0.3  --    ALK PHOS 492*  --                      Brief Urine Lab Results  (Last result in the past 365 days)        Color   Clarity   Blood   Leuk Est   Nitrite   Protein   CREAT   Urine HCG        06/02/24 1721 Yellow   Clear   Trace   Trace   Negative   100 mg/dL (2+)                   Microbiology Results Abnormal       Procedure Component Value - Date/Time    Blood Culture - Blood, Blood, Central Line [[id number]]  (Normal) Collected: 05/31/24 2237    Lab Status: Final result Specimen: Blood, Central Line Updated: 06/06/24 0801     Blood Culture No growth at 5 days    Blood Culture - Blood, Arm, Right [[id number]]  (Normal) Collected: 05/31/24 2237    Lab Status: Final result Specimen: Blood from Arm, Right Updated: 06/06/24 0745     Blood Culture No growth at 5 days    Urine Culture - Urine, Indwelling Urethral Catheter [[id number]]  (Normal) Collected: 06/02/24 1721    Lab Status: Final result Specimen: Urine from Indwelling Urethral Catheter Updated: 06/04/24 0955     Urine Culture No growth            No radiology results from the last 24 hrs    Results for orders placed during the hospital encounter of 05/31/24    Adult Transthoracic Echo Complete W/ Cont if Necessary Per Protocol    Interpretation Summary    Left ventricular systolic function is normal. Left ventricular ejection fraction appears to be 51 - 55%.    Left ventricular wall thickness is consistent with borderline concentric hypertrophy.    Left atrial volume is moderately increased.    There is mild  calcification of the aortic valve.    Mild mitral valve regurgitation is present.    There is a small (<1cm) circumferential pericardial effusion.      Current medications:  Scheduled Meds:apixaban, 5 mg, Oral, BID  aspirin, 81 mg, Oral, Daily  collagenase, 1 Application, Topical, Q24H  dilTIAZem CD, 240 mg, Oral, Q24H  eravacycline dihydrochloride (XERAVA) 85 mg in sodium chloride 0.9 % 250 mL (0.34 mg/mL) IVPB, 85 mg, Intravenous, Q12H  famotidine, 10 mg, Oral, Daily  folic acid, 1 mg, Oral, Daily  lactobacillus acidophilus, 1 capsule, Oral, BID  levothyroxine, 75 mcg, Oral, Q AM  Lidocaine HCl gel, , Topical, BID  metoprolol succinate XL, 100 mg, Oral, Q24H  pharmacy consult - MTM, , Does not apply, Daily  sodium chloride, 10 mL, Intravenous, Q12H  sodium zirconium cyclosilicate, 10 g, Oral, Daily  vancomycin, 125 mg, Oral, BID  [START ON 6/22/2024] vancomycin, 125 mg, Oral, Daily      Continuous Infusions:   PRN Meds:.  acetaminophen    heparin (porcine)    Lidocaine HCl gel    melatonin    metoprolol tartrate    ondansetron    Polyvinyl Alcohol-Povidone PF    sodium chloride    sodium chloride    Assessment & Plan   Assessment & Plan     Active Hospital Problems    Diagnosis  POA    **NSTEMI (non-ST elevated myocardial infarction) [I21.4]  Yes    Soft tissue infection [L08.9]  Unknown    Acute osteomyelitis of left tibia [M86.162]  Unknown    Moderate malnutrition [E44.0]  Yes    C. difficile colitis [A04.72]  Yes    History of cholecystitis s/p cholecystostomy tube [K81.9]  Yes    ESRD (end stage renal disease) on hemodialysis [N18.6]  Yes    Atrial fibrillation [I48.91]  Yes    Essential hypertension [I10]  Yes      Resolved Hospital Problems   No resolved problems to display.        Brief Hospital Course to date:  Tavo Gudino is a 69 y.o. male  with hx of HTN, PAD, left BKA, ESRD on hemodialysis, Afib on Eliquis, recent acute cholecystitis s/p cholecystostomy tube placement at , chronic urinary retention  with indwelling Delaney catheter, and previous hx of C.diff who presented from [facility] due to chest pain, elevated troponin, and Afib with RVR.  Found to have multiple other concerning issues including active C. difficile infection, concern for possible osteomyelitis, exposed tendon on right lower extremity. Patient is being evaluated for possible SNF placement at [facility] in [place] at this time, continue to follow as precert pending at this time. Patient seen on 6/19, plans are for d/c to [facility] now that approved on 6/20 following HD tomorrow. Patient's antibiotics that he must take with him are ready for  and meds to beds will deliver to bedside tomorrow with planned discharge. His LIJ will be removed on 6/20 as well.      This patient's problems and plans were partially entered by my partner and updated as appropriate by me 06/19/24.     Assessment/plan:     Chest pain, resolved  Elevated troponin, possible NSTEMI  -Transferred to [facility] for C with Dr. Amador  -Dr. Amador/Cardiology consulted, stress test with small area of ischemia.    --prior discussion with patient of decision to medically manage at this time.  --no current cp complaints.     Afib with RVR  Hx of Afib/Aflutter  -Toprol dosing adjusted per cardiology given episodes of intermittent RVR  -Continue Eliquis     HFrEF  Moderate MR  --Most recent Echo on file I April 2024: EF 40%, akinetic septal, anterior, and apical walls, severely dilated LA, moderate MR  --Follows with  Cardiology, had been evaluated previously for Vanessa clip  --ECHO 6/1: LVEF 51 to 55%, borderline concentric LVH, left atrial volume moderately increased, mild MR, small less than 1 cm circumferential pericardial effusion. F/u as prior scheduled      Leukocytosis, resolved  --wbc 7.16, afebrile      Concern for osteomyelitis   Exposed tendon on RLE  --MRI left tib/fib showed cellulitis, possible osteomyelitis  --MRI right foot with  cellulitis/edema  -- Arterial duplex abnormal; Vascular surgery consulted and recommended medical management with no vascular surgery intervention  --Eravacycline per ID  --MRSA screen +  --Follow-up blood and urine cultures  -Dr. Yeager following. Patient likely needs additional amputation of left lower extremity due to concern for poor wound healing, likely needs debridement of right lower extremity around the tendon with wound VAC placement.  Patient refusing further amputation of left lower extremity or any other procedures.   --ID following. Patient is not likely a candidate for outpatient IV antibiotics he will not be able to follow-up adequately and doesn't not have support at home.   Prior auth for Nuzyra approved for two weeks of therapy, then transition over to oral doxycycline 100 mg bid at least through 7/12   -planned d/c to SNF on 6/20, left IJ will need removed, plans for removal on 6/20   -Patient wishes to follow-up with wound care at .  -- follow up with ID two weeks following discharge      C.diff colitis  -s/p fosphofmycin  -Continue p.o. vancomycin 125 mg 4 times a day for 14 days from admission through June 14 and then taper to 125 mg twice daily for 1 week and then 125 mg once daily for 1 week  -Follow-up with ID  -- probiotic bid      Recent cholecystitis s/p cholecystostomy tube  --Request records from that hospitalization, apparently was placed at   --Consulted General Surgery for management of tube  --Apparently already had follow up at  6/6/24 General Surgery--will need to reschedule this at [place]     Chronic urinary retention  --Has Delaney in place, reported hx of urethral injury per OSH note (data deficit, no idea when this was Delaney placed), appears he follows with Urology at  and this is a chronic Delaney  --Replaced Delaney here     PAD  Hx of left BKA  --Continue ASA  --Lt BKA drsg in place.       Anemia  --Likely chronic due to renal disease  --Folate 4.36 --> supplementation  started; vitamin B 12 level 755  --Hb 7.5 here, had been 9 at OSH. up to 10.1     HTN  ESRD  --Nephrology consulted for dialysis   --Case management to work with patient and family in order to work on disposition location and finding an outpatient dialysis chair.     Hyperkalemia  -Potassium up to 6.0 s/p dose of lokelma.   --s/p HD TTHS     Hypothyroidism  --TSH significantly elevated at 91 at OSH, still elevated here to 65, but improved  --Synthroid dose was increased to 75 mcg daily at OSH  --Continue Synthroid at above dose and recheck in 4-6 weeks. Defer to PCP      Multiple wounds  Sacral Decubitus ulcer, POA, Stage 3  --Wound care consulted  --Ortho as above     Poor IV access  --Dr. Beckman placed Lt IJ central line, will need to be removed prior to transfer and currently plans are for removal once accepted at facility   -- Nuzyra  was preauthfor two weeks. Med would need to be filled her prior to dc. Treat with Nuzyra for two weeks then transition over to oral  doxycycline 100 mg bid for two more weeks at least through July 12th        Expected Discharge Location and Transportation: rehab   Expected Discharge   Expected Discharge Date: 6/20/2024; Expected Discharge Time:      VTE Prophylaxis:  Pharmacologic & mechanical VTE prophylaxis orders are present.         AM-PAC 6 Clicks Score (PT): 9 (06/19/24 0810)    CODE STATUS:   Code Status and Medical Interventions:   Ordered at: 05/31/24 1429     Code Status (Patient has no pulse and is not breathing):    CPR (Attempt to Resuscitate)     Medical Interventions (Patient has pulse or is breathing):    Full Support       SILAS Malloy, DO  06/19/24

## 2024-06-19 NOTE — PROGRESS NOTES
Tavo Gudino  1955  [id number]    Reason for Consultation: recurrent C diff. Osteomyelitis     History of present illness:    Patient is a 69 y.o. male, with PMH chronic cholecystitis( cholecystotomy tube) DM, ESRD on HD, HTN, PVD s/p Left  BKA.  All recent care done at [facility] in [facility].    Presented to OSH with chest pain, found to be in afib with RVR possible NSTEMi, transferred to [facility] for LHC.  Developed diarrhea prior to transfer, C diff toxin negative, positive antigen, started on oral Vancomycin. He had been treated with Kayexalate for hyperkalemia, as well as Colace.  Last positive C diff PCR 4/6/24 from [facility]. Noted exposed tendon RLE wound , and ulcer of right heel, sacral area, and left residual stump. He has been afebrile. Admitting labs with WBC 22, plt 249, ALT 10 AST 10, Scr 3.47, UA with TNTC WBCs, urine culture with gram negative bacilli, blood cultures no growth. MRI Left with edema throughout soft tissue of stump, no abscess,subtle early changes of superimposed osteomyelitis distal osseous stump not excluded.  Right with diffuse soft tissue cellulitis, possible early osteomyelitis lateral malleolus, no definite evidence of osteomyelitis.  Started on Ceftriaxone Daptomycin, Flagyl, and oral Vancomycin and we were consulted for evaluation and treatment. He has had an indwelling cole catheter since April, just recently changed.  He continues to have numerous diarrhea stools and abdominal cramping.  No fever.      6/3/24: Frustrated with prolonged hospitalization but slow overnight.  Awaiting possible cardiac workup.  Legs stable.  Cole catheter in place.  Denies abdominal pain, suprapubic pain at this time.  He said he only had 1 bowel movement yesterday evening but has had 2 loose bowel movement so far today.  Overall he thinks his stool frequency has improved    6/5/2024: Resting comfortably.  Cardiac workup completed.   orthopedic surgery recommended  additional debridement of the amputation site but patient refusing stating he would like to follow-up with his previous wound care providers.  Loose stools slowing down and he has less nausea    6/6/24: Stool frequency slowing down.  Minimal abdominal discomfort.  Still has biliary drain.  Tolerating IV antibiotics. Discussed discharge planning again with patient today: He has numerous needs and will be difficult to manage at home but still reluctant to go back to prior skilled nursing facility.    6/10/24: Stool frequency is overall improved.  Had a bowel movement this morning but cannot tell me if it was liquid or solid.  Only 1 bowel movement charted in past 24 hours.  No abdominal pain.  Biliary drain remains in place.  No worsening swelling or pain in either lower extremity.  Tolerating IV antibiotics through left IJ CVC.  Patient agreeable to skilled nursing facility    6/12/24: Afebrile.  Blood pressure stable.  Seen in dialysis and complains of feeling cold during dialysis but otherwise no chills.  Still refusing  placement of tunneled line to facilitate IV antibiotics after discharge but is agreeable to taking oral antibiotics. Complex discharge planning in process.  Stool frequency improved.  No worsening swelling, pain, drainage from the lower extremity    6/19/24: No new complaints.  Resting comfortably.  Stool frequency stable. Appetite ok..  No abdominal pain.  No worsening leg swelling, pain. Tolerating eravacycline via L IJ CVC    ROS:  Afebrile and hemodynamically stable. No nausea. No rash. See above, otherwise negative.      Allergies   Allergen Reactions    Penicillins Hives     Received ceftriaxone 6/1/24    Levothyroxine Unknown - Low Severity    Hydromorphone Other (See Comments)     Confusion, encephalopathy per wife         Medication:    Current Facility-Administered Medications:     acetaminophen (TYLENOL) tablet 650 mg, 650 mg, Oral, Q4H PRN, Genny Saeed MD, 650 mg at 06/19/24 0904     apixaban (ELIQUIS) tablet 5 mg, 5 mg, Oral, BID, Otilia Tay MD, 5 mg at 06/19/24 0904    aspirin EC tablet 81 mg, 81 mg, Oral, Daily, Keren Escalona PA-C, 81 mg at 06/19/24 0904    collagenase ointment 1 Application, 1 Application, Topical, Q24H, Genny Saeed MD, 1 Application at 06/17/24 0941    dilTIAZem CD (CARDIZEM CD) 24 hr capsule 240 mg, 240 mg, Oral, Q24H, Fred Amador MD, 240 mg at 06/19/24 0903    eravacycline dihydrochloride (XERAVA) 85 mg in sodium chloride 0.9 % 250 mL (0.34 mg/mL) IVPB, 85 mg, Intravenous, Q12H, José Fuentes MD, Last Rate: 250 mL/hr at 06/19/24 0948, 85 mg at 06/19/24 0948    famotidine (PEPCID) tablet 10 mg, 10 mg, Oral, Daily, Otilia Tay MD, 10 mg at 06/19/24 0904    folic acid (FOLVITE) tablet 1 mg, 1 mg, Oral, Daily, Sonya Banks MD, 1 mg at 06/19/24 0904    heparin (porcine) injection 2,000 Units, 2,000 Units, Intracatheter, PRN, Efe, Jose Hall MD, 2,000 Units at 06/18/24 1042    lactobacillus acidophilus (RISAQUAD) capsule 1 capsule, 1 capsule, Oral, BID, José Fuentes MD, 1 capsule at 06/19/24 0903    levothyroxine (SYNTHROID, LEVOTHROID) tablet 75 mcg, 75 mcg, Oral, Q AM, Genny Saeed MD, 75 mcg at 06/19/24 0526    Lidocaine HCl gel (XYLOCAINE) urethral/mucosal syringe, , Topical, PRN, Genny Saeed MD, Given at 06/01/24 0609    lidocaine HCL topical jelly USP 2% syringe 11 mL, , Topical, BID, Genny Saeed MD, Given at 06/19/24 0907    melatonin tablet 5 mg, 5 mg, Oral, Nightly PRN, Radha Vasques APRN, 5 mg at 06/18/24 2232    metoprolol succinate XL (TOPROL-XL) 24 hr tablet 100 mg, 100 mg, Oral, Q24H, Keren Escalona PA-C, 100 mg at 06/18/24 1628    metoprolol tartrate (LOPRESSOR) injection 5 mg, 5 mg, Intravenous, Q6H PRN, Hayden Weinstein MD, 5 mg at 06/14/24 2201    ondansetron (ZOFRAN) injection 4 mg, 4 mg, Intravenous, Q6H PRN, Sita Santizo PA-C, 4 mg at 06/08/24 3686     Pharmacy Consult - [facility], , Does not apply, Daily, Freddie Sutton, RPH, Given at 06/09/24 0853    Polyvinyl Alcohol-Povidone PF (HYPOTEARS) 1.4-0.6 % ophthalmic solution 1 drop, 1 drop, Both Eyes, Q1H PRN, Pam Bullock, APRN, 1 drop at 06/18/24 1203    sodium chloride 0.9 % flush 10 mL, 10 mL, Intravenous, Q12H, Genny Saeed MD, 10 mL at 06/19/24 0908    sodium chloride 0.9 % flush 10 mL, 10 mL, Intravenous, PRN, Genny Saeed MD    sodium chloride 0.9 % infusion 40 mL, 40 mL, Intravenous, PRN, Genny Saeed MD    sodium zirconium cyclosilicate (LOKELMA) packet 10 g, 10 g, Oral, Daily, Arvin Curtis MD, 10 g at 06/17/24 0934    vancomycin (VANCOCIN) capsule 125 mg, 125 mg, Oral, BID, José Fuentes MD, 125 mg at 06/19/24 0904    [START ON 6/22/2024] vancomycin (VANCOCIN) capsule 125 mg, 125 mg, Oral, Daily, José Fuentes MD    Antibiotics:  Anti-Infectives (From admission, onward)      Ordered     Dose/Rate Route Frequency Start Stop    06/14/24 0954  vancomycin (VANCOCIN) capsule 125 mg        Ordering Provider: José Fuentes MD    125 mg Oral Daily 06/22/24 0900 06/29/24 0859    06/14/24 0954  vancomycin (VANCOCIN) capsule 125 mg        Ordering Provider: José Fuentes MD    125 mg Oral 2 Times Daily 06/14/24 2100 06/21/24 2059    06/10/24 1030  eravacycline dihydrochloride (XERAVA) 85 mg in sodium chloride 0.9 % 250 mL (0.34 mg/mL) IVPB        Note to Pharmacy: !! Ensure final concentration of 0.2 - 0.6 mg/mL !!   Ordering Provider: José Fuentes MD    85 mg  250 mL/hr over 60 Minutes Intravenous Every 12 Hours 06/10/24 1800 06/22/24 1035    06/10/24 1037  Omadacycline Tosylate 150 MG tablet        Ordering Provider: José Fuentes MD    300 mg Oral Daily 06/10/24 0000      06/03/24 1247  fosfomycin (MONUROL) packet 3 g        Ordering Provider: José Fuentes MD    3 g Oral Once 06/03/24 1400 06/03/24 1450    06/02/24 1421  eravacycline  dihydrochloride (XERAVA) 85 mg in sodium chloride 0.9 % 250 mL (0.34 mg/mL) IVPB        Note to Pharmacy: !! Ensure final concentration of 0.2 - 0.6 mg/mL !!   Ordering Provider: José Fuentes MD    85 mg  250 mL/hr over 60 Minutes Intravenous Every 12 Hours 24 1600 06/10/24 0646            Physical Exam:   Vital Signs  Temp (24hrs), Av.6 °F (36.4 °C), Min:97.4 °F (36.3 °C), Max:97.7 °F (36.5 °C)    Temp  Min: 97.4 °F (36.3 °C)  Max: 97.7 °F (36.5 °C)  BP  Min: 120/81  Max: 181/93  Pulse  Min: 57  Max: 111  Resp  Min: 17  Max: 20  SpO2  Min: 92 %  Max: 98 %    GENERAL: Awake and alert, chronically ill-appearing but not acutely toxic  HEENT: Normocephalic, atraumatic.  PERRL. EOMI. No conjunctival injection. No icterus. Edentulous   NECK: Supple   HEART: RRR; No murmur,  .   LUNGS: Clear to auscultation bilaterally without wheezing, rales, rhonchi. Normal respiratory effort. Nonlabored.   ABDOMEN: Soft,  tender  nondistended: Biliary drain in right upper quadrant  EXT: Left BKA site dressed, CDI. No new images in chart  Right foot lesions dressed.  To level of soft tissue, tendon based on most recent images  :  Delaney catheter with clear urine  MSK: No joint effusions or erythema  SKIN: Warm and dry    NEURO: Oriented to PPT.  Motor 5/5 strength  PSYCHIATRIC: Normal insight and judgment. Cooperative with PE  Dialysis line in the right chest.  Left IJ CVC w/o redness or swelling     Laboratory Data    Results from last 7 days   Lab Units 24  0343 24  0621 06/15/24  0630   WBC 10*3/mm3 9.68 7.16 9.15   HEMOGLOBIN g/dL 10.4* 9.8* 10.1*   HEMATOCRIT % 32.4* 30.9* 32.2*   PLATELETS 10*3/mm3 200 167 178     Results from last 7 days   Lab Units 24  0343   SODIUM mmol/L 134*   POTASSIUM mmol/L 4.7   CHLORIDE mmol/L 102   CO2 mmol/L 19.0*   BUN mg/dL 40*   CREATININE mg/dL 2.63*   GLUCOSE mg/dL 78   CALCIUM mg/dL 8.6     Results from last 7 days   Lab Units 24  0343   ALK PHOS U/L  492*   BILIRUBIN mg/dL 0.3   ALT (SGPT) U/L 47*   AST (SGOT) U/L 43*           Results from last 7 days   Lab Units 06/16/24  0621   CRP mg/dL 0.39                     Estimated Creatinine Clearance: 30.9 mL/min (A) (by C-G formula based on SCr of 2.63 mg/dL (H)).      Microbiology:  Microbiology Results (last 10 days)       ** No results found for the last 240 hours. **            Radiology:  Imaging Results (Last 72 Hours)       ** No results found for the last 72 hours. **          5/28, 5/31 blood cultures from [place], negative      Impression:   C diff colitis initially diagnosed in April 2024. Was given Lactulose, Stool softeners in [place] and developed worsening diarrhea, with positive antigen, negative EIA toxin so possible Colonization vs true infection.  Repeat testing at [facility] with positive PCR and positive EIA antigen.  Patient says stool frequency is overall improving, especially after switched to eravacycline. Improving   NSTEMI,  Cardiology evaluated  ESRD on HD: via tunneled HD catheter. still makes urine  Severe peripheral vascular disease: No intervention needed per vascular surgery  S/p left BKA 4/2024, with dehiscence of the amputation site and possible soft tissue infection  Possible left tibial early osteomyelitis:  by MRI.  Dr. Yeager recommended additional I&D surgery which the patient refused. CRP improving   Left lower extremity wound, with exposed tendon-  early osteomyelitis of the lateral malleolus not excluded by MRI per radiology.  Lesions dry on exam  Chronic cholecystitis, s/p percutaneous cholecystotomy tube- [facility]  Pyuria, Enterobacter bacteriuria: Had chronic indwelling Delaney catheter that was in place for almost 2 months prior to being changed on admission 5/31.  Culture from admission with Enterobacter.  Treated with fosfomycin x 1.  Repeat culture 6/2 negative  Sacral pressure ulcers  Social barriers to care: Inadequate transportation for follow-up  appointments    PLAN/RECOMMENDATIONS:   Follow CBC, CMP, CRP      Continue IV eravacycline while inpatient    PO  vancomycin tapered to 125 mg twice daily for 1 week (through 6/21), and then 125 mg once daily for 1 week, then 125 mg every other day for 2 weeks  - probiotic BID     Patient agreeable to skilled nursing facility placement and case management working on options.  Complex discharge planning due to numerous committees and high level of needs after discharge.    Patient refused tunneled line placement to facilitate IV antibiotics after discharge but is agreeable to taking oral antibiotics.  Per case management, prior authorization for Nuzyra has been approved for 2 weeks.  Treat with Nuzyra 300 mg daily for 2 weeks and then transition to oral doxycycline 100 mg BID for 2 more weeks, to complete 6 weeks of antibiotics total, at least through July 12.     Overall prognosis is poor due to numerous comorbidities, refusal of recommended aggressive surgical debridement and IV antibiotics.  High risk for higher level amputation and recurrent sepsis.         Okay to discharge from my perspective once arrangements have been made. Remove IJ CVC prior to discharge. Schedule outpatient follow-up with me 2 weeks after discharge        José Fuentes MD  6/19/2024  10:56 EDT

## 2024-06-19 NOTE — CASE MANAGEMENT/SOCIAL WORK
Continued Stay Note  [facility]     Patient Name: Tavo Gudino  MRN: [id number]  Today's Date: 6/19/2024    Admit Date: 5/31/2024    Plan: [facility]   Discharge Plan       Row Name 06/19/24 1140       Plan    Plan [facility]    Patient/Family in Agreement with Plan yes    Plan Comments Patient peer to peer was successful. Patient has been approved for SNF at [facility] in [place]. MD stated in rounds patient can go tomorrow. I have submitted for EMS transport for tomorrow between 2 and 3 pm as he will need dialysis in the morning before he goes. Patient in agreement with the plan. I will update patient, staff, and MD with EMS time when it is available. IMM given to patient. He denied questions or concerns. CM following.    Final Discharge Disposition Code 03 - skilled nursing facility (SNF)                   Discharge Codes    No documentation.                 Expected Discharge Date and Time       Expected Discharge Date Expected Discharge Time    Jun 20, 2024               Serina Sinclair RN

## 2024-06-20 ENCOUNTER — APPOINTMENT (OUTPATIENT)
Dept: NEPHROLOGY | Facility: HOSPITAL | Age: 69
End: 2024-06-20
Payer: MEDICARE

## 2024-06-20 VITALS
HEIGHT: 73 IN | WEIGHT: 186.95 LBS | TEMPERATURE: 98.2 F | DIASTOLIC BLOOD PRESSURE: 95 MMHG | RESPIRATION RATE: 18 BRPM | HEART RATE: 98 BPM | BODY MASS INDEX: 24.78 KG/M2 | SYSTOLIC BLOOD PRESSURE: 119 MMHG | OXYGEN SATURATION: 97 %

## 2024-06-20 LAB
ALBUMIN SERPL-MCNC: 2.7 G/DL (ref 3.5–5.2)
ALBUMIN/GLOB SERPL: 1.1 G/DL
ALP SERPL-CCNC: 419 U/L (ref 39–117)
ALT SERPL W P-5'-P-CCNC: 33 U/L (ref 1–41)
ANION GAP SERPL CALCULATED.3IONS-SCNC: 12 MMOL/L (ref 5–15)
AST SERPL-CCNC: 18 U/L (ref 1–40)
BASOPHILS # BLD AUTO: 0.08 10*3/MM3 (ref 0–0.2)
BASOPHILS NFR BLD AUTO: 0.9 % (ref 0–1.5)
BILIRUB SERPL-MCNC: 0.4 MG/DL (ref 0–1.2)
BUN SERPL-MCNC: 56 MG/DL (ref 8–23)
BUN/CREAT SERPL: 18.9 (ref 7–25)
CALCIUM SPEC-SCNC: 8.5 MG/DL (ref 8.6–10.5)
CHLORIDE SERPL-SCNC: 104 MMOL/L (ref 98–107)
CO2 SERPL-SCNC: 18 MMOL/L (ref 22–29)
CREAT SERPL-MCNC: 2.97 MG/DL (ref 0.76–1.27)
DEPRECATED RDW RBC AUTO: 55 FL (ref 37–54)
EGFRCR SERPLBLD CKD-EPI 2021: 21.8 ML/MIN/1.73
EOSINOPHIL # BLD AUTO: 0.09 10*3/MM3 (ref 0–0.4)
EOSINOPHIL NFR BLD AUTO: 1 % (ref 0.3–6.2)
ERYTHROCYTE [DISTWIDTH] IN BLOOD BY AUTOMATED COUNT: 15.9 % (ref 12.3–15.4)
GLOBULIN UR ELPH-MCNC: 2.4 GM/DL
GLUCOSE SERPL-MCNC: 76 MG/DL (ref 65–99)
HCT VFR BLD AUTO: 33 % (ref 37.5–51)
HGB BLD-MCNC: 10.7 G/DL (ref 13–17.7)
IMM GRANULOCYTES # BLD AUTO: 0.03 10*3/MM3 (ref 0–0.05)
IMM GRANULOCYTES NFR BLD AUTO: 0.3 % (ref 0–0.5)
LYMPHOCYTES # BLD AUTO: 2.27 10*3/MM3 (ref 0.7–3.1)
LYMPHOCYTES NFR BLD AUTO: 25.3 % (ref 19.6–45.3)
MAGNESIUM SERPL-MCNC: 1.8 MG/DL (ref 1.6–2.4)
MCH RBC QN AUTO: 30.7 PG (ref 26.6–33)
MCHC RBC AUTO-ENTMCNC: 32.4 G/DL (ref 31.5–35.7)
MCV RBC AUTO: 94.8 FL (ref 79–97)
MONOCYTES # BLD AUTO: 1.07 10*3/MM3 (ref 0.1–0.9)
MONOCYTES NFR BLD AUTO: 11.9 % (ref 5–12)
NEUTROPHILS NFR BLD AUTO: 5.44 10*3/MM3 (ref 1.7–7)
NEUTROPHILS NFR BLD AUTO: 60.6 % (ref 42.7–76)
NRBC BLD AUTO-RTO: 0 /100 WBC (ref 0–0.2)
PLATELET # BLD AUTO: 186 10*3/MM3 (ref 140–450)
PMV BLD AUTO: 10.1 FL (ref 6–12)
POTASSIUM SERPL-SCNC: 5.5 MMOL/L (ref 3.5–5.2)
PROT SERPL-MCNC: 5.1 G/DL (ref 6–8.5)
RBC # BLD AUTO: 3.48 10*6/MM3 (ref 4.14–5.8)
SODIUM SERPL-SCNC: 134 MMOL/L (ref 136–145)
WBC NRBC COR # BLD AUTO: 8.98 10*3/MM3 (ref 3.4–10.8)

## 2024-06-20 PROCEDURE — 80053 COMPREHEN METABOLIC PANEL: CPT | Performed by: FAMILY MEDICINE

## 2024-06-20 PROCEDURE — 99239 HOSP IP/OBS DSCHRG MGMT >30: CPT | Performed by: NURSE PRACTITIONER

## 2024-06-20 PROCEDURE — 85025 COMPLETE CBC W/AUTO DIFF WBC: CPT | Performed by: FAMILY MEDICINE

## 2024-06-20 PROCEDURE — 25810000003 SODIUM CHLORIDE 0.9 % SOLUTION 250 ML FLEX CONT: Performed by: INTERNAL MEDICINE

## 2024-06-20 PROCEDURE — 25010000002 ERAVACYCLINE DIHYDROCHLORIDE 50 MG RECONSTITUTED SOLUTION 1 EACH VIAL: Performed by: INTERNAL MEDICINE

## 2024-06-20 PROCEDURE — 83735 ASSAY OF MAGNESIUM: CPT | Performed by: FAMILY MEDICINE

## 2024-06-20 RX ORDER — FAMOTIDINE 10 MG
10 TABLET ORAL DAILY
Status: ON HOLD
Start: 2024-06-20

## 2024-06-20 RX ORDER — VANCOMYCIN HYDROCHLORIDE 125 MG/1
125 CAPSULE ORAL DAILY
Status: ON HOLD
Start: 2024-06-22 | End: 2024-06-29

## 2024-06-20 RX ORDER — L.ACID,PARA/B.BIFIDUM/S.THERM 8B CELL
1 CAPSULE ORAL 2 TIMES DAILY
Status: ON HOLD
Start: 2024-06-20

## 2024-06-20 RX ORDER — ACETAMINOPHEN 325 MG/1
650 TABLET ORAL EVERY 4 HOURS PRN
Status: ON HOLD
Start: 2024-06-20

## 2024-06-20 RX ORDER — DILTIAZEM HYDROCHLORIDE 240 MG/1
240 CAPSULE, COATED, EXTENDED RELEASE ORAL
Status: ON HOLD
Start: 2024-06-20

## 2024-06-20 RX ORDER — METOPROLOL SUCCINATE 100 MG/1
100 TABLET, EXTENDED RELEASE ORAL
Status: ON HOLD
Start: 2024-06-20

## 2024-06-20 RX ORDER — LEVOTHYROXINE SODIUM 0.07 MG/1
75 TABLET ORAL
Status: ON HOLD
Start: 2024-06-21

## 2024-06-20 RX ORDER — VANCOMYCIN HYDROCHLORIDE 125 MG/1
125 CAPSULE ORAL 2 TIMES DAILY
Start: 2024-06-20 | End: 2024-06-22

## 2024-06-20 RX ORDER — ASPIRIN 81 MG/1
81 TABLET ORAL DAILY
Status: ON HOLD
Start: 2024-06-20

## 2024-06-20 RX ORDER — FOLIC ACID 1 MG/1
1 TABLET ORAL DAILY
Status: ON HOLD
Start: 2024-06-20

## 2024-06-20 RX ORDER — UREA 10 %
5 LOTION (ML) TOPICAL NIGHTLY PRN
Status: ON HOLD
Start: 2024-06-20

## 2024-06-20 RX ADMIN — ERAVACYCLINE 85 MG: 50 INJECTION, POWDER, LYOPHILIZED, FOR SOLUTION INTRAVENOUS at 00:26

## 2024-06-20 RX ADMIN — SODIUM ZIRCONIUM CYCLOSILICATE 10 G: 10 POWDER, FOR SUSPENSION ORAL at 12:39

## 2024-06-20 RX ADMIN — LIDOCAINE HYDROCHLORIDE: 20 JELLY TOPICAL at 00:18

## 2024-06-20 RX ADMIN — FAMOTIDINE 10 MG: 20 TABLET, FILM COATED ORAL at 12:39

## 2024-06-20 RX ADMIN — VANCOMYCIN HYDROCHLORIDE 125 MG: 125 CAPSULE ORAL at 12:42

## 2024-06-20 RX ADMIN — Medication 10 ML: at 00:19

## 2024-06-20 RX ADMIN — DILTIAZEM HYDROCHLORIDE 240 MG: 240 CAPSULE, COATED, EXTENDED RELEASE ORAL at 12:39

## 2024-06-20 RX ADMIN — ASPIRIN 81 MG: 81 TABLET, COATED ORAL at 12:39

## 2024-06-20 RX ADMIN — LEVOTHYROXINE SODIUM 75 MCG: 0.07 TABLET ORAL at 06:20

## 2024-06-20 RX ADMIN — Medication 10 ML: at 12:40

## 2024-06-20 RX ADMIN — FOLIC ACID 1 MG: 1 TABLET ORAL at 12:39

## 2024-06-20 RX ADMIN — APIXABAN 5 MG: 5 TABLET, FILM COATED ORAL at 00:18

## 2024-06-20 RX ADMIN — Medication 1 CAPSULE: at 00:18

## 2024-06-20 RX ADMIN — Medication 1 CAPSULE: at 12:39

## 2024-06-20 RX ADMIN — APIXABAN 5 MG: 5 TABLET, FILM COATED ORAL at 12:39

## 2024-06-20 RX ADMIN — VANCOMYCIN HYDROCHLORIDE 125 MG: 125 CAPSULE ORAL at 00:18

## 2024-06-20 RX ADMIN — LIDOCAINE HYDROCHLORIDE: 20 JELLY TOPICAL at 12:41

## 2024-06-20 NOTE — DISCHARGE SUMMARY
[facility] Medicine Services  TRANSFER SUMMARY    Patient Name: Tavo Gudino  : 1955  MRN: [id number]    Date of Admission: 2024  Date of Discharge:  2024  Length of Stay: 20  Primary Care Physician: Hayden Weinstein MD    Consults       Date and Time Order Name Status Description    6/3/2024  8:05 AM Inpatient Vascular Surgery Consult Completed     2024 12:15 PM Inpatient Infectious Diseases Consult Completed     2024  2:39 PM Inpatient Orthopedic Surgery Consult Completed     2024  3:37 PM Inpatient General Surgery Consult Completed     2024  2:31 PM Inpatient Nephrology Consult      2024  2:02 PM Inpatient Cardiology Consult Completed             Hospital Course     Presenting Problem:   NSTEMI (non-ST elevated myocardial infarction) [I21.4]    Active Hospital Problems    Diagnosis  POA    **NSTEMI (non-ST elevated myocardial infarction) [I21.4]  Yes    Soft tissue infection [L08.9]  Unknown    Acute osteomyelitis of left tibia [M86.162]  Unknown    Moderate malnutrition [E44.0]  Yes    C. difficile colitis [A04.72]  Yes    History of cholecystitis s/p cholecystostomy tube [K81.9]  Yes    ESRD (end stage renal disease) on hemodialysis [N18.6]  Yes    Atrial fibrillation [I48.91]  Yes    Essential hypertension [I10]  Yes      Resolved Hospital Problems   No resolved problems to display.          Hospital Course:  Tavo Gudino is a 69 y.o. male with hx of HTN, PAD, left BKA, ESRD on hemodialysis, Afib on Eliquis, recent acute cholecystitis s/p cholecystostomy tube placement at , chronic urinary retention with indwelling Delaney catheter, and previous hx of C.diff who presented from [facility] due to chest pain, elevated troponin, and Afib with RVR.  Found to have multiple other concerning issues including active C. difficile infection, concern for possible osteomyelitis, exposed tendon on right lower extremity. Patient is being evaluated for  possible SNF placement at [facility] in [place] at this time, continue to follow as precert pending at this time. Patient seen on 6/19, plans are for d/c to [facility] now that approved on 6/20 following HD tomorrow. Patient's antibiotics that he must take with him are ready for  and meds to beds will deliver to bedside tomorrow with planned discharge. His LIJ will be removed on 6/20 as well.      Chest pain, resolved  Elevated troponin, possible NSTEMI  -Transferred to [facility] for [facility] with Dr. Amador  -Dr. Amador/Cardiology consulted, stress test with small area of ischemia.    --prior discussion with patient of decision to medically manage at this time.  --no current cp complaints.  --pt to f/u with his primary cardiologist at  on dc      Afib with RVR  Hx of Afib/Aflutter  -Toprol dosing adjusted per cardiology given episodes of intermittent RVR  -Continue Eliquis  --meds adjusted this am.  F/u [facility] cards      HFrEF  Moderate MR  --Most recent Echo on file I April 2024: EF 40%, akinetic septal, anterior, and apical walls, severely dilated LA, moderate MR  --Follows with  Cardiology, had been evaluated previously for Vanessa clip  --ECHO 6/1: LVEF 51 to 55%, borderline concentric LVH, left atrial volume moderately increased, mild MR, small less than 1 cm circumferential pericardial effusion. F/u as prior scheduled at      Leukocytosis, resolved  --wbc 7.16, afebrile      Concern for osteomyelitis   Exposed tendon on RLE  --MRI left tib/fib showed cellulitis, possible osteomyelitis  --MRI right foot with cellulitis/edema  -- Arterial duplex abnormal; Vascular surgery consulted and recommended medical management with no vascular surgery intervention  --Eravacycline per ID  --MRSA screen +  --Follow-up blood and urine cultures  -Dr. Yeaegr following. Patient likely needs additional amputation of left lower extremity due to concern for poor wound healing, likely needs debridement of right lower  extremity around the tendon with wound VAC placement.  Patient refusing further amputation of left lower extremity or any other procedures.   --ID following. Patient is not likely a candidate for outpatient IV antibiotics he will not be able to follow-up adequately and doesn't not have support at home.   Prior auth for Nuzyra approved for two weeks of therapy, then transition over to oral doxycycline 100 mg bid at least through 7/12   -planned d/c to SNF on 6/20, left IJ will be removed today prior to transfer per ID recs   -Patient wishes to follow-up with wound care at  on dc from rehab.  -- follow up with ID two weeks following discharge   - continue current daily wound care     C.diff colitis  -s/p fosphofmycin  -Continue p.o. vancomycin 125 mg 4 times a day for 14 days from admission through June 14 and then taper to 125 mg twice daily for 1 week and then 125 mg once daily for 1 week  -Follow-up with ID as noted   -- probiotic bid      Recent cholecystitis s/p cholecystostomy tube  --Request records from that hospitalization, apparently was placed at   --Consulted General Surgery for management of tube  --Apparently already had follow up at  6/6/24 General Surgery--will attempt to reschedule this at [facility] today     Chronic urinary retention  --Has Delaney in place, reported hx of urethral injury per OSH note (data deficit, no idea when this was Delaney placed), appears he follows with Urology at  and this is a chronic Delaney  --Replaced Delaney here  --f/u with urology on dc      PAD  Hx of left BKA  --Continue ASA  --Lt BKA drsg in place.  Continue with current wound care.  Will need wound care on dc from rehab      Anemia  --Likely chronic due to renal disease  --Folate 4.36 --> supplementation started; vitamin B 12 level 755  --Hb 7.5 here, had been 9 at OSH. up to 10.1     HTN  ESRD  --Nephrology consulted for dialysis   --Case management to work with patient and family in order to work on disposition  location and finding an outpatient dialysis chair.  --pt with [facility].  S/p HD today.  CM arranged from transfer to rehab in [place] for today.       Hyperkalemia  -Potassium up to 6.0 s/p dose of lokelma.   --s/p HD TTHS     Hypothyroidism  --TSH significantly elevated at 91 at OSH, still elevated here to 65, but improved  --Synthroid dose was increased to 75 mcg daily at OSH  --Continue Synthroid at above dose and recheck in 4-6 weeks. Defer to PCP      Multiple wounds  Sacral Decubitus ulcer, POA, Stage 3  --Wound care consulted  --Ortho as above  --continue current wound care      Poor IV access  --Dr. Beckman placed Lt IJ central line, will need to be removed prior to transfer and currently plans are for removal once accepted at facility   -- Nuzyra  was preauthfor two weeks. Med to be filled her prior to dc and delivered to . Treat with Nuzyra for two weeks then transition over to oral  doxycycline 100 mg bid for two more weeks at least through July 12th     Patient was seen resting back in bed in no acute distress.  Chronically ill and debilitated.  Hemodynamically stable and afebrile.  S/p HD today.  Cleared by all services for transfer to acute rehab today.  Per case management, patient will undergo HD at facility.  Going on medications as below with follow-up as noted.       Discharge Follow Up Recommendations for outpatient labs/diagnostics:  Patient is cleared for transfer to rehab facility today by all services.  Will receive HD at facility.  Medications as below with numerous follow-ups as noted.    --to be seen by provider at facility on arrival, PCP 1 week of dc   --Pt to continue to be followed by nephrology and undergo HD at facility, f/u with her primary nephrologist on dc  --f/u Dr Fuentes with [facility] in 2 weeks   --f/u [facility] cardiology as prior scheduled   --f/u with [facility] general surgery first available due to prior placed abdominal drain by them  --f/u urology in 2-3 weeks (pt dcing with cole in  place)  --continue daily wound care as noted    Day of Discharge     HPI:   Pt was seen resting up in bed in [facility]. Awake and alert.  Just back from HD shortly ago.  No new issues.  Still having loose stools per staff RN. Pt states he feels okay, just worn out.  Wants to get home asap.  Agreeable to rehab today with continued HD in [place].     Review of Systems  Gen- No fevers, chills  CV- No chest pain, palpitations  Resp- No cough, dyspnea  GI- as above      Vital Signs:   Temp:  [97.9 °F (36.6 °C)-98.2 °F (36.8 °C)] 98.2 °F (36.8 °C)  Heart Rate:  [] 98  Resp:  [18] 18  BP: ()/() 119/95     Physical Exam:  Constitutional: No acute distress, awake, alert, chronically ill appearing. Resting back in bed.    HENT: NCAT, mucous membranes moist, Lt IJ intact  Respiratory: Clear to auscultation bilaterally but decreased at base, no rhonchi or wheezing, respiratory effort normal room air with sats wnl   Cardiovascular: RRR, no murmurs, rubs, or gallops  Gastrointestinal: Positive bowel sounds, soft, nontender, nondistended, cholecystostomy tube in place with dark green drainage   Musculoskeletal: Lt AKA with dressing d/i, right foot dressing, rogerio LE venous stasis changes. BAER spont   Psychiatric: Appropriate affect, cooperative  Neurologic: Oriented x 3, strength symmetric in all extremities, Cranial Nerves grossly intact to confrontation, speech clear. Follows commands   Skin: No rashes, right chest tunneled cath dressing cdi, pale   : cole to BSD    Pertinent Results     Results from last 7 days   Lab Units 06/20/24  0355 06/19/24  0343 06/16/24  0621 06/15/24  0630   WBC 10*3/mm3 8.98 9.68 7.16 9.15   HEMOGLOBIN g/dL 10.7* 10.4* 9.8* 10.1*   HEMATOCRIT % 33.0* 32.4* 30.9* 32.2*   PLATELETS 10*3/mm3 186 200 167 178   SODIUM mmol/L 134* 134* 136 133*   POTASSIUM mmol/L 5.5* 4.7 4.4 4.6   CHLORIDE mmol/L 104 102 103 101   CO2 mmol/L 18.0* 19.0* 23.0 19.0*   BUN mg/dL 56* 40* 36* 55*   CREATININE  "mg/dL 2.97* 2.63* 2.62* 3.31*   GLUCOSE mg/dL 76 78 70 82   CALCIUM mg/dL 8.5* 8.6 8.5* 8.3*     Results from last 7 days   Lab Units 06/20/24  0355 06/19/24  0343   BILIRUBIN mg/dL 0.4 0.3   ALK PHOS U/L 419* 492*   ALT (SGPT) U/L 33 47*   AST (SGOT) U/L 18 43*           Invalid input(s): \"TG\", \"LDLCALC\", \"LDLREALC\"      Brief Urine Lab Results  (Last result in the past 365 days)        Color   Clarity   Blood   Leuk Est   Nitrite   Protein   CREAT   Urine HCG        06/02/24 1721 Yellow   Clear   Trace   Trace   Negative   100 mg/dL (2+)                   Microbiology Results Abnormal       Procedure Component Value - Date/Time    Blood Culture - Blood, Blood, Central Line [[id number]]  (Normal) Collected: 05/31/24 2237    Lab Status: Final result Specimen: Blood, Central Line Updated: 06/06/24 0801     Blood Culture No growth at 5 days    Blood Culture - Blood, Arm, Right [[id number]]  (Normal) Collected: 05/31/24 2237    Lab Status: Final result Specimen: Blood from Arm, Right Updated: 06/06/24 0745     Blood Culture No growth at 5 days    Urine Culture - Urine, Indwelling Urethral Catheter [[id number]]  (Normal) Collected: 06/02/24 1721    Lab Status: Final result Specimen: Urine from Indwelling Urethral Catheter Updated: 06/04/24 0955     Urine Culture No growth            Imaging Results (All)       Procedure Component Value Units Date/Time    MRI Tibia Fibula Left Without Contrast [[id number]] Collected: 06/02/24 1138     Updated: 06/02/24 1151    Narrative:      MRI TIBIA FIBULA LEFT WO CONTRAST    Date of Exam: 6/2/2024 4:59 AM EDT    Indication: concern for osteomyelitis.     Comparison: None available.    Technique:  Routine multiplanar/multisequence images of the left tibia and fibula were obtained without contrast administration.        Findings:  Postoperative changes are noted status post prior below the knee amputation occurring at the level of the proximal diaphyses of the tibia and fibula. Nonspecific " scattered fluid signal is noted within the soft tissues at the surgical stump. The findings   may be reactive. Changes of soft tissue cellulitis could have this appearance.    Scarring is observed in the soft tissues directly adjacent to the osseous stub of the tibia. There are signal changes at the site of osteotomy of the tibia which are related to postoperative changes. Subtle increased signal is noted in this region.   Findings secondary to early developing superimposed osteomyelitis cannot be entirely excluded. Otherwise there is no separate definitive cortical erosion or destruction. No additional abnormal bone marrow edema is seen.    No well-defined fluid collections are seen in the soft tissues to suggest abscess.      Impression:      Impression:  1.Nonspecific edema throughout the soft tissues of the surgical stump of the lower leg status post below-the-knee amputation. The findings may indicate changes of soft tissue cellulitis. No well-defined abscess is observed.  2.Signal changes are noted at the osteotomy site of the tibial resection which may relate to postoperative changes. Subtle early signs of superimposed osteomyelitis at the distal osseous stump cannot be excluded. Otherwise no additional abnormal bone   marrow signal is seen throughout the tibia-fibula. There are no definitive additional potential sites of osteomyelitis.        Electronically Signed: Aj Hollingsworth MD    6/2/2024 11:48 AM EDT    Workstation ID: [id number]    MRI Foot Right Without Contrast [[id number]] Collected: 06/02/24 1113     Updated: 06/02/24 1128    Narrative:        MRI FOOT RIGHT WO CONTRAST    Date of Exam: 6/2/2024 5:02 AM EDT    Indication: R foot wounds, evaluate for osteomyelitis.     Comparison: None available.    Technique:  Routine multiplanar/multisequence sequence images of the right foot were obtained without contrast administration.      Findings:  There is edema in the subcutaneous soft tissues along the  medial, posterior, and lateral aspect of the ankle/hindfoot. There is also edema involving the subcutaneous soft tissues of the forefoot extending into the digits. Mild edema is also seen   throughout the midfoot. The findings suggest changes of soft tissue cellulitis throughout the foot.    There is a low signal focus within the subcutaneous soft tissues along the plantar aspect of the hindfoot. This finding measures 2.0 x 1.0 x 1.3 cm. This finding demonstrates decreased signal on both T1 and T2 weighted imaging. The etiology for this   abnormality is uncertain.    Moderate changes of arthropathy are seen throughout the midfoot indicating developing neuropathic arthropathy or Charcot foot. There is mild edema involving the peripheral margin of the lateral malleolus. The findings may indicate early signs of   developing osteomyelitis. No well-defined cortical destruction or erosion is seen. No definitive additional potential sites of osteomyelitis are identified.      Impression:      Impression:  1.Scattered edema throughout the soft tissues of the foot which may indicate diffuse soft tissue cellulitis.  2.There is an ovoid low signal focus within the subcutaneous soft tissues along the plantar aspect of the hindfoot adjacent to the plantar fascia. This finding may indicate developing fibromatosis related to planter fasciitis. Focal scarring in the soft   tissues in this region with fibrous change could have this appearance. Residual changes of prior hemorrhage could have this appearance. Findings related to prior postoperative change could potentially have this appearance as well.  3.Mild edema associated with the peripheral margin of the lateral malleolus. The finds are nonspecific but may indicate mild early signs of osteomyelitis. There is no definitive evidence for additional potential sites of osteomyelitis.  4.Changes of neuropathic arthropathy or Charcot foot involving the midfoot.        Electronically  Signed: Aj Hollingsworth MD    6/2/2024 11:25 AM EDT    Workstation ID: [id number]    CT Abdomen Pelvis Without Contrast [[id number]] Collected: 05/31/24 2141     Updated: 05/31/24 2151    Narrative:      CT ABDOMEN PELVIS WO CONTRAST    Date of Exam: 5/31/2024 9:17 PM EDT    Indication: leukocytosis, has a cholecystostomy tube.    Comparison: None available.    Technique: Axial CT images were obtained of the abdomen and pelvis without the administration of contrast. Reconstructed coronal and sagittal images were also obtained. Automated exposure control and iterative construction methods were used.      Findings:  Visualized Chest: Moderate cardiomegaly. Small pericardial effusion. Partially imaged central venous catheter with the distal tip at the cavoatrial junction.  Trace bilateral pleural effusions. Mild diffuse interstitial thickening.    Liver: Liver is normal in size and CT density. No focal lesions.    Gallbladder: Cholecystostomy tube is noted. The gallbladder is almost completely decompressed. There is mild fat stranding noted within the gallbladder fossa.    Bile Ducts: No billiary dcutal dilation.    Spleen: Spleen is normal in size and CT density.    Pancreas: Pancreas is normal. There is no evidence of pancreatic mass or peripancreatic fluid.    Adrenals: Adrenal glands are unremarkable.    Kidneys: Kidneys are normal in size. There are no stones or hydronephrosis.    Gastrointestinal: Wall thickening of the sigmoid colon and rectum. Additionally hyperdensity is noted within the rectum, nonspecific but may represent suppositories.  Otherwise the visualized GI tract is unremarkable on this study performed without IV and oral contrast.    Bladder: A Delaney catheter is present within the bladder. Significant bladder wall thickening, nonspecific but    Pelvis:  No suspecious mass.    Peritoneum/Mesentery: Mild diffuse mesenteric edema. No free fluid or pneumoperitoneum.      Lymph Nodes: Multiple prominent to  mildly enlarged para-aortic and mesenteric lymph nodes.    Vasculature: Extensive vascular calcifications noted throughout the visualized arteries of the abdomen and pelvis.    Abdominal Wall: Partially imaged large left inguinal hernia containing a portion of the sigmoid colon. Mild diffuse soft tissue edema.    Bony Structures: No acute osseous abnormality      Impression:      Impression:  Delaney catheter is present within the bladder with associated significant bladder wall thickening is concerning for cystitis.    Wall thickening of the sigmoid colon and rectum is also concerning for colitis/proctitis.    Ongoing mild inflammatory changes within the gallbladder fossa. Cholecystostomy tube appears to be in adequate position.    Large, partially imaged, left inguinal hernia containing a portion of the sigmoid colon without evidence of obstruction.    Findings concerning for pulmonary edema.    Multiple additional nonemergent findings as characterized above              Electronically Signed: Romel Dominguez DO    5/31/2024 9:48 PM EDT    Workstation ID: [id number]    XR Chest 1 View [[id number]] Collected: 05/31/24 1934     Updated: 05/31/24 1940    Narrative:      XR CHEST 1 VW    Date of Exam: 5/31/2024 7:10 PM EDT    Indication: central line    Comparison: None available.    Findings: Right subclavian central venous catheter with the tip in the superior vena cava. Left internal jugular central venous catheter with the tip in the superior vena cava. No consolidation. No pneumothorax or pleural effusion. Cardiac size is   normal. The clavicles are intact. No rib fractures. Large region of partially visualized coarse calcification of the right upper extremity likely dystrophic calcification given the location.      Impression:      No acute cardiopulmonary disease. Appropriate line placement. Likely dystrophic calcification of the right proximal humerus. Plain film evaluation of the right humerus  recommended.      Electronically Signed: Delmer Shea MD    5/31/2024 7:36 PM EDT    Workstation ID: [id number]            Results for orders placed during the hospital encounter of 05/31/24    Adult Transthoracic Echo Complete W/ Cont if Necessary Per Protocol    Interpretation Summary    Left ventricular systolic function is normal. Left ventricular ejection fraction appears to be 51 - 55%.    Left ventricular wall thickness is consistent with borderline concentric hypertrophy.    Left atrial volume is moderately increased.    There is mild calcification of the aortic valve.    Mild mitral valve regurgitation is present.    There is a small (<1cm) circumferential pericardial effusion.          Discharge Details        Discharge Medications        New Medications        Instructions Start Date   acetaminophen 325 MG tablet  Commonly known as: TYLENOL   650 mg, Oral, Every 4 Hours PRN      aspirin 81 MG EC tablet   81 mg, Oral, Daily      collagenase 250 UNIT/GM ointment   1 Application, Topical, Every 24 Hours      dilTIAZem  MG 24 hr capsule  Commonly known as: CARDIZEM CD   240 mg, Oral, Every 24 Hours Scheduled      famotidine 10 MG tablet  Commonly known as: PEPCID   10 mg, Oral, Daily      folic acid 1 MG tablet  Commonly known as: FOLVITE   1 mg, Oral, Daily      lactobacillus acidophilus capsule capsule   1 capsule, Oral, 2 Times Daily      melatonin 5 MG tablet tablet   5 mg, Oral, Nightly PRN      metoprolol succinate  MG 24 hr tablet  Commonly known as: TOPROL-XL   100 mg, Oral, Every 24 Hours Scheduled      Omadacycline Tosylate 150 MG tablet   Take 2 tablets (300 mg) by mouth Daily for 30 days.      Polyvinyl Alcohol-Povidone PF 1.4-0.6 % ophthalmic solution  Commonly known as: HYPOTEARS   1 drop, Both Eyes, Every 1 Hour PRN      sodium zirconium cyclosilicate 10 g packet  Commonly known as: LOKELMA   10 g, Oral, Daily, Empty entire contents of the packet(s) into a glass with at least 3  tablespoons (45 mL) of water. Stir well and drink immediately; if powder remains in the glass, add water, stir and drink immediately; repeat until no powder remains. Administer other oral medications at least 2 hours before or 2 hours after dose.      vancomycin 125 MG capsule  Commonly known as: VANCOCIN   125 mg, Oral, 2 Times Daily      vancomycin 125 MG capsule  Commonly known as: VANCOCIN   125 mg, Oral, Daily   Start Date: June 22, 2024            Changes to Medications        Instructions Start Date   levothyroxine 75 MCG tablet  Commonly known as: SYNTHROID, LEVOTHROID  What changed:   medication strength  how much to take   75 mcg, Oral, Every Early Morning   Start Date: June 21, 2024            Continue These Medications        Instructions Start Date   apixaban 5 MG tablet tablet  Commonly known as: ELIQUIS   5 mg, Oral, 2 Times Daily             Stop These Medications      amiodarone 200 MG tablet  Commonly known as: PACERONE     finasteride 5 MG tablet  Commonly known as: PROSCAR     hydrALAZINE 25 MG tablet  Commonly known as: APRESOLINE     sodium bicarbonate 650 MG tablet     tamsulosin 0.4 MG capsule 24 hr capsule  Commonly known as: FLOMAX     torsemide 100 MG tablet  Commonly known as: DEMADEX     traZODone 50 MG tablet  Commonly known as: DESYREL              Allergies   Allergen Reactions    Penicillins Hives     Received ceftriaxone 6/1/24    Levothyroxine Unknown - Low Severity    Hydromorphone Other (See Comments)     Confusion, encephalopathy per wife         Discharge Disposition:  Skilled Nursing Facility (DC - External)    Discharge Diet:  Diet Order   Procedures    Diet: Regular/House; Fluid Consistency: Thin (IDDSI 0)       Discharge Activity:  Activity Instructions       Activity as Tolerated      Measure Blood Pressure      Measure Weight                CODE STATUS:    Code Status and Medical Interventions:   Ordered at: 05/31/24 1429     Code Status (Patient has no pulse and is not  breathing):    CPR (Attempt to Resuscitate)     Medical Interventions (Patient has pulse or is breathing):    Full Support         Future Appointments   Date Time Provider Department Center   6/20/2024  2:30 PM MED 7  [facility]       Additional Instructions for the Follow-ups that You Need to Schedule       Discharge Follow-up with PCP   As directed       Currently Documented PCP:    Hayden Weinstein MD    PCP Phone Number:    [phone]     Follow Up Details: To be seen by provider at facility on arrival, PCP 1 week of discharge        Discharge Follow-up with Specialty: Follow-up with [facility] in 2-3 weeks (please schedule appointment prior to DC)   As directed      Specialty: Follow-up with [facility] in 2-3 weeks (please schedule appointment prior to DC)        Discharge Follow-up with Specified Provider: Follow-up with Dr. Fuentes with ID in 2 weeks (please schedule appointment prior to DC)   As directed      To: Follow-up with Dr. Fuentes with ID in 2 weeks (please schedule appointment prior to DC)        Discharge Follow-up with Specified Provider: Follow-up with [facility] as prior scheduled (please ensure appointment is arranged, if not scheduled for approximately 4-6 weeks from now)   As directed      To: Follow-up with [facility] as prior scheduled (please ensure appointment is arranged, if not scheduled for approximately 4-6 weeks from now)        Discharge Follow-up with Specified Provider: To continue with daily HD and shorter sessions at facility per case management.  Continue current renal medications per .  Will need follow-up with his primary nephrologist managing at the facil...   As directed      To: To continue with daily HD and shorter sessions at facility per case management.  Continue current renal medications per .  Will need follow-up with his primary nephrologist managing at the facility        Discharge Follow-up with Specified Provider: Up with  [facility] for further management of his nominal drain placed by them.  Patient missed appointment while inpatient here on 6/6/2024.  (Please reschedule appointment for first available/2...   As directed      To: Up with [facility] for further management of his nominal drain placed by them.  Patient missed appointment while inpatient here on 6/6/2024.  (Please reschedule appointment for first available/2-3 weeks from now)                Electronically signed by ROSELINE Paris, 06/20/24, 12:48 PM EDT.      Time Spent on Discharge: I spent 65 minutes on this discharge activity which included: face-to-face encounter with the patient, reviewing the data in the system, coordination of the care with the nursing staff as well as consultants, documentation, and entering orders.

## 2024-06-20 NOTE — PROGRESS NOTES
" LOS: 20 days   Patient Care Team:  Hayden Weinstein MD as PCP - General (Nephrology)    Chief Complaint: ESRD  NSTEMI    Subjective     Seen on HD. Treatment time cut short to 2.5 hr. UF 1.5-2 liter as tolerated.           History taken from: patient    Objective     Vital Sign Min/Max for last 24 hours  Temp  Min: 97.9 °F (36.6 °C)  Max: 98.2 °F (36.8 °C)   BP  Min: 97/86  Max: 166/99   Pulse  Min: 60  Max: 128   Resp  Min: 18  Max: 18   SpO2  Min: 97 %  Max: 98 %   No data recorded   No data recorded     Flowsheet Rows      Flowsheet Row First Filed Value   Admission Height 185.4 cm (73\") Documented at 05/31/2024 1826   Admission Weight 84.8 kg (187 lb) Documented at 05/31/2024 1826            No intake/output data recorded.  I/O last 3 completed shifts:  In: 1170 [P.O.:720; Other:450]  Out: 1050 [Urine:775; Drains:275]    Objective   Gen: Alert, NAD   HENT: NC, AT, EOMI   NECK: Supple, no JVD, Trachea midline   LUNGS: CTA bilaterally, non labored respirtation   CVS: S1/S2 audible, RRR, no murmur   Abd: Soft, NT, ND, BS+   : + Delaney   Ext: Left BKA  CNS: Alert, No focal deficit noted grossly  Psy: Cooperative  Skin: Warm, dry and intact      Results Review:     I reviewed the patient's new clinical results.    WBC WBC   Date Value Ref Range Status   06/20/2024 8.98 3.40 - 10.80 10*3/mm3 Final   06/19/2024 9.68 3.40 - 10.80 10*3/mm3 Final        HGB Hemoglobin   Date Value Ref Range Status   06/20/2024 10.7 (L) 13.0 - 17.7 g/dL Final   06/19/2024 10.4 (L) 13.0 - 17.7 g/dL Final        HCT Hematocrit   Date Value Ref Range Status   06/20/2024 33.0 (L) 37.5 - 51.0 % Final   06/19/2024 32.4 (L) 37.5 - 51.0 % Final        Platlets No results found for: \"LABPLAT\"   MCV MCV   Date Value Ref Range Status   06/20/2024 94.8 79.0 - 97.0 fL Final   06/19/2024 95.0 79.0 - 97.0 fL Final            Sodium Sodium   Date Value Ref Range Status   06/20/2024 134 (L) 136 - 145 mmol/L Final   06/19/2024 134 (L) 136 - 145 mmol/L Final " "     Potassium Potassium   Date Value Ref Range Status   06/20/2024 5.5 (H) 3.5 - 5.2 mmol/L Final   06/19/2024 4.7 3.5 - 5.2 mmol/L Final      Chloride Chloride   Date Value Ref Range Status   06/20/2024 104 98 - 107 mmol/L Final   06/19/2024 102 98 - 107 mmol/L Final      CO2 CO2   Date Value Ref Range Status   06/20/2024 18.0 (L) 22.0 - 29.0 mmol/L Final   06/19/2024 19.0 (L) 22.0 - 29.0 mmol/L Final      BUN BUN   Date Value Ref Range Status   06/20/2024 56 (H) 8 - 23 mg/dL Final   06/19/2024 40 (H) 8 - 23 mg/dL Final      Creatinine Creatinine   Date Value Ref Range Status   06/20/2024 2.97 (H) 0.76 - 1.27 mg/dL Final   06/19/2024 2.63 (H) 0.76 - 1.27 mg/dL Final      Calcium Calcium   Date Value Ref Range Status   06/20/2024 8.5 (L) 8.6 - 10.5 mg/dL Final   06/19/2024 8.6 8.6 - 10.5 mg/dL Final      PO4 No results found for: \"CAPO4\"   Albumin Albumin   Date Value Ref Range Status   06/20/2024 2.7 (L) 3.5 - 5.2 g/dL Final   06/19/2024 2.7 (L) 3.5 - 5.2 g/dL Final            Magnesium Magnesium   Date Value Ref Range Status   06/20/2024 1.8 1.6 - 2.4 mg/dL Final   06/19/2024 1.9 1.6 - 2.4 mg/dL Final          Uric Acid No results found for: \"URICACID\"     Medication Review: Yes    Assessment & Plan       NSTEMI (non-ST elevated myocardial infarction)    C. difficile colitis    History of cholecystitis s/p cholecystostomy tube    ESRD (end stage renal disease) on hemodialysis    Atrial fibrillation    Essential hypertension    Moderate malnutrition    Soft tissue infection    Acute osteomyelitis of left tibia      Assessment & Plan    ESRD: On TTS jonathan. HD through right IJ TDC. Still makes urine. Does have cole cath+ for chronic urinary retention. Patient the family Primary nephrologist (Dr Wan) was monitoring for renal recovery and residual renal function.      Volume status: No significant LE edema noted.      Afib w RVR: RVR during HD treatment. BP controlled. . No chest pain or sob.       Met acidosis: " Mild. Management with HD.     Anemia: ACD. Transfuse at hb<7.  ALLISON on HD days     CAD: Transferred from OSH for evaluation of ACD. Cardiology following. EF 55%    Plan:  - Hd per TTS jonathan. 2k Bath   - Renal diet (Doesn't want to follow)  - ALLISON with HD       Hayden Weinstein MD  06/20/24  10:05 EDT

## 2024-06-20 NOTE — DISCHARGE INSTR - LAB
GENERAL SURGERY  [phone]  OFFICE WILL CALL WITH APPOINTMENT DATE/TIME     UROLOGY  [phone]  OFFICE WILL CALL WITH APPOINTMENT  DATE/TIME     CARDIOLOGY  [phone]  TO SEE DR. JEAN BAPTISTE 7/1/2024 AT 11:00AM

## 2024-06-20 NOTE — CASE MANAGEMENT/SOCIAL WORK
Case Management Discharge Note      Final Note: Insurance has approved and the patient has accepted the bed at [facility] in [place]. He will be transported today by  EMS at 1430. PCS filed electronically. Patient's Nuzyra is being filled by  pharmacy and will be delivered to him prior to discharge. Patient has been granted permission to bring this medication with him to the facility. RN please call report to [phone]. Rochelle with [facility] will get the discharge summary to the facility when it is available. [facility] is the pharmacy used by the facility. This has been updated in Epic.         Selected Continued Care - Admitted Since 5/31/2024       Destination Coordination complete.      Service Provider Selected Services Address Phone Fax Patient Preferred    [facility] [address] [place] [id number] [phone] --              Durable Medical Equipment    No services have been selected for the patient.                Dialysis/Infusion    No services have been selected for the patient.                Home Medical Care    No services have been selected for the patient.                Therapy    No services have been selected for the patient.                Community Resources    No services have been selected for the patient.                Community & DME    No services have been selected for the patient.                    Transportation Services  Ambulance: [facility]    Final Discharge Disposition Code: 03 - skilled nursing facility (SNF)

## 2024-06-20 NOTE — NURSING NOTE
[facility] follow-up for management of complicated wounds.    Left knee is showing so much problem as it is a lots of epithelialization I compared to previous pictures there is a considerable amount of reepithelialization and significant reduction in overall size of wound.  The slough is now just yellow adherent to Santyl is slow but it is working.  Wound bed granulating.              Continue daily wound care: Cleanse with a 10-minute Vashe soak, apply Santyl nickel thick to slough, applied multilayer Xeroform to all of wound bed top with ABD pad wrapped with Kerlix and secure with stockinette change daily.    The right medial heel wound is also significantly smaller however when wound bed does not look any better still yellow slough pale pink wound edges.  Wound is ember.    Continue daily wound care: Cleanse with Vashe or saline gauze apply nickel thick Santyl and topped with silicone foam heel dressings for offloading.  Change daily.    Looks like we might of had a little backslide on the right lateral leg wound.  The tendon is a little bit puffier and yellow year and stringy air when I took the dressing off.  It was nowhere near as pretty as the last picture is actually more resembling the picture before that.  However there is still contraction evident in the wound bed.  It is smaller than when he came in.    Continue every other day wound care: Cleanse with 5 to 10-minute Vashe soak then apply saline hydrogel to tendon to keep moist cover with Mepitel silicone mesh and topped with Mepilex Ag silver silver foam.  Changes every other day.  Right wrapped with Kerlix to secure the silver foam dressing.      Sacral wounds changed by nursing today.    The only wound I am concerned about it actually after I cleansed that the photo looks better than when he got here however there has been no reduction in the size of the wound bed.  It is full-thickness subcutaneous tissue exposed.  I was using honey every other day I  tried Hope every other day and I might just have to increase the frequency of the change soaking it clean better.    I called report and discussed with accepting nurse.  We will cleanse this every day with saline or Vashe and then apply silver Hydrofiber and cover with silicone foam.  Once the Hydrofiber runs out of the facility cannot get anymore you can go back to either Hope extra Hope or Thera honey sheet.      If right lateral leg wound with exposed tendon fails to heal in 2 to 4 weeks and there is no significant closure or it starts draining exudate please reimage.  Imaging as stands was inconclusive for osteomyelitis however nonhealing and/or production of exudate would signal possible infection.    Patient to continue high-protein diet for wound healing.

## 2024-06-20 NOTE — NURSING NOTE
Prior to central line removal, order for the removal of catheter was verified, patient was assessed, necessary materials were gathered and patient was educated regarding procedure .    Patient was positioned supine and flat to ensure that the insertion site was at or below the level of the heart.    Hands were washed, clean gloves were applied and central line dressing was gently removed. Catheter exit site was not cultured.     A new pair of clean gloves were then applied. Insertion site was cleansed with 2% Chlorhexidine swab using a circular motion beginning at the insertion site and moving outward for 30 seconds and allowed to dry.     Clamp on line was present and clamped.     Patient was instructed to hold breath as catheter was withdrawn.     The central line was grasped at the insertion site and slowly pulled outward parallel to the skin. Resistance was not met.    After central line was completely removed, a sterile 4x4 gauze pad was used to apply light pressure until bleeding stopped. At that time, petroleum-based gauze and a sterile occlusive dressing was applied to exit site.     Patient was instructed to keep dressing in place for at least 24 hours and to remain in a flat or reclining position for 30 minutes post-removal.     Catheter was inspected after removal and was intact. Tip of central line was not sent for culture. Patient tolerated procedure.

## 2024-06-28 ENCOUNTER — HOSPITAL ENCOUNTER (OUTPATIENT)
Facility: HOSPITAL | Age: 69
Setting detail: OBSERVATION
Discharge: NURSING, REHAB OR LONG-TERM CARE | End: 2024-07-02
Attending: EMERGENCY MEDICINE | Admitting: STUDENT IN AN ORGANIZED HEALTH CARE EDUCATION/TRAINING PROGRAM
Payer: MEDICARE

## 2024-06-28 ENCOUNTER — APPOINTMENT (OUTPATIENT)
Dept: GENERAL RADIOLOGY | Facility: HOSPITAL | Age: 69
End: 2024-06-28
Payer: MEDICARE

## 2024-06-28 DIAGNOSIS — S81.801A LEG WOUND, RIGHT, INITIAL ENCOUNTER: Primary | ICD-10-CM

## 2024-06-28 DIAGNOSIS — D64.9 CHRONIC ANEMIA: ICD-10-CM

## 2024-06-28 DIAGNOSIS — N18.6 END STAGE RENAL DISEASE ON DIALYSIS: ICD-10-CM

## 2024-06-28 DIAGNOSIS — I10 HYPERTENSION NOT AT GOAL: ICD-10-CM

## 2024-06-28 DIAGNOSIS — Z99.2 END STAGE RENAL DISEASE ON DIALYSIS: ICD-10-CM

## 2024-06-28 LAB
ALBUMIN SERPL-MCNC: 3.2 G/DL (ref 3.5–5.2)
ALBUMIN/GLOB SERPL: 1.1 G/DL
ALP SERPL-CCNC: 277 U/L (ref 39–117)
ALT SERPL W P-5'-P-CCNC: 22 U/L (ref 1–41)
ANION GAP SERPL CALCULATED.3IONS-SCNC: 12.9 MMOL/L (ref 5–15)
APTT PPP: 41.1 SECONDS (ref 22.7–35.4)
AST SERPL-CCNC: 13 U/L (ref 1–40)
BASOPHILS # BLD AUTO: 0.04 10*3/MM3 (ref 0–0.2)
BASOPHILS NFR BLD AUTO: 0.5 % (ref 0–1.5)
BILIRUB SERPL-MCNC: 0.3 MG/DL (ref 0–1.2)
BUN SERPL-MCNC: 37 MG/DL (ref 8–23)
BUN/CREAT SERPL: 10.5 (ref 7–25)
CALCIUM SPEC-SCNC: 9.4 MG/DL (ref 8.6–10.5)
CHLORIDE SERPL-SCNC: 97 MMOL/L (ref 98–107)
CO2 SERPL-SCNC: 24.1 MMOL/L (ref 22–29)
CREAT SERPL-MCNC: 3.52 MG/DL (ref 0.76–1.27)
CRP SERPL-MCNC: 2.19 MG/DL (ref 0–0.5)
DEPRECATED RDW RBC AUTO: 48.9 FL (ref 37–54)
EGFRCR SERPLBLD CKD-EPI 2021: 17.8 ML/MIN/1.73
EOSINOPHIL # BLD AUTO: 0.26 10*3/MM3 (ref 0–0.4)
EOSINOPHIL NFR BLD AUTO: 3.5 % (ref 0.3–6.2)
ERYTHROCYTE [DISTWIDTH] IN BLOOD BY AUTOMATED COUNT: 13.8 % (ref 12.3–15.4)
ERYTHROCYTE [SEDIMENTATION RATE] IN BLOOD: 24 MM/HR (ref 0–20)
GLOBULIN UR ELPH-MCNC: 3 GM/DL
GLUCOSE SERPL-MCNC: 87 MG/DL (ref 65–99)
HCT VFR BLD AUTO: 28 % (ref 37.5–51)
HGB BLD-MCNC: 8.9 G/DL (ref 13–17.7)
IMM GRANULOCYTES # BLD AUTO: 0.03 10*3/MM3 (ref 0–0.05)
IMM GRANULOCYTES NFR BLD AUTO: 0.4 % (ref 0–0.5)
INR PPP: 1.34 (ref 0.9–1.1)
LYMPHOCYTES # BLD AUTO: 1.68 10*3/MM3 (ref 0.7–3.1)
LYMPHOCYTES NFR BLD AUTO: 22.5 % (ref 19.6–45.3)
MCH RBC QN AUTO: 30.7 PG (ref 26.6–33)
MCHC RBC AUTO-ENTMCNC: 31.8 G/DL (ref 31.5–35.7)
MCV RBC AUTO: 96.6 FL (ref 79–97)
MONOCYTES # BLD AUTO: 0.73 10*3/MM3 (ref 0.1–0.9)
MONOCYTES NFR BLD AUTO: 9.8 % (ref 5–12)
NEUTROPHILS NFR BLD AUTO: 4.72 10*3/MM3 (ref 1.7–7)
NEUTROPHILS NFR BLD AUTO: 63.3 % (ref 42.7–76)
NRBC BLD AUTO-RTO: 0 /100 WBC (ref 0–0.2)
PLATELET # BLD AUTO: 225 10*3/MM3 (ref 140–450)
PMV BLD AUTO: 8.9 FL (ref 6–12)
POTASSIUM SERPL-SCNC: 4.2 MMOL/L (ref 3.5–5.2)
PROT SERPL-MCNC: 6.2 G/DL (ref 6–8.5)
PROTHROMBIN TIME: 16.8 SECONDS (ref 11.7–14.2)
RBC # BLD AUTO: 2.9 10*6/MM3 (ref 4.14–5.8)
SODIUM SERPL-SCNC: 134 MMOL/L (ref 136–145)
WBC NRBC COR # BLD AUTO: 7.46 10*3/MM3 (ref 3.4–10.8)

## 2024-06-28 PROCEDURE — 85652 RBC SED RATE AUTOMATED: CPT | Performed by: EMERGENCY MEDICINE

## 2024-06-28 PROCEDURE — 25010000002 CEFEPIME PER 500 MG: Performed by: EMERGENCY MEDICINE

## 2024-06-28 PROCEDURE — 80053 COMPREHEN METABOLIC PANEL: CPT | Performed by: EMERGENCY MEDICINE

## 2024-06-28 PROCEDURE — G0378 HOSPITAL OBSERVATION PER HR: HCPCS

## 2024-06-28 PROCEDURE — 25810000003 SODIUM CHLORIDE 0.9 % SOLUTION: Performed by: EMERGENCY MEDICINE

## 2024-06-28 PROCEDURE — 85610 PROTHROMBIN TIME: CPT | Performed by: EMERGENCY MEDICINE

## 2024-06-28 PROCEDURE — 99285 EMERGENCY DEPT VISIT HI MDM: CPT

## 2024-06-28 PROCEDURE — 87102 FUNGUS ISOLATION CULTURE: CPT | Performed by: STUDENT IN AN ORGANIZED HEALTH CARE EDUCATION/TRAINING PROGRAM

## 2024-06-28 PROCEDURE — 97602 WOUND(S) CARE NON-SELECTIVE: CPT

## 2024-06-28 PROCEDURE — 86140 C-REACTIVE PROTEIN: CPT | Performed by: EMERGENCY MEDICINE

## 2024-06-28 PROCEDURE — 25010000002 VANCOMYCIN 10 G RECONSTITUTED SOLUTION: Performed by: EMERGENCY MEDICINE

## 2024-06-28 PROCEDURE — 99204 OFFICE O/P NEW MOD 45 MIN: CPT | Performed by: ORTHOPAEDIC SURGERY

## 2024-06-28 PROCEDURE — 85730 THROMBOPLASTIN TIME PARTIAL: CPT | Performed by: EMERGENCY MEDICINE

## 2024-06-28 PROCEDURE — 85025 COMPLETE CBC W/AUTO DIFF WBC: CPT | Performed by: EMERGENCY MEDICINE

## 2024-06-28 PROCEDURE — 73590 X-RAY EXAM OF LOWER LEG: CPT

## 2024-06-28 PROCEDURE — 96365 THER/PROPH/DIAG IV INF INIT: CPT

## 2024-06-28 RX ORDER — SODIUM CHLORIDE 9 MG/ML
40 INJECTION, SOLUTION INTRAVENOUS AS NEEDED
Status: DISCONTINUED | OUTPATIENT
Start: 2024-06-28 | End: 2024-07-03 | Stop reason: HOSPADM

## 2024-06-28 RX ORDER — SODIUM CHLORIDE 0.9 % (FLUSH) 0.9 %
10 SYRINGE (ML) INJECTION AS NEEDED
Status: DISCONTINUED | OUTPATIENT
Start: 2024-06-28 | End: 2024-07-03 | Stop reason: HOSPADM

## 2024-06-28 RX ORDER — DOXYCYCLINE 100 MG/1
100 CAPSULE ORAL EVERY 12 HOURS SCHEDULED
Status: DISCONTINUED | OUTPATIENT
Start: 2024-06-28 | End: 2024-07-03 | Stop reason: HOSPADM

## 2024-06-28 RX ORDER — LOSARTAN POTASSIUM 25 MG/1
25 TABLET ORAL NIGHTLY
Status: DISCONTINUED | OUTPATIENT
Start: 2024-06-28 | End: 2024-07-03 | Stop reason: HOSPADM

## 2024-06-28 RX ORDER — ASPIRIN 81 MG/1
81 TABLET ORAL DAILY
Status: DISCONTINUED | OUTPATIENT
Start: 2024-06-28 | End: 2024-07-03 | Stop reason: HOSPADM

## 2024-06-28 RX ORDER — HYDROCODONE BITARTRATE AND ACETAMINOPHEN 5; 325 MG/1; MG/1
1 TABLET ORAL EVERY 6 HOURS PRN
Status: CANCELLED | OUTPATIENT
Start: 2024-06-28 | End: 2024-07-03

## 2024-06-28 RX ORDER — BISACODYL 5 MG/1
5 TABLET, DELAYED RELEASE ORAL DAILY PRN
Status: DISCONTINUED | OUTPATIENT
Start: 2024-06-28 | End: 2024-07-03 | Stop reason: HOSPADM

## 2024-06-28 RX ORDER — SODIUM CHLORIDE 0.9 % (FLUSH) 0.9 %
10 SYRINGE (ML) INJECTION EVERY 12 HOURS SCHEDULED
Status: DISCONTINUED | OUTPATIENT
Start: 2024-06-28 | End: 2024-07-03 | Stop reason: HOSPADM

## 2024-06-28 RX ORDER — HEPARIN SODIUM 5000 [USP'U]/ML
5000 INJECTION, SOLUTION INTRAVENOUS; SUBCUTANEOUS EVERY 8 HOURS SCHEDULED
Status: DISCONTINUED | OUTPATIENT
Start: 2024-06-28 | End: 2024-06-28

## 2024-06-28 RX ORDER — AMOXICILLIN 250 MG
2 CAPSULE ORAL 2 TIMES DAILY PRN
Status: DISCONTINUED | OUTPATIENT
Start: 2024-06-28 | End: 2024-07-03 | Stop reason: HOSPADM

## 2024-06-28 RX ORDER — LEVOTHYROXINE SODIUM 0.07 MG/1
75 TABLET ORAL
Status: DISCONTINUED | OUTPATIENT
Start: 2024-06-29 | End: 2024-07-03 | Stop reason: HOSPADM

## 2024-06-28 RX ORDER — METOPROLOL SUCCINATE 25 MG/1
100 TABLET, EXTENDED RELEASE ORAL
Status: DISCONTINUED | OUTPATIENT
Start: 2024-06-28 | End: 2024-07-03 | Stop reason: HOSPADM

## 2024-06-28 RX ORDER — BISACODYL 10 MG
10 SUPPOSITORY, RECTAL RECTAL DAILY PRN
Status: DISCONTINUED | OUTPATIENT
Start: 2024-06-28 | End: 2024-07-03 | Stop reason: HOSPADM

## 2024-06-28 RX ORDER — DILTIAZEM HYDROCHLORIDE 240 MG/1
240 CAPSULE, COATED, EXTENDED RELEASE ORAL
Status: DISCONTINUED | OUTPATIENT
Start: 2024-06-28 | End: 2024-07-03 | Stop reason: HOSPADM

## 2024-06-28 RX ORDER — POLYETHYLENE GLYCOL 3350 17 G/17G
17 POWDER, FOR SOLUTION ORAL DAILY PRN
Status: DISCONTINUED | OUTPATIENT
Start: 2024-06-28 | End: 2024-07-03 | Stop reason: HOSPADM

## 2024-06-28 RX ADMIN — CEFEPIME 2000 MG: 2 INJECTION, POWDER, FOR SOLUTION INTRAVENOUS at 14:15

## 2024-06-28 RX ADMIN — APIXABAN 5 MG: 5 TABLET, FILM COATED ORAL at 22:08

## 2024-06-28 RX ADMIN — Medication 10 ML: at 22:08

## 2024-06-28 RX ADMIN — COLLAGENASE SANTYL 1 APPLICATION: 250 OINTMENT TOPICAL at 18:30

## 2024-06-28 RX ADMIN — VANCOMYCIN HYDROCHLORIDE 1750 MG: 10 INJECTION, POWDER, LYOPHILIZED, FOR SOLUTION INTRAVENOUS at 23:28

## 2024-06-28 RX ADMIN — LOSARTAN POTASSIUM 25 MG: 25 TABLET, FILM COATED ORAL at 22:08

## 2024-06-28 RX ADMIN — DOXYCYCLINE 100 MG: 100 CAPSULE ORAL at 22:08

## 2024-06-28 NOTE — ED NOTES
Nursing report ED to floor  Tavo Gudino  69 y.o.  male    HPI :  HPI (Adult)  Stated Reason for Visit: wound to R calf    Chief Complaint  Chief Complaint   Patient presents with    Wound Check       Admitting doctor:   Stanley Ford MD    Admitting diagnosis:   The encounter diagnosis was Leg wound, right, initial encounter.    Code status:   Current Code Status       Date Active Code Status Order ID Comments User Context       Prior            Allergies:   Penicillins, Levothyroxine, and Hydromorphone    Isolation:   Contact Spore, Contact    Intake and Output  No intake or output data in the 24 hours ending 06/28/24 1401    Weight:       06/28/24  1157   Weight: 84.4 kg (186 lb)       Most recent vitals:   Vitals:    06/28/24 1244 06/28/24 1301 06/28/24 1331 06/28/24 1346   BP: 162/63 (!) 172/109 165/97 165/97   Patient Position:       Pulse: 95 86 79 91   Resp:   16    Temp:       TempSrc:       SpO2: 98% 100% 100% 100%   Weight:       Height:           Active LDAs/IV Access:   Lines, Drains & Airways       Active LDAs       Name Placement date Placement time Site Days    CVC Triple Lumen 05/31/24 Left Internal jugular 05/31/24  1917  Internal jugular  27    Peripheral IV 06/28/24 1221 Anterior;Right Forearm 06/28/24  1221  Forearm  less than 1    Closed/Suction Drain Right;Anterior Abdomen Other (Comment) --  --  Abdomen  --    Urethral Catheter 05/31/24  1820  -- 27    Hemodialysis Cath Double --  --  Subclavian  --                    Labs (abnormal labs have a star):   Labs Reviewed   COMPREHENSIVE METABOLIC PANEL - Abnormal; Notable for the following components:       Result Value    BUN 37 (*)     Creatinine 3.52 (*)     Sodium 134 (*)     Chloride 97 (*)     Albumin 3.2 (*)     Alkaline Phosphatase 277 (*)     eGFR 17.8 (*)     All other components within normal limits    Narrative:     GFR Normal >60  Chronic Kidney Disease <60  Kidney Failure <15    The GFR formula is only valid for adults with stable  renal function between ages 18 and 70.   PROTIME-INR - Abnormal; Notable for the following components:    Protime 16.8 (*)     INR 1.34 (*)     All other components within normal limits   APTT - Abnormal; Notable for the following components:    PTT 41.1 (*)     All other components within normal limits   SEDIMENTATION RATE - Abnormal; Notable for the following components:    Sed Rate 24 (*)     All other components within normal limits   C-REACTIVE PROTEIN - Abnormal; Notable for the following components:    C-Reactive Protein 2.19 (*)     All other components within normal limits   CBC WITH AUTO DIFFERENTIAL - Abnormal; Notable for the following components:    RBC 2.90 (*)     Hemoglobin 8.9 (*)     Hematocrit 28.0 (*)     All other components within normal limits   CBC AND DIFFERENTIAL    Narrative:     The following orders were created for panel order CBC & Differential.  Procedure                               Abnormality         Status                     ---------                               -----------         ------                     CBC Auto Differential[[id number]]        Abnormal            Final result                 Please view results for these tests on the individual orders.       EKG:   No orders to display       Meds given in ED:   Medications   sodium chloride 0.9 % flush 10 mL (has no administration in time range)   cefepime 2000 mg IVPB in 100 mL NS (MBP) (has no administration in time range)   vancomycin 1750 mg/500 mL 0.9% NS IVPB ([facility]) (has no administration in time range)       Imaging results:  XR Tibia Fibula 2 View Right    Result Date: 6/28/2024   As described.    This report was finalized on 6/28/2024 12:54 PM by Dr. Aristeo Damon M.D on Workstation: [facility] [employer]       Ambulatory status:   - bed rest.    Social issues:   Social History     Socioeconomic History    Marital status:    Tobacco Use    Smoking status: Never    Smokeless tobacco: Never   Vaping Use    Vaping  status: Never Used   Substance and Sexual Activity    Alcohol use: Never    Drug use: Never    Sexual activity: Defer       Peripheral Neurovascular  Peripheral Neurovascular (Adult)  Peripheral Neurovascular WDL: WDL    Neuro Cognitive  Neuro Cognitive (Adult)  Cognitive/Neuro/Behavioral WDL: WDL    Learning  Learning Assessment (Adult)  Learning Readiness and Ability: no barriers identified    Respiratory  Respiratory WDL  Respiratory WDL: WDL    Abdominal Pain  Safety Interventions  Safety Precautions/Falls Reduction: assistive device/personal items within reach, fall reduction program maintained    Pain Assessments  Pain (Adult)  (0-10) Pain Rating: Rest: 9  (0-10) Pain Rating: Activity: 9    NIH Stroke Scale       Kayla Ibarra RN  06/28/24 14:01 EDT

## 2024-06-28 NOTE — ED NOTES
Pt arrives via EMS from [facility] for a wound to his right calf. EMS reports that it was wrapped when they arrived. Staff reports that there are tendons protruding. Noticed it this morning.

## 2024-06-28 NOTE — ED NOTES
"Pt arrives via EMS from [facility] for what staff there stated to EMS was a new wound with \"tendons protruding\". EMS states that the wound was wrapped prior to their arrival, with bleeding controlled. This nurse unwrapped the affected leg, and visualized an existing lateral venous ulcer where the dressing had become dislodged, with Mepitel wound dressing utilized without the plastic film removed. No tendon visualized, and chronic venous ulcer healing visualized around the existing wound bed. Pt is Aox4 at time of evaluation, and answering questions appropriately.   "

## 2024-06-28 NOTE — CONSULTS
[facility] Consult Note      Patient Name: Tavo Gudino  : 1955  MRN: [id number]  Primary Care Physician:  System, Provider Not In  Referring Physician: Stanley Ford MD  Date of admission: 2024    Subjective     Reason for Consult:  ESRD     HPI:   Tavo Gudino is a 69 y.o. male with ESRD, PVD s/p left BKA, HFrEF, AFIB on AC who presented from [facility] for RLE wound.  He was noted to have exposed tendon there per DC summary during prolonged hospitalization in [place]  to 24 but reportedly refused surgical intervention.  He also had NSTEMI, managed medically, AF/RVR.  MRI of RLE raised question of poss osteomyelitis, was seen by vascular surgery and ID, given eravacycline.  Also had c diff colitis.  Recent cholecystitis s/p cholecystostomy drain.  Chronic urinary retention with cole.  Notably on daily lokelma.      BP high up to 185/95 here.  K/HCo3 normal 4.2/24 with BUN/Cr 37/3.5.  Hgb is 8.9 and WBC normal 7.4.  RLE x-ray shows soft tissue swelling and dystrophic calcification, possible erosion vs osteomyelitis along mid to distal fibula shaft.  Been doing short daily dialysis at rehab, access is RI TD.  Last HD yesterday.  Denies dyspnea or nausea.      Review of Systems:   14 point review of systems is otherwise negative except for mentioned above on HPI    Personal History     Past Medical History:   Diagnosis Date   • Atrial fibrillation    • Chronic kidney disease (CKD), stage V    • ESRD on hemodialysis    • Hypertension    • Metabolic acidosis    • Peripheral arterial disease    • Pneumothorax    • Secondary hyperparathyroidism        Past Surgical History:   Procedure Laterality Date   • ARTERIOVENOUS FISTULA Right    • BELOW KNEE LEG AMPUTATION Left    • CATARACT EXTRACTION Bilateral        Family History: family history includes Cancer in his father.    Social History:  reports that he has never smoked. He has never used smokeless tobacco. He  reports that he does not drink alcohol and does not use drugs.    Home Medications:  Prior to Admission medications    Medication Sig Start Date End Date Taking? Authorizing Provider   acetaminophen (TYLENOL) 325 MG tablet Take 2 tablets by mouth Every 4 (Four) Hours As Needed for Mild Pain. 6/20/24  Yes Marilee Garrido APRN   apixaban (ELIQUIS) 5 MG tablet tablet Take 1 tablet by mouth 2 (Two) Times a Day.   Yes Provider, MD Dilia   aspirin 81 MG EC tablet Take 1 tablet by mouth Daily. 6/20/24  Yes Marilee Garrido APRN   dilTIAZem CD (CARDIZEM CD) 240 MG 24 hr capsule Take 1 capsule by mouth Daily. 6/20/24  Yes Marilee Garrido APRN   famotidine (PEPCID) 10 MG tablet Take 1 tablet by mouth Daily. 6/20/24  Yes Marilee Garrido APRN   folic acid (FOLVITE) 1 MG tablet Take 1 tablet by mouth Daily. 6/20/24  Yes Marilee Garrido APRN   levothyroxine (SYNTHROID, LEVOTHROID) 75 MCG tablet Take 1 tablet by mouth Every Morning. 6/21/24  Yes Marilee Garrido APRN   metoprolol succinate XL (TOPROL-XL) 100 MG 24 hr tablet Take 1 tablet by mouth Daily. 6/20/24  Yes Marilee Garrido APRN   Omadacycline Tosylate 150 MG tablet Take 2 tablets (300 mg) by mouth Daily for 30 days. 6/10/24  Yes José Fuentes MD   Polyvinyl Alcohol-Povidone PF (HYPOTEARS) 1.4-0.6 % ophthalmic solution Administer 1 drop to both eyes Every 1 (One) Hour As Needed (dry eyes). 6/20/24  Yes Marilee Garrido APRN   sodium zirconium cyclosilicate (LOKELMA) 10 g packet Take 10 g by mouth Daily. Empty entire contents of the packet(s) into a glass with at least 3 tablespoons (45 mL) of water. Stir well and drink immediately; if powder remains in the glass, add water, stir and drink immediately; repeat until no powder remains. Administer other oral medications at least 2 hours before or 2 hours after dose. 6/20/24  Yes Marilee Garrido, ROSELINE   vancomycin  (VANCOCIN) 125 MG capsule Take 1 capsule by mouth Daily for 7 doses. Indications: Colon Inflammation due to Clostridium Bacteria Overgrowth  Patient taking differently: Take 1 capsule by mouth Daily. For C-diff   Indications: Colon Inflammation due to Clostridium Bacteria Overgrowth 6/22/24 6/29/24 Yes Marilee Garrido APRN   collagenase 250 UNIT/GM ointment Apply 1 Application topically to the appropriate area as directed Daily. 6/20/24   Marilee Garrido APRN   lactobacillus acidophilus (RISAQUAD) capsule capsule Take 1 capsule by mouth 2 (Two) Times a Day. 6/20/24   Marilee Garrido APRN   melatonin 5 MG tablet tablet Take 1 tablet by mouth At Night As Needed (insomnia). 6/20/24   Marilee Garrido APRN       Allergies:  Allergies   Allergen Reactions   • Penicillins Hives     Received ceftriaxone 6/1/24   • Levothyroxine Unknown - Low Severity   • Hydromorphone Other (See Comments)     Confusion, encephalopathy per wife       Objective     Vitals:   Temp:  [97.2 °F (36.2 °C)] 97.2 °F (36.2 °C)  Heart Rate:  [] 93  Resp:  [16-18] 16  BP: (149-185)/() 185/95  No intake or output data in the 24 hours ending 06/28/24 1621    Physical Exam:    General Appearance: frail ill appearing WM in no acute distress, surgery at bedside  Skin: warm and dry  HEENT: oral mucosa normal, nonicteric sclera  Neck: RIJ TDC, no JVD  Lungs: CTA bilat no rales  Heart: RRR, normal S1 and S2  Abdomen: soft, nontender, nondistended  : no palpable bladder  Extremities: LLE BKA with beefy red stump wound; RLE with dusky discoloration, no pedal edema     Scheduled Meds:     apixaban, 5 mg, Oral, BID  aspirin, 81 mg, Oral, Daily  dilTIAZem CD, 240 mg, Oral, Q24H  doxycycline, 100 mg, Oral, Q12H  [START ON 6/29/2024] levothyroxine, 75 mcg, Oral, Q AM  metoprolol succinate XL, 100 mg, Oral, Q24H  sodium chloride, 10 mL, Intravenous, Q12H  vancomycin, 20 mg/kg, Intravenous, Once      IV Meds:         Results Reviewed:   I have personally reviewed the results from the time of this admission to 6/28/2024 16:21 EDT     Lab Results   Component Value Date    GLUCOSE 87 06/28/2024    CALCIUM 9.4 06/28/2024     (L) 06/28/2024    K 4.2 06/28/2024    CO2 24.1 06/28/2024    CL 97 (L) 06/28/2024    BUN 37 (H) 06/28/2024    CREATININE 3.52 (H) 06/28/2024    BCR 10.5 06/28/2024    ANIONGAP 12.9 06/28/2024      Lab Results   Component Value Date    MG 1.8 06/20/2024    PHOS 5.8 (H) 06/16/2024    ALBUMIN 3.2 (L) 06/28/2024           Assessment / Plan     ASSESSMENT:  ESRD - been doing short daily HD at [facility].  RIJ TDC.  K/HCo3, waste products and vol status are stable, no indication for HD today  PVD s/p LLE BKA with stump wound   RLE wound with exposed tendon and possible osteomyelitis - notes from [facility] suggest patient was refusing surgical intervention?    CAD s/p recent NSTEMI, managed medically  HFrEF, prior EF reported 40%  HTN of ESRD, uncontrolled, on toprol  and diltiazem  mg (also for rate control)  Anemia of CKD, hgb is 8.9, likely on long acting ALLISON at dialysis unit  Recent acute cholecystitis s/p cholecystostomy drain  PAF on AC (eliquis)    PLAN:  HD tomorrow  Add ARB   Abx per primary team (vanc/cefepime started)  D/W ortho surg and RN at bedside, may involve vascular surg +/- plastic surg     Thank you for involving us in the care of Tavo Gudino.  Please feel free to call with any questions.    Rommel Amato MD  06/28/24  16:21 EDT    [facility]  [phone]

## 2024-06-28 NOTE — ED PROVIDER NOTES
" EMERGENCY DEPARTMENT ENCOUNTER  Room Number:  39/39  PCP: System, Provider Not In  Independent Historians: Patient and EMS      HPI:  Chief Complaint: had concerns including Wound Check.     A complete HPI/ROS/PMH/PSH/SH/FH are unobtainable due to: None    Chronic or social conditions impacting patient care (Social Determinants of Health): None      Context: Tavo Gudino is a 69 y.o. male with a medical history of hypertension, end-stage renal disease, atrial fibrillation, peripheral arterial disease, left below the knee amputation who presents to the ED c/o acute wound to the right lower leg that is new today.  Patient received care at the [facility].  Today the staff noticed that he had a new wound to his right lower extremity and they were concerned that there was \"tendon material\" sticking out of the wound.  Patient says that this just happened this morning.  He denies any traumatic injury to the wound.  He says that he was simply getting up for physical therapy when they noticed that he was bleeding from his leg.  Patient himself denies any other new complaints or concerns.      Review of prior external notes (non-ED) -and- Review of prior external test results outside of this encounter: I independently reviewed the hospital discharge summary from June 20, 2024 when patient was admitted for non-ST elevation MI and atrial fibrillation with RVR    Prescription drug monitoring program review: NICOLLE reviewed by Stanley Ford MD, Jameson Traylor MD   N/A and NICOLLE query complete and reviewed. Patient receives regular prescriptions for controlled substances.    PAST MEDICAL HISTORY  Active Ambulatory Problems     Diagnosis Date Noted    NSTEMI (non-ST elevated myocardial infarction) 05/31/2024    C. difficile colitis 05/31/2024    History of cholecystitis s/p cholecystostomy tube 05/31/2024    ESRD (end stage renal disease) on hemodialysis 05/31/2024    Atrial fibrillation 05/31/2024    Essential " hypertension 05/31/2024    Moderate malnutrition 06/04/2024    Soft tissue infection 06/06/2024    Acute osteomyelitis of left tibia 06/06/2024     Resolved Ambulatory Problems     Diagnosis Date Noted    No Resolved Ambulatory Problems     Past Medical History:   Diagnosis Date    Chronic kidney disease (CKD), stage V     ESRD on hemodialysis     Hypertension     Metabolic acidosis     Peripheral arterial disease     Pneumothorax     Secondary hyperparathyroidism          PAST SURGICAL HISTORY  Past Surgical History:   Procedure Laterality Date    ARTERIOVENOUS FISTULA Right     BELOW KNEE LEG AMPUTATION Left     CATARACT EXTRACTION Bilateral          FAMILY HISTORY  Family History   Problem Relation Age of Onset    Cancer Father         Bladder         SOCIAL HISTORY  Social History     Socioeconomic History    Marital status:    Tobacco Use    Smoking status: Never    Smokeless tobacco: Never   Vaping Use    Vaping status: Never Used   Substance and Sexual Activity    Alcohol use: Never    Drug use: Never    Sexual activity: Defer         ALLERGIES  Penicillins, Levothyroxine, and Hydromorphone      REVIEW OF SYSTEMS  Review of Systems  Included in HPI  All systems reviewed and negative except for those discussed in HPI.      PHYSICAL EXAM    I have reviewed the triage vital signs and nursing notes.    ED Triage Vitals [06/28/24 1141]   Temp Heart Rate Resp BP SpO2   97.2 °F (36.2 °C) 100 18 150/81 100 %      Temp src Heart Rate Source Patient Position BP Location FiO2 (%)   Oral Monitor Lying -- --       Physical Exam  GENERAL: alert, no acute distress but appears chronically ill  SKIN: Warm, dry without diaphoresis  HENT: Normocephalic, atraumatic  EYES: no scleral icterus, normal conjunctiva  CV:  regular rate, normal perfusion noted to the bilateral radial arteries  RESPIRATORY: normal effort, lungs clear bilaterally  ABDOMEN: soft, nondistended, nontender  MUSCULOSKELETAL: The left lower extremity  demonstrates amputation below the knee with appropriate Kerlix dressing to the stump.  Evaluation of the right lower extremity demonstrates a wound to the distal third of the leg on the lateral aspect with subcutaneous tissue protruding from the wound and scant bleeding.  The associated soft tissues are tender to palpation.  There is also a chronic appearing ulcerative wound to the medial plantar heel of the right foot.  NEURO: alert, moves all extremities, follows commands      LAB RESULTS  Recent Results (from the past 24 hour(s))   Comprehensive Metabolic Panel    Collection Time: 06/28/24 12:21 PM    Specimen: Blood   Result Value Ref Range    Glucose 87 65 - 99 mg/dL    BUN 37 (H) 8 - 23 mg/dL    Creatinine 3.52 (H) 0.76 - 1.27 mg/dL    Sodium 134 (L) 136 - 145 mmol/L    Potassium 4.2 3.5 - 5.2 mmol/L    Chloride 97 (L) 98 - 107 mmol/L    CO2 24.1 22.0 - 29.0 mmol/L    Calcium 9.4 8.6 - 10.5 mg/dL    Total Protein 6.2 6.0 - 8.5 g/dL    Albumin 3.2 (L) 3.5 - 5.2 g/dL    ALT (SGPT) 22 1 - 41 U/L    AST (SGOT) 13 1 - 40 U/L    Alkaline Phosphatase 277 (H) 39 - 117 U/L    Total Bilirubin 0.3 0.0 - 1.2 mg/dL    Globulin 3.0 gm/dL    A/G Ratio 1.1 g/dL    BUN/Creatinine Ratio 10.5 7.0 - 25.0    Anion Gap 12.9 5.0 - 15.0 mmol/L    eGFR 17.8 (L) >60.0 mL/min/1.73   Protime-INR    Collection Time: 06/28/24 12:21 PM    Specimen: Blood   Result Value Ref Range    Protime 16.8 (H) 11.7 - 14.2 Seconds    INR 1.34 (H) 0.90 - 1.10   aPTT    Collection Time: 06/28/24 12:21 PM    Specimen: Blood   Result Value Ref Range    PTT 41.1 (H) 22.7 - 35.4 seconds   Sedimentation Rate    Collection Time: 06/28/24 12:21 PM    Specimen: Blood   Result Value Ref Range    Sed Rate 24 (H) 0 - 20 mm/hr   C-reactive Protein    Collection Time: 06/28/24 12:21 PM    Specimen: Blood   Result Value Ref Range    C-Reactive Protein 2.19 (H) 0.00 - 0.50 mg/dL   CBC Auto Differential    Collection Time: 06/28/24 12:21 PM    Specimen: Blood   Result  Value Ref Range    WBC 7.46 3.40 - 10.80 10*3/mm3    RBC 2.90 (L) 4.14 - 5.80 10*6/mm3    Hemoglobin 8.9 (L) 13.0 - 17.7 g/dL    Hematocrit 28.0 (L) 37.5 - 51.0 %    MCV 96.6 79.0 - 97.0 fL    MCH 30.7 26.6 - 33.0 pg    MCHC 31.8 31.5 - 35.7 g/dL    RDW 13.8 12.3 - 15.4 %    RDW-SD 48.9 37.0 - 54.0 fl    MPV 8.9 6.0 - 12.0 fL    Platelets 225 140 - 450 10*3/mm3    Neutrophil % 63.3 42.7 - 76.0 %    Lymphocyte % 22.5 19.6 - 45.3 %    Monocyte % 9.8 5.0 - 12.0 %    Eosinophil % 3.5 0.3 - 6.2 %    Basophil % 0.5 0.0 - 1.5 %    Immature Grans % 0.4 0.0 - 0.5 %    Neutrophils, Absolute 4.72 1.70 - 7.00 10*3/mm3    Lymphocytes, Absolute 1.68 0.70 - 3.10 10*3/mm3    Monocytes, Absolute 0.73 0.10 - 0.90 10*3/mm3    Eosinophils, Absolute 0.26 0.00 - 0.40 10*3/mm3    Basophils, Absolute 0.04 0.00 - 0.20 10*3/mm3    Immature Grans, Absolute 0.03 0.00 - 0.05 10*3/mm3    nRBC 0.0 0.0 - 0.2 /100 WBC         RADIOLOGY  XR Tibia Fibula 2 View Right    Result Date: 6/28/2024  XR TIBIA FIBULA 2 VW RIGHT-  INDICATIONS: Leg wound.  TECHNIQUE: FRONTAL AND LATERAL VIEWS OF THE RIGHT LOWER LEG  COMPARISON: None available  FINDINGS:  Soft tissue swelling is seen laterally at the distal right lower leg that may be evidence of inflammation, infection, injury, correlate clinically. Dystrophic soft tissue calcifications are seen. No radiopaque foreign body is noted. Arterial calcifications are conspicuous. No acute fracture or dislocation is identified. On the lateral view, small lucency along the posterior cortex of the mid to distal fibular shaft could be erosion/osteomyelitis. If indicated, MRI or bone scan can be obtained for further evaluation.       As described.    This report was finalized on 6/28/2024 12:54 PM by Dr. Aristeo Damon M.D on Workstation: [id number]         MEDICATIONS GIVEN IN ER  Medications   sodium chloride 0.9 % flush 10 mL (has no administration in time range)   cefepime 2000 mg IVPB in 100 mL NS (MBP) (2,000 mg  Intravenous New Bag 6/28/24 1415)   vancomycin 1750 mg/500 mL 0.9% NS IVPB ([facility]) (has no administration in time range)         ORDERS PLACED DURING THIS VISIT:  Orders Placed This Encounter   Procedures    JAMEY AURIS SCREEN - Swab, Axilla Right, Axilla Left and Groin    XR Tibia Fibula 2 View Right    Comprehensive Metabolic Panel    Protime-INR    aPTT    Sedimentation Rate    C-reactive Protein    CBC Auto Differential    [facility] (on-call MD unless specified) Details    Insert Peripheral IV    Initiate Observation Status    CBC & Differential         OUTPATIENT MEDICATION MANAGEMENT:  Current Facility-Administered Medications Ordered in Epic   Medication Dose Route Frequency Provider Last Rate Last Admin    cefepime 2000 mg IVPB in 100 mL NS (MBP)  2,000 mg Intravenous Once Jameson Traylor MD   2,000 mg at 06/28/24 1415    sodium chloride 0.9 % flush 10 mL  10 mL Intravenous PRN Jameson Traylor MD        vancomycin 1750 mg/500 mL 0.9% NS IVPB ([facility])  20 mg/kg Intravenous Once Jameson Traylor MD         Current Outpatient Medications Ordered in Epic   Medication Sig Dispense Refill    acetaminophen (TYLENOL) 325 MG tablet Take 2 tablets by mouth Every 4 (Four) Hours As Needed for Mild Pain.      apixaban (ELIQUIS) 5 MG tablet tablet Take 1 tablet by mouth 2 (Two) Times a Day.      aspirin 81 MG EC tablet Take 1 tablet by mouth Daily.      dilTIAZem CD (CARDIZEM CD) 240 MG 24 hr capsule Take 1 capsule by mouth Daily.      famotidine (PEPCID) 10 MG tablet Take 1 tablet by mouth Daily.      folic acid (FOLVITE) 1 MG tablet Take 1 tablet by mouth Daily.      levothyroxine (SYNTHROID, LEVOTHROID) 75 MCG tablet Take 1 tablet by mouth Every Morning.      melatonin 5 MG tablet tablet Take 1 tablet by mouth At Night As Needed (insomnia).      metoprolol succinate XL (TOPROL-XL) 100 MG 24 hr tablet Take 1 tablet by mouth Daily.      Omadacycline Tosylate 150 MG tablet Take 2 tablets (300 mg) by mouth Daily for 30 days. 28  tablet 2    Polyvinyl Alcohol-Povidone PF (HYPOTEARS) 1.4-0.6 % ophthalmic solution Administer 1 drop to both eyes Every 1 (One) Hour As Needed (dry eyes).      sodium zirconium cyclosilicate (LOKELMA) 10 g packet Take 10 g by mouth Daily. Empty entire contents of the packet(s) into a glass with at least 3 tablespoons (45 mL) of water. Stir well and drink immediately; if powder remains in the glass, add water, stir and drink immediately; repeat until no powder remains. Administer other oral medications at least 2 hours before or 2 hours after dose.      vancomycin (VANCOCIN) 125 MG capsule Take 1 capsule by mouth Daily for 7 doses. Indications: Colon Inflammation due to Clostridium Bacteria Overgrowth (Patient taking differently: Take 1 capsule by mouth Daily. For C-diff   Indications: Colon Inflammation due to Clostridium Bacteria Overgrowth)      collagenase 250 UNIT/GM ointment Apply 1 Application topically to the appropriate area as directed Daily.      lactobacillus acidophilus (RISAQUAD) capsule capsule Take 1 capsule by mouth 2 (Two) Times a Day.           PROCEDURES  Procedures          PROGRESS, DATA ANALYSIS, CONSULTS, AND MEDICAL DECISION MAKING  All labs have been independently interpreted by me.  All radiology studies have been reviewed by me. All EKG's have been independently viewed and interpreted by me.  Discussion below represents my analysis of pertinent findings related to patient's condition, differential diagnosis, treatment plan and final disposition.    Differential diagnosis includes but is not limited to venous stasis ulcer, cellulitis, osteomyelitis, necrotizing fasciitis.    Clinical Scores:                   ED Course as of 06/28/24 1442   Fri Jun 28, 2024   1301 I independently interpreted the x-ray of the right tibia and fibula and my findings are: No fracture or dislocation.  No soft tissue foreign body. [SHABBIR]   1310 C-Reactive Protein(!): 2.19 [SHABBIR]   1311 Sed Rate(!): 24 [SHABBIR]   1311  Creatinine(!): 3.52 [SHABBIR]   1311 Hemoglobin(!): 8.9 [SHABBIR]   1311 Hematocrit(!): 28.0 [SHABBIR]   1311 The inflammatory markers, ESR and CRP are both elevated.  The report on x-ray indicates possible features of osteomyelitis/erosion as well.  Recommendation is for MRI. [SHABBIR]   1312 I discussed with Dr. Ford from [facility] about this patient.  He agrees to admit him to the hospitalist service for further care needs today. [SHABBIR]      ED Course User Index  [SHABBIR] Jameson Traylor MD             AS OF 14:42 EDT VITALS:    BP - 165/97  HR - 91  TEMP - 97.2 °F (36.2 °C) (Oral)  O2 SATS - 100%    COMPLEXITY OF CARE  The patient requires admission.      DIAGNOSIS  Final diagnoses:   Leg wound, right, initial encounter   End stage renal disease on dialysis   Chronic anemia   Hypertension not at goal         DISPOSITION  ED Disposition       ED Disposition   Decision to Admit    Condition   --    Comment   Level of Care: Med/Surg [1]   Diagnosis: Leg wound, right, initial encounter [287191]   Admitting Physician: ROBE FORD [[id number]]   Attending Physician: ROBE FORD [[id number]]                  Please note that portions of this document were completed with a voice recognition program.    Note Disclaimer: At [facility], we believe that sharing information builds trust and better relationships. You are receiving this note because you recently visited [facility]. It is possible you will see health information before a provider has talked with you about it. This kind of information can be easy to misunderstand. To help you fully understand what it means for your health, we urge you to discuss this note with your provider.         Jameson Traylor MD  06/28/24 9178

## 2024-06-28 NOTE — PROGRESS NOTES
Clinical Pharmacy Services: Medication History    Tavo Gudino is a 69 y.o. male presenting to [facility] for   Chief Complaint   Patient presents with    Wound Check       He  has a past medical history of Atrial fibrillation, Chronic kidney disease (CKD), stage V, ESRD on hemodialysis, Hypertension, Metabolic acidosis, Peripheral arterial disease, Pneumothorax, and Secondary hyperparathyroidism.    Allergies as of 06/28/2024 - Reviewed 06/28/2024   Allergen Reaction Noted    Penicillins Hives 06/01/2024    Levothyroxine Unknown - Low Severity 06/01/2024    Hydromorphone Other (See Comments) 04/05/2024       Medication information was obtained from: snf Paperwork   Pharmacy and Phone Number:     Prior to Admission Medications       Prescriptions Last Dose Informant Patient Reported? Taking?    acetaminophen (TYLENOL) 325 MG tablet  Nursing Home No Yes    Take 2 tablets by mouth Every 4 (Four) Hours As Needed for Mild Pain.    apixaban (ELIQUIS) 5 MG tablet tablet  Nursing Home Yes Yes    Take 1 tablet by mouth 2 (Two) Times a Day.    aspirin 81 MG EC tablet  Nursing Home No Yes    Take 1 tablet by mouth Daily.    dilTIAZem CD (CARDIZEM CD) 240 MG 24 hr capsule  Nursing Home No Yes    Take 1 capsule by mouth Daily.    famotidine (PEPCID) 10 MG tablet  Nursing Home No Yes    Take 1 tablet by mouth Daily.    folic acid (FOLVITE) 1 MG tablet  Nursing Home No Yes    Take 1 tablet by mouth Daily.    levothyroxine (SYNTHROID, LEVOTHROID) 75 MCG tablet  Nursing Home No Yes    Take 1 tablet by mouth Every Morning.    melatonin 5 MG tablet tablet  Nursing Home No Yes    Take 1 tablet by mouth At Night As Needed (insomnia).    metoprolol succinate XL (TOPROL-XL) 100 MG 24 hr tablet  Nursing Home No Yes    Take 1 tablet by mouth Daily.    Omadacycline Tosylate 150 MG tablet  Nursing Home No Yes    Take 2 tablets (300 mg) by mouth Daily for 30 days.    Polyvinyl Alcohol-Povidone PF (HYPOTEARS) 1.4-0.6 %  ophthalmic solution  Nursing Home No Yes    Administer 1 drop to both eyes Every 1 (One) Hour As Needed (dry eyes).    sodium zirconium cyclosilicate (LOKELMA) 10 g packet  Nursing Home No Yes    Take 10 g by mouth Daily. Empty entire contents of the packet(s) into a glass with at least 3 tablespoons (45 mL) of water. Stir well and drink immediately; if powder remains in the glass, add water, stir and drink immediately; repeat until no powder remains. Administer other oral medications at least 2 hours before or 2 hours after dose.    vancomycin (VANCOCIN) 125 MG capsule  Nursing Home No Yes    Take 1 capsule by mouth Daily for 7 doses. Indications: Colon Inflammation due to Clostridium Bacteria Overgrowth    Patient taking differently:  Take 1 capsule by mouth Daily. For C-diff   Indications: Colon Inflammation due to Clostridium Bacteria Overgrowth    collagenase 250 UNIT/GM ointment   No No    Apply 1 Application topically to the appropriate area as directed Daily.    lactobacillus acidophilus (RISAQUAD) capsule capsule   No No    Take 1 capsule by mouth 2 (Two) Times a Day.              Medication notes: Patient is taking van 125mg daily for c-diff. Started 6/22/24 but there is no end date on pt paperwork.     This medication list is complete to the best of my knowledge as of 6/28/2024    Please call if questions.    Sonja Irizarry  Medication History Technician   [phone]    6/28/2024 13:47 EDT

## 2024-06-28 NOTE — CONSULTS
Orthopedic Consult      Patient: Tavo Gudino    Date of Admission: 6/28/2024 11:43 AM    YOB: 1955    Medical Record Number: [id number]    Consulting Physician: Stanley Ford MD    Chief Complaints: Leg wound    History of Present Illness: 69 y.o. male admitted to [facility] to services of Stanley Ford MD with leg wounds.  Patient was sent from his rehab facility due to exposed leg wounds.  There is concerned about tendon involvement.  Orthopedics asked for consult.  Patient was seen evaluated states he had previously seen wound care at  and [place].  He is supposed to be following up with them once he got out of his rehab center.  It appears that he has had a history of a left BKA and has been getting treatments for the stump as well as he has a right leg wound.  Patient states has been going on for some time per records it is least been since May.  He also has kidney disease requiring dialysis.    Allergies:   Allergies   Allergen Reactions    Penicillins Hives     Received ceftriaxone 6/1/24    Levothyroxine Unknown - Low Severity    Hydromorphone Other (See Comments)     Confusion, encephalopathy per wife       Home Medications:    Current Facility-Administered Medications:     apixaban (ELIQUIS) tablet 5 mg, 5 mg, Oral, BID, Stanley Ford MD    aspirin EC tablet 81 mg, 81 mg, Oral, Daily, Stanley Ford MD    sennosides-docusate (PERICOLACE) 8.6-50 MG per tablet 2 tablet, 2 tablet, Oral, BID PRN **AND** polyethylene glycol (MIRALAX) packet 17 g, 17 g, Oral, Daily PRN **AND** bisacodyl (DULCOLAX) EC tablet 5 mg, 5 mg, Oral, Daily PRN **AND** bisacodyl (DULCOLAX) suppository 10 mg, 10 mg, Rectal, Daily PRN, Stanley Ford MD    Calcium Replacement - Follow Nurse / BPA Driven Protocol, , Does not apply, PRN, Stanley Ford MD    dilTIAZem CD (CARDIZEM CD) 24 hr capsule 240 mg, 240 mg, Oral, Q24H, Stanley Ford MD    doxycycline (MONODOX) capsule 100 mg, 100 mg, Oral, Q12H, Stanley Ford  MD    [START ON 6/29/2024] levothyroxine (SYNTHROID, LEVOTHROID) tablet 75 mcg, 75 mcg, Oral, Q AM, Stanley Ford MD    losartan (COZAAR) tablet 25 mg, 25 mg, Oral, Nightly, Rommel Amato MD    Magnesium Standard Dose Replacement - Follow Nurse / BPA Driven Protocol, , Does not apply, PRLogan RON Aakash, MD    metoprolol succinate XL (TOPROL-XL) 24 hr tablet 100 mg, 100 mg, Oral, Q24H, Stanley Ford MD    Phosphorus Replacement - Follow Nurse / BPA Driven Protocol, , Does not apply, PRN, Stanley Ford MD    Potassium Replacement - Follow Nurse / BPA Driven Protocol, , Does not apply, Logan BLANCHARD Aakash, MD    [COMPLETED] Insert Peripheral IV, , , Once **AND** sodium chloride 0.9 % flush 10 mL, 10 mL, Intravenous, PRN, Jameson Traylor MD    sodium chloride 0.9 % flush 10 mL, 10 mL, Intravenous, Q12H, Stanley Ford MD    sodium chloride 0.9 % flush 10 mL, 10 mL, Intravenous, PRN, Stanley Ford MD    sodium chloride 0.9 % infusion 40 mL, 40 mL, Intravenous, PRN, Stanley Ford MD    vancomycin 1750 mg/500 mL 0.9% NS IVPB ([facility]), 20 mg/kg, Intravenous, Once, Jameson Traylor MD    Current Medications:  Scheduled Meds:apixaban, 5 mg, Oral, BID  aspirin, 81 mg, Oral, Daily  dilTIAZem CD, 240 mg, Oral, Q24H  doxycycline, 100 mg, Oral, Q12H  [START ON 6/29/2024] levothyroxine, 75 mcg, Oral, Q AM  losartan, 25 mg, Oral, Nightly  metoprolol succinate XL, 100 mg, Oral, Q24H  sodium chloride, 10 mL, Intravenous, Q12H  vancomycin, 20 mg/kg, Intravenous, Once      Continuous Infusions:   PRN Meds:.  senna-docusate sodium **AND** polyethylene glycol **AND** bisacodyl **AND** bisacodyl    Calcium Replacement - Follow Nurse / BPA Driven Protocol    Magnesium Standard Dose Replacement - Follow Nurse / BPA Driven Protocol    Phosphorus Replacement - Follow Nurse / BPA Driven Protocol    Potassium Replacement - Follow Nurse / BPA Driven Protocol    [COMPLETED] Insert Peripheral IV **AND** sodium chloride    sodium chloride     sodium chloride    Past Medical History:   Diagnosis Date    Atrial fibrillation     Chronic kidney disease (CKD), stage V     ESRD on hemodialysis     Hypertension     Metabolic acidosis     Peripheral arterial disease     Pneumothorax     Secondary hyperparathyroidism        Past Surgical History:   Procedure Laterality Date    ARTERIOVENOUS FISTULA Right     BELOW KNEE LEG AMPUTATION Left     CATARACT EXTRACTION Bilateral        Social History     Occupational History    Not on file   Tobacco Use    Smoking status: Never    Smokeless tobacco: Never   Vaping Use    Vaping status: Never Used   Substance and Sexual Activity    Alcohol use: Never    Drug use: Never    Sexual activity: Defer      Social History     Social History Narrative    Not on file       Family History   Problem Relation Age of Onset    Cancer Father         Bladder       Review of Systems:     Constitutional:  Denies fever, shaking or chills   Eyes:  Denies change in visual acuity   HEENT:  Denies nasal congestion or sore throat   Respiratory:  Denies cough or shortness of breath   Cardiovascular:  Denies chest pain or edema  Endocrine: Denies tremors, palpitations, intolerance of heat or cold, polyuria, polydipsia.  GI:  Denies abdominal pain, nausea, vomiting, bloody stools or diarrhea  :  Denies frequency, urgency, incontinence, retention, or nocturia.  Musculoskeletal:  Denies numbness tingling or loss of motor function except as above  Integument:  Denies rash, lesion or ulceration   Neurologic:  Denies headache or focal weakness, deficits  Heme:  Denies epistaxis, spontaneous or excessive bleeding, hematuria, melena, fatigue, enlarged or tender lymph nodes.      All other pertinent positives and negatives as noted above in HPI.    Physical Exam: 69 y.o. male    Vitals:    06/28/24 1331 06/28/24 1346 06/28/24 1442 06/28/24 1642   BP: 165/97 165/97 (!) 185/95    BP Location:    Right arm   Patient Position:    Lying   Pulse: 79 91 93 98    Resp: 16   16   Temp:    96.4 °F (35.8 °C)   TempSrc:    Oral   SpO2: 100% 100% 98% 99%   Weight:       Height:         General:  Awake, alert. No acute distress.      Lower extremities examined left lower extremity as previous stump does show some granulation tissue.  There are some discoloration as well noted.    Right lower extremity examined there is a open wound appears fairly chronic since on the lateral aspect of the distal leg color changes about the lower leg.  Splint was somewhat cool to get a Doppler I cannot get a PT pulse but I could get a DP pulse.  Also early appearing pressure sore about the heel.  Patient is able to move his ankle up and down tendons appear to be intact.    Diagnostic Tests:    Admission on 06/28/2024   Component Date Value Ref Range Status    Glucose 06/28/2024 87  65 - 99 mg/dL Final    BUN 06/28/2024 37 (H)  8 - 23 mg/dL Final    Creatinine 06/28/2024 3.52 (H)  0.76 - 1.27 mg/dL Final    Sodium 06/28/2024 134 (L)  136 - 145 mmol/L Final    Potassium 06/28/2024 4.2  3.5 - 5.2 mmol/L Final    Chloride 06/28/2024 97 (L)  98 - 107 mmol/L Final    CO2 06/28/2024 24.1  22.0 - 29.0 mmol/L Final    Calcium 06/28/2024 9.4  8.6 - 10.5 mg/dL Final    Total Protein 06/28/2024 6.2  6.0 - 8.5 g/dL Final    Albumin 06/28/2024 3.2 (L)  3.5 - 5.2 g/dL Final    ALT (SGPT) 06/28/2024 22  1 - 41 U/L Final    AST (SGOT) 06/28/2024 13  1 - 40 U/L Final    Alkaline Phosphatase 06/28/2024 277 (H)  39 - 117 U/L Final    Total Bilirubin 06/28/2024 0.3  0.0 - 1.2 mg/dL Final    Globulin 06/28/2024 3.0  gm/dL Final    A/G Ratio 06/28/2024 1.1  g/dL Final    BUN/Creatinine Ratio 06/28/2024 10.5  7.0 - 25.0 Final    Anion Gap 06/28/2024 12.9  5.0 - 15.0 mmol/L Final    eGFR 06/28/2024 17.8 (L)  >60.0 mL/min/1.73 Final    Protime 06/28/2024 16.8 (H)  11.7 - 14.2 Seconds Final    INR 06/28/2024 1.34 (H)  0.90 - 1.10 Final    PTT 06/28/2024 41.1 (H)  22.7 - 35.4 seconds Final    Sed Rate 06/28/2024 24 (H)  0 -  20 mm/hr Final    C-Reactive Protein 06/28/2024 2.19 (H)  0.00 - 0.50 mg/dL Final    WBC 06/28/2024 7.46  3.40 - 10.80 10*3/mm3 Final    RBC 06/28/2024 2.90 (L)  4.14 - 5.80 10*6/mm3 Final    Hemoglobin 06/28/2024 8.9 (L)  13.0 - 17.7 g/dL Final    Hematocrit 06/28/2024 28.0 (L)  37.5 - 51.0 % Final    MCV 06/28/2024 96.6  79.0 - 97.0 fL Final    MCH 06/28/2024 30.7  26.6 - 33.0 pg Final    MCHC 06/28/2024 31.8  31.5 - 35.7 g/dL Final    RDW 06/28/2024 13.8  12.3 - 15.4 % Final    RDW-SD 06/28/2024 48.9  37.0 - 54.0 fl Final    MPV 06/28/2024 8.9  6.0 - 12.0 fL Final    Platelets 06/28/2024 225  140 - 450 10*3/mm3 Final    Neutrophil % 06/28/2024 63.3  42.7 - 76.0 % Final    Lymphocyte % 06/28/2024 22.5  19.6 - 45.3 % Final    Monocyte % 06/28/2024 9.8  5.0 - 12.0 % Final    Eosinophil % 06/28/2024 3.5  0.3 - 6.2 % Final    Basophil % 06/28/2024 0.5  0.0 - 1.5 % Final    Immature Grans % 06/28/2024 0.4  0.0 - 0.5 % Final    Neutrophils, Absolute 06/28/2024 4.72  1.70 - 7.00 10*3/mm3 Final    Lymphocytes, Absolute 06/28/2024 1.68  0.70 - 3.10 10*3/mm3 Final    Monocytes, Absolute 06/28/2024 0.73  0.10 - 0.90 10*3/mm3 Final    Eosinophils, Absolute 06/28/2024 0.26  0.00 - 0.40 10*3/mm3 Final    Basophils, Absolute 06/28/2024 0.04  0.00 - 0.20 10*3/mm3 Final    Immature Grans, Absolute 06/28/2024 0.03  0.00 - 0.05 10*3/mm3 Final    nRBC 06/28/2024 0.0  0.0 - 0.2 /100 WBC Final     Lab Results (last 24 hours)       Procedure Component Value Units Date/Time    CANDIDA AURIS SCREEN - Swab, Axilla Right, Axilla Left and Groin [[id number]] Collected: 06/28/24 1420    Specimen: Swab from Axilla Right, Axilla Left and Groin Updated: 06/28/24 1425    Sedimentation Rate [[id number]]  (Abnormal) Collected: 06/28/24 1221    Specimen: Blood Updated: 06/28/24 1256     Sed Rate 24 mm/hr     Comprehensive Metabolic Panel [[id number]]  (Abnormal) Collected: 06/28/24 1221    Specimen: Blood Updated: 06/28/24 1251     Glucose 87 mg/dL       BUN 37 mg/dL      Creatinine 3.52 mg/dL      Sodium 134 mmol/L      Potassium 4.2 mmol/L      Chloride 97 mmol/L      CO2 24.1 mmol/L      Calcium 9.4 mg/dL      Total Protein 6.2 g/dL      Albumin 3.2 g/dL      ALT (SGPT) 22 U/L      AST (SGOT) 13 U/L      Alkaline Phosphatase 277 U/L      Total Bilirubin 0.3 mg/dL      Globulin 3.0 gm/dL      A/G Ratio 1.1 g/dL      BUN/Creatinine Ratio 10.5     Anion Gap 12.9 mmol/L      eGFR 17.8 mL/min/1.73     Narrative:      GFR Normal >60  Chronic Kidney Disease <60  Kidney Failure <15    The GFR formula is only valid for adults with stable renal function between ages 18 and 70.    C-reactive Protein [[id number]]  (Abnormal) Collected: 06/28/24 1221    Specimen: Blood Updated: 06/28/24 1251     C-Reactive Protein 2.19 mg/dL     Protime-INR [[id number]]  (Abnormal) Collected: 06/28/24 1221    Specimen: Blood Updated: 06/28/24 1246     Protime 16.8 Seconds      INR 1.34    aPTT [[id number]]  (Abnormal) Collected: 06/28/24 1221    Specimen: Blood Updated: 06/28/24 1246     PTT 41.1 seconds     CBC & Differential [[id number]]  (Abnormal) Collected: 06/28/24 1221    Specimen: Blood Updated: 06/28/24 1232    Narrative:      The following orders were created for panel order CBC & Differential.  Procedure                               Abnormality         Status                     ---------                               -----------         ------                     CBC Auto Differential[[id number]]        Abnormal            Final result                 Please view results for these tests on the individual orders.    CBC Auto Differential [[id number]]  (Abnormal) Collected: 06/28/24 1221    Specimen: Blood Updated: 06/28/24 1232     WBC 7.46 10*3/mm3      RBC 2.90 10*6/mm3      Hemoglobin 8.9 g/dL      Hematocrit 28.0 %      MCV 96.6 fL      MCH 30.7 pg      MCHC 31.8 g/dL      RDW 13.8 %      RDW-SD 48.9 fl      MPV 8.9 fL      Platelets 225 10*3/mm3      Neutrophil % 63.3 %       Lymphocyte % 22.5 %      Monocyte % 9.8 %      Eosinophil % 3.5 %      Basophil % 0.5 %      Immature Grans % 0.4 %      Neutrophils, Absolute 4.72 10*3/mm3      Lymphocytes, Absolute 1.68 10*3/mm3      Monocytes, Absolute 0.73 10*3/mm3      Eosinophils, Absolute 0.26 10*3/mm3      Basophils, Absolute 0.04 10*3/mm3      Immature Grans, Absolute 0.03 10*3/mm3      nRBC 0.0 /100 WBC                 Assessment: Right chronic leg wound, right heel pressure ulcer, history left BKA    Plan: I discussed the findings with the patient seems to be fairly chronic in nature.  His tendons appear to be in tact.  Tissue debridement and soft tissue coverage could be warranted.  Given the patient's history and likely vascular issues I do worry about potential healing.  I recommend a vascular consultation and would defer to the recommendations.  Wound care is already on board and plan to see the patient and give orders based on chart information at this time.  There is no current orthopedic issue at this time.  If any other issues arise or there are questions please let us know happy to reevaluate if needed.          Date: 6/28/2024    Porfirio Butler MD    CC: Stanley Ford MD

## 2024-06-28 NOTE — NURSING NOTE
"Wound/ostomy - end of day consult received regarding wounds POA. Chart reviewed, patient most recently was at [facility] (5/31 - 6/20) and WOC nurse followed patient while there, last assessed on 6/20. Patient typically gets all of his care at [facility], was admitted to [facility] on 5/28/24 with complaints of chest pain and palpitations and was eventually transferred to [facility]. Patient has been in various SNF's for rehab since March, upon d/c from [facility], his last facility in [place] would not accept back and so patient was sent to accepting facility, [facility] in [place] that has an inpatient dialysis chair.     Per the WOC nurse at [facility] there was tendon exposure to the calf wound, this is not a new finding.     Multiple specialty consults saw patient while at [facility]. Per vascular consult on 6/3:   \"Chronically ill male with peripheral arterial disease with left BKA wound ([facility], March 2024) and right heel ulceration.  Arterial studies reviewed and demonstrate tibial level disease bilaterally but no overt occlusion.  Exam significant for palpable right DPA and left popliteal pulse.  He should have adequate arterial flow for wounds to heal. However, he is at high risk of needing further amputation and has a poor healing prognosis given his renal disease, malnutrition and nonhealing chronic wounds. No vascular intervention recommended.  Continue aspirin and start statin.  Anticoagulation per cardiology for A-fib\".     Wound care notes and orders reviewed from [facility]. Orders placed in EPIC for multiple wounds, attempted to simplify regimen as much as possible to minimize confusion. Spoke to nurse for patient, a low air loss mattress has been ordered, requested that photos be taken of the wounds for comparison. Some wound care supplies tubed to to 8P, however, all supplies needed are available in distribution and pharmacy.     WOC team will see patient early the following week if " remains inpatient.

## 2024-06-28 NOTE — LETTER
EMS Transport Request  For use at [facility], [place], [place], [place], and [place] only   Patient Name: Tavo Gudino : 1955   Weight:84.4 kg (186 lb) Pick-up Location:  BLS/ALS: BLS/ALS: BLS   Insurance: [employer] MEDICARE REPLACEMENT Auth End Date:    Pre-Cert #: D/C Summary complete:    Destination: Other [facility].   Contact Precautions: Other Contact, Contact Spore.   Equipment (O2, Fluids, etc.): None   Arrive By Date/Time: 2024 Stretcher/WC: Stretcher   CM Requesting: Marysol Boogie RN Ext: [phone]   Notes/Medical Necessity: Unable to tolerate seated position for transport; Nonambulatory.     ______________________________________________________________________    *Only 2 patient bags OR 1 carry-on size bag are permitted.  Wheelchairs and walkers CANNOT transported with the patient. Acknowledge: Yes

## 2024-06-28 NOTE — H&P
Patient Name:  Tavo Gudino  YOB: 1955  MRN:  [id number]  Admit Date:  6/28/2024  Patient Care Team:  System, Provider Not In as PCP - General      Subjective   History Present Illness     Chief Complaint   Patient presents with   • Wound Check     This is a 69-year-old male with a history of hypertension, peripheral artery disease status post left BKA, ESRD on hemodialysis Monday through Friday, atrial fibrillation on Eliquis, recent acute cholecystitis status post cholecystostomy tube placement at outlying facility, chronic urinary retention with an indwelling Delaney catheter, history of C. difficile on oral vancomycin who presents to the hospital with a wound of the right lower extremity.  [facility] where he was also diagnosed with C. difficile and placed on oral vancomycin.    He has been evaluated for osteomyelitis before.  He had an MRI of the left tib-fib as well as the right foot.  The left tib-fib MRI showed possible osteomyelitis.      Personal History     Past Medical History:   Diagnosis Date   • Atrial fibrillation    • Chronic kidney disease (CKD), stage V    • ESRD on hemodialysis    • Hypertension    • Metabolic acidosis    • Peripheral arterial disease    • Pneumothorax    • Secondary hyperparathyroidism      Past Surgical History:   Procedure Laterality Date   • ARTERIOVENOUS FISTULA Right    • BELOW KNEE LEG AMPUTATION Left    • CATARACT EXTRACTION Bilateral      Family History   Problem Relation Age of Onset   • Cancer Father         Bladder     Social History     Tobacco Use   • Smoking status: Never   • Smokeless tobacco: Never   Vaping Use   • Vaping status: Never Used   Substance Use Topics   • Alcohol use: Never   • Drug use: Never     No current facility-administered medications on file prior to encounter.     Current Outpatient Medications on File Prior to Encounter   Medication Sig Dispense Refill    • acetaminophen (TYLENOL) 325 MG tablet Take 2 tablets by mouth Every 4 (Four) Hours As Needed for Mild Pain.     • apixaban (ELIQUIS) 5 MG tablet tablet Take 1 tablet by mouth 2 (Two) Times a Day.     • aspirin 81 MG EC tablet Take 1 tablet by mouth Daily.     • dilTIAZem CD (CARDIZEM CD) 240 MG 24 hr capsule Take 1 capsule by mouth Daily.     • famotidine (PEPCID) 10 MG tablet Take 1 tablet by mouth Daily.     • folic acid (FOLVITE) 1 MG tablet Take 1 tablet by mouth Daily.     • levothyroxine (SYNTHROID, LEVOTHROID) 75 MCG tablet Take 1 tablet by mouth Every Morning.     • melatonin 5 MG tablet tablet Take 1 tablet by mouth At Night As Needed (insomnia).     • metoprolol succinate XL (TOPROL-XL) 100 MG 24 hr tablet Take 1 tablet by mouth Daily.     • Omadacycline Tosylate 150 MG tablet Take 2 tablets (300 mg) by mouth Daily for 30 days. 28 tablet 2   • Polyvinyl Alcohol-Povidone PF (HYPOTEARS) 1.4-0.6 % ophthalmic solution Administer 1 drop to both eyes Every 1 (One) Hour As Needed (dry eyes).     • sodium zirconium cyclosilicate (LOKELMA) 10 g packet Take 10 g by mouth Daily. Empty entire contents of the packet(s) into a glass with at least 3 tablespoons (45 mL) of water. Stir well and drink immediately; if powder remains in the glass, add water, stir and drink immediately; repeat until no powder remains. Administer other oral medications at least 2 hours before or 2 hours after dose.     • vancomycin (VANCOCIN) 125 MG capsule Take 1 capsule by mouth Daily for 7 doses. Indications: Colon Inflammation due to Clostridium Bacteria Overgrowth (Patient taking differently: Take 1 capsule by mouth Daily. For C-diff   Indications: Colon Inflammation due to Clostridium Bacteria Overgrowth)     • collagenase 250 UNIT/GM ointment Apply 1 Application topically to the appropriate area as directed Daily.     • lactobacillus acidophilus (RISAQUAD) capsule capsule Take 1 capsule by mouth 2 (Two) Times a Day.       Allergies    Allergen Reactions   • Penicillins Hives     Received ceftriaxone 6/1/24   • Levothyroxine Unknown - Low Severity   • Hydromorphone Other (See Comments)     Confusion, encephalopathy per wife       Objective    Objective     Vital Signs  Temp:  [97.2 °F (36.2 °C)] 97.2 °F (36.2 °C)  Heart Rate:  [] 93  Resp:  [16-18] 16  BP: (149-185)/() 185/95  SpO2:  [98 %-100 %] 98 %  on   ;   Device (Oxygen Therapy): room air  Body mass index is 24.55 kg/m².    Physical Exam  Constitutional:       General: He is not in acute distress.     Appearance: He is ill-appearing.   HENT:      Head: Normocephalic and atraumatic.   Eyes:      Extraocular Movements: Extraocular movements intact.      Pupils: Pupils are equal, round, and reactive to light.   Pulmonary:      Effort: Pulmonary effort is normal. No respiratory distress.   Abdominal:      General: There is no distension.      Palpations: Abdomen is soft.      Tenderness: There is no abdominal tenderness.      Comments: Cholecystostomy tube noted   Musculoskeletal:      Comments: Left BKA  Right leg with soft tissue extrusion above ankle    Neurological:      General: No focal deficit present.      Mental Status: He is alert and oriented to person, place, and time.      Motor: Weakness present.         Results Review:  I reviewed the patient's new clinical results.  I reviewed the patient's new imaging results and agree with the interpretation.  I reviewed the patient's other test results and agree with the interpretation  I personally viewed and interpreted the patient's EKG/Telemetry data  Discussed with ED provider.    Lab Results (last 24 hours)       Procedure Component Value Units Date/Time    CBC & Differential [[id number]]  (Abnormal) Collected: 06/28/24 1221    Specimen: Blood Updated: 06/28/24 1232    Narrative:      The following orders were created for panel order CBC & Differential.  Procedure                               Abnormality         Status                      ---------                               -----------         ------                     CBC Auto Differential[[id number]]        Abnormal            Final result                 Please view results for these tests on the individual orders.    Comprehensive Metabolic Panel [[id number]]  (Abnormal) Collected: 06/28/24 1221    Specimen: Blood Updated: 06/28/24 1251     Glucose 87 mg/dL      BUN 37 mg/dL      Creatinine 3.52 mg/dL      Sodium 134 mmol/L      Potassium 4.2 mmol/L      Chloride 97 mmol/L      CO2 24.1 mmol/L      Calcium 9.4 mg/dL      Total Protein 6.2 g/dL      Albumin 3.2 g/dL      ALT (SGPT) 22 U/L      AST (SGOT) 13 U/L      Alkaline Phosphatase 277 U/L      Total Bilirubin 0.3 mg/dL      Globulin 3.0 gm/dL      A/G Ratio 1.1 g/dL      BUN/Creatinine Ratio 10.5     Anion Gap 12.9 mmol/L      eGFR 17.8 mL/min/1.73     Narrative:      GFR Normal >60  Chronic Kidney Disease <60  Kidney Failure <15    The GFR formula is only valid for adults with stable renal function between ages 18 and 70.    Protime-INR [[id number]]  (Abnormal) Collected: 06/28/24 1221    Specimen: Blood Updated: 06/28/24 1246     Protime 16.8 Seconds      INR 1.34    aPTT [[id number]]  (Abnormal) Collected: 06/28/24 1221    Specimen: Blood Updated: 06/28/24 1246     PTT 41.1 seconds     Sedimentation Rate [[id number]]  (Abnormal) Collected: 06/28/24 1221    Specimen: Blood Updated: 06/28/24 1256     Sed Rate 24 mm/hr     C-reactive Protein [[id number]]  (Abnormal) Collected: 06/28/24 1221    Specimen: Blood Updated: 06/28/24 1251     C-Reactive Protein 2.19 mg/dL     CBC Auto Differential [[id number]]  (Abnormal) Collected: 06/28/24 1221    Specimen: Blood Updated: 06/28/24 1232     WBC 7.46 10*3/mm3      RBC 2.90 10*6/mm3      Hemoglobin 8.9 g/dL      Hematocrit 28.0 %      MCV 96.6 fL      MCH 30.7 pg      MCHC 31.8 g/dL      RDW 13.8 %      RDW-SD 48.9 fl      MPV 8.9 fL      Platelets 225 10*3/mm3      Neutrophil % 63.3 %       Lymphocyte % 22.5 %      Monocyte % 9.8 %      Eosinophil % 3.5 %      Basophil % 0.5 %      Immature Grans % 0.4 %      Neutrophils, Absolute 4.72 10*3/mm3      Lymphocytes, Absolute 1.68 10*3/mm3      Monocytes, Absolute 0.73 10*3/mm3      Eosinophils, Absolute 0.26 10*3/mm3      Basophils, Absolute 0.04 10*3/mm3      Immature Grans, Absolute 0.03 10*3/mm3      nRBC 0.0 /100 WBC     CANDIDA AURIS SCREEN - Swab, Axilla Right, Axilla Left and Groin [[id number]] Collected: 06/28/24 1420    Specimen: Swab from Axilla Right, Axilla Left and Groin Updated: 06/28/24 1425            Results for orders placed or performed during the hospital encounter of 05/31/24   Blood Culture - Blood, Blood, Central Line    Specimen: Blood, Central Line   Result Value Ref Range    Blood Culture No growth at 5 days        Imaging Results (Last 24 Hours)       Procedure Component Value Units Date/Time    XR Tibia Fibula 2 View Right [[id number]] Collected: 06/28/24 1249     Updated: 06/28/24 1257    Narrative:      XR TIBIA FIBULA 2 VW RIGHT-     INDICATIONS: Leg wound.     TECHNIQUE: FRONTAL AND LATERAL VIEWS OF THE RIGHT LOWER LEG     COMPARISON: None available     FINDINGS:     Soft tissue swelling is seen laterally at the distal right lower leg  that may be evidence of inflammation, infection, injury, correlate  clinically. Dystrophic soft tissue calcifications are seen. No  radiopaque foreign body is noted. Arterial calcifications are  conspicuous. No acute fracture or dislocation is identified. On the  lateral view, small lucency along the posterior cortex of the mid to  distal fibular shaft could be erosion/osteomyelitis. If indicated, MRI  or bone scan can be obtained for further evaluation.       Impression:         As described.           This report was finalized on 6/28/2024 12:54 PM by Dr. Aristeo Damon M.D on Workstation: [id number]               Results for orders placed during the hospital encounter of  05/31/24    Adult Transthoracic Echo Complete W/ Cont if Necessary Per Protocol    Interpretation Summary  •  Left ventricular systolic function is normal. Left ventricular ejection fraction appears to be 51 - 55%.  •  Left ventricular wall thickness is consistent with borderline concentric hypertrophy.  •  Left atrial volume is moderately increased.  •  There is mild calcification of the aortic valve.  •  Mild mitral valve regurgitation is present.  •  There is a small (<1cm) circumferential pericardial effusion.      No orders to display              Assessment/Plan     Active Hospital Problems    Diagnosis  POA   • **Leg wound, right, initial encounter [S88.755W]  Yes      Resolved Hospital Problems   No resolved problems to display.       Osteomyelitis   Exposed tendon on RLE  --MRI left tib/fib showed cellulitis, possible osteomyelitis  --MRI right foot with cellulitis/edema  -Currently on vanc and cefepime  Review of previous records state that he should be on oral doxycycline through 7/12/2024.  -Will ask ortho to see    Atrial fibrillation  -Eliquis and metoprolol     HFrEF  Moderate MR  --ECHO 6/1: LVEF 51 to 55%, borderline concentric LVH, left atrial volume moderately increased, mild MR, small pericardial effusion     Recent C.diff colitis  -s/p fosphofmycin at outying facility and should have completed a PO vancomycin taper as of 6/28    Recent cholecystitis s/p cholecystostomy tube     Chronic urinary retention  -continue cole catheter      PAD  Hx of left BKA  --Continue ASA  --Lt BKA drsg in place.  Continue with current wound care.      Anemia  --Likely chronic due to renal disease    HTN  ESRD  --Nephrology consulted for dialysis     Hypothyroidism  -Continue levothyroxine at 75mcg. Repeat TSH in about 2 weeks       Multiple wounds  Sacral Decubitus ulcer, POA, Stage 3  --Wound care consulted    I discussed the patient's findings and my recommendations with patient and ED provider.    VTE  Prophylaxis - Eliquis (home med).  Code Status - Full code.       Stanley Ford MD  [facility]  06/28/24  15:01 EDT

## 2024-06-29 LAB
ALBUMIN SERPL-MCNC: 2.7 G/DL (ref 3.5–5.2)
ALBUMIN/GLOB SERPL: 1 G/DL
ALP SERPL-CCNC: 226 U/L (ref 39–117)
ALT SERPL W P-5'-P-CCNC: 15 U/L (ref 1–41)
ANION GAP SERPL CALCULATED.3IONS-SCNC: 11.5 MMOL/L (ref 5–15)
AST SERPL-CCNC: 10 U/L (ref 1–40)
BILIRUB SERPL-MCNC: 0.3 MG/DL (ref 0–1.2)
BUN SERPL-MCNC: 38 MG/DL (ref 8–23)
BUN/CREAT SERPL: 11 (ref 7–25)
CALCIUM SPEC-SCNC: 9.3 MG/DL (ref 8.6–10.5)
CHLORIDE SERPL-SCNC: 102 MMOL/L (ref 98–107)
CO2 SERPL-SCNC: 21.5 MMOL/L (ref 22–29)
CREAT SERPL-MCNC: 3.45 MG/DL (ref 0.76–1.27)
DEPRECATED RDW RBC AUTO: 48.8 FL (ref 37–54)
EGFRCR SERPLBLD CKD-EPI 2021: 18.2 ML/MIN/1.73
ERYTHROCYTE [DISTWIDTH] IN BLOOD BY AUTOMATED COUNT: 13.8 % (ref 12.3–15.4)
GLOBULIN UR ELPH-MCNC: 2.8 GM/DL
GLUCOSE SERPL-MCNC: 76 MG/DL (ref 65–99)
HBV SURFACE AG SERPL QL IA: NORMAL
HCT VFR BLD AUTO: 26.4 % (ref 37.5–51)
HGB BLD-MCNC: 8.4 G/DL (ref 13–17.7)
MAGNESIUM SERPL-MCNC: 1.7 MG/DL (ref 1.6–2.4)
MCH RBC QN AUTO: 30.5 PG (ref 26.6–33)
MCHC RBC AUTO-ENTMCNC: 31.8 G/DL (ref 31.5–35.7)
MCV RBC AUTO: 96 FL (ref 79–97)
PHOSPHATE SERPL-MCNC: 5.7 MG/DL (ref 2.5–4.5)
PLATELET # BLD AUTO: 199 10*3/MM3 (ref 140–450)
PMV BLD AUTO: 8.9 FL (ref 6–12)
POTASSIUM SERPL-SCNC: 4.4 MMOL/L (ref 3.5–5.2)
PREALB SERPL-MCNC: 20.5 MG/DL (ref 20–40)
PROT SERPL-MCNC: 5.5 G/DL (ref 6–8.5)
RBC # BLD AUTO: 2.75 10*6/MM3 (ref 4.14–5.8)
SODIUM SERPL-SCNC: 135 MMOL/L (ref 136–145)
WBC NRBC COR # BLD AUTO: 6.52 10*3/MM3 (ref 3.4–10.8)

## 2024-06-29 PROCEDURE — 97602 WOUND(S) CARE NON-SELECTIVE: CPT

## 2024-06-29 PROCEDURE — 85027 COMPLETE CBC AUTOMATED: CPT | Performed by: STUDENT IN AN ORGANIZED HEALTH CARE EDUCATION/TRAINING PROGRAM

## 2024-06-29 PROCEDURE — 25010000002 HEPARIN (PORCINE) PER 1000 UNITS: Performed by: INTERNAL MEDICINE

## 2024-06-29 PROCEDURE — 36415 COLL VENOUS BLD VENIPUNCTURE: CPT | Performed by: STUDENT IN AN ORGANIZED HEALTH CARE EDUCATION/TRAINING PROGRAM

## 2024-06-29 PROCEDURE — G0378 HOSPITAL OBSERVATION PER HR: HCPCS

## 2024-06-29 PROCEDURE — 87340 HEPATITIS B SURFACE AG IA: CPT | Performed by: INTERNAL MEDICINE

## 2024-06-29 PROCEDURE — 99221 1ST HOSP IP/OBS SF/LOW 40: CPT | Performed by: STUDENT IN AN ORGANIZED HEALTH CARE EDUCATION/TRAINING PROGRAM

## 2024-06-29 PROCEDURE — 83735 ASSAY OF MAGNESIUM: CPT | Performed by: STUDENT IN AN ORGANIZED HEALTH CARE EDUCATION/TRAINING PROGRAM

## 2024-06-29 PROCEDURE — 96366 THER/PROPH/DIAG IV INF ADDON: CPT

## 2024-06-29 PROCEDURE — 80053 COMPREHEN METABOLIC PANEL: CPT | Performed by: STUDENT IN AN ORGANIZED HEALTH CARE EDUCATION/TRAINING PROGRAM

## 2024-06-29 PROCEDURE — 84100 ASSAY OF PHOSPHORUS: CPT | Performed by: STUDENT IN AN ORGANIZED HEALTH CARE EDUCATION/TRAINING PROGRAM

## 2024-06-29 PROCEDURE — G0257 UNSCHED DIALYSIS ESRD PT HOS: HCPCS

## 2024-06-29 PROCEDURE — 84134 ASSAY OF PREALBUMIN: CPT | Performed by: STUDENT IN AN ORGANIZED HEALTH CARE EDUCATION/TRAINING PROGRAM

## 2024-06-29 RX ORDER — HEPARIN SODIUM 1000 [USP'U]/ML
4000 INJECTION, SOLUTION INTRAVENOUS; SUBCUTANEOUS AS NEEDED
Status: DISCONTINUED | OUTPATIENT
Start: 2024-06-29 | End: 2024-07-03 | Stop reason: HOSPADM

## 2024-06-29 RX ADMIN — LEVOTHYROXINE SODIUM 75 MCG: 75 TABLET ORAL at 05:29

## 2024-06-29 RX ADMIN — DOXYCYCLINE 100 MG: 100 CAPSULE ORAL at 12:21

## 2024-06-29 RX ADMIN — DOXYCYCLINE 100 MG: 100 CAPSULE ORAL at 22:35

## 2024-06-29 RX ADMIN — HEPARIN SODIUM 4000 UNITS: 1000 INJECTION INTRAVENOUS; SUBCUTANEOUS at 12:02

## 2024-06-29 RX ADMIN — LOSARTAN POTASSIUM 25 MG: 25 TABLET, FILM COATED ORAL at 22:35

## 2024-06-29 RX ADMIN — APIXABAN 5 MG: 5 TABLET, FILM COATED ORAL at 22:36

## 2024-06-29 RX ADMIN — Medication 10 ML: at 22:36

## 2024-06-29 RX ADMIN — APIXABAN 5 MG: 5 TABLET, FILM COATED ORAL at 12:21

## 2024-06-29 RX ADMIN — METOPROLOL SUCCINATE 100 MG: 25 TABLET, EXTENDED RELEASE ORAL at 12:22

## 2024-06-29 RX ADMIN — ASPIRIN 81 MG: 81 TABLET, COATED ORAL at 12:21

## 2024-06-29 RX ADMIN — Medication 10 ML: at 12:26

## 2024-06-29 RX ADMIN — COLLAGENASE SANTYL 1 APPLICATION: 250 OINTMENT TOPICAL at 10:15

## 2024-06-29 RX ADMIN — DILTIAZEM HYDROCHLORIDE 240 MG: 240 CAPSULE, COATED, EXTENDED RELEASE ORAL at 12:22

## 2024-06-29 NOTE — PROGRESS NOTES
Discharge Planning Assessment  [facility]     Patient Name: Tavo Gudino  MRN: [id number]  Today's Date: 6/29/2024    Admit Date: 6/28/2024    Plan: From [facility] skilled bed, will require updated pre-cert to return..........Dany LEE   Discharge Needs Assessment    No documentation.                  Discharge Plan       Row Name 06/29/24 1824       Plan    Plan From [facility] skilled bed, will require updated pre-cert to return..........Dany LEE    Patient/Family in Agreement with Plan yes    Plan Comments Return call from [facility]- patient will require pre-cert to return to facility as he was there for short term rehab. Patient placed in [facility] in-basket for [facility] and [facility] will follow, [facility] facility can start cert tomorrow if we have therapy notes to submit. S/W RN who states she will update patient and MD and get therapy orders. [facility] canceled......Dany LEE      Row Name 06/29/24 1811       Plan    Plan [facility] via [facility] at 2000 today........Dany LEE    Patient/Family in Agreement with Plan yes    Plan Comments VM left for [facility] requesting call back. Call to [facility] and S/W Les[facility], [facility] patient can return this evening. [facility] aware of transport time 2000 and report can be called to main number: [phone]. S/W RN to update, [facility] will make packet on unit, ambulance form completed in EPIC.......Dany LEE                  Continued Care and Services - Admitted Since 6/28/2024       Destination       Service Provider Request Status Selected Services Address Phone Fax Patient Preferred    [facility] [address] [facility] [address] [phone] --                  Selected Continued Care - Prior Encounters Includes continued care and service providers with selected services from prior encounters from 3/30/2024 to 6/29/2024      Discharged on 6/20/2024 Admission date:  5/31/2024 - Discharge disposition: Skilled Nursing Facility (DC - External)      Destination       Service Provider Selected Services Address Phone Fax Patient Preferred    [facility] [address] [address] [phone] --                          Expected Discharge Date and Time       Expected Discharge Date Expected Discharge Time    Jun 29, 2024            Demographic Summary    No documentation.                  Functional Status    No documentation.                  Psychosocial    No documentation.                  Abuse/Neglect    No documentation.                  Legal    No documentation.                  Substance Abuse    No documentation.                  Patient Forms    No documentation.                     Awa Boyle, RN

## 2024-06-29 NOTE — PROGRESS NOTES
[facility] Progress Note      Patient Name: Tavo Gudino  : 1955  MRN: [id number]  Primary Care Physician:  System, Provider Not In  Date of admission: 2024    Subjective     Interval History:   F/u ESRD     Review of Systems:   Dialyzed today w/o issue, 2.5L removed  Seen by vascular surg and no immediate surgical intervention needed    Objective     Vitals:   Temp:  [95.4 °F (35.2 °C)-97.9 °F (36.6 °C)] 97.8 °F (36.6 °C)  Heart Rate:  [] 81  Resp:  [16-18] 16  BP: (132-149)/(50-83) 134/77    Intake/Output Summary (Last 24 hours) at 2024 1534  Last data filed at 2024 1300  Gross per 24 hour   Intake 960 ml   Output 1950 ml   Net -990 ml       Physical Exam:    General Appearance: frail ill appearing WM in no acute distress, surgery at bedside  Skin: warm and dry  HEENT: oral mucosa normal, nonicteric sclera  Neck: RIJ TDC, no JVD  Lungs: CTA bilat no rales  Heart: RRR, normal S1 and S2  Abdomen: soft, nontender, nondistended  : no palpable bladder  Extremities: LLE BKA with beefy red stump wound; RLE with dusky discoloration, no pedal edema     Scheduled Meds:     apixaban, 5 mg, Oral, BID  aspirin, 81 mg, Oral, Daily  collagenase, 1 Application, Topical, Q24H  dilTIAZem CD, 240 mg, Oral, Q24H  doxycycline, 100 mg, Oral, Q12H  levothyroxine, 75 mcg, Oral, Q AM  losartan, 25 mg, Oral, Nightly  metoprolol succinate XL, 100 mg, Oral, Q24H  sodium chloride, 10 mL, Intravenous, Q12H      IV Meds:        Results Reviewed:   I have personally reviewed the results from the time of this admission to 2024 15:34 EDT     Results from last 7 days   Lab Units 24  0354 24  1221   SODIUM mmol/L 135* 134*   POTASSIUM mmol/L 4.4 4.2   CHLORIDE mmol/L 102 97*   CO2 mmol/L 21.5* 24.1   BUN mg/dL 38* 37*   CREATININE mg/dL 3.45* 3.52*   CALCIUM mg/dL 9.3 9.4   BILIRUBIN mg/dL 0.3 0.3   ALK PHOS U/L 226* 277*   ALT (SGPT) U/L 15 22   AST (SGOT) U/L 10 13    GLUCOSE mg/dL 76 87     Estimated Creatinine Clearance: 23.4 mL/min (A) (by C-G formula based on SCr of 3.45 mg/dL (H)).  Results from last 7 days   Lab Units 06/29/24  0354   MAGNESIUM mg/dL 1.7   PHOSPHORUS mg/dL 5.7*         Results from last 7 days   Lab Units 06/29/24  0354 06/28/24  1221   WBC 10*3/mm3 6.52 7.46   HEMOGLOBIN g/dL 8.4* 8.9*   PLATELETS 10*3/mm3 199 225     Results from last 7 days   Lab Units 06/28/24  1221   INR  1.34*       Assessment / Plan     ASSESSMENT:  ESRD - been doing short daily HD at [facility] (Mon thru Fri), last treatment there THURS so dialyzed today.  RIJ TDC.  K/HCo3, waste products and vol status are stable, no indication for HD today  PVD s/p LLE BKA with stump wound - seen by vascular surg  RLE wound with exposed tendon - seen by ortho; poss osteomyelitis, on doxy through 7/12/24 ; no surgical intervention needed per surgery  CAD s/p recent NSTEMI, managed medically  HFrEF, prior EF reported 40%  HTN of ESRD, BP improved, added ARB; also on toprol XL/diltiazem for #9  Anemia of CKD, hgb is 8.9, likely on long acting ALLISON at dialysis unit  Recent acute cholecystitis s/p cholecystostomy drain  PAF on AC (eliquis)    PLAN:  HD today completed   Note plan for discharge back to rehab  Next HD MON    Rommel Amato MD  06/29/24  15:34 EDT    [facility] \[facility]\""  [phone]

## 2024-06-29 NOTE — CONSULTS
"[facility] Vascular Surgery  Consult Note    Patient Name: Tavo Gudino  : 1955  MRN: [id number]  Primary Care Physician:  System, Provider Not In  Referring Physician: Stanley Ford MD  Date of admission: 2024    Inpatient Vascular Surgery Consult  Consult performed by: Bob Pitts II, MD  Consult ordered by: Porfirio Butler MD        Subjective   Subjective     Reason for Consult/ Chief Complaint: Leg wounds    History of Present Illness  Tavo Gudino is a 69 y.o. 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  The patient now has a wound on his right lower leg as well with exposed tendon.  He was admitted yesterday as a transfer from nursing facility.  He had previously been managed by vascular surgery in [place].    At the time of my evaluation the patient is resting comfortably in his hospital bed getting dialysis.  He denies significant pain in either of his lower extremities.  He is very concerned about getting back to his rehab facility where he has \"8 days\" left.    Has not ambulated since his amputation in January    Review of Systems     Personal History     Past Medical History:   Diagnosis Date   • Atrial fibrillation    • Chronic kidney disease (CKD), stage V    • ESRD on hemodialysis    • Hypertension    • Metabolic acidosis    • Peripheral arterial disease    • Pneumothorax    • Secondary hyperparathyroidism        Past Surgical History:   Procedure Laterality Date   • ARTERIOVENOUS FISTULA Right    • BELOW KNEE LEG AMPUTATION Left    • CATARACT EXTRACTION Bilateral        Family History: His family history includes Cancer in his father.     Social History: He  reports that he has never smoked. He has never used smokeless tobacco. He reports that he does not drink alcohol and does not use drugs.    Home " Medications:  Omadacycline Tosylate, Polyvinyl Alcohol-Povidone PF, acetaminophen, apixaban, aspirin, collagenase, dilTIAZem CD, famotidine, folic acid, lactobacillus acidophilus, levothyroxine, melatonin, metoprolol succinate XL, sodium zirconium cyclosilicate, and vancomycin    Allergies:  He is allergic to penicillins, levothyroxine, and hydromorphone.    Objective    Objective     Vitals:    Temp:  [95.4 °F (35.2 °C)-97.9 °F (36.6 °C)] 97.9 °F (36.6 °C)  Heart Rate:  [] 99  Resp:  [16-18] 18  BP: (140-185)/() 149/83  Output by Drain (mL) 06/28/24 0701 - 06/28/24 1900 06/28/24 1901 - 06/29/24 0700 06/29/24 0701 - 06/29/24 1028 Range Total   Closed/Suction Drain Right;Anterior Abdomen Other (Comment) 200 100  300   Urethral Catheter 700 600  1300       Physical Exam  NAD  R TDC in place, functioning appropriately for dialysis  Wound in anterolateral right lower leg with fibrinous exudate.  Palpable right DP pulse  Palpable left popliteal pulse  Large wound over left BKA site with granulation tissue forming on the anterior aspect with some fibrinous exudate buildup on the posterior aspect.  No purulence from either wound.     Result Review    Result Review:  I have personally reviewed the results from the time of this admission to 6/29/2024 10:28 EDT and agree with these findings:  [x]  Laboratory list / accordion  [x]  Microbiology  [x]  Radiology  []  EKG/Telemetry   [x]  Cardiology/Vascular   []  Pathology  [x]  Old records  []  Other:  Most notable findings include: Multiphasic waveforms noted within distal right anterior tibial artery on arterial duplex.  Surgical changes versus osteomyelitis on MRI of the left BKA stump.      CBC    Results from last 7 days   Lab Units 06/29/24  0354 06/28/24  1221   WBC 10*3/mm3 6.52 7.46   HEMOGLOBIN g/dL 8.4* 8.9*   PLATELETS 10*3/mm3 199 225     BMP   Results from last 7 days   Lab Units 06/29/24  0354 06/28/24  1221   SODIUM mmol/L 135* 134*   POTASSIUM  "mmol/L 4.4 4.2   CHLORIDE mmol/L 102 97*   CO2 mmol/L 21.5* 24.1   BUN mg/dL 38* 37*   CREATININE mg/dL 3.45* 3.52*   GLUCOSE mg/dL 76 87   MAGNESIUM mg/dL 1.7  --    PHOSPHORUS mg/dL 5.7*  --      Cr Clearance Estimated Creatinine Clearance: 23.4 mL/min (A) (by C-G formula based on SCr of 3.45 mg/dL (H)).  Coag   Results from last 7 days   Lab Units 06/28/24  1221   INR  1.34*   APTT seconds 41.1*     HbA1C   Lab Results   Component Value Date    HGBA1C 4.6 01/08/2024     Blood Glucose No results found for: \"POCGLU\"  Infection     CMP   Results from last 7 days   Lab Units 06/29/24  0354 06/28/24  1221   SODIUM mmol/L 135* 134*   POTASSIUM mmol/L 4.4 4.2   CHLORIDE mmol/L 102 97*   CO2 mmol/L 21.5* 24.1   BUN mg/dL 38* 37*   CREATININE mg/dL 3.45* 3.52*   GLUCOSE mg/dL 76 87   ALBUMIN g/dL 2.7* 3.2*   BILIRUBIN mg/dL 0.3 0.3   ALK PHOS U/L 226* 277*   AST (SGOT) U/L 10 13   ALT (SGPT) U/L 15 22     ABG      UA      TIMOTEO  No results found for: \"POCMETH\", \"POCAMPHET\", \"POCBARBITUR\", \"POCBENZO\", \"POCCOCAINE\", \"POCOPIATES\", \"POCOXYCODO\", \"POCPHENCYC\", \"POCPROPOXY\", \"POCTHC\", \"POCTRICYC\"  Lysis Labs   Results from last 7 days   Lab Units 06/29/24  0354 06/28/24  1221   INR   --  1.34*   APTT seconds  --  41.1*   HEMOGLOBIN g/dL 8.4* 8.9*   PLATELETS 10*3/mm3 199 225   CREATININE mg/dL 3.45* 3.52*     Radiology(recent) XR Tibia Fibula 2 View Right    Result Date: 6/28/2024   As described.    This report was finalized on 6/28/2024 12:54 PM by Dr. Aristeo Damon M.D on Workstation: [employer]           From 4/13                          Assessment & Plan   Assessment / Plan     Brief Patient Summary:  Tavo Gudino is a 69 y.o. male with history of multiple medical problems receiving virtually all of his care in [place] up until this point, now admitted with wounds on his right leg and left BKA stump.  By exam patient should have adequate blood flow to heal these wounds.  I suspect slow healing likely related to chronic " microvascular disease from his end-stage renal disease, malnutrition.  Patient has been effectively nonambulatory for approximately 6 months.  If his left BKA wound does not heal he will require conversion to an above-knee amputation there and I think it would be highly unlikely he would ever walk again after that.  Very well may not walk again regardless.  With regards to the right lower leg wound, he is get a palpable DP pulse and multiphasic waveforms in the anterior tibial artery on most recent noninvasive studies.  This wound is within the vascular distribution the anterior tibial artery.  There is noted to be some stenosis within the tibioperoneal trunk at the time of his last noninvasive studies but I do not think intervention for this would really make a significant difference in his ability to heal these wounds.  I really think he would be in the patient's best interest to continue following up with the surgeons who had managed these wounds thus far, though we will be happy to see him after discharge if it is in keeping with the patient's wishes.      Active Hospital Problems:  Active Hospital Problems    Diagnosis    • **Leg wound, right, initial encounter    • Acute osteomyelitis of left tibia    • Soft tissue infection    • Essential hypertension    • Atrial fibrillation    • ESRD (end stage renal disease) on hemodialysis    • History of cholecystitis s/p cholecystostomy tube        Plan:   Will check a prealbumin.  Continue wound care per  WOCN.   Okay to continue aspirin and Eliquis and other medications as prescribed by other providers      VTE Prophylaxis:  Pharmacologic VTE prophylaxis orders are present.         MD Bob Lloyd II, II, MD  06/29/24  10:28 EDT  Office Number [phone]    [facility]

## 2024-06-29 NOTE — PLAN OF CARE
Goal Outcome Evaluation:  Plan of Care Reviewed With: patient        Progress: no change    A/Ox4. Pt verbalized being anxious about what is the plan on his right leg. Pt has h/o Afib. Tachycardic fluctuating at 100-120's at bed time, denies chest pain or SOA, per pt when he thinks too much or being anxious it raises his heart rate. No s/s distress. On isolation for recent C. Diff and h/o MRSA. On Eliquis and ASA scheduled. On PO Doxycycline q12H. Pt has a right IJ TDC for HD access. Scheduled for HD later today. Pt has a cholecystotomy bag from recent gallbladder surgery, draining yellowish output. Voiding per chronic Delaney. Needs attended.

## 2024-06-29 NOTE — CASE MANAGEMENT/SOCIAL WORK
Continued Stay Note  [facility]     Patient Name: Tavo Gudino  MRN: [id number]  Today's Date: 6/29/2024    Admit Date: 6/28/2024    Plan: [facility]- awaiting facility confirmation if patient can return   Discharge Plan       Row Name 06/29/24 1459       Plan    Plan [facility]- awaiting facility confirmation if patient can return    Patient/Family in Agreement with Plan yes    Plan Comments CCP notified that patient will be ready for discharge and if able to return to [facility]. CCP spoke with [facility] central intake ([phone]) three times and they are awaiting call back from [facility] management regarding patient's ability to return today. CCP pre-arranged [facility] at 8PM that needs cancelled [phone], if unable to transfer. CCP following. RN updated. Theodora LEE CCP                   Discharge Codes    No documentation.                       Theodora Dai RN     HPI    Pre op cat sx OD scheduled for 6/21    Pt c/o blurry vision and glare. Has been out of cl for over 10 days prior   to today's pre op.   Last edited by Antonieta Coronado on 6/8/2023  2:32 PM.            Assessment /Plan     For exam results, see Encounter Report.    Nuclear sclerotic cataract, bilateral      Patient with visually significant cataract impacting abiliity ADLs (reading, driving, PM driving, glare).  Discussed options, R & B, expectations, patient voices good understanding and wishes to proceed with procedure. Patient will likely benefit from sx and signed consent.    Proceed with CEIOL OD  Monofocal dist IOL OD  (Monovision distance OD, near OS)

## 2024-06-29 NOTE — NURSING NOTE
Patient arrived to floor and low air loss mattress immediately ordered for patient. Patient placed upon bed and wound care completed per order. Patient to received dialysis tomorrow per nephrology.

## 2024-06-29 NOTE — DISCHARGE SUMMARY
Patient Name: Tavo Gudino  : 1955  MRN: [id number]    Date of Admission: 2024  Date of Discharge:  2024  Primary Care Physician: System, Provider Not In      Chief Complaint:   Wound Check      Discharge Diagnoses     Active Hospital Problems    Diagnosis  POA   • **Leg wound, right, initial encounter [S81.801A]  Yes   • Acute osteomyelitis of left tibia [M86.162]  Yes   • Soft tissue infection [L08.9]  Yes   • Essential hypertension [I10]  Yes   • Atrial fibrillation [I48.91]  Yes   • ESRD (end stage renal disease) on hemodialysis [N18.6]  Yes   • History of cholecystitis s/p cholecystostomy tube [K81.9]  Yes      Resolved Hospital Problems   No resolved problems to display.        Hospital Course       This is a 69-year-old male with a history of hypertension, peripheral artery disease status post left BKA, ESRD on hemodialysis Monday through Friday, atrial fibrillation on Eliquis, recent acute cholecystitis status post cholecystostomy tube placement at outlying facility, chronic urinary retention with an indwelling Delaney catheter, history of C. difficile on oral vancomycin who presents to the hospital with a wound of the right lower extremity.  He was recently admitted to an outlying facility for atrial fibrillation with RVR where he was also diagnosed with C. difficile and placed on oral vancomycin.    He has been evaluated for osteomyelitis before.  He had an MRI of the left tib-fib as well as the right foot.  The left tib-fib MRI showed possible osteomyelitis      Osteomyelitis   Exposed tendon on RLE  --MRI left tib/fib showed cellulitis, possible osteomyelitis  --MRI right foot with cellulitis/edema  -Currently on Idamycin cefepime.  Review of previous records state that he should be on oral doxycycline through 2024.  --no surgical intervention required per orthopedic and vascular surgery    Afib with RVR  Hx of Afib/Aflutter  -Eliquis and metoprolol.       HFrEF  Moderate  MR  --ECHO 6/1: LVEF 51 to 55%, borderline concentric LVH, left atrial volume moderately increased, mild MR, small pericardial effusion      Recent C.diff colitis  -s/p fosphofmycin at outying facility and should have completed a PO vancomycin taper as of 6/28    Recent cholecystitis s/p cholecystostomy tube     Chronic urinary retention  -continue cole catheter      PAD  Hx of left BKA  --Continue ASA  --Lt BKA drsg in place.  Continue with current wound care.      Anemia  --Likely chronic due to renal disease    HTN  ESRD  --Nephrology consulted for dialysis      Hypothyroidism  -Continue levothyroxine at 75mcg. Repeat TSH in about 2 weeks       Multiple wounds  Sacral Decubitus ulcer, POA, Stage 3      Physical Exam:  Temp:  [95.4 °F (35.2 °C)-97.9 °F (36.6 °C)] 97.8 °F (36.6 °C)  Heart Rate:  [] 81  Resp:  [16-18] 16  BP: (132-149)/(50-83) 134/77  Body mass index is 24.55 kg/m².  Physical Exam  Constitutional:       Comments: Chronically ill appearing    HENT:      Head: Normocephalic and atraumatic.   Cardiovascular:      Rate and Rhythm: Normal rate and regular rhythm.   Pulmonary:      Effort: Pulmonary effort is normal. No respiratory distress.   Abdominal:      General: There is no distension.      Palpations: Abdomen is soft.      Tenderness: There is no abdominal tenderness.   Musculoskeletal:      Comments: Exposed soft tissue above right ankle    Neurological:      Mental Status: He is alert and oriented to person, place, and time.      Motor: Weakness present.         Consultants     Consult Orders (all) (From admission, onward)       Start     Ordered    06/28/24 1826  Inpatient Vascular Surgery Consult  Once        Specialty:  Vascular Surgery  Provider:  Bob Pitts II, MD    06/28/24 1826 06/28/24 1522  Inpatient Orthopedic Surgery Consult  Once        Specialty:  Orthopedic Surgery  Provider:  Porfirio Butler MD    06/28/24 1521    06/28/24 1452  Inpatient Nephrology Consult  Once         Specialty:  Nephrology  Provider:  (Not yet assigned)    06/28/24 1452    06/28/24 1303  [facility] (on-call MD unless specified) Details  Once        Specialty:  Hospitalist  Provider:  Stanley Ford MD    06/28/24 1302                  Procedures     Imaging Results (All)       Procedure Component Value Units Date/Time    XR Tibia Fibula 2 View Right [[id number]] Collected: 06/28/24 1249     Updated: 06/28/24 1257    Narrative:      XR TIBIA FIBULA 2 VW RIGHT-     INDICATIONS: Leg wound.     TECHNIQUE: FRONTAL AND LATERAL VIEWS OF THE RIGHT LOWER LEG     COMPARISON: None available     FINDINGS:     Soft tissue swelling is seen laterally at the distal right lower leg  that may be evidence of inflammation, infection, injury, correlate  clinically. Dystrophic soft tissue calcifications are seen. No  radiopaque foreign body is noted. Arterial calcifications are  conspicuous. No acute fracture or dislocation is identified. On the  lateral view, small lucency along the posterior cortex of the mid to  distal fibular shaft could be erosion/osteomyelitis. If indicated, MRI  or bone scan can be obtained for further evaluation.       Impression:         As described.           This report was finalized on 6/28/2024 12:54 PM by Dr. Aristeo Damon M.D on Workstation: [id number]               Pertinent Labs     Results from last 7 days   Lab Units 06/29/24  0354 06/28/24  1221   WBC 10*3/mm3 6.52 7.46   HEMOGLOBIN g/dL 8.4* 8.9*   PLATELETS 10*3/mm3 199 225     Results from last 7 days   Lab Units 06/29/24  0354 06/28/24  1221   SODIUM mmol/L 135* 134*   POTASSIUM mmol/L 4.4 4.2   CHLORIDE mmol/L 102 97*   CO2 mmol/L 21.5* 24.1   BUN mg/dL 38* 37*   CREATININE mg/dL 3.45* 3.52*   GLUCOSE mg/dL 76 87   Estimated Creatinine Clearance: 23.4 mL/min (A) (by C-G formula based on SCr of 3.45 mg/dL (H)).  Results from last 7 days   Lab Units 06/29/24  0354 06/28/24  1221   ALBUMIN g/dL 2.7* 3.2*   BILIRUBIN mg/dL 0.3 0.3   ALK PHOS  "U/L 226* 277*   AST (SGOT) U/L 10 13   ALT (SGPT) U/L 15 22     Results from last 7 days   Lab Units 06/29/24  0354 06/28/24  1221   CALCIUM mg/dL 9.3 9.4   ALBUMIN g/dL 2.7* 3.2*   MAGNESIUM mg/dL 1.7  --    PHOSPHORUS mg/dL 5.7*  --                Invalid input(s): \"LDLCALC\"        Test Results Pending at Discharge     Pending Labs       Order Current Status    CANDIDA A[facility]IS SCREEN - Swab, Axilla Right, Axilla Left and Groin Preliminary result            Discharge Details        Discharge Medications        Continue These Medications        Instructions Start Date   acetaminophen 325 MG tablet  Commonly known as: TYLENOL   650 mg, Oral, Every 4 Hours PRN      apixaban 5 MG tablet tablet  Commonly known as: ELIQUIS   5 mg, Oral, 2 Times Daily      aspirin 81 MG EC tablet   81 mg, Oral, Daily      collagenase 250 UNIT/GM ointment   1 Application, Topical, Every 24 Hours      dilTIAZem  MG 24 hr capsule  Commonly known as: CARDIZEM CD   240 mg, Oral, Every 24 Hours Scheduled      famotidine 10 MG tablet  Commonly known as: PEPCID   10 mg, Oral, Daily      folic acid 1 MG tablet  Commonly known as: FOLVITE   1 mg, Oral, Daily      lactobacillus acidophilus capsule capsule   1 capsule, Oral, 2 Times Daily      levothyroxine 75 MCG tablet  Commonly known as: SYNTHROID, LEVOTHROID   75 mcg, Oral, Every Early Morning      melatonin 5 MG tablet tablet   5 mg, Oral, Nightly PRN      metoprolol succinate  MG 24 hr tablet  Commonly known as: TOPROL-XL   100 mg, Oral, Every 24 Hours Scheduled      Nuzyra 150 MG tablet  Generic drug: Omadacycline Tosylate   Take 2 tablets (300 mg) by mouth Daily for 30 days.      Polyvinyl Alcohol-Povidone PF 1.4-0.6 % ophthalmic solution  Commonly known as: HYPOTEARS   1 drop, Both Eyes, Every 1 Hour PRN      sodium zirconium cyclosilicate 10 g packet  Commonly known as: LOKELMA   10 g, Oral, Daily, Empty entire contents of the packet(s) into a glass with at least 3 tablespoons (45 " mL) of water. Stir well and drink immediately; if powder remains in the glass, add water, stir and drink immediately; repeat until no powder remains. Administer other oral medications at least 2 hours before or 2 hours after dose.             Stop These Medications      vancomycin 125 MG capsule  Commonly known as: VANCOCIN              Allergies   Allergen Reactions   • Penicillins Hives     Received ceftriaxone 6/1/24   • Levothyroxine Unknown - Low Severity   • Hydromorphone Other (See Comments)     Confusion, encephalopathy per wife         Discharge Disposition:  Skilled Nursing Facility (DC - External)    Discharge Diet:  Diet Order   Procedures   • Diet: Regular/House; Fluid Consistency: Thin (IDDSI 0)       Discharge Activity:       CODE STATUS:    Code Status and Medical Interventions:   Ordered at: 06/28/24 1452     Code Status (Patient has no pulse and is not breathing):    CPR (Attempt to Resuscitate)     Medical Interventions (Patient has pulse or is breathing):    Full Support       No future appointments.   Follow-up Information       System, Provider Not In .    Contact information:  [facility] [id number]                             Time Spent on Discharge:  Greater than 30 minutes      Stanley Ford MD  [facility]  06/29/24  16:22 EDT

## 2024-06-30 LAB
ALBUMIN SERPL-MCNC: 2.8 G/DL (ref 3.5–5.2)
ANION GAP SERPL CALCULATED.3IONS-SCNC: 8.5 MMOL/L (ref 5–15)
BASOPHILS # BLD AUTO: 0.05 10*3/MM3 (ref 0–0.2)
BASOPHILS NFR BLD AUTO: 0.7 % (ref 0–1.5)
BUN SERPL-MCNC: 23 MG/DL (ref 8–23)
BUN/CREAT SERPL: 8.4 (ref 7–25)
CALCIUM SPEC-SCNC: 9.2 MG/DL (ref 8.6–10.5)
CHLORIDE SERPL-SCNC: 100 MMOL/L (ref 98–107)
CO2 SERPL-SCNC: 24.5 MMOL/L (ref 22–29)
CREAT SERPL-MCNC: 2.75 MG/DL (ref 0.76–1.27)
DEPRECATED RDW RBC AUTO: 48.4 FL (ref 37–54)
EGFRCR SERPLBLD CKD-EPI 2021: 23.9 ML/MIN/1.73
EOSINOPHIL # BLD AUTO: 0.31 10*3/MM3 (ref 0–0.4)
EOSINOPHIL NFR BLD AUTO: 4.6 % (ref 0.3–6.2)
ERYTHROCYTE [DISTWIDTH] IN BLOOD BY AUTOMATED COUNT: 13.9 % (ref 12.3–15.4)
GLUCOSE SERPL-MCNC: 79 MG/DL (ref 65–99)
HCT VFR BLD AUTO: 25 % (ref 37.5–51)
HGB BLD-MCNC: 8 G/DL (ref 13–17.7)
IMM GRANULOCYTES # BLD AUTO: 0.02 10*3/MM3 (ref 0–0.05)
IMM GRANULOCYTES NFR BLD AUTO: 0.3 % (ref 0–0.5)
LYMPHOCYTES # BLD AUTO: 1.5 10*3/MM3 (ref 0.7–3.1)
LYMPHOCYTES NFR BLD AUTO: 22.3 % (ref 19.6–45.3)
MCH RBC QN AUTO: 30.4 PG (ref 26.6–33)
MCHC RBC AUTO-ENTMCNC: 32 G/DL (ref 31.5–35.7)
MCV RBC AUTO: 95.1 FL (ref 79–97)
MONOCYTES # BLD AUTO: 0.68 10*3/MM3 (ref 0.1–0.9)
MONOCYTES NFR BLD AUTO: 10.1 % (ref 5–12)
NEUTROPHILS NFR BLD AUTO: 4.17 10*3/MM3 (ref 1.7–7)
NEUTROPHILS NFR BLD AUTO: 62 % (ref 42.7–76)
NRBC BLD AUTO-RTO: 0 /100 WBC (ref 0–0.2)
PHOSPHATE SERPL-MCNC: 4.6 MG/DL (ref 2.5–4.5)
PLATELET # BLD AUTO: 184 10*3/MM3 (ref 140–450)
PMV BLD AUTO: 8.7 FL (ref 6–12)
POTASSIUM SERPL-SCNC: 4.1 MMOL/L (ref 3.5–5.2)
RBC # BLD AUTO: 2.63 10*6/MM3 (ref 4.14–5.8)
SODIUM SERPL-SCNC: 133 MMOL/L (ref 136–145)
WBC NRBC COR # BLD AUTO: 6.73 10*3/MM3 (ref 3.4–10.8)

## 2024-06-30 PROCEDURE — 97161 PT EVAL LOW COMPLEX 20 MIN: CPT

## 2024-06-30 PROCEDURE — 99232 SBSQ HOSP IP/OBS MODERATE 35: CPT | Performed by: STUDENT IN AN ORGANIZED HEALTH CARE EDUCATION/TRAINING PROGRAM

## 2024-06-30 PROCEDURE — 97530 THERAPEUTIC ACTIVITIES: CPT

## 2024-06-30 PROCEDURE — 80069 RENAL FUNCTION PANEL: CPT | Performed by: INTERNAL MEDICINE

## 2024-06-30 PROCEDURE — 97602 WOUND(S) CARE NON-SELECTIVE: CPT

## 2024-06-30 PROCEDURE — 85025 COMPLETE CBC W/AUTO DIFF WBC: CPT | Performed by: INTERNAL MEDICINE

## 2024-06-30 PROCEDURE — G0378 HOSPITAL OBSERVATION PER HR: HCPCS

## 2024-06-30 RX ADMIN — APIXABAN 5 MG: 5 TABLET, FILM COATED ORAL at 21:43

## 2024-06-30 RX ADMIN — APIXABAN 5 MG: 5 TABLET, FILM COATED ORAL at 10:53

## 2024-06-30 RX ADMIN — METOPROLOL SUCCINATE 100 MG: 25 TABLET, EXTENDED RELEASE ORAL at 10:52

## 2024-06-30 RX ADMIN — DILTIAZEM HYDROCHLORIDE 240 MG: 240 CAPSULE, COATED, EXTENDED RELEASE ORAL at 10:52

## 2024-06-30 RX ADMIN — LOSARTAN POTASSIUM 25 MG: 25 TABLET, FILM COATED ORAL at 21:44

## 2024-06-30 RX ADMIN — DOXYCYCLINE 100 MG: 100 CAPSULE ORAL at 23:00

## 2024-06-30 RX ADMIN — Medication 10 ML: at 21:44

## 2024-06-30 RX ADMIN — Medication 10 ML: at 10:54

## 2024-06-30 RX ADMIN — ASPIRIN 81 MG: 81 TABLET, COATED ORAL at 10:52

## 2024-06-30 RX ADMIN — LEVOTHYROXINE SODIUM 75 MCG: 75 TABLET ORAL at 05:06

## 2024-06-30 RX ADMIN — DOXYCYCLINE 100 MG: 100 CAPSULE ORAL at 10:52

## 2024-06-30 RX ADMIN — COLLAGENASE SANTYL 1 APPLICATION: 250 OINTMENT TOPICAL at 10:55

## 2024-06-30 NOTE — PLAN OF CARE
Goal Outcome Evaluation:              Outcome Evaluation: A&O, bedbound, wound care provided, RA, specialty matress, plans for HD tomorrow, MACARIO, educaed on bp monitoring, CTM

## 2024-06-30 NOTE — PLAN OF CARE
Goal Outcome Evaluation:  Plan of Care Reviewed With: patient        Progress: improving    A/Ox4. Calm, cooperative, pleasant. No s/s distress. Pt did received HD on 06/29/24. W/ R IJ TDC for HD access. SLIV R FA. Voiding per chronic Delaney. Last BM on 06/29/24. On PO Doxycycline q12H. Pt will need pre-cert to return to [facility]. Needs attended.

## 2024-06-30 NOTE — PROGRESS NOTES
[facility] Progress Note      Patient Name: Tavo Gudino  : 1955  MRN: [id number]  Primary Care Physician:  System, Provider Not In  Date of admission: 2024    Subjective     Interval History:   F/u ESRD     Review of Systems:     Patient resting comfortably.  S/p hemodialysis yesterday.  Denies any chest pain, shortness of breath, nausea or vomiting    Objective     Vitals:   Temp:  [97 °F (36.1 °C)-98.8 °F (37.1 °C)] 98.8 °F (37.1 °C)  Heart Rate:  [66-81] 70  Resp:  [16] 16  BP: (127-134)/(50-81) 133/70    Intake/Output Summary (Last 24 hours) at 2024 0927  Last data filed at 2024 0600  Gross per 24 hour   Intake 480 ml   Output 1200 ml   Net -720 ml       Physical Exam:    General Appearance: frail ill appearing WM in no acute distress, surgery at bedside  Skin: warm and dry  HEENT: oral mucosa normal, nonicteric sclera  Neck: RIJ TDC, no JVD  Lungs: CTA bilat no rales  Heart: RRR, normal S1 and S2  Abdomen: soft, nontender, nondistended  : no palpable bladder  Extremities: LLE BKA with beefy red stump wound; RLE with dusky discoloration, no pedal edema     Scheduled Meds:     apixaban, 5 mg, Oral, BID  aspirin, 81 mg, Oral, Daily  collagenase, 1 Application, Topical, Q24H  dilTIAZem CD, 240 mg, Oral, Q24H  doxycycline, 100 mg, Oral, Q12H  levothyroxine, 75 mcg, Oral, Q AM  losartan, 25 mg, Oral, Nightly  metoprolol succinate XL, 100 mg, Oral, Q24H  sodium chloride, 10 mL, Intravenous, Q12H      IV Meds:        Results Reviewed:   I have personally reviewed the results from the time of this admission to 2024 09:27 EDT     Results from last 7 days   Lab Units 24  0511 24  0354 24  1221   SODIUM mmol/L 133* 135* 134*   POTASSIUM mmol/L 4.1 4.4 4.2   CHLORIDE mmol/L 100 102 97*   CO2 mmol/L 24.5 21.5* 24.1   BUN mg/dL 23 38* 37*   CREATININE mg/dL 2.75* 3.45* 3.52*   CALCIUM mg/dL 9.2 9.3 9.4   BILIRUBIN mg/dL  --  0.3 0.3   ALK PHOS U/L   --  226* 277*   ALT (SGPT) U/L  --  15 22   AST (SGOT) U/L  --  10 13   GLUCOSE mg/dL 79 76 87     Estimated Creatinine Clearance: 29.4 mL/min (A) (by C-G formula based on SCr of 2.75 mg/dL (H)).  Results from last 7 days   Lab Units 06/30/24  0511 06/29/24  0354   MAGNESIUM mg/dL  --  1.7   PHOSPHORUS mg/dL 4.6* 5.7*         Results from last 7 days   Lab Units 06/30/24  0512 06/29/24  0354 06/28/24  1221   WBC 10*3/mm3 6.73 6.52 7.46   HEMOGLOBIN g/dL 8.0* 8.4* 8.9*   PLATELETS 10*3/mm3 184 199 225     Results from last 7 days   Lab Units 06/28/24  1221   INR  1.34*       Assessment / Plan     ASSESSMENT:  ESRD - been doing short daily HD at [facility] (Mon thru Fri), lites, volume status okay  PVD s/p LLE BKA with stump wound -vascular surgery evaluating  RLE wound with exposed tendon - seen by ortho; poss osteomyelitis, on doxy through 7/12/24 ; no surgical intervention needed per surgery  CAD s/p recent NSTEMI, managed medically  HFrEF, prior EF reported 40%  HTN of ESRD, BP improved, added ARB; also on toprol XL/diltiazem for #9  Anemia of CKD, on long acting ALLISON at dialysis unit  Recent acute cholecystitis s/p cholecystostomy drain  PAF on AC (eliquis)    PLAN:  Hemodialysis tomorrow  Okay to discharge otherwise from renal standpoint    Eirc Christiansen MD  06/30/24  09:27 EDT    [facility]  [phone]

## 2024-06-30 NOTE — PROGRESS NOTES
Name: Tavo Gudino ADMIT: 2024   : 1955  PCP: System, Provider Not In    MRN: [id number] LOS: 0 days   AGE/SEX: 69 y.o. male  ROOM: [facility]/     Subjective   This is a 69-year-old male with a history of hypertension, peripheral artery disease status post left BKA, ESRD on hemodialysis Monday through Friday, atrial fibrillation on Eliquis, recent acute cholecystitis status post cholecystostomy tube placement at outlying facility, chronic urinary retention with an indwelling Delaney catheter, history of C. difficile on oral vancomycin who presents to the hospital with a wound of the right lower extremity.  [facility] where he was also diagnosed with C. difficile and placed on oral vancomycin.     He has been evaluated for osteomyelitis before.  He had an MRI of the left tib-fib as well as the right foot.  The left tib-fib MRI showed possible osteomyelitis    : no new complaints today.     Objective   Objective   Vital Signs  Temp:  [97 °F (36.1 °C)-98.8 °F (37.1 °C)] 97.7 °F (36.5 °C)  Heart Rate:  [66-81] 77  Resp:  [16] 16  BP: (127-148)/(50-81) 148/72  SpO2:  [96 %-100 %] 98 %  on   ;   Device (Oxygen Therapy): room air  Body mass index is 24.55 kg/m².  Physical Exam    Constitutional:       Comments: Chronically ill appearing    HENT:      Head: Normocephalic and atraumatic.   Cardiovascular:      Rate and Rhythm: Normal rate and regular rhythm.   Pulmonary:      Effort: Pulmonary effort is normal. No respiratory distress.   Abdominal:      General: There is no distension.      Palpations: Abdomen is soft.      Tenderness: There is no abdominal tenderness.   Musculoskeletal:      Comments: Exposed soft tissue above right ankle    Neurological:      Mental Status: He is alert and oriented to person, place, and time.      Motor: Weakness present.     Results Review     I reviewed the patient's new clinical results.  Results from  "last 7 days   Lab Units 06/30/24  0512 06/29/24  0354 06/28/24  1221   WBC 10*3/mm3 6.73 6.52 7.46   HEMOGLOBIN g/dL 8.0* 8.4* 8.9*   PLATELETS 10*3/mm3 184 199 225     Results from last 7 days   Lab Units 06/30/24  0511 06/29/24  0354 06/28/24  1221   SODIUM mmol/L 133* 135* 134*   POTASSIUM mmol/L 4.1 4.4 4.2   CHLORIDE mmol/L 100 102 97*   CO2 mmol/L 24.5 21.5* 24.1   BUN mg/dL 23 38* 37*   CREATININE mg/dL 2.75* 3.45* 3.52*   GLUCOSE mg/dL 79 76 87   Estimated Creatinine Clearance: 29.4 mL/min (A) (by C-G formula based on SCr of 2.75 mg/dL (H)).  Results from last 7 days   Lab Units 06/30/24  0511 06/29/24  0354 06/28/24  1221   ALBUMIN g/dL 2.8* 2.7* 3.2*   BILIRUBIN mg/dL  --  0.3 0.3   ALK PHOS U/L  --  226* 277*   AST (SGOT) U/L  --  10 13   ALT (SGPT) U/L  --  15 22     Results from last 7 days   Lab Units 06/30/24  0511 06/29/24  0354 06/28/24  1221   CALCIUM mg/dL 9.2 9.3 9.4   ALBUMIN g/dL 2.8* 2.7* 3.2*   MAGNESIUM mg/dL  --  1.7  --    PHOSPHORUS mg/dL 4.6* 5.7*  --      No results found for: \"COVID19\"  No results found for: \"HGBA1C\", \"POCGLU\"  Results for orders placed or performed during the hospital encounter of 05/31/24   Blood Culture - Blood, Blood, Central Line    Specimen: Blood, Central Line   Result Value Ref Range    Blood Culture No growth at 5 days          XR Tibia Fibula 2 View Right  Narrative: XR TIBIA FIBULA 2 VW RIGHT-     INDICATIONS: Leg wound.     TECHNIQUE: FRONTAL AND LATERAL VIEWS OF THE RIGHT LOWER LEG     COMPARISON: None available     FINDINGS:     Soft tissue swelling is seen laterally at the distal right lower leg  that may be evidence of inflammation, infection, injury, correlate  clinically. Dystrophic soft tissue calcifications are seen. No  radiopaque foreign body is noted. Arterial calcifications are  conspicuous. No acute fracture or dislocation is identified. On the  lateral view, small lucency along the posterior cortex of the mid to  distal fibular shaft could be " erosion/osteomyelitis. If indicated, MRI  or bone scan can be obtained for further evaluation.     Impression:    As described.           This report was finalized on 6/28/2024 12:54 PM by Dr. Aristeo Damon M.D on Workstation: [id number]       Scheduled Medications  apixaban, 5 mg, Oral, BID  aspirin, 81 mg, Oral, Daily  collagenase, 1 Application, Topical, Q24H  dilTIAZem CD, 240 mg, Oral, Q24H  doxycycline, 100 mg, Oral, Q12H  levothyroxine, 75 mcg, Oral, Q AM  losartan, 25 mg, Oral, Nightly  metoprolol succinate XL, 100 mg, Oral, Q24H  sodium chloride, 10 mL, Intravenous, Q12H    Infusions   Diet  Diet: Regular/House; Fluid Consistency: Thin (IDDSI 0)       Assessment/Plan     Active Hospital Problems    Diagnosis  POA   • **Leg wound, right, initial encounter [S81.801A]  Yes   • Acute osteomyelitis of left tibia [M86.162]  Yes   • Soft tissue infection [L08.9]  Yes   • Essential hypertension [I10]  Yes   • Atrial fibrillation [I48.91]  Yes   • ESRD (end stage renal disease) on hemodialysis [N18.6]  Yes   • History of cholecystitis s/p cholecystostomy tube [K81.9]  Yes      Resolved Hospital Problems   No resolved problems to display.       69 y.o. male admitted with Leg wound, right, initial encounter.    Osteomyelitis   Exposed tendon on RLE  --MRI left tib/fib showed cellulitis, possible osteomyelitis  --MRI right foot with cellulitis/edema  -Currently on Idamycin cefepime.  Review of previous records state that he should be on oral doxycycline through 7/12/2024.  --no surgical intervention required per orthopedic and vascular surgery     Afib with RVR  Hx of Afib/Aflutter  -Eliquis and metoprolol.       HFrEF  Moderate MR  --ECHO 6/1: LVEF 51 to 55%, borderline concentric LVH, left atrial volume moderately increased, mild MR, small pericardial effusion      Recent C.diff colitis  -s/p fosphofmycin at outying facility and completed a PO vancomycin taper as of 6/28     Recent cholecystitis s/p  cholecystostomy tube     Chronic urinary retention  -continue cole catheter      PAD  Hx of left BKA  --Continue ASA  --Lt BKA drsg in place.  Continue with current wound care.      Anemia  --Likely chronic due to renal disease     HTN  ESRD  --Nephrology consulted for dialysis      Hypothyroidism  -Continue levothyroxine at 75mcg. Repeat TSH in about 2 weeks       Multiple wounds  Sacral Decubitus ulcer, POA, Stage 3      SCDs for DVT prophylaxis.  Full code.  Discussed with patient and nursing staff.  Anticipate discharge home when precert has been obtained       Stanley Ford MD  [facility]  06/30/24  12:09 EDT

## 2024-06-30 NOTE — PLAN OF CARE
Goal Outcome Evaluation:  Plan of Care Reviewed With: patient        Progress: no change  Outcome Evaluation: Patient remains stable, VSS and cole catheter intact. Patient has no c/o pain. Amputation stump changed per order and gluteal and sacral wound dressing changed after BM. HD received this am without issue. Patient required precert prior to d/c, CCP following.

## 2024-06-30 NOTE — THERAPY EVALUATION
Patient Name: Tavo Gudino  : 1955    MRN: [id number]                              Today's Date: 2024       Admit Date: 2024    Visit Dx:     ICD-10-CM ICD-9-CM   1. Leg wound, right, initial encounter  S81.801A 894.0   2. End stage renal disease on dialysis  N18.6 585.6    Z99.2 V45.11   3. Chronic anemia  D64.9 285.9   4. Hypertension not at goal  I10 401.9     Patient Active Problem List   Diagnosis    NSTEMI (non-ST elevated myocardial infarction)    C. difficile colitis    History of cholecystitis s/p cholecystostomy tube    ESRD (end stage renal disease) on hemodialysis    Atrial fibrillation    Essential hypertension    Moderate malnutrition    Soft tissue infection    Acute osteomyelitis of left tibia    Leg wound, right, initial encounter     Past Medical History:   Diagnosis Date    Atrial fibrillation     Chronic kidney disease (CKD), stage V     ESRD on hemodialysis     Hypertension     Metabolic acidosis     Peripheral arterial disease     Pneumothorax     Secondary hyperparathyroidism      Past Surgical History:   Procedure Laterality Date    ARTERIOVENOUS FISTULA Right     BELOW KNEE LEG AMPUTATION Left     CATARACT EXTRACTION Bilateral       General Information       Row Name 24 0858          Physical Therapy Time and Intention    Document Type evaluation  -CC     Mode of Treatment individual therapy;physical therapy  -CC       Row Name 24 0858          General Information    Patient Profile Reviewed yes  -CC     Existing Precautions/Restrictions fall  -CC     Barriers to Rehab none identified  -CC       Row Name 24 0858          Living Environment    People in Home spouse  -CC       Row Name 24 0858          Cognition    Orientation Status (Cognition) oriented x 3  -CC       Row Name 24 0858          Safety Issues, Functional Mobility    Impairments Affecting Function (Mobility) endurance/activity tolerance;strength;pain  -CC               User Key  (r)  = Recorded By, (t) = Taken By, (c) = Cosigned By      Initials Name Provider Type    Trina Mcmanus PT Physical Therapist                   Mobility       Row Name 06/30/24 0858          Bed Mobility    Bed Mobility sit-supine;supine-sit  -CC     Supine-Sit [place] (Bed Mobility) minimum assist (75% patient effort)  -CC     Sit-Supine [place] (Bed Mobility) contact guard  -     Assistive Device (Bed Mobility) head of bed elevated  -       Row Name 06/30/24 0858          Transfers    Comment, (Transfers) Unable to attempted due to pain levels with WB from R LE foot wound  -CC               User Key  (r) = Recorded By, (t) = Taken By, (c) = Cosigned By      Initials Name Provider Type    Trina Mcmanus PT Physical Therapist                   Obj/Interventions       Row Name 06/30/24 0859          Strength Comprehensive (MMT)    General Manual Muscle Testing (MMT) Assessment lower extremity strength deficits identified  -               User Key  (r) = Recorded By, (t) = Taken By, (c) = Cosigned By      Initials Name Provider Type    Trina Mcmanus PT Physical Therapist                   Goals/Plan       Row Name 06/30/24 0903          Bed Mobility Goal 1 (PT)    Activity/Assistive Device (Bed Mobility Goal 1, PT) bed mobility activities, all  -CC     [place] Level/Cues Needed (Bed Mobility Goal 1, PT) standby assist  -     Time Frame (Bed Mobility Goal 1, PT) by discharge  -       Row Name 06/30/24 0903          Transfer Goal 1 (PT)    Activity/Assistive Device (Transfer Goal 1, PT) sliding board  -     [place] Level/Cues Needed (Transfer Goal 1, PT) minimum assist (75% or more patient effort)  -     Time Frame (Transfer Goal 1, PT) by discharge  -               User Key  (r) = Recorded By, (t) = Taken By, (c) = Cosigned By      Initials Name Provider Type    Trina Mcmanus PT Physical Therapist                   Clinical Impression       Row  Name 06/30/24 0900          Plan of Care Review    Outcome Evaluation Pt is a 69 y.o. male admitted to [facility] with c/o R LE foot wound and L BKA residual limb wound on 6/28/2024. Work up reveals R foot cellulitis, L tib-fib cellulitis with possible osteomyelitis per imaging. Pt presents today with generalized weakness, pain, and decreased functional mobility. Pt required min A for bed mobility, and unable to attempt STS transfers secondary to pain with R LE foot wound. Pt will benefit from skilled PT to address the previous deficits and improve overall safety with functional mobility. Prior to admission pt lives with spouse in home, although was most recently admitted from SNF, where he was using slide board transfers from bed to w/c for mobility. Pt is functioning below baseline and will benefit from SNF at d/c.  -CC       Row Name 06/30/24 0900          Therapy Assessment/Plan (PT)    Rehab Potential (PT) good, to achieve stated therapy goals  -CC     Criteria for Skilled Interventions Met (PT) skilled treatment is necessary  -CC     Therapy Frequency (PT) 3 times/wk  -CC       Row Name 06/30/24 0900          Positioning and Restraints    Pre-Treatment Position in bed  -CC     Post Treatment Position bed  -CC     In Bed supine;call light within reach;encouraged to call for assist;exit alarm on  -CC               User Key  (r) = Recorded By, (t) = Taken By, (c) = Cosigned By      Initials Name Provider Type    CC Trina Chery, PT Physical Therapist                   Outcome Measures       Row Name 06/30/24 0903          How much help from another person do you currently need...    Turning from your back to your side while in flat bed without using bedrails? 3  -CC     Moving from lying on back to sitting on the side of a flat bed without bedrails? 3  -CC     Moving to and from a bed to a chair (including a wheelchair)? 2  -CC     Standing up from a chair using your arms (e.g., wheelchair, bedside chair)? 1   -CC     Climbing 3-5 steps with a railing? 1  -CC     To walk in hospital room? 1  -CC     AM-PAC 6 Clicks Score (PT) 11  -CC     Highest Level of Mobility Goal 4 --> Transfer to chair/commode  -CC       Row Name 06/30/24 0903          Functional Assessment    Outcome Measure Options AM-PAC 6 Clicks Basic Mobility (PT)  -CC               User Key  (r) = Recorded By, (t) = Taken By, (c) = Cosigned By      Initials Name Provider Type    CC Trina Chery PT Physical Therapist                                 Physical Therapy Education       Title: PT OT SLP Therapies (Not Started)       Topic: Physical Therapy (Not Started)       Point: Mobility training (Not Started)       Learner Progress:  Not documented in this visit.              Point: Home exercise program (Not Started)       Learner Progress:  Not documented in this visit.              Point: Body mechanics (Not Started)       Learner Progress:  Not documented in this visit.              Point: Precautions (Not Started)       Learner Progress:  Not documented in this visit.                                  PT Recommendation and Plan     Outcome Evaluation: Pt is a 69 y.o. male admitted to [facility] with c/o R LE foot wound and L BKA residual limb wound on 6/28/2024. Work up reveals R foot cellulitis, L tib-fib cellulitis with possible osteomyelitis per imaging. Pt presents today with generalized weakness, pain, and decreased functional mobility. Pt required min A for bed mobility, and unable to attempt STS transfers secondary to pain with R LE foot wound. Pt will benefit from skilled PT to address the previous deficits and improve overall safety with functional mobility. Prior to admission pt lives with spouse in home, although was most recently admitted from SNF, where he was using slide board transfers from bed to w/c for mobility. Pt is functioning below baseline and will benefit from SNF at d/c.     Time Calculation:         PT Charges       Row Name  06/30/24 0904             Time Calculation    Start Time 0825  -CC      Stop Time 0836  -CC      Time Calculation (min) 11 min  -CC      PT Received On 06/30/24  -CC      PT - Next Appointment 07/01/24  -CC      PT Goal Re-Cert Due Date 07/07/24  -CC         Time Calculation- PT    Total Timed Code Minutes- PT 8 minute(s)  -CC         Timed Charges    55895 - PT Therapeutic Activity Minutes 8  -CC         Untimed Charges    PT Eval/Re-eval Minutes 3  -CC         Total Minutes    Timed Charges Total Minutes 8  -CC      Untimed Charges Total Minutes 3  -CC       Total Minutes 11  -CC                User Key  (r) = Recorded By, (t) = Taken By, (c) = Cosigned By      Initials Name Provider Type    CC Trina Chery, PT Physical Therapist                  Therapy Charges for Today       Code Description Service Date Service Provider Modifiers Qty    19618422104 HC PT EVAL LOW COMPLEXITY 1 6/30/2024 Trina Chery, PT GP 1    04526283496 HC PT THERAPEUTIC ACT EA 15 MIN 6/30/2024 Trina Chery, PT GP 1            PT G-Codes  Outcome Measure Options: AM-PAC 6 Clicks Basic Mobility (PT)  AM-PAC 6 Clicks Score (PT): 11  PT Discharge Summary  Anticipated Discharge Disposition (PT): skilled nursing facility    Trina Chery PT  6/30/2024

## 2024-06-30 NOTE — PLAN OF CARE
Goal Outcome Evaluation:              Outcome Evaluation: Pt is a 69 y.o. male admitted to [facility] with c/o R LE foot wound and L BKA residual limb wound on 6/28/2024. Work up reveals R foot cellulitis, L tib-fib cellulitis with possible osteomyelitis per imaging. Pt presents today with generalized weakness, pain, and decreased functional mobility. Pt required min A for bed mobility, and unable to attempt STS transfers secondary to pain with R LE foot wound. Pt will benefit from skilled PT to address the previous deficits and improve overall safety with functional mobility. Prior to admission pt lives with spouse in home, although was most recently admitted from SNF, where he was using slide board transfers from bed to w/c for mobility. Pt is functioning below baseline and will benefit from SNF at d/c.      Anticipated Discharge Disposition (PT): skilled nursing facility

## 2024-06-30 NOTE — PROGRESS NOTES
"Name: Tavo Gudino ADMIT: 2024   : 1955  PCP: System, Provider Not In    MRN: [id number] LOS: 0 days   AGE/SEX: 69 y.o. male  ROOM:  [facility] P897/1     CC: Follow-up for leg wounds  Interval History: No issues overnight.  Pain controlled  Subjective   Subjective     Review of Systems  Objective   Objective     Vitals:   Temp:  [97 °F (36.1 °C)-98.8 °F (37.1 °C)] 98.8 °F (37.1 °C)  Heart Rate:  [66-81] 70  Resp:  [16] 16  BP: (127-134)/(50-81) 133/70    No intake/output data recorded.    Scheduled Meds:     apixaban, 5 mg, Oral, BID  aspirin, 81 mg, Oral, Daily  collagenase, 1 Application, Topical, Q24H  dilTIAZem CD, 240 mg, Oral, Q24H  doxycycline, 100 mg, Oral, Q12H  levothyroxine, 75 mcg, Oral, Q AM  losartan, 25 mg, Oral, Nightly  metoprolol succinate XL, 100 mg, Oral, Q24H  sodium chloride, 10 mL, Intravenous, Q12H      IV Meds:        Physical Exam  Palpable right DP pulse  Palpable left popliteal pulse                      Data Reviewed:  CBC    Results from last 7 days   Lab Units 24  0512 24  0354 24  1221   WBC 10*3/mm3 6.73 6.52 7.46   HEMOGLOBIN g/dL 8.0* 8.4* 8.9*   PLATELETS 10*3/mm3 184 199 225     BMP   Results from last 7 days   Lab Units 24  0511 24  0354 24  1221   SODIUM mmol/L 133* 135* 134*   POTASSIUM mmol/L 4.1 4.4 4.2   CHLORIDE mmol/L 100 102 97*   CO2 mmol/L 24.5 21.5* 24.1   BUN mg/dL 23 38* 37*   CREATININE mg/dL 2.75* 3.45* 3.52*   GLUCOSE mg/dL 79 76 87   MAGNESIUM mg/dL  --  1.7  --    PHOSPHORUS mg/dL 4.6* 5.7*  --      Cr Clearance Estimated Creatinine Clearance: 29.4 mL/min (A) (by C-G formula based on SCr of 2.75 mg/dL (H)).  Coag   Results from last 7 days   Lab Units 24  1221   INR  1.34*   APTT seconds 41.1*     HbA1C   Lab Results   Component Value Date    HGBA1C 4.6 2024     Blood Glucose No results found for: \"POCGLU\"  Infection     CMP   Results from last 7 days   Lab Units 24  0511 24  0354 " "06/28/24  1221   SODIUM mmol/L 133* 135* 134*   POTASSIUM mmol/L 4.1 4.4 4.2   CHLORIDE mmol/L 100 102 97*   CO2 mmol/L 24.5 21.5* 24.1   BUN mg/dL 23 38* 37*   CREATININE mg/dL 2.75* 3.45* 3.52*   GLUCOSE mg/dL 79 76 87   ALBUMIN g/dL 2.8* 2.7* 3.2*   BILIRUBIN mg/dL  --  0.3 0.3   ALK PHOS U/L  --  226* 277*   AST (SGOT) U/L  --  10 13   ALT (SGPT) U/L  --  15 22     ABG      UA      TIMOTEO  No results found for: \"POCMETH\", \"POCAMPHET\", \"POCBARBITUR\", \"POCBENZO\", \"POCCOCAINE\", \"POCOPIATES\", \"POCOXYCODO\", \"POCPHENCYC\", \"POCPROPOXY\", \"POCTHC\", \"POCTRICYC\"  Lysis Labs   Results from last 7 days   Lab Units 06/30/24  0512 06/30/24  0511 06/29/24  0354 06/28/24  1221   INR   --   --   --  1.34*   APTT seconds  --   --   --  41.1*   HEMOGLOBIN g/dL 8.0*  --  8.4* 8.9*   PLATELETS 10*3/mm3 184  --  199 225   CREATININE mg/dL  --  2.75* 3.45* 3.52*     Radiology(recent) XR Tibia Fibula 2 View Right    Result Date: 6/28/2024   As described.    This report was finalized on 6/28/2024 12:54 PM by Dr. Aristeo Damon M.D on Workstation: [id number]       Most notable findings include: Hgb 8, prealbumin at lower limit of normal.     Active Hospital Problems:   Active Hospital Problems    Diagnosis  POA   • **Leg wound, right, initial encounter [S8.036A]  Yes   • Acute osteomyelitis of left tibia [M86.162]  Yes   • Soft tissue infection [L08.9]  Yes   • Essential hypertension [I10]  Yes   • Atrial fibrillation [I48.91]  Yes   • ESRD (end stage renal disease) on hemodialysis [N18.6]  Yes   • History of cholecystitis s/p cholecystostomy tube [K81.9]  Yes      Resolved Hospital Problems   No resolved problems to display.      Assessment & Plan     Assessment / Plan     69-year-old gentleman with right lower leg wound and left BKA stump wound following BKA this past January in [place].   By physical exam should have adequate blood flow to heal these wounds.  Recommend continued wound care and outpatient follow-up with his previous " vascular surgeon.  If patient is unable to see them anymore for any reason we will be happy to see him.  Continue antibiotics per primary team  Will see patient as needed for now. Please do not hesitate to call with any questions or concerns.       Bob Pitts II, MD  06/30/24  08:33 EDT  Office Number [phone]

## 2024-07-01 LAB
ALBUMIN SERPL-MCNC: 2.8 G/DL (ref 3.5–5.2)
ANION GAP SERPL CALCULATED.3IONS-SCNC: 11 MMOL/L (ref 5–15)
BUN SERPL-MCNC: 38 MG/DL (ref 8–23)
BUN/CREAT SERPL: 11.4 (ref 7–25)
CALCIUM SPEC-SCNC: 9.2 MG/DL (ref 8.6–10.5)
CHLORIDE SERPL-SCNC: 101 MMOL/L (ref 98–107)
CO2 SERPL-SCNC: 22 MMOL/L (ref 22–29)
CREAT SERPL-MCNC: 3.33 MG/DL (ref 0.76–1.27)
DEPRECATED RDW RBC AUTO: 48.8 FL (ref 37–54)
EGFRCR SERPLBLD CKD-EPI 2021: 19 ML/MIN/1.73
ERYTHROCYTE [DISTWIDTH] IN BLOOD BY AUTOMATED COUNT: 13.9 % (ref 12.3–15.4)
GLUCOSE SERPL-MCNC: 82 MG/DL (ref 65–99)
HCT VFR BLD AUTO: 25.7 % (ref 37.5–51)
HGB BLD-MCNC: 8.1 G/DL (ref 13–17.7)
MCH RBC QN AUTO: 30.1 PG (ref 26.6–33)
MCHC RBC AUTO-ENTMCNC: 31.5 G/DL (ref 31.5–35.7)
MCV RBC AUTO: 95.5 FL (ref 79–97)
PHOSPHATE SERPL-MCNC: 5.9 MG/DL (ref 2.5–4.5)
PLATELET # BLD AUTO: 193 10*3/MM3 (ref 140–450)
PMV BLD AUTO: 8.9 FL (ref 6–12)
POTASSIUM SERPL-SCNC: 4.6 MMOL/L (ref 3.5–5.2)
RBC # BLD AUTO: 2.69 10*6/MM3 (ref 4.14–5.8)
SODIUM SERPL-SCNC: 134 MMOL/L (ref 136–145)
WBC NRBC COR # BLD AUTO: 7.27 10*3/MM3 (ref 3.4–10.8)

## 2024-07-01 PROCEDURE — G0257 UNSCHED DIALYSIS ESRD PT HOS: HCPCS

## 2024-07-01 PROCEDURE — G0378 HOSPITAL OBSERVATION PER HR: HCPCS

## 2024-07-01 PROCEDURE — 85027 COMPLETE CBC AUTOMATED: CPT | Performed by: INTERNAL MEDICINE

## 2024-07-01 PROCEDURE — 97110 THERAPEUTIC EXERCISES: CPT

## 2024-07-01 PROCEDURE — 80069 RENAL FUNCTION PANEL: CPT | Performed by: INTERNAL MEDICINE

## 2024-07-01 RX ADMIN — LEVOTHYROXINE SODIUM 75 MCG: 75 TABLET ORAL at 06:43

## 2024-07-01 RX ADMIN — APIXABAN 5 MG: 5 TABLET, FILM COATED ORAL at 22:26

## 2024-07-01 RX ADMIN — Medication 10 ML: at 09:00

## 2024-07-01 RX ADMIN — METOPROLOL SUCCINATE 100 MG: 25 TABLET, EXTENDED RELEASE ORAL at 08:42

## 2024-07-01 RX ADMIN — DOXYCYCLINE 100 MG: 100 CAPSULE ORAL at 22:26

## 2024-07-01 RX ADMIN — COLLAGENASE SANTYL 1 APPLICATION: 250 OINTMENT TOPICAL at 08:45

## 2024-07-01 RX ADMIN — DILTIAZEM HYDROCHLORIDE 240 MG: 240 CAPSULE, COATED, EXTENDED RELEASE ORAL at 08:43

## 2024-07-01 RX ADMIN — DOXYCYCLINE 100 MG: 100 CAPSULE ORAL at 08:42

## 2024-07-01 RX ADMIN — LOSARTAN POTASSIUM 25 MG: 25 TABLET, FILM COATED ORAL at 22:26

## 2024-07-01 RX ADMIN — ASPIRIN 81 MG: 81 TABLET, COATED ORAL at 08:43

## 2024-07-01 RX ADMIN — APIXABAN 5 MG: 5 TABLET, FILM COATED ORAL at 08:43

## 2024-07-01 RX ADMIN — Medication 10 ML: at 21:00

## 2024-07-01 NOTE — PROGRESS NOTES
[facility] Progress Note      Patient Name: Tavo Gudino  : 1955  MRN: [id number]  Primary Care Physician:  System, Provider Not In  Date of admission: 2024    Subjective     Interval History:   F/u ESRD     Review of Systems:   Had HD earlier today; 1.8 L removed  No shortness of breath on room air  No significant pain at site of left BKA     Objective     Vitals:   Temp:  [97 °F (36.1 °C)-98 °F (36.7 °C)] 98 °F (36.7 °C)  Heart Rate:  [72-75] 72  Resp:  [16] 16  BP: (107-170)/(47-96) 137/67    Intake/Output Summary (Last 24 hours) at 2024  Last data filed at 2024 1700  Gross per 24 hour   Intake 480 ml   Output 3050 ml   Net -2570 ml       Physical Exam:    General: frail, chronically ill appearing, NAD, pleasant  Skin: warm and dry  HEENT: oral mucosa normal, nonicteric sclera, ectropion both lower lids  Neck: RIJ TDC, no JVD  Lungs: CTA bilat no rales  Heart: RRR, normal S1 and S2  Abdomen: soft, nontender, nondistended  : no palpable bladder  Extremities: LLE BKA with stump wrapped; RLE with dusky discoloration and venous stasis changes, no pedal edema     Scheduled Meds:     apixaban, 5 mg, Oral, BID  aspirin, 81 mg, Oral, Daily  collagenase, 1 Application, Topical, Q24H  dilTIAZem CD, 240 mg, Oral, Q24H  doxycycline, 100 mg, Oral, Q12H  levothyroxine, 75 mcg, Oral, Q AM  losartan, 25 mg, Oral, Nightly  metoprolol succinate XL, 100 mg, Oral, Q24H  sodium chloride, 10 mL, Intravenous, Q12H      IV Meds:        Results Reviewed:   I have personally reviewed the results from the time of this admission to 2024 19:35 EDT     Results from last 7 days   Lab Units 24  0341 24  0511 24  0354 24  1221   SODIUM mmol/L 134* 133* 135* 134*   POTASSIUM mmol/L 4.6 4.1 4.4 4.2   CHLORIDE mmol/L 101 100 102 97*   CO2 mmol/L 22.0 24.5 21.5* 24.1   BUN mg/dL 38* 23 38* 37*   CREATININE mg/dL 3.33* 2.75* 3.45* 3.52*   CALCIUM mg/dL 9.2 9.2 9.3  9.4   BILIRUBIN mg/dL  --   --  0.3 0.3   ALK PHOS U/L  --   --  226* 277*   ALT (SGPT) U/L  --   --  15 22   AST (SGOT) U/L  --   --  10 13   GLUCOSE mg/dL 82 79 76 87     Estimated Creatinine Clearance: 24.3 mL/min (A) (by C-G formula based on SCr of 3.33 mg/dL (H)).  Results from last 7 days   Lab Units 07/01/24  0341 06/30/24  0511 06/29/24  0354   MAGNESIUM mg/dL  --   --  1.7   PHOSPHORUS mg/dL 5.9* 4.6* 5.7*         Results from last 7 days   Lab Units 07/01/24  0341 06/30/24  0512 06/29/24  0354 06/28/24  1221   WBC 10*3/mm3 7.27 6.73 6.52 7.46   HEMOGLOBIN g/dL 8.1* 8.0* 8.4* 8.9*   PLATELETS 10*3/mm3 193 184 199 225     Results from last 7 days   Lab Units 06/28/24  1221   INR  1.34*       Assessment / Plan     ASSESSMENT:  ESRD -has been doing short daily HD at [facility] (Mon thru Fri), electrolytes and volume stable today.  Had HD earlier.    PVD s/p LLE BKA with stump wound, followed by Vascular  RLE wound with exposed tendon - seen by ortho; poss osteomyelitis, on doxy through 7/12/24; no surgical intervention needed per surgery  CAD s/p recent NSTEMI, managed medically  HFrEF, prior EF reported 40%  HTN of ESRD, BP improved, added ARB; also on toprol XL/diltiazem for #9  Anemia of CKD, on long acting ALLISON at dialysis unit  Recent acute cholecystitis s/p cholecystostomy drain  PAF on AC (eliquis)    PLAN:  1.  HD MWF while here  2.  To nursing home anytime from renal view  3.  No need for daily chemistries    Ted De Leon MD  07/01/24  19:35 EDT    [facility]  [phone]

## 2024-07-01 NOTE — PROGRESS NOTES
"Nutrition Services    Patient Name:  Tavo Gudino  YOB: 1955  MRN: [id number]  Admit Date:  6/28/2024  Assessment Date:  07/01/24    Summary: Nutrition screen for skin integrity  This is a 69-year-old male with a history of hypertension, peripheral artery disease status post left left BKA(1/29/24), ESRD on hemodialysis, bed bound  Skin issues: Right sacral spine PI stage 3,Left gluteal PI stage 3,Right anterior groin incision,Left circumferential knee amputation,Right lateral leg/ankle/heeel arterial ulcer,Distal penis PI,Abrasion/skin tears  Po intake 100%.     Plan/recommendation  Good po intake encouraged  Will add yosef  RD to follow    CLINICAL NUTRITION ASSESSMENT      Reason for Assessment Pressure Injury and/or Non-Healing Wound     Diagnosis/Problem   Leg wound   Medical/Surgical History Past Medical History:   Diagnosis Date    Atrial fibrillation     Chronic kidney disease (CKD), stage V     ESRD on hemodialysis     Hypertension     Metabolic acidosis     Peripheral arterial disease     Pneumothorax     Secondary hyperparathyroidism        Past Surgical History:   Procedure Laterality Date    ARTERIOVENOUS FISTULA Right     BELOW KNEE LEG AMPUTATION Left     CATARACT EXTRACTION Bilateral         Anthropometrics        Current Height  Current Weight  BMI kg/m2 Height: 185.4 cm (72.99\")  Weight: 84.4 kg (186 lb) (06/28/24 1157)  Body mass index is 24.55 kg/m².   Adjusted BMI (if applicable)    BMI Category Normal/Healthy (18.4 - 24.9)   Ideal Body Weight (IBW) 176lb   Usual Body Weight (UBW) 186lb since amputation   Weight Trend Stable   Weight History Wt Readings from Last 30 Encounters:   06/28/24 1157 84.4 kg (186 lb)   06/04/24 1413 84.8 kg (186 lb 15.2 oz)   06/02/24 1207 84.8 kg (187 lb)   06/01/24 1028 84.8 kg (187 lb)   05/31/24 1826 84.8 kg (187 lb)      --  Labs       Pertinent Labs    Results from last 7 days   Lab Units 07/01/24  0341 06/30/24  0511 06/29/24  0354 06/28/24  1221 " "  SODIUM mmol/L 134* 133* 135* 134*   POTASSIUM mmol/L 4.6 4.1 4.4 4.2   CHLORIDE mmol/L 101 100 102 97*   CO2 mmol/L 22.0 24.5 21.5* 24.1   BUN mg/dL 38* 23 38* 37*   CREATININE mg/dL 3.33* 2.75* 3.45* 3.52*   CALCIUM mg/dL 9.2 9.2 9.3 9.4   BILIRUBIN mg/dL  --   --  0.3 0.3   ALK PHOS U/L  --   --  226* 277*   ALT (SGPT) U/L  --   --  15 22   AST (SGOT) U/L  --   --  10 13   GLUCOSE mg/dL 82 79 76 87     Results from last 7 days   Lab Units 07/01/24 0341 06/30/24  0511 06/29/24  1123 06/29/24  0354   MAGNESIUM mg/dL  --   --   --  1.7   PHOSPHORUS mg/dL 5.9*   < >  --  5.7*   HEMOGLOBIN g/dL 8.1*   < >  --  8.4*   HEMATOCRIT % 25.7*   < >  --  26.4*   WBC 10*3/mm3 7.27   < >  --  6.52   ALBUMIN g/dL 2.8*   < >  --  2.7*   PREALBUMIN mg/dL  --   --  20.5  --     < > = values in this interval not displayed.     Results from last 7 days   Lab Units 07/01/24 0341 06/30/24  0512 06/29/24  0354 06/28/24  1221   INR   --   --   --  1.34*   APTT seconds  --   --   --  41.1*   PLATELETS 10*3/mm3 193 184 199 225     No results found for: \"COVID19\"  Lab Results   Component Value Date    HGBA1C 4.6 01/08/2024          Medications           Scheduled Medications apixaban, 5 mg, Oral, BID  aspirin, 81 mg, Oral, Daily  collagenase, 1 Application, Topical, Q24H  dilTIAZem CD, 240 mg, Oral, Q24H  doxycycline, 100 mg, Oral, Q12H  levothyroxine, 75 mcg, Oral, Q AM  losartan, 25 mg, Oral, Nightly  metoprolol succinate XL, 100 mg, Oral, Q24H  sodium chloride, 10 mL, Intravenous, Q12H       Infusions     PRN Medications   senna-docusate sodium **AND** polyethylene glycol **AND** bisacodyl **AND** bisacodyl    Calcium Replacement - Follow Nurse / BPA Driven Protocol    heparin (porcine)    Magnesium Standard Dose Replacement - Follow Nurse / BPA Driven Protocol    Phosphorus Replacement - Follow Nurse / BPA Driven Protocol    Potassium Replacement - Follow Nurse / BPA Driven Protocol    [COMPLETED] Insert Peripheral IV **AND** sodium " chloride    sodium chloride    sodium chloride     Physical Findings          General Findings oriented   Oral/Mouth Cavity tooth or teeth missing   Edema  3+ (moderate)   Gastrointestinal last bowel movement: 6/30   Skin  Wounds: Right sacral spine PI stage 3,Left gluteal PI stage 3,Right anterior groin incision,Left circumferential knee amputation,Right lateral leg/ankle/heeel arterial ulcer,Distal penis PI,Abrasion/skin tears   Tubes/Drains/Lines dialysis catheter, drain right abd   NFPE No clinical signs of muscle wasting or fat loss   --  Current Nutrition Orders & Evaluation of Intake       Oral Nutrition     Food Allergies NKFA   Current PO Diet Diet: Regular/House; Fluid Consistency: Thin (IDDSI 0)   Supplement n/a   PO Evaluation     % PO Intake 100%    Factors Affecting Intake: pain issues   --  PES STATEMENT / NUTRITION DIAGNOSIS      Nutrition Dx Problem  Problem: Increased Nutrient Needs  Etiology: Medical Diagnosis - wounds    Signs/Symptoms: Report/Observation     NUTRITION INTERVENTION / PLAN OF CARE      Intervention Goal(s) Maintain nutrition status, Tolerate PO , Maintain weight, and PO intake goal %: 75+         RD Intervention/Action Interview for preferences, Encourage intake, Continue to monitor, Care plan reviewed, and Recommend/order: oral supplement   --      Prescription/Orders:       PO Diet       Supplements Pj      Enteral Nutrition       Parenteral Nutrition    New Prescription Ordered? Yes   --      Monitor/Evaluation Per protocol, PO intake, Supplement intake, Weight, Skin status, Symptoms   Discharge Plan/Needs Pending clinical course   --    RD to follow per protocol.      Electronically signed by:  Jessy Tirado RD  07/01/24 13:49 EDT

## 2024-07-01 NOTE — PLAN OF CARE
Goal Outcome Evaluation:  Plan of Care Reviewed With: patient           Outcome Evaluation: Pt declined OOB activity due to R LE pain, but was agreeable to supine ther ex for strengthening. Pt performed 10 reps B LE ther ex with cues. Pt hopeful to DC back to SNF for continued rehab soon. Pt will continue to benefit from PT to address strength, balance, and endurance impairments.      Anticipated Discharge Disposition (PT): skilled nursing facility

## 2024-07-01 NOTE — CASE MANAGEMENT/SOCIAL WORK
Discharge Planning Assessment  [facility]     Patient Name: Tavo Gudino  MRN: [id number]  Today's Date: 7/1/2024    Admit Date: 6/28/2024    Plan: [facility] -- Accepted & Pre-cert Pending.   Discharge Needs Assessment       Row Name 07/01/24 1354       Living Environment    People in Home spouse    Current Living Arrangements home    Primary Care Provided by self    Provides Primary Care For no one    Family Caregiver if Needed spouse    Quality of Family Relationships helpful;involved;supportive    Able to Return to Prior Arrangements other (see comments)  Plans SNF.       Transition Planning    Patient/Family Anticipates Transition to inpatient rehabilitation facility    Patient/Family Anticipated Services at Transition     Transportation Anticipated health plan transportation       Discharge Needs Assessment    Readmission Within the Last 30 Days no previous admission in last 30 days    Equipment Currently Used at Home none    Discharge Facility/Level of Care Needs nursing facility, skilled    Provided Post Acute Provider List? N/A    Refused Provider List Comment Declined; requests [facility].    Patient's Choice of Community Agency(s) [facility].                   Discharge Plan       Row Name 07/01/24 1355       Plan    Plan [facility] -- Accepted & Pre-cert Pending.    Patient/Family in Agreement with Plan yes    Plan Comments Spoke with the patient, verified current information and explained the role of the CCP. Patient said he lives with his wife/Nanda and has family support. Patient said at baseline, he is independent at home and has no history with DME/HH. He arrived to [facility] from [facility]. Patient said that he plans to return to [facility] at discharge. Referral was previously sent in [facility]. Spoke with [facility] who has accepted the patient and has a room for him at [facility]. CCP asked the  Jaziel THAPA/FOUZIA Post-Acute Auth Team to initiate pre-cert now. CCP to follow.                  Continued Care and Services - Admitted Since 6/28/2024       Destination       Service Provider Request Status Selected Services Address Phone Fax Patient Preferred    [facility] [address] [facility] [id number] [phone] --                  Selected Continued Care - Prior Encounters Includes continued care and service providers with selected services from prior encounters from 3/30/2024 to 7/1/2024      Discharged on 6/20/2024 Admission date: 5/31/2024 - Discharge disposition: Skilled Nursing Facility (DC - External)      Destination       Service Provider Selected Services Address Phone Fax Patient Preferred    [facility] Skilled Nursing [address] [facility] [id number] [phone] --                          Expected Discharge Date and Time       Expected Discharge Date Expected Discharge Time    Jun 29, 2024            Demographic Summary       Row Name 07/01/24 1341       General Information    Reason for Consult discharge planning    Preferred Language English       Contact Information    Permission Granted to Share Info With ;family/designee                   Functional Status       Row Name 07/01/24 1351       Functional Status    Usual Activity Tolerance good       Functional Status, IADL    Medications independent;assistive person    Meal Preparation independent;assistive person;assistive equipment and person    Housekeeping independent;assistive person;assistive equipment and person    Laundry independent;assistive person;assistive equipment and person    Shopping independent;assistive equipment and person;assistive person       Mental Status Summary    Recent Changes in Mental Status/Cognitive Functioning no changes                   Psychosocial       Row Name 07/01/24 5512        Intellectual Performance WDL    Level of Consciousness Alert       Coping/Stress    Patient Personal Strengths able to adapt    Sources of Support spouse    Reaction to Health Status accepting    Understanding of Condition and Treatment adequate understanding of medical condition;adequate understanding of treatment       Developmental Stage (Eriksson's)    Developmental Stage Stage 8 (65 years-death/Late Adulthood) Integrity vs. Despair                    Marysol DIMAS RN

## 2024-07-01 NOTE — CASE MANAGEMENT/SOCIAL WORK
Post-Acute Authorization Submission      Post Acute Pre-Cert Documentation  Request Submitted by Facility - Type:: Hospital  Post-Acute Authorization Type Submitted:: SNF  Date Post Acute Pre-Cert Inititated per Facility: 07/01/24  Date Post Acute Pre-Cert Completed: 07/01/24  Accepting Facility: [facility] Discharge Date Requested: 07/01/24  All Clinicals Submitted?: Yes  Had Accepting Facility at Time of Submission: Yes  Response Received from Insurance?: Approval  Response Communicated to:: , Accepting Facility Liaison, Accepting Facility Auth Department  Authorization Number:: [id number]  Post Acute Pre-Cert Initiated Comment: VALID TO ADMIT UPTO 11:59PM ON 7/6/2024.              Jaziel Lara, PCT

## 2024-07-01 NOTE — CASE MANAGEMENT/SOCIAL WORK
Post-Acute Authorization Submission      Post Acute Pre-Cert Documentation  Request Submitted by Facility - Type:: Hospital  Post-Acute Authorization Type Submitted:: SNF  Date Post Acute Pre-Cert Inititated per Facility: 07/01/24  Accepting Facility: [facility] Discharge Date Requested: 07/01/24  All Clinicals Submitted?: Yes  Had Accepting Facility at Time of Submission: Yes  Response Communicated to::   Authorization Number:: PENDING [id number]              JOSEPH Chaudhari

## 2024-07-01 NOTE — PROGRESS NOTES
Name: Tavo Gudino ADMIT: 2024   : 1955  PCP: System, Provider Not In    MRN: [id number] LOS: 0 days   AGE/SEX: 69 y.o. male  ROOM: [facility]/     Subjective   This is a 69-year-old male with a history of hypertension, peripheral artery disease status post left BKA, ESRD on hemodialysis Monday through Friday, atrial fibrillation on Eliquis, recent acute cholecystitis status post cholecystostomy tube placement at outlying facility, chronic urinary retention with an indwelling Delaney catheter, history of C. difficile on oral vancomycin who presents to the hospital with a wound of the right lower extremity.  [facility] where he was also diagnosed with C. difficile and placed on oral vancomycin.     He has been evaluated for osteomyelitis before.  He had an MRI of the left tib-fib as well as the right foot.  The left tib-fib MRI showed possible osteomyelitis    : Blood pressure slightly elevated today.  He has no new complaints and is eager for discharge.     Objective   Objective   Vital Signs  Temp:  [97.6 °F (36.4 °C)-98.2 °F (36.8 °C)] 97.6 °F (36.4 °C)  Heart Rate:  [73-77] 73  Resp:  [16] 16  BP: (148-183)/(43-96) 170/96  SpO2:  [96 %-100 %] 96 %  on   ;   Device (Oxygen Therapy): room air  Body mass index is 24.55 kg/m².  Physical Exam    Constitutional:       Comments: Chronically ill appearing    HENT:      Head: Normocephalic and atraumatic.   Cardiovascular:      Rate and Rhythm: Normal rate and regular rhythm  Pulmonary:      Effort: Pulmonary effort is normal. No respiratory distress.   Abdominal:      General: There is no distension.   Musculoskeletal:      Comments: Exposed soft tissue above right ankle    Neurological:      Mental Status: He is alert and oriented to person, place, and time.      Motor: Weakness present.       Results Review     I reviewed the patient's new clinical results.  Results from last 7 days  "  Lab Units 07/01/24  0341 06/30/24  0512 06/29/24  0354 06/28/24  1221   WBC 10*3/mm3 7.27 6.73 6.52 7.46   HEMOGLOBIN g/dL 8.1* 8.0* 8.4* 8.9*   PLATELETS 10*3/mm3 193 184 199 225     Results from last 7 days   Lab Units 07/01/24 0341 06/30/24 0511 06/29/24  0354 06/28/24  1221   SODIUM mmol/L 134* 133* 135* 134*   POTASSIUM mmol/L 4.6 4.1 4.4 4.2   CHLORIDE mmol/L 101 100 102 97*   CO2 mmol/L 22.0 24.5 21.5* 24.1   BUN mg/dL 38* 23 38* 37*   CREATININE mg/dL 3.33* 2.75* 3.45* 3.52*   GLUCOSE mg/dL 82 79 76 87   Estimated Creatinine Clearance: 24.3 mL/min (A) (by C-G formula based on SCr of 3.33 mg/dL (H)).  Results from last 7 days   Lab Units 07/01/24 0341 06/30/24 0511 06/29/24  0354 06/28/24  1221   ALBUMIN g/dL 2.8* 2.8* 2.7* 3.2*   BILIRUBIN mg/dL  --   --  0.3 0.3   ALK PHOS U/L  --   --  226* 277*   AST (SGOT) U/L  --   --  10 13   ALT (SGPT) U/L  --   --  15 22     Results from last 7 days   Lab Units 07/01/24 0341 06/30/24 0511 06/29/24 0354 06/28/24  1221   CALCIUM mg/dL 9.2 9.2 9.3 9.4   ALBUMIN g/dL 2.8* 2.8* 2.7* 3.2*   MAGNESIUM mg/dL  --   --  1.7  --    PHOSPHORUS mg/dL 5.9* 4.6* 5.7*  --      No results found for: \"COVID19\"  No results found for: \"HGBA1C\", \"POCGLU\"  Results for orders placed or performed during the hospital encounter of 05/31/24   Blood Culture - Blood, Blood, Central Line    Specimen: Blood, Central Line   Result Value Ref Range    Blood Culture No growth at 5 days          XR Tibia Fibula 2 View Right  Narrative: XR TIBIA FIBULA 2 VW RIGHT-     INDICATIONS: Leg wound.     TECHNIQUE: FRONTAL AND LATERAL VIEWS OF THE RIGHT LOWER LEG     COMPARISON: None available     FINDINGS:     Soft tissue swelling is seen laterally at the distal right lower leg  that may be evidence of inflammation, infection, injury, correlate  clinically. Dystrophic soft tissue calcifications are seen. No  radiopaque foreign body is noted. Arterial calcifications are  conspicuous. No acute fracture " or dislocation is identified. On the  lateral view, small lucency along the posterior cortex of the mid to  distal fibular shaft could be erosion/osteomyelitis. If indicated, MRI  or bone scan can be obtained for further evaluation.     Impression:    As described.           This report was finalized on 6/28/2024 12:54 PM by Dr. Aristeo Damon M.D on Workstation: [facility]       Scheduled Medications  apixaban, 5 mg, Oral, BID  aspirin, 81 mg, Oral, Daily  collagenase, 1 Application, Topical, Q24H  dilTIAZem CD, 240 mg, Oral, Q24H  doxycycline, 100 mg, Oral, Q12H  levothyroxine, 75 mcg, Oral, Q AM  losartan, 25 mg, Oral, Nightly  metoprolol succinate XL, 100 mg, Oral, Q24H  sodium chloride, 10 mL, Intravenous, Q12H    Infusions   Diet  Diet: Regular/House; Fluid Consistency: Thin (IDDSI 0)       Assessment/Plan     Active Hospital Problems    Diagnosis  POA   • **Leg wound, right, initial encounter [S81.801A]  Yes   • Acute osteomyelitis of left tibia [M86.162]  Yes   • Soft tissue infection [L08.9]  Yes   • Essential hypertension [I10]  Yes   • Atrial fibrillation [I48.91]  Yes   • ESRD (end stage renal disease) on hemodialysis [N18.6]  Yes   • History of cholecystitis s/p cholecystostomy tube [K81.9]  Yes      Resolved Hospital Problems   No resolved problems to display.       69 y.o. male admitted with Leg wound, right, initial encounter.    Osteomyelitis   Exposed tendon on RLE  --MRI left tib/fib showed cellulitis, possible osteomyelitis  --MRI right foot with cellulitis/edema  -Currently on Idamycin cefepime.  Review of previous records state that he should be on oral doxycycline through 7/12/2024.  --no surgical intervention required per orthopedic and vascular surgery     Afib with RVR  Hx of Afib/Aflutter  -Eliquis and metoprolol.       HFrEF  Moderate MR  --ECHO 6/1: LVEF 51 to 55%, borderline concentric LVH, left atrial volume moderately increased, mild MR, small pericardial effusion      Recent  C.diff colitis  -s/p fosphofmycin at [facility] facility and completed a PO vancomycin taper as of 6/28     Recent cholecystitis s/p cholecystostomy tube     Chronic urinary retention  -continue cole catheter      PAD  Hx of left BKA  --Continue ASA  --Lt BKA drsg in place.  Continue with current wound care.      Anemia  --Likely chronic due to renal disease     HTN  ESRD  --Nephrology consulted for dialysis      Hypothyroidism  -Continue levothyroxine at 75mcg. Repeat TSH in about 2 weeks       Multiple wounds  Sacral Decubitus ulcer, POA, Stage 3      SCDs for DVT prophylaxis.  Full code.  Discussed with patient and nursing staff.  Anticipate discharge home when precert has been obtained       Stanley Ford MD  [facility]  07/01/24  09:12 EDT

## 2024-07-01 NOTE — THERAPY TREATMENT NOTE
Patient Name: Tavo Gudino  : 1955    MRN: [id number]                              Today's Date: 2024       Admit Date: 2024    Visit Dx:     ICD-10-CM ICD-9-CM   1. Leg wound, right, initial encounter  S81.801A 894.0   2. End stage renal disease on dialysis  N18.6 585.6    Z99.2 V45.11   3. Chronic anemia  D64.9 285.9   4. Hypertension not at goal  I10 401.9     Patient Active Problem List   Diagnosis    NSTEMI (non-ST elevated myocardial infarction)    C. difficile colitis    History of cholecystitis s/p cholecystostomy tube    ESRD (end stage renal disease) on hemodialysis    Atrial fibrillation    Essential hypertension    Moderate malnutrition    Soft tissue infection    Acute osteomyelitis of left tibia    Leg wound, right, initial encounter     Past Medical History:   Diagnosis Date    Atrial fibrillation     Chronic kidney disease (CKD), stage V     ESRD on hemodialysis     Hypertension     Metabolic acidosis     Peripheral arterial disease     Pneumothorax     Secondary hyperparathyroidism      Past Surgical History:   Procedure Laterality Date    ARTERIOVENOUS FISTULA Right     BELOW KNEE LEG AMPUTATION Left     CATARACT EXTRACTION Bilateral       General Information       Row Name 24 0934          Physical Therapy Time and Intention    Document Type therapy note (daily note)  -EF     Mode of Treatment individual therapy;physical therapy  -EF       Row Name 24 0934          General Information    Existing Precautions/Restrictions fall  -EF       Row Name 24 0934          Cognition    Orientation Status (Cognition) oriented x 3  -EF       Row Name 24 0934          Safety Issues, Functional Mobility    Impairments Affecting Function (Mobility) endurance/activity tolerance;strength;pain  -EF               User Key  (r) = Recorded By, (t) = Taken By, (c) = Cosigned By      Initials Name Provider Type    EF Coty Delacruz PT Physical Therapist                    Mobility       Row Name 07/01/24 0934          Bed Mobility    Supine-Sit [place] (Bed Mobility) not tested  -EF     Sit-Supine [place] (Bed Mobility) not tested  -EF     Comment, (Bed Mobility) Pt declined bed mobility or OOB activity due to R LE pain. Agreeable to supine ther ex.  -EF               User Key  (r) = Recorded By, (t) = Taken By, (c) = Cosigned By      Initials Name Provider Type    EF Coty Delacruz, KATARZYNA Physical Therapist                   Obj/Interventions       Row Name 07/01/24 0934          Motor Skills    Therapeutic Exercise --  Supine R ankle pumps, B quad sets, glute sets, heel slides, hip abd, and SLR x10 reps each.  -EF               User Key  (r) = Recorded By, (t) = Taken By, (c) = Cosigned By      Initials Name Provider Type    Coty Galdamez, KATARZYNA Physical Therapist                   Goals/Plan    No documentation.                  Clinical Impression       Row Name 07/01/24 0935          Pain    Pretreatment Pain Rating 7/10  -EF     Posttreatment Pain Rating 7/10  -EF     Pain Location - Side/Orientation Right  -EF     Pain Location lower  -EF     Pain Location - extremity  -EF     Pain Intervention(s) Repositioned;Rest;Elevated  -EF       Row Name 07/01/24 0935          Plan of Care Review    Plan of Care Reviewed With patient  -EF     Outcome Evaluation Pt declined OOB activity due to R LE pain, but was agreeable to supine ther ex for strengthening. Pt performed 10 reps B LE ther ex with cues. Pt hopeful to DC back to SNF for continued rehab soon. Pt will continue to benefit from PT to address strength, balance, and endurance impairments.  -EF       Row Name 07/01/24 0935          Positioning and Restraints    Pre-Treatment Position in bed  -EF     Post Treatment Position bed  -EF     In Bed fowlers;call light within reach;encouraged to call for assist;exit alarm on;R heel elevated  -EF               User Key  (r) = Recorded By, (t) = Taken By, (c) = Cosigned By       Initials Name Provider Type     Coty Delacruz, PT Physical Therapist                   Outcome Measures       Row Name 07/01/24 0941 07/01/24 0840       How much help from another person do you currently need...    Turning from your back to your side while in flat bed without using bedrails? 3  -EF 3  -JM    Moving from lying on back to sitting on the side of a flat bed without bedrails? 3  -EF 3  -JM    Moving to and from a bed to a chair (including a wheelchair)? 2  -EF 2  -JM    Standing up from a chair using your arms (e.g., wheelchair, bedside chair)? 2  -EF 1  -JM    Climbing 3-5 steps with a railing? 1  -EF 1  -JM    To walk in hospital room? 1  -EF 1  -JM    AM-PAC 6 Clicks Score (PT) 12  -EF 11  -JM    Highest Level of Mobility Goal 4 --> Transfer to chair/commode  -EF 4 --> Transfer to chair/commode  -JM              User Key  (r) = Recorded By, (t) = Taken By, (c) = Cosigned By      Initials Name Provider Type     Coty Delacruz, PT Physical Therapist    Jory Ochoa, RN Registered Nurse                                 Physical Therapy Education       Title: PT OT SLP Therapies (In Progress)       Topic: Physical Therapy (In Progress)       Point: Mobility training (Not Started)       Learner Progress:  Not documented in this visit.              Point: Home exercise program (Done)       Learning Progress Summary             Patient Acceptance, E,D, VU,NR by  at 7/1/2024 0941                         Point: Body mechanics (Not Started)       Learner Progress:  Not documented in this visit.              Point: Precautions (Not Started)       Learner Progress:  Not documented in this visit.                              User Key       Initials Effective Dates Name Provider Type Discipline     05/31/24 -  Coty Delacruz, PT Physical Therapist PT                  PT Recommendation and Plan     Plan of Care Reviewed With: patient  Outcome Evaluation: Pt declined OOB activity  due to R LE pain, but was agreeable to supine ther ex for strengthening. Pt performed 10 reps B LE ther ex with cues. Pt hopeful to DC back to SNF for continued rehab soon. Pt will continue to benefit from PT to address strength, balance, and endurance impairments.     Time Calculation:         PT Charges       Row Name 07/01/24 0941             Time Calculation    Start Time 0904  -EF      Stop Time 0914  -EF      Time Calculation (min) 10 min  -EF      PT Received On 07/01/24  -EF      PT - Next Appointment 07/03/24  -EF         Time Calculation- PT    Total Timed Code Minutes- PT 10 minute(s)  -EF         Timed Charges    57052 - PT Therapeutic Exercise Minutes 10  -EF         Total Minutes    Timed Charges Total Minutes 10  -EF       Total Minutes 10  -EF                User Key  (r) = Recorded By, (t) = Taken By, (c) = Cosigned By      Initials Name Provider Type    EF Coty Delacruz, PT Physical Therapist                  Therapy Charges for Today       Code Description Service Date Service Provider Modifiers Qty    62530311661 HC PT THER PROC EA 15 MIN 7/1/2024 Coty Delacruz, KATARZYNA GP 1            PT G-Codes  Outcome Measure Options: AM-PAC 6 Clicks Basic Mobility (PT)  AM-PAC 6 Clicks Score (PT): 12  PT Discharge Summary  Anticipated Discharge Disposition (PT): skilled nursing facility    Coty Delacruz PT  7/1/2024

## 2024-07-02 VITALS
SYSTOLIC BLOOD PRESSURE: 124 MMHG | OXYGEN SATURATION: 95 % | TEMPERATURE: 97.3 F | WEIGHT: 186 LBS | BODY MASS INDEX: 24.65 KG/M2 | DIASTOLIC BLOOD PRESSURE: 96 MMHG | RESPIRATION RATE: 16 BRPM | HEIGHT: 73 IN | HEART RATE: 70 BPM

## 2024-07-02 PROCEDURE — G0378 HOSPITAL OBSERVATION PER HR: HCPCS

## 2024-07-02 PROCEDURE — 97602 WOUND(S) CARE NON-SELECTIVE: CPT

## 2024-07-02 RX ADMIN — APIXABAN 5 MG: 5 TABLET, FILM COATED ORAL at 20:12

## 2024-07-02 RX ADMIN — DOXYCYCLINE 100 MG: 100 CAPSULE ORAL at 08:47

## 2024-07-02 RX ADMIN — ASPIRIN 81 MG: 81 TABLET, COATED ORAL at 08:47

## 2024-07-02 RX ADMIN — Medication 10 ML: at 08:49

## 2024-07-02 RX ADMIN — DILTIAZEM HYDROCHLORIDE 240 MG: 240 CAPSULE, COATED, EXTENDED RELEASE ORAL at 08:47

## 2024-07-02 RX ADMIN — APIXABAN 5 MG: 5 TABLET, FILM COATED ORAL at 08:48

## 2024-07-02 RX ADMIN — METOPROLOL SUCCINATE 100 MG: 25 TABLET, EXTENDED RELEASE ORAL at 08:47

## 2024-07-02 RX ADMIN — COLLAGENASE SANTYL 1 APPLICATION: 250 OINTMENT TOPICAL at 13:52

## 2024-07-02 RX ADMIN — LOSARTAN POTASSIUM 25 MG: 25 TABLET, FILM COATED ORAL at 20:12

## 2024-07-02 RX ADMIN — DOXYCYCLINE 100 MG: 100 CAPSULE ORAL at 20:12

## 2024-07-02 NOTE — CASE MANAGEMENT/SOCIAL WORK
Continued Stay Note  [facility]     Patient Name: Tavo Gudino  MRN: [id number]  Today's Date: 7/2/2024    Admit Date: 6/28/2024    Plan: [facility] -- Accepted & Pre-cert Obtained.   Discharge Plan       Row Name 07/02/24 1406       Plan    [facility] -- Accept[facility] Pre[facility]cert Obtained.    Patient/Family in Agreement with Plan yes    Plan Comments [facility] rescheduled the patient's transport to 2200 today. CCP updated the patient, his wife/Nanda, his nurse/Martha RN and [facility] who are all agreeable. No other needs identified. Packet is on the chart.    Final Discharge Disposition Code 03 - skilled nursing facility (SNF)    Final Note [facility].                   Discharge Codes    No documentation.                 Expected Discharge Date and Time       Expected Discharge Date Expected Discharge Time    Jul 2, 2024               Marysol DIMAS, RN

## 2024-07-02 NOTE — PLAN OF CARE
Goal Outcome Evaluation:                      Pt admitted on 6/28 with a rt leg wound. Dressing changes every other day. Pt has a history of a left BKA with daily dressing change. Pt is a dialysis pt. Pt continues with the chronic cole and biliary tube. Tunnel cath to the Right upper chest. Pt has no complaints of pain. Pt educated on blood pressure monitoring. Pt has multiple wounds. Pt on contact/spore isol for MRSA, R/O candida aureus and history of Cdiff. Pt tolerates meds whole with thin liquids. Pt resting at this time, will continue to monitor.

## 2024-07-02 NOTE — PLAN OF CARE
Goal Outcome Evaluation:           Progress: improving  Outcome Evaluation: Pt is returning to [facility] by EMS at 2200. Packet is in the front nurse's station. Pt remains stable and has not complained of any pain. Wound care done as per order. Contact spore precautions maintained.

## 2024-07-02 NOTE — CASE MANAGEMENT/SOCIAL WORK
"Physicians Statement of Medical Necessity for  Ambulance Transportation    GENERAL INFORMATION     Name: Tavo Gudino  YOB: 1955  Medicare #: [id number] (See Facesheet)  Transport Date: 7/2/2024 (Valid for round trips this date, or for scheduled repetitive trips for 60 days from the date signed below.)  Origin: [facility]  Destination: [facility].  Is the Patient's stay covered under Medicare Part A (PPS/DRG?)No   Closest appropriate facility? Yes  If this a hosp-hosp transfer? No  Is this a hospice patient? No    MEDICAL NECESSITY QUESTIONAIRE    Ambulance Transportation is medically necessary only if other means of transportation are contraindicated or would be potentially harmful to the patient.  To meet this requirement, the patient must be either \"bed confined\" or suffer from a condition such that transport by means other than an ambulance is contraindicated by the patient's condition.  The following questions must be answered by the healthcare professional signing below for this form to be valid:     1) Describe the MEDICAL CONDITION (physical and/or mental) of this patient AT THE TIME OF AMBULANCE TRANSPORT that requires the patient to be transported in an ambulance, and why transport by other means is contraindicated by the patient's condition:   Past Medical History:   Diagnosis Date    Atrial fibrillation     Chronic kidney disease (CKD), stage V     ESRD on hemodialysis     Hypertension     Metabolic acidosis     Peripheral arterial disease     Pneumothorax     Secondary hyperparathyroidism       Past Surgical History:   Procedure Laterality Date    ARTERIOVENOUS FISTULA Right     BELOW KNEE LEG AMPUTATION Left     CATARACT EXTRACTION Bilateral       2) Is this patient \"bed confined\" as defined below?Yes   To be \"bed confined\" the patient must satisfy all three of the following criteria:  (1) unable to get up from bed without assistance; AND (2) unable to " ambulate;  AND (3) unable to sit in a chair or wheelchair.  3) Can this patient safely be transported by car or wheelchair van (I.e., may safely sit during transport, without an attendant or monitoring?)No   4. In addition to completing questions 1-3 above, please check any of the following conditions that apply*:          *Note: supporting documentation for any boxes checked must be maintained in the patient's medical records Unable to tolerate seated position for time needed to transport and Other Patient is nonambulatory.      SIGNATURE OF PHYSICIAN OR OTHER AUTHORIZED HEALTHCARE PROFESSIONAL    I certify that the above information is true and correct based on my evaluation of this patient, and represent that the patient requires transport by ambulance and that other forms of transport are contraindicated.  I understand that this information will be used by the Centers for Medicare and Medicaid Services (CMS) to support the determiniation of medical necessity for ambulance services, and I represent that I have personal knowledge of the patient's condition at the time of transport.       If this box is checked, I also certify that the patient is physically or mentally incapable of signing the ambulance service's claim form and that the institution with which I am affiliated has furnished care, services or assistance to the patient.  My signature below is made on behalf of the patient pursuant to 42 .36(b)(4). In accordance with 42 .37, the specific reason(s) that the patient is physically or mentally incapable of signing the claim for is as follows:     Signature of Physician or Healthcare Professional  Date/Time:        (For Scheduled repetitive transport, this form is not valid for transports performed more than 60 days after this date).                                                                                                                                             --------------------------------------------------------------------------------------------  Printed Name and Credentials of Physician or Authorized Healthcare Professional     *Form must be signed by patient's attending physician for scheduled, repetitive transports,.  For non-repetitive ambulance transports, if unable to obtain the signature of the attending physician, any of the following may sign (please select below):     Physician  Clinical Nurse Specialist  Registered Nurse     Physician Assistant  Discharge Planner  Licensed Practical Nurse     Nurse Practitioner

## 2024-07-02 NOTE — CASE MANAGEMENT/SOCIAL WORK
Continued Stay Note  [facility]     Patient Name: Tavo Gudino  MRN: [id number]  Today's Date: 7/2/2024    Admit Date: 6/28/2024    Plan: [facility] -- Accepted & Pre-cert Obtained.   Discharge Plan       Row Name 07/02/24 0921       Plan    Plan [facility] -- Accepted & Pre-cert Obtained.    Patient/Family in Agreement with Plan yes    Plan Comments [facility] Post-Acute Auth Team has obtained SNF pre-cert. CCP discussed with [facility] who has a room for the patient today at [facility]. CCP sent a request in [employer] for a [facility]. Await their response.                   Discharge Codes    No documentation.                 Expected Discharge Date and Time       Expected Discharge Date Expected Discharge Time    Jul 2, 2024               Marysol DIMAS RN

## 2024-07-02 NOTE — DISCHARGE SUMMARY
Patient Name: Tavo Gudino  : 1955  MRN: [id number]    Date of Admission: 2024  Date of Discharge:  2024  Primary Care Physician: System, Provider Not In      Chief Complaint:   Wound Check      Discharge Diagnoses     Active Hospital Problems    Diagnosis  POA    **Leg wound, right, initial encounter [S81.801A]  Yes    Acute osteomyelitis of left tibia [M86.162]  Yes    Soft tissue infection [L08.9]  Yes    Essential hypertension [I10]  Yes    Atrial fibrillation [I48.91]  Yes    ESRD (end stage renal disease) on hemodialysis [N18.6]  Yes    History of cholecystitis s/p cholecystostomy tube [K81.9]  Yes      Resolved Hospital Problems   No resolved problems to display.        Hospital Course       This is a 69-year-old male with a history of hypertension, peripheral artery disease status post left BKA, ESRD on hemodialysis Monday through Friday, atrial fibrillation on Eliquis, recent acute cholecystitis status post cholecystostomy tube placement at outlying facility, chronic urinary retention with an indwelling Delaney catheter, history of C. difficile on oral vancomycin who presents to the hospital with a wound of the right lower extremity.  [facility] where he was also diagnosed with C. difficile and placed on oral vancomycin.    He has been evaluated for osteomyelitis before.  He had an MRI of the left tib-fib as well as the right foot.  The left tib-fib MRI showed possible osteomyelitis      Osteomyelitis   Exposed tendon on RLE  --MRI left tib/fib showed cellulitis, possible osteomyelitis  --MRI right foot with cellulitis/edema  -Currently on Idamycin cefepime.  Review of previous records state that he should be on oral doxycycline through 2024.  --no surgical intervention required per orthopedic and vascular surgery    Afib with RVR  Hx of Afib/Aflutter  -Eliquis and metoprolol.       HFrEF  Moderate MR  --ECHO :  LVEF 51 to 55%, borderline concentric LVH, left atrial volume moderately increased, mild MR, small pericardial effusion      Recent C.diff colitis  -s/p fosphofmycin at outying facility and should have completed a PO vancomycin taper as of 6/28    Recent cholecystitis s/p cholecystostomy tube     Chronic urinary retention  -continue cole catheter      PAD  Hx of left BKA  --Continue ASA  --Lt BKA drsg in place.  Continue with current wound care.      Anemia  --Likely chronic due to renal disease    HTN  ESRD  --Nephrology consulted for dialysis      Hypothyroidism  -Continue levothyroxine at 75mcg. Repeat TSH in about 2 weeks       Multiple wounds  Sacral Decubitus ulcer, POA, Stage 3      Physical Exam:  Temp:  [97 °F (36.1 °C)-98.6 °F (37 °C)] 98.6 °F (37 °C)  Heart Rate:  [60-94] 94  Resp:  [16] 16  BP: (133-151)/(67-88) 133/88  Body mass index is 24.55 kg/m².  Physical Exam  Constitutional:       Comments: Chronically ill appearing    HENT:      Head: Normocephalic and atraumatic.   Cardiovascular:      Rate and Rhythm: Normal rate and regular rhythm.   Pulmonary:      Effort: Pulmonary effort is normal. No respiratory distress.   Abdominal:      General: There is no distension.      Palpations: Abdomen is soft.      Tenderness: There is no abdominal tenderness.   Musculoskeletal:      Comments: Exposed soft tissue above right ankle    Neurological:      Mental Status: He is alert and oriented to person, place, and time.      Motor: Weakness present.         Consultants     Consult Orders (all) (From admission, onward)       Start     Ordered    06/28/24 1826  Inpatient Vascular Surgery Consult  Once        Specialty:  Vascular Surgery  Provider:  Bob Pitts II, MD    06/28/24 1826    06/28/24 1522  Inpatient Orthopedic Surgery Consult  Once        Specialty:  Orthopedic Surgery  Provider:  Porfirio Butler MD    06/28/24 1521    06/28/24 1452  Inpatient Nephrology Consult  Once        Specialty:   Nephrology  Provider:  (Not yet assigned)    06/28/24 1452    06/28/24 1303  [facility] (on-call MD unless specified) Details  Once        Specialty:  Hospitalist  Provider:  Stanley Ford MD    06/28/24 1302                  Procedures     Imaging Results (All)       Procedure Component Value Units Date/Time    XR Tibia Fibula 2 View Right [[id number]] Collected: 06/28/24 1249     Updated: 06/28/24 1257    Narrative:      XR TIBIA FIBULA 2 VW RIGHT-     INDICATIONS: Leg wound.     TECHNIQUE: FRONTAL AND LATERAL VIEWS OF THE RIGHT LOWER LEG     COMPARISON: None available     FINDINGS:     Soft tissue swelling is seen laterally at the distal right lower leg  that may be evidence of inflammation, infection, injury, correlate  clinically. Dystrophic soft tissue calcifications are seen. No  radiopaque foreign body is noted. Arterial calcifications are  conspicuous. No acute fracture or dislocation is identified. On the  lateral view, small lucency along the posterior cortex of the mid to  distal fibular shaft could be erosion/osteomyelitis. If indicated, MRI  or bone scan can be obtained for further evaluation.       Impression:         As described.           This report was finalized on 6/28/2024 12:54 PM by Dr. Aristeo Damon M.D on Workstation: [id number]               Pertinent Labs     Results from last 7 days   Lab Units 07/01/24  0341 06/30/24  0512 06/29/24  0354 06/28/24  1221   WBC 10*3/mm3 7.27 6.73 6.52 7.46   HEMOGLOBIN g/dL 8.1* 8.0* 8.4* 8.9*   PLATELETS 10*3/mm3 193 184 199 225     Results from last 7 days   Lab Units 07/01/24  0341 06/30/24  0511 06/29/24  0354 06/28/24  1221   SODIUM mmol/L 134* 133* 135* 134*   POTASSIUM mmol/L 4.6 4.1 4.4 4.2   CHLORIDE mmol/L 101 100 102 97*   CO2 mmol/L 22.0 24.5 21.5* 24.1   BUN mg/dL 38* 23 38* 37*   CREATININE mg/dL 3.33* 2.75* 3.45* 3.52*   GLUCOSE mg/dL 82 79 76 87   Estimated Creatinine Clearance: 24.3 mL/min (A) (by C-G formula based on SCr of 3.33 mg/dL  "(H)).  Results from last 7 days   Lab Units 07/01/24  0341 06/30/24  0511 06/29/24  0354 06/28/24  1221   ALBUMIN g/dL 2.8* 2.8* 2.7* 3.2*   BILIRUBIN mg/dL  --   --  0.3 0.3   ALK PHOS U/L  --   --  226* 277*   AST (SGOT) U/L  --   --  10 13   ALT (SGPT) U/L  --   --  15 22     Results from last 7 days   Lab Units 07/01/24  0341 06/30/24  0511 06/29/24  0354 06/28/24  1221   CALCIUM mg/dL 9.2 9.2 9.3 9.4   ALBUMIN g/dL 2.8* 2.8* 2.7* 3.2*   MAGNESIUM mg/dL  --   --  1.7  --    PHOSPHORUS mg/dL 5.9* 4.6* 5.7*  --                Invalid input(s): \"LDLCALC\"        Test Results Pending at Discharge     Pending Labs       Order Current Status    CANDIDA AURIS SCREEN - Swab, Axilla Right, Axilla Left and Groin Preliminary result            Discharge Details        Discharge Medications        Continue These Medications        Instructions Start Date   acetaminophen 325 MG tablet  Commonly known as: TYLENOL   650 mg, Oral, Every 4 Hours PRN      apixaban 5 MG tablet tablet  Commonly known as: ELIQUIS   5 mg, Oral, 2 Times Daily      aspirin 81 MG EC tablet   81 mg, Oral, Daily      collagenase 250 UNIT/GM ointment   1 Application, Topical, Every 24 Hours      dilTIAZem  MG 24 hr capsule  Commonly known as: CARDIZEM CD   240 mg, Oral, Every 24 Hours Scheduled      famotidine 10 MG tablet  Commonly known as: PEPCID   10 mg, Oral, Daily      folic acid 1 MG tablet  Commonly known as: FOLVITE   1 mg, Oral, Daily      lactobacillus acidophilus capsule capsule   1 capsule, Oral, 2 Times Daily      levothyroxine 75 MCG tablet  Commonly known as: SYNTHROID, LEVOTHROID   75 mcg, Oral, Every Early Morning      melatonin 5 MG tablet tablet   5 mg, Oral, Nightly PRN      metoprolol succinate  MG 24 hr tablet  Commonly known as: TOPROL-XL   100 mg, Oral, Every 24 Hours Scheduled      Nuzyra 150 MG tablet  Generic drug: Omadacycline Tosylate   Take 2 tablets (300 mg) by mouth Daily for 30 days.      Polyvinyl " Alcohol-Povidone PF 1.4-0.6 % ophthalmic solution  Commonly known as: HYPOTEARS   1 drop, Both Eyes, Every 1 Hour PRN      sodium zirconium cyclosilicate 10 g packet  Commonly known as: LOKELMA   10 g, Oral, Daily, Empty entire contents of the packet(s) into a glass with at least 3 tablespoons (45 mL) of water. Stir well and drink immediately; if powder remains in the glass, add water, stir and drink immediately; repeat until no powder remains. Administer other oral medications at least 2 hours before or 2 hours after dose.             Stop These Medications      vancomycin 125 MG capsule  Commonly known as: VANCOCIN              Allergies   Allergen Reactions    Penicillins Hives     Received ceftriaxone 6/1/24    Levothyroxine Unknown - Low Severity    Hydromorphone Other (See Comments)     Confusion, encephalopathy per wife         Discharge Disposition:  Skilled Nursing Facility (DC - External)    Discharge Diet:  Diet Order   Procedures    Diet: Regular/House; Fluid Consistency: Thin (IDDSI 0)       Discharge Activity:       CODE STATUS:    Code Status and Medical Interventions:   Ordered at: 06/28/24 1452     Code Status (Patient has no pulse and is not breathing):    CPR (Attempt to Resuscitate)     Medical Interventions (Patient has pulse or is breathing):    Full Support       No future appointments.   Contact information for follow-up providers       System, Provider Not In .    Contact information:  [facility] [id number]                       Contact information for after-discharge care       Destination       [facility] .    Service: Skilled Nursing  Contact information:  [address]  [facility] [address] [phone]                                   Time Spent on Discharge:  Greater than 30 minutes      Stanley Ford MD  [facility]  07/02/24  16:22 EDT

## 2024-07-02 NOTE — PLAN OF CARE
Goal Outcome Evaluation:              Outcome Evaluation: RLL wound, multiple wounds, A&O, RA, received HD today at bedside, HD dressing changed by HD RN, reg diet, chronic f/c, biliary drain, specialty bed, L BKA, awaiting precert to return to [facility], educated on bp monitoring, CTM

## 2024-07-02 NOTE — CASE MANAGEMENT/SOCIAL WORK
Continued Stay Note  [facility]     Patient Name: Tavo Gudino  MRN: [id number]  Today's Date: 7/2/2024    Admit Date: 6/28/2024    Plan: [facility] -- Accepted & Pre-cert Obtained.   Discharge Plan       Row Name 07/02/24 1046       Plan    Plan [facility] -- Accepted & Pre-cert Obtained.    Patient/Family in Agreement with Plan yes    Plan Comments Scheduled a [facility] for today at 1330 (spoke with Elba). Plan is for the patient to discharge to [facility] today via [facility] at 1330. Updated the patient, his wife/Nanda, his nurse/Martha LEE and Isai/Mamie. No other needs identified. Packet is on the chart.      Row Name 07/02/24 0921       Plan    Plan [facility] -- Accepted & Pre-cert Obtained.    Patient/Family in Agreement with Plan yes    Plan Comments Jaziel THAPA/FOUZIA Post-Acute Auth Team has obtained SNF pre-cert. CCP discussed with Isai/Link who has a room for the patient today at [facility]. CCP sent a request in [employer] for a [facility]. Await their response.                   Discharge Codes    No documentation.                 Expected Discharge Date and Time       Expected Discharge Date Expected Discharge Time    Jul 2, 2024               Marysol DIMAS, RN

## 2024-07-04 LAB — BACTERIA ISLT: NORMAL
